# Patient Record
Sex: MALE | Race: WHITE | NOT HISPANIC OR LATINO | Employment: FULL TIME | URBAN - METROPOLITAN AREA
[De-identification: names, ages, dates, MRNs, and addresses within clinical notes are randomized per-mention and may not be internally consistent; named-entity substitution may affect disease eponyms.]

---

## 2019-08-26 ENCOUNTER — CONVERSION ENCOUNTER (OUTPATIENT)
Dept: INTERNAL MEDICINE | Facility: CLINIC | Age: 59
End: 2019-08-26

## 2019-08-26 ENCOUNTER — OFFICE VISIT CONVERTED (OUTPATIENT)
Dept: INTERNAL MEDICINE | Facility: CLINIC | Age: 59
End: 2019-08-26
Attending: INTERNAL MEDICINE

## 2019-12-10 ENCOUNTER — HOSPITAL ENCOUNTER (OUTPATIENT)
Dept: OTHER | Facility: HOSPITAL | Age: 59
Setting detail: RECURRING SERIES
Discharge: HOME | End: 2020-01-13
Attending: NURSE PRACTITIONER

## 2020-02-26 ENCOUNTER — OFFICE VISIT CONVERTED (OUTPATIENT)
Dept: INTERNAL MEDICINE | Facility: CLINIC | Age: 60
End: 2020-02-26
Attending: INTERNAL MEDICINE

## 2020-02-26 ENCOUNTER — HOSPITAL ENCOUNTER (OUTPATIENT)
Dept: OTHER | Facility: HOSPITAL | Age: 60
Discharge: HOME | End: 2020-02-26
Attending: INTERNAL MEDICINE

## 2020-02-26 LAB
ALBUMIN SERPL-MCNC: 4.4 G/DL (ref 3.5–5)
ALBUMIN/GLOB SERPL: 1.5 {RATIO} (ref 1.4–2.6)
ALP SERPL-CCNC: 56 U/L (ref 56–119)
ALT SERPL-CCNC: 27 U/L (ref 10–40)
ANION GAP SERPL CALC-SCNC: 19 MMOL/L (ref 8–19)
AST SERPL-CCNC: 27 U/L (ref 15–50)
BASOPHILS # BLD AUTO: 0.02 10*3/UL (ref 0–0.2)
BASOPHILS NFR BLD AUTO: 0.4 % (ref 0–3)
BILIRUB SERPL-MCNC: 0.37 MG/DL (ref 0.2–1.3)
BUN SERPL-MCNC: 19 MG/DL (ref 5–25)
BUN/CREAT SERPL: 20 {RATIO} (ref 6–20)
CALCIUM SERPL-MCNC: 9 MG/DL (ref 8.7–10.4)
CHLORIDE SERPL-SCNC: 97 MMOL/L (ref 99–111)
CHOLEST SERPL-MCNC: 206 MG/DL (ref 107–200)
CHOLEST/HDLC SERPL: 5 {RATIO} (ref 3–6)
CONV ABS IMM GRAN: 0.01 10*3/UL (ref 0–0.2)
CONV CO2: 25 MMOL/L (ref 22–32)
CONV IMMATURE GRAN: 0.2 % (ref 0–1.8)
CONV TOTAL PROTEIN: 7.4 G/DL (ref 6.3–8.2)
CREAT UR-MCNC: 0.97 MG/DL (ref 0.7–1.2)
DEPRECATED RDW RBC AUTO: 48.3 FL (ref 35.1–43.9)
EOSINOPHIL # BLD AUTO: 0.15 10*3/UL (ref 0–0.7)
EOSINOPHIL # BLD AUTO: 2.9 % (ref 0–7)
ERYTHROCYTE [DISTWIDTH] IN BLOOD BY AUTOMATED COUNT: 13.1 % (ref 11.6–14.4)
GFR SERPLBLD BASED ON 1.73 SQ M-ARVRAT: >60 ML/MIN/{1.73_M2}
GLOBULIN UR ELPH-MCNC: 3 G/DL (ref 2–3.5)
GLUCOSE SERPL-MCNC: 91 MG/DL (ref 70–99)
HCT VFR BLD AUTO: 51.3 % (ref 42–52)
HDLC SERPL-MCNC: 41 MG/DL (ref 40–60)
HGB BLD-MCNC: 16.2 G/DL (ref 14–18)
LDLC SERPL CALC-MCNC: 127 MG/DL (ref 70–100)
LYMPHOCYTES # BLD AUTO: 2.16 10*3/UL (ref 1–5)
LYMPHOCYTES NFR BLD AUTO: 41.9 % (ref 20–45)
MCH RBC QN AUTO: 31.5 PG (ref 27–31)
MCHC RBC AUTO-ENTMCNC: 31.6 G/DL (ref 33–37)
MCV RBC AUTO: 99.6 FL (ref 80–96)
MONOCYTES # BLD AUTO: 0.54 10*3/UL (ref 0.2–1.2)
MONOCYTES NFR BLD AUTO: 10.5 % (ref 3–10)
NEUTROPHILS # BLD AUTO: 2.27 10*3/UL (ref 2–8)
NEUTROPHILS NFR BLD AUTO: 44.1 % (ref 30–85)
NRBC CBCN: 0 % (ref 0–0.7)
OSMOLALITY SERPL CALC.SUM OF ELEC: 286 MOSM/KG (ref 273–304)
PLATELET # BLD AUTO: 176 10*3/UL (ref 130–400)
PMV BLD AUTO: 10.4 FL (ref 9.4–12.4)
POTASSIUM SERPL-SCNC: 4.3 MMOL/L (ref 3.5–5.3)
RBC # BLD AUTO: 5.15 10*6/UL (ref 4.7–6.1)
SODIUM SERPL-SCNC: 137 MMOL/L (ref 135–147)
TRIGL SERPL-MCNC: 189 MG/DL (ref 40–150)
TSH SERPL-ACNC: 2.09 M[IU]/L (ref 0.27–4.2)
VLDLC SERPL-MCNC: 38 MG/DL (ref 5–37)
WBC # BLD AUTO: 5.15 10*3/UL (ref 4.8–10.8)

## 2020-03-10 ENCOUNTER — OFFICE VISIT CONVERTED (OUTPATIENT)
Dept: ORTHOPEDIC SURGERY | Facility: CLINIC | Age: 60
End: 2020-03-10
Attending: ORTHOPAEDIC SURGERY

## 2020-08-27 ENCOUNTER — OFFICE VISIT CONVERTED (OUTPATIENT)
Dept: INTERNAL MEDICINE | Facility: CLINIC | Age: 60
End: 2020-08-27
Attending: NURSE PRACTITIONER

## 2020-09-08 ENCOUNTER — OFFICE VISIT CONVERTED (OUTPATIENT)
Dept: ORTHOPEDIC SURGERY | Facility: CLINIC | Age: 60
End: 2020-09-08
Attending: ORTHOPAEDIC SURGERY

## 2020-09-25 ENCOUNTER — HOSPITAL ENCOUNTER (OUTPATIENT)
Dept: MRI IMAGING | Facility: HOSPITAL | Age: 60
Discharge: HOME | End: 2020-09-25
Attending: ORTHOPAEDIC SURGERY

## 2020-10-01 ENCOUNTER — OFFICE VISIT CONVERTED (OUTPATIENT)
Dept: ORTHOPEDIC SURGERY | Facility: CLINIC | Age: 60
End: 2020-10-01
Attending: ORTHOPAEDIC SURGERY

## 2021-02-24 ENCOUNTER — OFFICE VISIT CONVERTED (OUTPATIENT)
Dept: INTERNAL MEDICINE | Facility: CLINIC | Age: 61
End: 2021-02-24
Attending: PHYSICIAN ASSISTANT

## 2021-02-24 ENCOUNTER — HOSPITAL ENCOUNTER (OUTPATIENT)
Dept: OTHER | Facility: HOSPITAL | Age: 61
Discharge: HOME | End: 2021-02-24
Attending: PHYSICIAN ASSISTANT

## 2021-02-24 ENCOUNTER — CONVERSION ENCOUNTER (OUTPATIENT)
Dept: INTERNAL MEDICINE | Facility: CLINIC | Age: 61
End: 2021-02-24

## 2021-03-15 ENCOUNTER — CONVERSION ENCOUNTER (OUTPATIENT)
Dept: INTERNAL MEDICINE | Facility: CLINIC | Age: 61
End: 2021-03-15

## 2021-03-15 ENCOUNTER — OFFICE VISIT CONVERTED (OUTPATIENT)
Dept: INTERNAL MEDICINE | Facility: CLINIC | Age: 61
End: 2021-03-15
Attending: INTERNAL MEDICINE

## 2021-03-15 ENCOUNTER — HOSPITAL ENCOUNTER (OUTPATIENT)
Dept: OTHER | Facility: HOSPITAL | Age: 61
Discharge: HOME | End: 2021-03-15
Attending: INTERNAL MEDICINE

## 2021-03-15 LAB
APPEARANCE UR: CLEAR
BILIRUB UR QL STRIP: NEGATIVE
BILIRUB UR QL: NEGATIVE
COLOR UR: YELLOW
COLOR UR: YELLOW
CONV CLARITY OF URINE: CLEAR
CONV COLLECTION SOURCE (UA): NORMAL
CONV PROTEIN IN URINE BY AUTOMATED TEST STRIP: NEGATIVE
CONV UROBILINOGEN IN URINE BY AUTOMATED TEST STRIP: 1 {EHRLICHU}/DL (ref 0.1–1)
CONV UROBILINOGEN IN URINE BY AUTOMATED TEST STRIP: NORMAL
GLUCOSE UR QL: NEGATIVE
GLUCOSE UR QL: NEGATIVE MG/DL
HGB UR QL STRIP: NEGATIVE
HGB UR QL STRIP: NORMAL
KETONES UR QL STRIP: NEGATIVE
KETONES UR QL STRIP: NEGATIVE MG/DL
LEUKOCYTE ESTERASE UR QL STRIP: NEGATIVE
LEUKOCYTE ESTERASE UR QL STRIP: NEGATIVE
NITRITE UR QL STRIP: NEGATIVE
NITRITE UR QL STRIP: NEGATIVE
PH UR STRIP.AUTO: 6 [PH]
PH UR STRIP.AUTO: 6 [PH] (ref 5–8)
PROT UR QL: NEGATIVE MG/DL
SP GR UR: 1.02
SP GR UR: 1.02 (ref 1–1.03)

## 2021-03-16 ENCOUNTER — HOSPITAL ENCOUNTER (OUTPATIENT)
Dept: GENERAL RADIOLOGY | Facility: HOSPITAL | Age: 61
Discharge: HOME | End: 2021-03-16
Attending: INTERNAL MEDICINE

## 2021-03-16 LAB
CREAT BLD-MCNC: 0.9 MG/DL (ref 0.6–1.4)
GFR SERPLBLD BASED ON 1.73 SQ M-ARVRAT: >60 ML/MIN/{1.73_M2}

## 2021-03-17 LAB — BACTERIA UR CULT: NORMAL

## 2021-04-07 ENCOUNTER — OFFICE VISIT CONVERTED (OUTPATIENT)
Dept: INTERNAL MEDICINE | Facility: CLINIC | Age: 61
End: 2021-04-07
Attending: INTERNAL MEDICINE

## 2021-05-13 NOTE — PROGRESS NOTES
"   Progress Note      Patient Name: Stephon Castellano   Patient ID: [id number]   Sex: Male   YOB: 1960    Primary Care Provider: Paige Florez MD    Visit Date: August 27, 2020    Provider: NARENDRA Hayden   Location: [facility]   Location Address: [address] [place] [address], [facility]  [place], [place]  [id number]   Location Phone: [phone]          Chief Complaint  · \"dizzy, congestion, runny nose      History Of Present Illness  Stephon Castellano is a 59 year old /White male who presents for evaluation and treatment of:      Acute visit     Patient reports congestion, runny nose, fever, sinus pressure, headaches in the frontal   Sudafed when he is having congestion.   Taking flonase, needs refill.   Tmax 99.4  Denies cough, shortness of breath    Denies sick exposure.     Eating and drinking well.    Patient also concerned that he may be a diabetic.  He was told that he had a rash that was similar to a diabetic rash.  Patient had labs back in February that were reviewed with the patient.    Patient would also like to discuss something for attention deficit.                Past Medical History  Disease Name Date Onset Notes   Allergic rhinitis --  --    Arthritis --  --    Cervical neck pain with evidence of disc disease --  --    Depression, unspecified depression type --  --    Head injury 1977 --    Hemorrhoid --  --    Hypogonadism in male --  --          Past Surgical History  Procedure Name Date Notes   ACL repair 2004 --    Cervical disc surgery 2008 --    Heel Spur 2005 --    Metal implants --  --    Pain Pump 2010 --          Medication List  Name Date Started Instructions   Depo-Testosterone 100 mg/mL intramuscular oil  inject 0.75 milliliter by intramuscular route every 2 weeks   Dilaudid oral  in pain pump   Flonase Allergy Relief 50 mcg/actuation nasal spray,suspension 08/27/2020 spray 2 sprays (100 mcg) in each nostril by intranasal route once daily   ibuprofen " 200 mg oral tablet 08/27/2020 take 2 tablets (400 mg) by oral route 3 times per day with food   metaxalone 800 mg oral tablet  take 1 tablet by oral route 3 times a day as needed   tramadol 50 mg oral tablet  take 1 tablet (50 mg) by oral route every 6 hours as needed   trazodone 50 mg oral tablet 08/26/2019 take 1 tablet (50 mg) by oral route once daily at bedtime for 90 days   Tylenol 325 mg oral tablet 08/27/2020 take 1 tablet (325 mg) by oral route every 4 hours as needed   Zoloft 50 mg oral tablet  take 1 tablet (50 mg) by oral route once daily         Allergy List  Allergen Name Date Reaction Notes   NO KNOWN DRUG ALLERGIES --  --  --    NO KNOWN DRUG ALLERGIES --  --  --          Family Medical History  Disease Name Relative/Age Notes   Stroke  aunt/uncle grandparents   Heart Disease Mother/   Mother   Cancer, Unspecified Father/   Father   Diabetes, unspecified type Sister/   Sister   Prostate cancer Father/   --    Diabetes  brother/sister grandparents   Osteoporosis Mother/   Mother   Family history of Arthritis Sister/   Sister         Social History  Finding Status Start/Stop Quantity Notes   Alcohol Use Current some day --/-- --  rarely drinks, has been drinking for 31 or more years   lives with spouse --  --/-- --  --    . --  --/-- --  --    Recreational Drug Use Never --/-- --  no   Tobacco Former --/-- --  former smoker  2 packs per day, smoked for 8 years quit 1990   Working --  --/-- --  --          Immunizations  NameDate Admin Mfg Trade Name Lot Number Route Inj VIS Given VIS Publication   Ixdaxhczo76/01/2019 SKB Fluarix, quadrivalent, preservative free  NE NE 10/01/2019    Comments: given at prev. PCP per pt         Review of Systems  · Constitutional  o Admits  o : fever  o Denies  o : fatigue, weight loss, weight gain  · HENT  o Admits  o : headaches, vertigo, lightheadedness, sinus pain, ear fullness  o Denies  o : nasal congestion, nasal discharge, sore throat, ear  "pain  · Cardiovascular  o Denies  o : lower extremity edema, claudication, chest pressure, palpitations  · Respiratory  o Denies  o : shortness of breath, wheezing, cough, hemoptysis, dyspnea on exertion  · Gastrointestinal  o Denies  o : nausea, vomiting, diarrhea, constipation, abdominal pain  · Musculoskeletal  o Admits  o : joint pain, wrist pain      Vitals  Date Time BP Position Site L\R Cuff Size HR RR TEMP (F) WT  HT  BMI kg/m2 BSA m2 O2 Sat HC       02/26/2020 03:29 /78 Sitting    68 - R  98.8 232lbs 4oz 5'  11\" 32.39 2.3 93 %    03/10/2020 03:29 PM       16  231lbs 16oz 5'  11\" 32.36 2.3     08/27/2020 03:19 PM      95 - R  99.5 238lbs 2oz 5'  11\" 33.21 2.33 81 %          Physical Examination  · Constitutional  o Appearance  o : no acute distress, well-nourished  · Head and Face  o Head  o :   § Inspection  § : atraumatic, normocephalic  · Eyes  o Eyes  o : extraocular movements intact, no scleral icterus, no conjunctival injection  · Ears, Nose, Mouth and Throat  o Ears  o :   § External Ears  § : normal  § Otoscopic Examination  § : fluid bubbles present behind tympanic membrane, Left TM injected, Bilateral TM effusion  o Nose  o :   § Intranasal Exam  § : nares patent, frontal sinus pain with palpation  o Oral Cavity  o :   § Oral Mucosa  § : moist mucous membranes  o Throat  o :   § Oropharynx  § : oropharynx inflammation present, tonsils within normal limits  · Respiratory  o Respiratory Effort  o : breathing comfortably, symmetric chest rise  o Auscultation of Lungs  o : clear to asculatation bilaterally, no wheezes, rales, or rhonchii  · Cardiovascular  o Heart  o :   § Auscultation of Heart  § : regular rate and rhythm, no murmurs, rubs, or gallops  o Peripheral Vascular System  o :   § Extremities  § : no edema  · Gastrointestinal  o Abdominal Examination  o :   § Abdomen  § : bowel sounds present, non-distended, non-tender  · Lymphatic  o Neck  o : no lymphadenopathy " present  o Supraclavicular Nodes  o : no supraclavicular nodes  · Skin and Subcutaneous Tissue  o General Inspection  o : no lesions present, no areas of discoloration, skin turgor normal  · Neurologic  o Mental Status Examination  o :   § Orientation  § : grossly oriented to person, place and time  o Gait and Station  o :   § Gait Screening  § : normal gait  · Psychiatric  o General  o : normal mood and affect  · Right Wrist  o Inspection  o : no deformity, no scarring, no redness, no swelling  o Palpation  o : radial styloid non tender, scaphoid non tender, CMC joint non tender, Listeis tubercle non tender, ulnar styloid non tender, pisiform non tender, hook of hammate non tender, non-tender to palpation  o Range of Motion  o : flexion normal 70-90 degrees, extension normal 75-90 degrees, pronation normal 80 degrees, supination normal 80 degrees, radial deviation normal 10-30 degrees, ulnar deviation normal 10-30 degrees  o Neurovascular  o : radial pulse present, ulnar pulse present, neurovascularly intact  o Special Tests  o : positive Finkelsteins, Phalen's positive          Assessment  · Allergic rhinitis     477.9/J30.9  Refill placed for Flonase and added Zyrtec for allergy relief.  · Arthritis     V13.4/V13.4  · Screening for lipid disorders     V77.91/Z13.220  · Sinusitis, acute     461.9/J01.90  Patient prescribed Augmentin and Mucinex at this time for sinus infection. Educated on supportive care to include rest, fluids, Tylenol and Motrin as needed. Educated patient to call or return to clinic with any new or worsening symptoms.  · Wrist pain     719.43/M25.539  Will refer to orthopedics per patient request.  · Screening for diabetes mellitus     V77.1/Z13.1       Discussed the patient's interest in ADHD medicine will consult with Dr. Florez and discussed with patient.  Patient will be set up for a phone call or telehealth visit to discuss.     Problems Reconciled  Plan  · Orders  o CBC with Auto Diff  [facility] ([id number]) - V13.4/V13.4, 461.9/J01.90, V77.91/Z13.220, V77.1/Z13.1 - 08/27/2020  o CMP [facility] ([id number]) - V13.4/V13.4, 477.9/J30.9, V77.91/Z13.220, V77.1/Z13.1 - 08/27/2020  o Hgb A1c [facility] ([id number]) - V13.4/V13.4, 477.9/J30.9, V77.91/Z13.220, V77.1/Z13.1 - 08/27/2020  o Lipid Panel [facility] ([id number]) - V77.91/Z13.220 - 08/27/2020  o TSH [facility] ([id number]) - 719.43/M25.539, 477.9/J30.9, 461.9/J01.90, V77.91/Z13.220 - 08/27/2020  o ACO-39: Current medications updated and reviewed () - - 08/27/2020  o ORTHOPEDIC CONSULTATION (ORTHO) - 719.43/M25.539 - 08/27/2020  · Medications  o Zyrtec 10 mg oral tablet   SIG: take 1 tablet (10 mg) by oral route once daily for 30 days   DISP: (30) tablets with 3 refills  Prescribed on 08/27/2020     o Augmentin 875-125 mg oral tablet   SIG: take 1 tablet by oral route every 12 hours for 10 days   DISP: (20) tablets with 0 refills  Prescribed on 08/27/2020     o Flonase Allergy Relief 50 mcg/actuation nasal spray,suspension   SIG: spray 2 sprays (100 mcg) in each nostril by intranasal route once daily   DISP: (1) 9.9 ml aer w/adap with 5 refills  Refilled on 08/27/2020     o ibuprofen 200 mg oral tablet   SIG: take 2 tablets (400 mg) by oral route 3 times per day with food   DISP: (300) tablets with 5 refills  Refilled on 08/27/2020     o Tylenol 325 mg oral tablet   SIG: take 1 tablet (325 mg) by oral route every 4 hours as needed   DISP: (180) tablets with 5 refills  Refilled on 08/27/2020     o Medications have been Reconciled  o Transition of Care or Provider Policy  · Instructions  o Patient was educated/instructed on their diagnosis, treatment and medications prior to discharge from the clinic today.  o Call the office with any concerns or questions.  · Disposition  o Call or Return if symptoms worsen or persist.  o Meds sent to pharmacy  o As Needed for Follow Up  o Care Transition  o LUCIANO Sent            Electronically Signed by: Nadya Mandel APRN -Author on August 27, 2020 04:02:25 PM

## 2021-05-13 NOTE — PROGRESS NOTES
Progress Note      Patient Name: Stephon Castellano   Patient ID: [id number]   Sex: Male   YOB: 1960    Primary Care Provider: Paige Florez MD    Visit Date: September 8, 2020    Provider: Jose Alejandro Carson MD   Location: [facility]   Location Address: [address] [place] [address]  [id number]   Location Phone: [phone]          Chief Complaint  · Right wrist pain      History Of Present Illness  Stephon Castellano is a 59 year old /White male who presents today to [facility].      The patient presents here today for follow up for right wrist pain.  We previously seen the patient for right wrist pain back in March 2020. We recommended the patient have a MRI of the right wrist. The patient is here today because he couldn't have his MRI due to his pain pump.  Patient has previously had MRIs in the past without any issues.       Past Medical History  Allergic rhinitis; Arthritis; Cervical neck pain with evidence of disc disease; Depression, unspecified depression type; Head injury; Hemorrhoid; Hypogonadism in male         Past Surgical History  ACL repair; Cervical disc surgery; Heel Spur; Metal implants; Pain Pump         Medication List  Augmentin 875-125 mg oral tablet; Depo-Testosterone 100 mg/mL intramuscular oil; Dilaudid oral; Flonase Allergy Relief 50 mcg/actuation nasal spray,suspension; ibuprofen 200 mg oral tablet; metaxalone 800 mg oral tablet; tramadol 50 mg oral tablet; trazodone 50 mg oral tablet; Tylenol 325 mg oral tablet; Zoloft 50 mg oral tablet; Zyrtec 10 mg oral tablet         Allergy List  NO KNOWN DRUG ALLERGIES; NO KNOWN DRUG ALLERGIES       Allergies Reconciled  Family Medical History  Stroke; Heart Disease; Cancer, Unspecified; Diabetes, unspecified type; Prostate cancer; Diabetes; Osteoporosis; Family history of Arthritis         Social History  Alcohol Use (Current some day); lives with spouse; .; Recreational Drug Use (Never);  "Tobacco (Former); Working         Review of Systems  · Constitutional  o Denies  o : fever, chills, weight loss  · Cardiovascular  o Denies  o : chest pain, shortness of breath  · Gastrointestinal  o Denies  o : liver disease, heartburn, nausea, blood in stools  · Genitourinary  o Denies  o : painful urination, blood in urine  · Integument  o Admits  o : itching  o Denies  o : rash  · Neurologic  o Denies  o : headache, weakness, loss of consciousness  · Musculoskeletal  o Admits  o : painful, swollen joints  · Psychiatric  o Admits  o : depression  o Denies  o : drug/alcohol addiction, anxiety      Vitals  Date Time BP Position Site L\R Cuff Size HR RR TEMP (F) WT  HT  BMI kg/m2 BSA m2 O2 Sat HC       09/08/2020 03:37 PM         240lbs 0oz 5'  11\" 33.47 2.34           Physical Examination  · Constitutional  o Appearance  o : well developed, well-nourished, no obvious deformities present  · Head and Face  o Head  o :   § Inspection  § : normocephalic  o Face  o :   § Inspection  § : no facial lesions  · Eyes  o Conjunctivae  o : conjunctivae normal  o Sclerae  o : sclerae white  · Ears, Nose, Mouth and Throat  o Ears  o :   § External Ears  § : appearance within normal limits  § Hearing  § : intact  o Nose  o :   § External Nose  § : appearance normal  · Neck  o Inspection/Palpation  o : normal appearance  o Range of Motion  o : full range of motion  · Respiratory  o Respiratory Effort  o : breathing unlabored  o Inspection of Chest  o : normal appearance  o Auscultation of Lungs  o : no audible wheezing or rales  · Cardiovascular  o Heart  o : regular rate  · Gastrointestinal  o Abdominal Examination  o : soft and non-tender  · Skin and Subcutaneous Tissue  o General Inspection  o : intact, no rashes  · Psychiatric  o General  o : Alert and oriented x3  o Judgement and Insight  o : judgment and insight intact  o Mood and Affect  o : mood normal, affect appropriate  · Right Wrist  o Inspection  o : Skin intact, no " swelling, no redness, no ecchymosis or deformity. Patient has tenderness to palpation of distal ulna. No pain with range of motion and patient has full range of motion with flexion, extension, radial and ulnar deviation, pronation and supination. Normal neurovascular exam   · Injection Note/Aspiration Note  o Site  o : Right wrist   o Procedure  o : Procedure: After educating the patient, patient gave consent for procedure. After using Chloraprep, the joint space was injected. The patient tolerated the procedure well.  o Medication  o : 80 mg of DepoMedrol with 1cc of 1% Lidocaine              Assessment  · Right ulna sided wrist pain     719.43/M25.531      Plan  · Orders  o Depo-Medrol injection 80mg () - - 09/08/2020   Lot [id number] Exp 07 2021 [employer] Administered by CHARLY HARDWICK MD  o Arthrocentesis of wrist ([id number]) - - 09/08/2020   Lot [id number] Exp 08 01 2021 [employer] Administered by CHARLY HARDWICK MD  · Medications  o Medications have been Reconciled  o Transition of Care or Provider Policy  · Instructions  o Reviewed the patient's Past Medical, Social, and Family history as well as the ROS at today's visit, no changes.  o Call or return if worsening symptoms.  o Follow up after MRI.  o The above service was scribed by Demi Castillo on my behalf and I attest to the accuracy of the note. jsb  o Dr. Hardwick called and spoke to Dr. Vick at [facility] about the patients pain pump. Dr Vick looked did some research and okd the patient to get scheduled for an MRI. Plan for MRI and repeat injection today. The patient will follow up after the MRI for results.   o Electronically Identified Patient Education Materials Provided Electronically  · Referrals  o ID: [id number] Date: 03/10/2020 Type: Inbound  Specialty: Orthopedic Surgery            Electronically Signed by: Demi Castillo MA -Author on September 9, 2020 03:53:48 PM  Electronically Co-signed by: Charly Hardwick MD  -Reviewer on September 9, 2020 10:11:39 PM

## 2021-05-13 NOTE — PROGRESS NOTES
Progress Note      Patient Name: Stephon Castellano   Patient ID: [id number]   Sex: Male   YOB: 1960    Primary Care Provider: Paige Florez MD    Visit Date: October 1, 2020    Provider: Jose Alejandro Carson MD   Location: [facility]   Location Address: [address] [place] [address]  [id number]   Location Phone: [phone]          Chief Complaint  · right wrist pain      History Of Present Illness  Stephon Castellano is a 60 year old /White male who presents today to [facility].      The patient presents here today for follow up evaluation of right wrist pain.  The patient states the injection did not give him any relief. He has had pain for about 10 years. He has been doing conservative treatment for this length of time. 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 We previously ordered an MRI on his right wrist and he is here today to follow up for those results.             Past Medical History  Allergic rhinitis; Arthritis; Cervical neck pain with evidence of disc disease; Depression, unspecified depression type; Head injury; Hemorrhoid; Hypogonadism in male         Past Surgical History  ACL repair; Cervical disc surgery; Heel Spur; Metal implants; Pain Pump         Medication List  Augmentin 875-125 mg oral tablet; Depo-Testosterone 100 mg/mL intramuscular oil; Dilaudid oral; Flonase Allergy Relief 50 mcg/actuation nasal spray,suspension; ibuprofen 200 mg oral tablet; metaxalone 800 mg oral tablet; tramadol 50 mg oral tablet; trazodone 50 mg oral tablet; Tylenol 325 mg oral tablet; Zoloft 50 mg oral tablet; Zyrtec 10 mg oral tablet         Allergy List  NO KNOWN DRUG ALLERGIES; NO KNOWN DRUG ALLERGIES       Allergies Reconciled  Family Medical History  Stroke; Heart Disease; Cancer, Unspecified; Diabetes, unspecified type; Prostate cancer; Diabetes; Osteoporosis; Family history of Arthritis         Social History  Alcohol Use (Current some day); lives with  "spouse; .; Recreational Drug Use (Never); Tobacco (Former); Working         Review of Systems  · Constitutional  o Denies  o : fever, chills, weight loss  · Cardiovascular  o Denies  o : chest pain, shortness of breath  · Gastrointestinal  o Denies  o : liver disease, heartburn, nausea, blood in stools  · Genitourinary  o Denies  o : painful urination, blood in urine  · Integument  o Denies  o : rash, itching  · Neurologic  o Denies  o : headache, weakness, loss of consciousness  · Musculoskeletal  o Denies  o : painful, swollen joints  · Psychiatric  o Denies  o : drug/alcohol addiction, anxiety, depression      Vitals  Date Time BP Position Site L\R Cuff Size HR RR TEMP (F) WT  HT  BMI kg/m2 BSA m2 O2 Sat FR L/min FiO2        10/01/2020 03:17 PM      71 - R   239lbs 0oz 5'  11\" 33.33 2.33 95 %            Physical Examination  · Constitutional  o Appearance  o : well developed, well-nourished, no obvious deformities present  · Head and Face  o Head  o :   § Inspection  § : normocephalic  o Face  o :   § Inspection  § : no facial lesions  · Eyes  o Conjunctivae  o : conjunctivae normal  o Sclerae  o : sclerae white  · Ears, Nose, Mouth and Throat  o Ears  o :   § External Ears  § : appearance within normal limits  § Hearing  § : intact  o Nose  o :   § External Nose  § : appearance normal  · Neck  o Inspection/Palpation  o : normal appearance  o Range of Motion  o : full range of motion  · Respiratory  o Respiratory Effort  o : breathing unlabored  o Inspection of Chest  o : normal appearance  o Auscultation of Lungs  o : no audible wheezing or rales  · Cardiovascular  o Heart  o : regular rate  · Gastrointestinal  o Abdominal Examination  o : soft and non-tender  · Skin and Subcutaneous Tissue  o General Inspection  o : intact, no rashes  · Psychiatric  o General  o : Alert and oriented x3  o Judgement and Insight  o : judgment and insight intact  o Mood and Affect  o : mood normal, affect " appropriate  · Right Wrist  o Inspection  o : Skin intact, no swelling, no redness, no ecchymosis or deformity. Patient has tenderness to palpation of distal ulna. No pain with range of motion and patient has full range of motion with flexion, extension, radial and ulnar deviation, pronation and supination. Normal neurovascular exam  · Imaging  o Imaging  o : [facility] MRI 09/2020: 1. Tendinosis and tenosynovitis of the extensor carpi ulnaris tendon. No evidence of a focal tear. 2. Mild osteoarthritis. 3. 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           Assessment  · Right wrist pain     719.43/M25.531  · TFCC (triangular fibrocartilage complex) injury     718.03/S69.80XA  · Right wrist tendonitis     727.05/M77.8      Plan  · Medications  o Medications have been Reconciled  o Transition of Care or Provider Policy  · Instructions  o MRI reviewed by Dr. Carson.  o Reviewed the patient's Past Medical, Social, and Family history as well as the ROS at today's visit, no changes.  o Call or return if worsening symptoms.  o Follow Up PRN.  o The above service was scribed by Demi Castillo on my behalf and I attest to the accuracy of the note. jsb  o Discussed treatment options with the patient. Due to failed conservative treatment for so many years, plan for a referral to Dr. Hoover in [place]   o Electronically Identified Patient Education Materials Provided Electronically  · Disposition  o Care Transition  o LUCIANO Sent            Electronically Signed by: Demi Castillo MA -Author on October 2, 2020 08:30:23 AM  Electronically Co-signed by: Jose Alejandro aCrson MD -Reviewer on October 6, 2020 10:27:14 PM

## 2021-05-14 VITALS — OXYGEN SATURATION: 95 % | HEIGHT: 71 IN | BODY MASS INDEX: 33.46 KG/M2 | HEART RATE: 71 BPM | WEIGHT: 239 LBS

## 2021-05-14 VITALS
WEIGHT: 226 LBS | BODY MASS INDEX: 31.64 KG/M2 | TEMPERATURE: 99.1 F | SYSTOLIC BLOOD PRESSURE: 126 MMHG | HEIGHT: 71 IN | OXYGEN SATURATION: 98 % | HEART RATE: 76 BPM | DIASTOLIC BLOOD PRESSURE: 82 MMHG

## 2021-05-14 VITALS
DIASTOLIC BLOOD PRESSURE: 82 MMHG | BODY MASS INDEX: 31.27 KG/M2 | SYSTOLIC BLOOD PRESSURE: 138 MMHG | WEIGHT: 223.37 LBS | HEART RATE: 87 BPM | HEIGHT: 71 IN | RESPIRATION RATE: 16 BRPM | OXYGEN SATURATION: 97 % | TEMPERATURE: 99.1 F

## 2021-05-14 VITALS
OXYGEN SATURATION: 95 % | HEIGHT: 71 IN | SYSTOLIC BLOOD PRESSURE: 144 MMHG | HEART RATE: 92 BPM | TEMPERATURE: 97.9 F | BODY MASS INDEX: 32.76 KG/M2 | WEIGHT: 234 LBS | DIASTOLIC BLOOD PRESSURE: 82 MMHG

## 2021-05-14 VITALS
BODY MASS INDEX: 33.34 KG/M2 | TEMPERATURE: 99.5 F | HEART RATE: 95 BPM | OXYGEN SATURATION: 81 % | WEIGHT: 238.12 LBS | HEIGHT: 71 IN

## 2021-05-14 VITALS — BODY MASS INDEX: 33.6 KG/M2 | WEIGHT: 240 LBS | HEIGHT: 71 IN

## 2021-05-14 NOTE — PROGRESS NOTES
Progress Note      Patient Name: Stephon Castellano   Patient ID: [id number]   Sex: Male   YOB: 1960    Primary Care Provider: Paige Florez MD    Visit Date: April 7, 2021    Provider: Paige Florez MD   Location: [facility]   Location Address: [address] [place] [address], [address]  [place], [place]  [id number]   Location Phone: [phone]          Chief Complaint  · Low back, right side flank pain x 1month      History Of Present Illness  Stephon Castellano is a 60 year old /White male who presents for evaluation and treatment of:      Last seen 3/15 for flank pain, hematuria. Had CT on 3/16 which showed 1mm non-obstructing kidney stone.   He has been taking Flomax since last visit.     States that he is still experiencing right flank pain, this has improved but is still present.   He denies any fever, painful urination, blood in urine.       He also complains of a cyst in his right earlobe. He states that he has had this before and his doctor at that time removed it in clinic. Cyst is tender, but ear lobe is not red or swollen.       Past Medical History  Disease Name Date Onset Notes   Allergic rhinitis --  --    Arthritis --  --    Cervical neck pain with evidence of disc disease --  --    Depression, unspecified depression type --  --    Head injury 1977 --    Hemorrhoid --  --    Hypogonadism in male --  --          Past Surgical History  Procedure Name Date Notes   ACL repair 2004 --    Cervical disc surgery 2008 --    Heel Spur 2005 --    Metal implants --  --    Pain Pump 2010 --          Medication List  Name Date Started Instructions   Cinnamon 500 mg oral capsule  take 1 capsule by oral route daily   Depo-Testosterone 100 mg/mL intramuscular oil  inject 0.75 milliliter by intramuscular route every 2 weeks   Dilaudid oral  in pain pump   ferrous sulfate 325 mg (65 mg iron) oral tablet 03/01/2021 take 1 tablet (325 mg) by oral route once daily   Flomax 0.4 mg oral  capsule 03/15/2021 take 1 capsule (0.4 mg) by oral route once daily 1/2 hour following the same meal each day   Flonase Allergy Relief 50 mcg/actuation nasal spray,suspension 08/27/2020 spray 2 sprays (100 mcg) in each nostril by intranasal route once daily   ibuprofen 200 mg oral tablet 08/27/2020 take 2 tablets (400 mg) by oral route 3 times per day with food   melatonin 10 mg oral tablet  take 1 tablet by oral route daily   Multivitamin 50 Plus oral tablet  take 1 tablet by oral route daily   trazodone 50 mg oral tablet 08/26/2019 take 1 tablet (50 mg) by oral route once daily at bedtime for 90 days   Tylenol 325 mg oral tablet 08/27/2020 take 1 tablet (325 mg) by oral route every 4 hours as needed   Vitamin C 500 mg oral tablet  take 1 tablet by oral route daily   Zoloft 50 mg oral tablet  take 1 tablet (50 mg) by oral route once daily   Zyrtec 10 mg oral tablet 08/27/2020 take 1 tablet (10 mg) by oral route once daily for 30 days         Allergy List  Allergen Name Date Reaction Notes   NO KNOWN DRUG ALLERGIES --  --  --        Allergies Reconciled  Family Medical History  Disease Name Relative/Age Notes   Stroke  aunt/uncle grandparents   Heart Disease Mother/   Mother   Cancer, Unspecified Father/   Father   Diabetes, unspecified type Sister/   Sister   Prostate cancer Father/   --    Diabetes  brother/sister grandparents   Osteoporosis Mother/   Mother   Family history of Arthritis Sister/   Sister         Social History  Finding Status Start/Stop Quantity Notes   Alcohol Use Current some day --/-- --  rarely drinks, has been drinking for 31 or more years   lives with spouse --  --/-- --  --    . --  --/-- --  --    Recreational Drug Use Never --/-- --  no   Tobacco Former --/-- --  former smoker  2 packs per day, smoked for 8 years quit 1990   Working --  --/-- --  --          Immunizations  NameDate Admin Mfg Trade Name Lot Number Route Inj VIS Given VIS Publication   [id number]/01/2019 CHAYITO  "Fluarix, quadrivalent, preservative free  NE [place] 10/01/2019    Comments: given at prev. PCP per pt         Vitals  Date Time BP Position Site L\R Cuff Size HR RR TEMP (F) WT  HT  BMI kg/m2 BSA m2 O2 Sat FR L/min FiO2 HC       02/24/2021 04:19 /82 Sitting    92 - R  97.9 233lbs 16oz 5'  11\" 32.64 2.31 95 %  21%    03/15/2021 04:06 /82 Sitting    87 - R 16 99.1 223lbs 6oz 5'  11\" 31.15 2.25 97 %  21%    04/07/2021 03:25 /82 Sitting    76 - R  99.1 226lbs 0oz 5'  11\" 31.52 2.27 98 %  21%          Physical Examination  · Constitutional  o Appearance  o : no acute distress, well-nourished  · Head and Face  o Head  o :   § Inspection  § : atraumatic, normocephalic  · Eyes  o Eyes  o : extraocular movements intact, no scleral icterus, no conjunctival injection  · Ears, Nose, Mouth and Throat  o Ears  o :   § External Ears  § : normal  o Nose  o :   § Intranasal Exam  § : nares patent  o Oral Cavity  o :   § Oral Mucosa  § : moist mucous membranes  · Respiratory  o Respiratory Effort  o : breathing comfortably, symmetric chest rise  o Auscultation of Lungs  o : clear to asculatation bilaterally, no wheezes, rales, or rhonchii  · Cardiovascular  o Heart  o :   § Auscultation of Heart  § : regular rate and rhythm, no murmurs, rubs, or gallops  o Peripheral Vascular System  o :   § Extremities  § : no edema  · Gastrointestinal  o Abdominal Examination  o :   § Abdomen  § : bowel sounds present, non-distended, non-tender  · Skin and Subcutaneous Tissue  o General Inspection  o : 3mm nodule in right earlobe that feels as though it may be fluid filled. no over lying redness or swelling.   · Neurologic  o Mental Status Examination  o :   § Orientation  § : grossly oriented to person, place and time  o Gait and Station  o :   § Gait Screening  § : normal gait  · Psychiatric  o General  o : normal mood and affect              Assessment  · Renal calculus     592.0/N20.0  3/16/21 CT result reviewed, 1mm " non-obstructing renal stone.   Continue Flomax  will refer to urology given continued pain and patient concern  · Cyst of skin     706.2/L72.9  will refer to derm for removal given cosmetically sensitive location    Problems Reconciled  Plan  · Orders  o ACO-39: Current medications updated and reviewed (, 1159F) - - 04/07/2021  o UROLOGY CONSULTATION (UROLO) - 592.0/N20.0 - 04/07/2021   1mm non-obstructing stone on CT 3/16/21, still having pain, on flomax daily.  o DERMATOLOGY CONSULTATION (DERMA) - 706.2/L72.9 - 04/07/2021   cystic lesion in right earlobe  · Medications  o Medications have been Reconciled  o Transition of Care or Provider Policy  · Instructions  o Patient was educated/instructed on their diagnosis, treatment and medications prior to discharge from the clinic today.  o Call the office with any concerns or questions.  · Disposition  o follow up as needed  o Referals Placed  o Dr. Mendez Patient  o Care Transition  o LUCIANO Sent            Electronically Signed by: Paige Florez MD -Author on April 8, 2021 03:36:10 PM

## 2021-05-14 NOTE — PROGRESS NOTES
"   Progress Note      Patient Name: Stephon Castellano   Patient ID: [id number]   Sex: Male   YOB: 1960    Primary Care Provider: Paige Florez MD    Visit Date: March 15, 2021    Provider: Rossy Mendez MD   Location: [facility]   Location Address: [address] [place] [address], [facility]  [place], [place]  [id number]   Location Phone: [phone]          Chief Complaint  · low back pain   · right eye floater  · \"I wake up once a night every night for the past week, I have insomnia\"      History Of Present Illness  Stephon Castellano is a 60 year old /White male who presents for evaluation and treatment of:      floaters just in the right eye  less than one week, nothing is making it better or worse  does sit a a computer all day  went to the eye doctor Sat, but they didn't accept his insurance  [facility]    Continues to have flank pain  States it is there all the time but sometimes better and sometimes worse  Increased urinary frequency  No gross blood  No pain with urination    Recent PSA normal per patient report will try to find this lab value       Past Medical History  Disease Name Date Onset Notes   Allergic rhinitis --  --    Arthritis --  --    Cervical neck pain with evidence of disc disease --  --    Depression, unspecified depression type --  --    Head injury 1977 --    Hemorrhoid --  --    Hypogonadism in male --  --          Past Surgical History  Procedure Name Date Notes   ACL repair 2004 --    Cervical disc surgery 2008 --    Heel Spur 2005 --    Metal implants --  --    Pain Pump 2010 --          Medication List  Name Date Started Instructions   Cinnamon 500 mg oral capsule  take 1 capsule by oral route daily   Depo-Testosterone 100 mg/mL intramuscular oil  inject 0.75 milliliter by intramuscular route every 2 weeks   Dilaudid oral  in pain pump   ferrous sulfate 325 mg (65 mg iron) oral tablet 03/01/2021 take 1 tablet (325 mg) by oral route once daily "   Flomax 0.4 mg oral capsule 03/15/2021 take 1 capsule (0.4 mg) by oral route once daily 1/2 hour following the same meal each day   Flonase Allergy Relief 50 mcg/actuation nasal spray,suspension 08/27/2020 spray 2 sprays (100 mcg) in each nostril by intranasal route once daily   ibuprofen 200 mg oral tablet 08/27/2020 take 2 tablets (400 mg) by oral route 3 times per day with food   melatonin 10 mg oral tablet  take 1 tablet by oral route daily   Multivitamin 50 Plus oral tablet  take 1 tablet by oral route daily   trazodone 50 mg oral tablet 08/26/2019 take 1 tablet (50 mg) by oral route once daily at bedtime for 90 days   Tylenol 325 mg oral tablet 08/27/2020 take 1 tablet (325 mg) by oral route every 4 hours as needed   Vitamin C 500 mg oral tablet  take 1 tablet by oral route daily   Zoloft 50 mg oral tablet  take 1 tablet (50 mg) by oral route once daily   Zyrtec 10 mg oral tablet 08/27/2020 take 1 tablet (10 mg) by oral route once daily for 30 days         Allergy List  Allergen Name Date Reaction Notes   NO KNOWN DRUG ALLERGIES --  --  --        Allergies Reconciled  Family Medical History  Disease Name Relative/Age Notes   Stroke  aunt/uncle grandparents   Heart Disease Mother/   Mother   Cancer, Unspecified Father/   Father   Diabetes, unspecified type Sister/   Sister   Prostate cancer Father/   --    Diabetes  brother/sister grandparents   Osteoporosis Mother/   Mother   Family history of Arthritis Sister/   Sister         Social History  Finding Status Start/Stop Quantity Notes   Alcohol Use Current some day --/-- --  rarely drinks, has been drinking for 31 or more years   lives with spouse --  --/-- --  --    . --  --/-- --  --    Recreational Drug Use Never --/-- --  no   Tobacco Former --/-- --  former smoker  2 packs per day, smoked for 8 years quit 1990   Working --  --/-- --  --          Immunizations  NameDate Admin Mfg Trade Name Lot Number Route Inj VIS Given VIS Publication  "  [id number]/01/2019 SKB Fluarix, quadrivalent, preservative free  NE NE 10/01/2019    Comments: given at prev. PCP per pt         Vitals  Date Time BP Position Site L\R Cuff Size HR RR TEMP (F) WT  HT  BMI kg/m2 BSA m2 O2 Sat FR L/min FiO2 HC       10/01/2020 03:17 PM      71 - R   239lbs 0oz 5'  11\" 33.33 2.33 95 %      02/24/2021 04:19 /82 Sitting    92 - R  97.9 233lbs 16oz 5'  11\" 32.64 2.31 95 %  21%    03/15/2021 04:06 /82 Sitting    87 - R 16 99.1 223lbs 6oz 5'  11\" 31.15 2.25 97 %  21%          Physical Examination  · Constitutional  o Appearance  o : no acute distress, well-nourished  · Head and Face  o Head  o :   § Inspection  § : atraumatic, normocephalic  · Eyes  o Eyes  o : extraocular movements intact, no scleral icterus, no conjunctival injection  · Respiratory  o Respiratory Effort  o : breathing comfortably, symmetric chest rise  o Auscultation of Lungs  o : clear to asculatation bilaterally, no wheezes, rales, or rhonchii  · Cardiovascular  o Heart  o :   § Auscultation of Heart  § : regular rate and rhythm, no murmurs, rubs, or gallops  o Peripheral Vascular System  o :   § Extremities  § : no edema  · Neurologic  o Mental Status Examination  o :   § Orientation  § : grossly oriented to person, place and time  o Gait and Station  o :   § Gait Screening  § : normal gait  · Psychiatric  o General  o : normal mood and affect     CVA tenderness right worse than left           Results  · In-Office Procedures  o Lab procedure  § IOP - Urinalysis without Microscopy (Clinitek) [facility] ([id number])   § Color Ur: Yellow   § Clarity Ur: Clear   § Glucose Ur Ql Strip: Negative   § Bilirub Ur Ql Strip: Negative   § Ketones Ur Ql Strip: Negative   § Sp Gr Ur Qn: 1.025   § Hgb Ur Ql Strip: Trace-Intact   § pH Ur-LsCnc: 6.0   § Prot Ur Ql Strip: Negative   § Urobilinogen Ur Strip-mCnc: 1.0 E.U./dL   § Nitrite Ur Ql Strip: Negative   § WBC Est Ur Ql Strip: Negative "       Assessment  · Lumbago     724.2/M54.5  · Frequent urination     788.41/R35.0  · Nocturia     788.43/R35.1  Will try Flomax for BPH-like symptoms as well  · Flank pain     789.09/R10.9  · Hematuria     599.70/R31.9  Will get further imaging to look into kidney stone  Start Flomax  Warned if symptoms worsen at all to call us or go to the ER  · Vision abnormalities     368.9/H53.9  · Floater, vitreous     379.24/H43.399  Will refer to ophthalmology for further work-up    Problems Reconciled  Plan  · Orders  o Urinalysis with Reflex Microscopy ([facility]) (59151) - 724.2/M54.5, 788.41/R35.0 - 03/15/2021  o Urine culture (48244, 33639) - 724.2/M54.5, 788.41/R35.0 - 03/15/2021  o ACO-14: Influenza immunization administered or previously received [facility] () - - 03/15/2021   already administered  o ACO-39: Current medications updated and reviewed (, 1159F) - - 03/15/2021  o CT Abdomen and Pelvis (with and without Contrast) with Bosniak Class and delayed images ([id number]-TA) - 788.43/R35.1, 789.09/R10.9, 599.70/R31.9 - 03/15/2021  o OPHTHALMOLOGY CONSULTATION (OPHTH) - 368.9/H53.9, 379.24/H43.399 - 03/16/2021  · Medications  o Flomax 0.4 mg oral capsule   SIG: take 1 capsule (0.4 mg) by oral route once daily 1/2 hour following the same meal each day   DISP: (90) Capsule with 0 refills  Prescribed on 03/15/2021     o Medications have been Reconciled  o Transition of Care or Provider Policy  · Instructions  o Patient was educated/instructed on their diagnosis, treatment and medications prior to discharge from the clinic today.            Electronically Signed by: Rossy Mendez MD -Author on March 19, 2021 08:11:25 AM

## 2021-05-14 NOTE — PROGRESS NOTES
Progress Note      Patient Name: Stephon Castellano   Patient ID: [id number]   Sex: Male   YOB: 1960    Primary Care Provider: Paige Florez MD    Visit Date: February 24, 2021    Provider: Anny Jo PA-C   Location: [facility]   Location Address: [address] [place] [address], [facility] [address]  [place], [place]  [id number]   Location Phone: [phone]          Chief Complaint  · Acute visit       History Of Present Illness  Stephon Castellano is a 60 year old /White male who presents for evaluation and treatment of:      f/u post covid.  Symptoms typically include diarrhea, body aches, fatigue, SOB, chills, insomnia.  He has also had some lightheadedness.  Pt had + covid dx a month ago.  At that time his symptoms were mild and he recovered within a week.  However, since then he has been having the same symptoms which are happening weekly 1-2 days then resolve.  He has been taking some Dayquil without improvement. No fevers or headaches.  No chest pain.          Past Medical History  Disease Name Date Onset Notes   Allergic rhinitis --  --    Arthritis --  --    Cervical neck pain with evidence of disc disease --  --    Depression, unspecified depression type --  --    Head injury 1977 --    Hemorrhoid --  --    Hypogonadism in male --  --          Past Surgical History  Procedure Name Date Notes   ACL repair 2004 --    Cervical disc surgery 2008 --    Heel Spur 2005 --    Metal implants --  --    Pain Pump 2010 --          Medication List  Name Date Started Instructions   Depo-Testosterone 100 mg/mL intramuscular oil  inject 0.75 milliliter by intramuscular route every 2 weeks   Dilaudid oral  in pain pump   Flonase Allergy Relief 50 mcg/actuation nasal spray,suspension 08/27/2020 spray 2 sprays (100 mcg) in each nostril by intranasal route once daily   ibuprofen 200 mg oral tablet 08/27/2020 take 2 tablets (400 mg) by oral route 3 times per day with food   trazodone 50 mg oral tablet  08/26/2019 take 1 tablet (50 mg) by oral route once daily at bedtime for 90 days   Tylenol 325 mg oral tablet 08/27/2020 take 1 tablet (325 mg) by oral route every 4 hours as needed   Zoloft 50 mg oral tablet  take 1 tablet (50 mg) by oral route once daily   Zyrtec 10 mg oral tablet 08/27/2020 take 1 tablet (10 mg) by oral route once daily for 30 days         Allergy List  Allergen Name Date Reaction Notes   NO KNOWN DRUG ALLERGIES --  --  --    NO KNOWN DRUG ALLERGIES --  --  --        Allergies Reconciled  Family Medical History  Disease Name Relative/Age Notes   Stroke  aunt/uncle grandparents   Heart Disease Mother/   Mother   Cancer, Unspecified Father/   Father   Diabetes, unspecified type Sister/   Sister   Prostate cancer Father/   --    Diabetes  brother/sister grandparents   Osteoporosis Mother/   Mother   Family history of Arthritis Sister/   Sister         Social History  Finding Status Start/Stop Quantity Notes   Alcohol Use Current some day --/-- --  rarely drinks, has been drinking for 31 or more years   lives with spouse --  --/-- --  --    . --  --/-- --  --    Recreational Drug Use Never --/-- --  no   Tobacco Former --/-- --  former smoker  2 packs per day, smoked for 8 years quit 1990   Working --  --/-- --  --          Immunizations  NameDate Admin Mfg Trade Name Lot Number Route Inj VIS Given VIS Publication   [id number]/01/2019 SKB Fluarix, quadrivalent, preservative free  NE [place] 10/01/2019    Comments: given at prev. PCP per pt         Review of Systems  · Constitutional  o Admits  o : fatigue  o Denies  o : fever, weight loss, weight gain  · Cardiovascular  o Denies  o : lower extremity edema, claudication, chest pressure, palpitations  · Respiratory  o Admits  o : shortness of breath  o Denies  o : wheezing, cough, hemoptysis, dyspnea on exertion  · Gastrointestinal  o Admits  o : diarrhea  o Denies  o : nausea, vomiting, constipation, abdominal pain      Vitals  Date Time BP  "Position Site L\R Cuff Size HR RR TEMP (F) WT  HT  BMI kg/m2 BSA m2 O2 Sat FR L/min FiO2 HC       10/01/2020 03:17 PM      71 - R   239lbs 0oz 5'  11\" 33.33 2.33 95 %      02/24/2021 04:19 /82 Sitting    92 - R  97.9 233lbs 16oz 5'  11\" 32.64 2.31 95 %  21%          Physical Examination  · Constitutional  o Appearance  o : no acute distress, well-nourished  · Head and Face  o Head  o :   § Inspection  § : atraumatic, normocephalic  · Eyes  o Eyes  o : extraocular movements intact, no scleral icterus, no conjunctival injection  · Ears, Nose, Mouth and Throat  o Ears  o :   § External Ears  § : normal  § Otoscopic Examination  § : tympanic membrane appearance within normal limits bilaterally  o Nose  o :   § Intranasal Exam  § : nares patent  o Oral Cavity  o :   § Oral Mucosa  § : moist mucous membranes  · Respiratory  o Respiratory Effort  o : breathing comfortably, symmetric chest rise  o Auscultation of Lungs  o : clear to asculatation bilaterally, no wheezes, rales, or rhonchii  · Cardiovascular  o Heart  o :   § Auscultation of Heart  § : regular rate and rhythm, no murmurs, rubs, or gallops  o Peripheral Vascular System  o :   § Extremities  § : no edema  · Lymphatic  o Neck  o : no lymphadenopathy present  · Skin and Subcutaneous Tissue  o General Inspection  o : no lesions present, no areas of discoloration, skin turgor normal  · Neurologic  o Mental Status Examination  o :   § Orientation  § : grossly oriented to person, place and time  o Gait and Station  o :   § Gait Screening  § : normal gait  · Psychiatric  o General  o : normal mood and affect              Assessment  · Diarrhea     787.91/R19.7  · Fatigue     780.79/R53.83  · History of COVID-19     V12.09/Z86.16  Recurrent symptoms most likely post covid. Will check some basic blood work to make sure patient is not anemic, electrolytes are normal or other discrepancies. If symptoms persist could consider sending patient to Post-Covid specality " clinic.   · Dyspnea on exertion     786.09/R06.00  CXR done in office WNL. Will continue to monitor at this time. Most likely due to post viral reaction as well. If symptoms persist, worse or he develops chest pain RTC or go to ER.   · Dizziness     780.4/R42    Problems Reconciled  Plan  · Orders  o CBC with Auto Diff [facility] ([id number]) - V12.09/Z86.16, 786.09/R06.00, 780.79/R53.83 - 02/24/2021  o CMP [facility] ([id number]) - V12.09/Z86.16, 786.09/R06.00, 780.79/R53.83 - 02/24/2021  o ACO-39: Current medications updated and reviewed (1159F, ) - - 02/24/2021  o Chest (AP PA and Lateral) 2 views X-Ray [facility] Preferred View ([id number]) - V12.09/Z86.16, 786.09/R06.00, 780.4/R42 - 02/24/2021   Done in office  o Iron Profile (Iron 65596 TIBC 77712 and Transferrin 64415) (IRONP) - V12.09/Z86.16, 786.09/R06.00, 780.79/R53.83 - 02/24/2021  · Medications  o Medications have been Reconciled  o Transition of Care or Provider Policy  · Instructions  o Patient was educated/instructed on their diagnosis, treatment and medications prior to discharge from the clinic today.  o Patient instructed to seek medical attention urgently for new or worsening symptoms.  o Call the office with any concerns or questions.  · Disposition  o Call or Return if symptoms worsen or persist.  o follow up as needed  o Discussed with Dr. Florez            Electronically Signed by: GAVIN LoganC -Author on February 25, 2021 11:43:28 AM

## 2021-05-15 VITALS
SYSTOLIC BLOOD PRESSURE: 128 MMHG | WEIGHT: 232.25 LBS | HEART RATE: 68 BPM | BODY MASS INDEX: 32.52 KG/M2 | TEMPERATURE: 98.8 F | HEIGHT: 71 IN | DIASTOLIC BLOOD PRESSURE: 78 MMHG | OXYGEN SATURATION: 93 %

## 2021-05-15 VITALS
RESPIRATION RATE: 16 BRPM | BODY MASS INDEX: 30.56 KG/M2 | HEART RATE: 70 BPM | WEIGHT: 218.25 LBS | TEMPERATURE: 98.2 F | SYSTOLIC BLOOD PRESSURE: 102 MMHG | HEIGHT: 71 IN | DIASTOLIC BLOOD PRESSURE: 62 MMHG | OXYGEN SATURATION: 96 %

## 2021-05-15 VITALS — HEIGHT: 71 IN | BODY MASS INDEX: 32.48 KG/M2 | WEIGHT: 232 LBS | RESPIRATION RATE: 16 BRPM

## 2021-07-20 ENCOUNTER — OFFICE VISIT (OUTPATIENT)
Dept: INTERNAL MEDICINE | Facility: CLINIC | Age: 61
End: 2021-07-20

## 2021-07-20 VITALS
HEIGHT: 71 IN | HEART RATE: 90 BPM | OXYGEN SATURATION: 98 % | WEIGHT: 233.2 LBS | DIASTOLIC BLOOD PRESSURE: 86 MMHG | SYSTOLIC BLOOD PRESSURE: 118 MMHG | TEMPERATURE: 98.5 F | BODY MASS INDEX: 32.65 KG/M2

## 2021-07-20 DIAGNOSIS — M54.6 CHRONIC RIGHT-SIDED THORACIC BACK PAIN: Primary | ICD-10-CM

## 2021-07-20 DIAGNOSIS — G89.29 CHRONIC RIGHT-SIDED THORACIC BACK PAIN: Primary | ICD-10-CM

## 2021-07-20 LAB
ALBUMIN SERPL-MCNC: 4.7 G/DL (ref 3.5–5.2)
ALBUMIN/GLOB SERPL: 1.8 G/DL
ALP SERPL-CCNC: 59 U/L (ref 39–117)
ALT SERPL W P-5'-P-CCNC: 25 U/L (ref 1–41)
ANION GAP SERPL CALCULATED.3IONS-SCNC: 10.8 MMOL/L (ref 5–15)
AST SERPL-CCNC: 31 U/L (ref 1–40)
BASOPHILS # BLD AUTO: 0.03 10*3/MM3 (ref 0–0.2)
BASOPHILS NFR BLD AUTO: 0.5 % (ref 0–1.5)
BILIRUB SERPL-MCNC: 0.3 MG/DL (ref 0–1.2)
BUN SERPL-MCNC: 7 MG/DL (ref 8–23)
BUN/CREAT SERPL: 7.4 (ref 7–25)
CALCIUM SPEC-SCNC: 9.1 MG/DL (ref 8.6–10.5)
CHLORIDE SERPL-SCNC: 100 MMOL/L (ref 98–107)
CHOLEST SERPL-MCNC: 223 MG/DL (ref 0–200)
CO2 SERPL-SCNC: 28.2 MMOL/L (ref 22–29)
CREAT SERPL-MCNC: 0.95 MG/DL (ref 0.76–1.27)
DEPRECATED RDW RBC AUTO: 41.9 FL (ref 37–54)
EOSINOPHIL # BLD AUTO: 0.12 10*3/MM3 (ref 0–0.4)
EOSINOPHIL NFR BLD AUTO: 1.9 % (ref 0.3–6.2)
ERYTHROCYTE [DISTWIDTH] IN BLOOD BY AUTOMATED COUNT: 11.5 % (ref 12.3–15.4)
GFR SERPL CREATININE-BSD FRML MDRD: 81 ML/MIN/1.73
GLOBULIN UR ELPH-MCNC: 2.6 GM/DL
GLUCOSE SERPL-MCNC: 91 MG/DL (ref 65–99)
HBA1C MFR BLD: 5.4 % (ref 4.8–5.6)
HCT VFR BLD AUTO: 46.7 % (ref 37.5–51)
HDLC SERPL-MCNC: 48 MG/DL (ref 40–60)
HGB BLD-MCNC: 16.1 G/DL (ref 13–17.7)
IMM GRANULOCYTES # BLD AUTO: 0.01 10*3/MM3 (ref 0–0.05)
IMM GRANULOCYTES NFR BLD AUTO: 0.2 % (ref 0–0.5)
LDLC SERPL CALC-MCNC: 143 MG/DL (ref 0–100)
LDLC/HDLC SERPL: 2.9 {RATIO}
LYMPHOCYTES # BLD AUTO: 2.52 10*3/MM3 (ref 0.7–3.1)
LYMPHOCYTES NFR BLD AUTO: 40.5 % (ref 19.6–45.3)
MCH RBC QN AUTO: 33.6 PG (ref 26.6–33)
MCHC RBC AUTO-ENTMCNC: 34.5 G/DL (ref 31.5–35.7)
MCV RBC AUTO: 97.5 FL (ref 79–97)
MONOCYTES # BLD AUTO: 0.66 10*3/MM3 (ref 0.1–0.9)
MONOCYTES NFR BLD AUTO: 10.6 % (ref 5–12)
NEUTROPHILS NFR BLD AUTO: 2.88 10*3/MM3 (ref 1.7–7)
NEUTROPHILS NFR BLD AUTO: 46.3 % (ref 42.7–76)
NRBC BLD AUTO-RTO: 0 /100 WBC (ref 0–0.2)
PLATELET # BLD AUTO: 175 10*3/MM3 (ref 140–450)
PMV BLD AUTO: 10.3 FL (ref 6–12)
POTASSIUM SERPL-SCNC: 4 MMOL/L (ref 3.5–5.2)
PROT SERPL-MCNC: 7.3 G/DL (ref 6–8.5)
RBC # BLD AUTO: 4.79 10*6/MM3 (ref 4.14–5.8)
SODIUM SERPL-SCNC: 139 MMOL/L (ref 136–145)
TRIGL SERPL-MCNC: 179 MG/DL (ref 0–150)
VLDLC SERPL-MCNC: 32 MG/DL (ref 5–40)
WBC # BLD AUTO: 6.22 10*3/MM3 (ref 3.4–10.8)

## 2021-07-20 PROCEDURE — 36415 COLL VENOUS BLD VENIPUNCTURE: CPT | Performed by: PHYSICIAN ASSISTANT

## 2021-07-20 PROCEDURE — 99213 OFFICE O/P EST LOW 20 MIN: CPT | Performed by: PHYSICIAN ASSISTANT

## 2021-07-20 PROCEDURE — 85025 COMPLETE CBC W/AUTO DIFF WBC: CPT | Performed by: PHYSICIAN ASSISTANT

## 2021-07-20 PROCEDURE — 80061 LIPID PANEL: CPT | Performed by: PHYSICIAN ASSISTANT

## 2021-07-20 PROCEDURE — 83036 HEMOGLOBIN GLYCOSYLATED A1C: CPT | Performed by: PHYSICIAN ASSISTANT

## 2021-07-20 PROCEDURE — 80053 COMPREHEN METABOLIC PANEL: CPT | Performed by: PHYSICIAN ASSISTANT

## 2021-07-20 RX ORDER — AMPICILLIN TRIHYDRATE 250 MG
CAPSULE ORAL
COMMUNITY

## 2021-07-20 RX ORDER — ASCORBIC ACID 500 MG
TABLET ORAL
COMMUNITY

## 2021-07-20 RX ORDER — FERROUS SULFATE 325(65) MG
TABLET ORAL
COMMUNITY
Start: 2021-03-01

## 2021-07-20 RX ORDER — TESTOSTERONE CYPIONATE 200 MG/ML
INJECTION, SOLUTION INTRAMUSCULAR
COMMUNITY
Start: 2021-06-25

## 2021-07-20 RX ORDER — MULTIPLE VITAMINS W/ MINERALS TAB 9MG-400MCG
TAB ORAL
COMMUNITY

## 2021-07-20 NOTE — PROGRESS NOTES
"Chief Complaint  right kidney pain (going on since Feb)    Subjective          Stephon Castellano presents to [facility]  R flank pain- \"burning sensation\", feels it when leaning back or sitting at computer.  Not worse to the touch.  Patient had CT abd/pelvis in March which showed 1mm puntate nonobstructing kidney stone on L side.  He has a pain pump for neck fusion.  Some increased lower back pain over the past year due to covid.      Would like labs to check blood sugars because of frequent urination.  He has a family history of diabetes and prostate cancer.  Has had PSA checked about 6 months to a year ago.        Objective   Vital Signs:   /86   Pulse 90   Temp 98.5 °F (36.9 °C)   Ht 180.3 cm (71\")   Wt 106 kg (233 lb 3.2 oz)   SpO2 98%   BMI 32.52 kg/m²     Physical Exam  Vitals reviewed.   Constitutional:       Appearance: Normal appearance. He is well-developed.   HENT:      Head: Normocephalic and atraumatic.      Mouth/Throat:      Pharynx: No oropharyngeal exudate.   Eyes:      Conjunctiva/sclera: Conjunctivae normal.      Pupils: Pupils are equal, round, and reactive to light.   Cardiovascular:      Rate and Rhythm: Normal rate and regular rhythm.      Heart sounds: No murmur heard.   No friction rub. No gallop.    Pulmonary:      Effort: Pulmonary effort is normal.      Breath sounds: Normal breath sounds. No wheezing or rhonchi.   Abdominal:      General: Bowel sounds are normal. There is no distension.      Palpations: Abdomen is soft.      Tenderness: There is no abdominal tenderness.   Musculoskeletal:      Comments: R flank TTP, no spinal tenderness, no SI joint tenderness, full ROM of spine, no rashes   Skin:     General: Skin is warm and dry.   Neurological:      Mental Status: He is alert and oriented to person, place, and time.      Cranial Nerves: No cranial nerve deficit.   Psychiatric:         Mood and Affect: Mood and affect normal.    "      Behavior: Behavior normal.         Thought Content: Thought content normal.         Judgment: Judgment normal.        Result Review :          Procedures      Assessment and Plan    Diagnoses and all orders for this visit:    1. Chronic right-sided thoracic back pain (Primary)  Assessment & Plan:  Some suspicion for musculoskeletal origin especially due to duration of symptoms.  Will get thoracic x-ray.  Could consider further imaging with MRI if needed.  Discussed results of CT of abdomen from earlier this year with patient.  Okay to send patient to urology for review of small nonobstructing punctate kidney stone and family history of prostate cancer.    Orders:  -     Ambulatory Referral to Urology  -     XR Spine Thoracic 3 View (In Office); Future  -     CBC & Differential; Future  -     Comprehensive Metabolic Panel; Future  -     Lipid Panel; Future  -     Hemoglobin A1c; Future  -     CBC & Differential  -     Comprehensive Metabolic Panel  -     Lipid Panel  -     Hemoglobin A1c      Follow Up   Return in about 4 weeks (around 8/17/2021).  Patient was given instructions and counseling regarding his condition or for health maintenance advice. Please see specific information pulled into the AVS if appropriate.

## 2021-07-21 NOTE — ASSESSMENT & PLAN NOTE
Some suspicion for musculoskeletal origin especially due to duration of symptoms.  Will get thoracic x-ray.  Could consider further imaging with MRI if needed.  Discussed results of CT of abdomen from earlier this year with patient.  Okay to send patient to urology for review of small nonobstructing punctate kidney stone and family history of prostate cancer.

## 2021-08-30 ENCOUNTER — OFFICE VISIT (OUTPATIENT)
Dept: UROLOGY | Facility: CLINIC | Age: 61
End: 2021-08-30

## 2021-08-30 VITALS
BODY MASS INDEX: 31.75 KG/M2 | DIASTOLIC BLOOD PRESSURE: 76 MMHG | SYSTOLIC BLOOD PRESSURE: 153 MMHG | WEIGHT: 226.8 LBS | HEIGHT: 71 IN

## 2021-08-30 DIAGNOSIS — M54.6 CHRONIC RIGHT-SIDED THORACIC BACK PAIN: Primary | ICD-10-CM

## 2021-08-30 DIAGNOSIS — N20.0 KIDNEY STONE: ICD-10-CM

## 2021-08-30 DIAGNOSIS — G89.29 CHRONIC RIGHT-SIDED THORACIC BACK PAIN: Primary | ICD-10-CM

## 2021-08-30 LAB
BILIRUB BLD-MCNC: NEGATIVE MG/DL
CLARITY, POC: CLEAR
COLOR UR: YELLOW
GLUCOSE UR STRIP-MCNC: NEGATIVE MG/DL
KETONES UR QL: ABNORMAL
LEUKOCYTE EST, POC: NEGATIVE
NITRITE UR-MCNC: NEGATIVE MG/ML
PH UR: 5.5 [PH] (ref 5–8)
PROT UR STRIP-MCNC: NEGATIVE MG/DL
RBC # UR STRIP: NEGATIVE /UL
SP GR UR: 1.01 (ref 1–1.03)
UROBILINOGEN UR QL: NORMAL

## 2021-08-30 PROCEDURE — 99202 OFFICE O/P NEW SF 15 MIN: CPT | Performed by: UROLOGY

## 2021-08-30 PROCEDURE — 81003 URINALYSIS AUTO W/O SCOPE: CPT | Performed by: UROLOGY

## 2021-08-30 RX ORDER — TRAZODONE HYDROCHLORIDE 100 MG/1
100 TABLET ORAL NIGHTLY
COMMUNITY

## 2021-08-30 RX ORDER — TAMSULOSIN HYDROCHLORIDE 0.4 MG/1
1 CAPSULE ORAL DAILY
COMMUNITY

## 2021-08-30 RX ORDER — DULOXETIN HYDROCHLORIDE 60 MG/1
60 CAPSULE, DELAYED RELEASE ORAL DAILY
COMMUNITY

## 2021-08-30 NOTE — PROGRESS NOTES
"Chief Complaint  Flank Pain    Subjective          Stephon Castellano presents to [facility]OLOG[facility]  Patient has chronic back pain for the past 3 to 4 months.  He had Covid in January and after he recovered from that started having some midthoracic right chronic back pain.  He denies any dysuria frequency urgency.  Denies hematuria.  He had a CT scan that revealed a punctate 1 mm left renal stone and no right renal stones.  He did not have any hydronephrosis.  Had no other  abnormalities on the scan.    Objective   Vital Signs:   /76   Ht 180.3 cm (71\")   Wt 103 kg (226 lb 12.8 oz)   BMI 31.63 kg/m²     Physical Exam  Vitals and nursing note reviewed.   Constitutional:       Appearance: Normal appearance. He is well-developed.   Pulmonary:      Effort: Pulmonary effort is normal.      Breath sounds: Normal air entry.   Neurological:      Mental Status: He is alert and oriented to person, place, and time.      Motor: Motor function is intact.   Psychiatric:         Mood and Affect: Mood normal.         Behavior: Behavior normal.        Result Review :                  Results for orders placed or performed in visit on 08/30/21   POC Urinalysis Dipstick, Automated    Specimen: Urine   Result Value Ref Range    Color Yellow Yellow, Straw, Dark Yellow, Mavis    Clarity, UA Clear Clear    Specific Gravity  1.015 (A) 1.005 - 1.030    pH, Urine 5.5 5.0 - 8.0    Leukocytes Negative Negative    Nitrite, UA Negative Negative    Protein, POC Negative Negative mg/dL    Glucose, UA Negative Negative, 1000 mg/dL (3+) mg/dL    Ketones, UA 15 mg/dL (A) Negative    Urobilinogen, UA Normal Normal    Bilirubin Negative Negative    Blood, UA Negative Negative       Assessment and Plan    Diagnoses and all orders for this visit:    1. Chronic right-sided thoracic back pain (Primary)  Assessment & Plan:  I have encouraged him to follow back up with his PCP as I suspect his pain is probably more musculoskeletal in " nature.  He does have a lot of spine issues and so imaging of his thoracic spine may be the next best course of action.      2. Kidney stone  Assessment & Plan:  He has a punctate 1 mm left new stone which have assured him is not the cause of his pain.  It is small enough he should pass it without any issues.      Orders:  -     POC Urinalysis Dipstick, Automated      Follow Up   Return if symptoms worsen or fail to improve.  Patient was given instructions and counseling regarding his condition or for health maintenance advice. Please see specific information pulled into the AVS if appropriate.

## 2021-08-30 NOTE — ASSESSMENT & PLAN NOTE
He has a punctate 1 mm left new stone which have assured him is not the cause of his pain.  It is small enough he should pass it without any issues.

## 2021-08-30 NOTE — ASSESSMENT & PLAN NOTE
I have encouraged him to follow back up with his PCP as I suspect his pain is probably more musculoskeletal in nature.  He does have a lot of spine issues and so imaging of his thoracic spine may be the next best course of action.

## 2022-02-18 ENCOUNTER — TELEPHONE (OUTPATIENT)
Dept: INTERNAL MEDICINE | Facility: CLINIC | Age: 62
End: 2022-02-18

## 2022-02-18 NOTE — TELEPHONE ENCOUNTER
Caller: Stephon Castellano    Relationship: Self    Best call back number: [phone]    What is the medical concern/diagnosis: PAIN CLINIC     What specialty or service is being requested: PAIN SPECIALISTS     What is the provider, practice or medical service name: [facility]. DR. CORWIN SALAMANCA      What is the office location: [address] [place] [address]     What is the office phone number: [phone]    Any additional details: NEEDS TO BE RETRO ACTIVE FROM April OF 2021 THROUGH April OF 2022 AND THEN WE WILL NEED A NEW ONE FROM April OF 2022 THROUGH April OF 2023.

## 2022-02-21 NOTE — TELEPHONE ENCOUNTER
Caller: Stephon Castellano    Relationship: Self    Best call back number: [phone]    What specialty or service is being requested: ENDOCRINOLOGY AS WELL AS PAIN MANAGEMENT MENTIONED IN THIS MESSAGE     What is the provider, practice or medical service name: [facility]     What is the office phone number: [phone]    Any additional details: PATIENT STATED THAT THE REFERRAL IS NEEDING TO BE UPDATED PER PATIENTS INSURANCE, RESUBMISSION FOR DR SALAMANCA IN PAIN MANAGEMENT FROM 4/2021 TO 4/2022, HE IS WANTING TO VERIFY THAT ENDOCRINOLOGY REFERRAL IS UP TO DATE OR RENEWED

## 2022-06-03 ENCOUNTER — TELEPHONE (OUTPATIENT)
Dept: INTERNAL MEDICINE | Facility: CLINIC | Age: 62
End: 2022-06-03

## 2022-06-03 NOTE — TELEPHONE ENCOUNTER
Red rule verified and correct.    Urology calling stating Dr Rosado requests updated imaging before they see him

## 2022-06-06 NOTE — TELEPHONE ENCOUNTER
Why is he being seen by urology again?   He was seen in July 2021 and had small stone, but they did not recommend follow up.   I haven't seen him since then.   Did urology say they wanted to see him?    Patient fell 2x at home w/unclear reasons, h/o CVA w/no residual deficits  - CTH negative for acute pathology  - could be d/t dehydration 2/2 flu  - c/w IVF maintenance  - no acute pathology a/w spinal stenosis  - Outpatient PT

## 2022-06-06 NOTE — TELEPHONE ENCOUNTER
Caller: Stephon Castellano    Relationship: Self    Best call back number: [phone]    What is the best time to reach you: ANTYIME    Who are you requesting to speak with (clinical staff, provider,  specific staff member): CLINICAL    What was the call regarding: PATIENT IS RETURNING CALL. HE IS REQUESTING CALL BACK.    Do you require a callback: YES

## 2022-06-07 NOTE — TELEPHONE ENCOUNTER
Red rule verified and correct.    Pt said to disregard.  He had some questions regarding billing, but not another appt.

## 2022-06-20 ENCOUNTER — CLINICAL SUPPORT (OUTPATIENT)
Dept: INTERNAL MEDICINE | Facility: CLINIC | Age: 62
End: 2022-06-20

## 2022-06-20 DIAGNOSIS — Z78.9: Primary | ICD-10-CM

## 2022-06-20 LAB
EXPIRATION DATE: NORMAL
FLUAV AG UPPER RESP QL IA.RAPID: NOT DETECTED
FLUBV AG UPPER RESP QL IA.RAPID: NOT DETECTED
INTERNAL CONTROL: NORMAL
Lab: NORMAL
SARS-COV-2 AG UPPER RESP QL IA.RAPID: NOT DETECTED

## 2022-06-20 PROCEDURE — 87428 SARSCOV & INF VIR A&B AG IA: CPT | Performed by: PHYSICIAN ASSISTANT

## 2023-03-22 ENCOUNTER — TELEPHONE (OUTPATIENT)
Dept: SURGERY | Facility: CLINIC | Age: 63
End: 2023-03-22

## 2023-03-22 ENCOUNTER — PREP FOR SURGERY (OUTPATIENT)
Dept: OTHER | Facility: HOSPITAL | Age: 63
End: 2023-03-22
Payer: OTHER GOVERNMENT

## 2023-03-22 ENCOUNTER — OFFICE VISIT (OUTPATIENT)
Dept: SURGERY | Facility: CLINIC | Age: 63
End: 2023-03-22
Payer: OTHER GOVERNMENT

## 2023-03-22 ENCOUNTER — TRANSCRIBE ORDERS (OUTPATIENT)
Dept: ADMINISTRATIVE | Facility: HOSPITAL | Age: 63
End: 2023-03-22
Payer: OTHER GOVERNMENT

## 2023-03-22 VITALS — HEIGHT: 71 IN | WEIGHT: 202.4 LBS | RESPIRATION RATE: 16 BRPM | BODY MASS INDEX: 28.34 KG/M2

## 2023-03-22 DIAGNOSIS — E22.1 HYPERPROLACTINEMIA: Primary | ICD-10-CM

## 2023-03-22 DIAGNOSIS — Z13.9 SCREENING DUE: Primary | ICD-10-CM

## 2023-03-22 PROCEDURE — S0260 H&P FOR SURGERY: HCPCS | Performed by: SURGERY

## 2023-03-22 RX ORDER — SODIUM CHLORIDE 0.9 % (FLUSH) 0.9 %
10 SYRINGE (ML) INJECTION EVERY 12 HOURS SCHEDULED
OUTPATIENT
Start: 2023-03-22

## 2023-03-22 RX ORDER — SODIUM CHLORIDE 9 MG/ML
40 INJECTION, SOLUTION INTRAVENOUS AS NEEDED
OUTPATIENT
Start: 2023-03-22

## 2023-03-22 RX ORDER — SODIUM CHLORIDE 0.9 % (FLUSH) 0.9 %
10 SYRINGE (ML) INJECTION AS NEEDED
OUTPATIENT
Start: 2023-03-22

## 2023-03-22 NOTE — PROGRESS NOTES
Chief Complaint: Colonoscopy    Subjective         History of Present Illness  Stephon Castellano is a 62 y.o. male presents to [facility].   The patient is to be seen for  a colonoscopy consult.    History of polyps     The patient had a colonoscopy in 2019 that showed several polyps. One polyp revealed a slightly more advanced tubulovillous adenoma. Additionally, there were hemorrhoids present at that time.      Constipation   The patient reports occasional constipation. He feels his poor diet choices contribute to his irregular bowel movements. He notes he is trying to improve his diet. The patient denies hematochezia and night sweats. He states he initially lost 45 pounds, bringing his weight to approximately 180 pounds. He reports his weight loss was intentional at first, but he continued to lose weight without effort. The patient has been able to gain some weight back and is now up to 203 pounds.  He denies any family history of colon cancer.     Objective     Past Medical History:   Diagnosis Date   • Allergic rhinitis    • Arthritis    • Cervical neck pain with evidence of disc disease    • Depression    • Head injury 1977   • Hemorrhoid    • Hypogonadism in male        Past Surgical History:   Procedure Laterality Date   • CERVICAL DISC SURGERY  2008   • HEEL SPUR SURGERY  2005   • KNEE ACL RECONSTRUCTION  2004   • OTHER SURGICAL HISTORY      METAL IMPLANT   • PAIN PUMP INSERTION/REVISION  2010         Current Outpatient Medications:   •  DULoxetine (CYMBALTA) 60 MG capsule, Take 1 capsule by mouth Daily., Disp: , Rfl:   •  multivitamin with minerals tablet tablet, Multivitamin 50 Plus oral tablet take 1 tablet by oral route daily   Active, Disp: , Rfl:   •  tamsulosin (FLOMAX) 0.4 MG capsule 24 hr capsule, Take 1 capsule by mouth Daily., Disp: , Rfl:   •  Testosterone Cypionate (DEPOTESTOTERONE CYPIONATE) 200 MG/ML injection, , Disp: , Rfl:   •  traZODone (DESYREL) 100 MG  "tablet, Take 1 tablet by mouth Every Night., Disp: , Rfl:   •  ascorbic acid (VITAMIN C) 500 MG tablet, Vitamin C 500 mg oral tablet take 1 tablet by oral route daily   Active (Patient not taking: Reported on 3/22/2023), Disp: , Rfl:   •  Cinnamon 500 MG capsule, Cinnamon 500 mg oral capsule take 1 capsule by oral route daily   Active (Patient not taking: Reported on 3/22/2023), Disp: , Rfl:   •  ferrous sulfate 325 (65 FE) MG tablet, ferrous sulfate 325 mg (65 mg iron) oral tablet take 1 tablet (325 mg) by oral route once daily 3/1/2021  Active (Patient not taking: Reported on 3/22/2023), Disp: , Rfl:   •  HYDROmorphone HCl (DILAUDID 10 MG/ML CONCENTRATED ORAL SOLUTION), Dilaudid oral in pain pump   Active (Patient not taking: Reported on 3/22/2023), Disp: , Rfl:   •  sertraline (ZOLOFT) 50 MG tablet, Zoloft 50 mg oral tablet take 1 tablet (50 mg) by oral route once daily   Active (Patient not taking: Reported on 3/22/2023), Disp: , Rfl:     No Known Allergies     Family History   Problem Relation Age of Onset   • Heart disease Mother    • Osteoporosis Mother    • Cancer Father    • Prostate cancer Father    • Diabetes Sister    • Arthritis Sister    • Diabetes Brother    • Stroke Other         AUNT/UNCLE GRANDPARENTS   • Diabetes Other         GRANDPARENTS        Social History     Socioeconomic History   • Marital status:    Tobacco Use   • Smoking status: Former     Packs/day: 2.00     Years: 8.00     Pack years: 16.00     Types: Cigarettes     Quit date:      Years since quittin.2   • Smokeless tobacco: Never   Substance and Sexual Activity   • Alcohol use: Yes     Comment: RARELY DRINKS; HAS BEEN DRINKING FOR 31 OR MORE YEARS    • Drug use: Never       Vital Signs:   Resp 16   Ht 180.3 cm (71\")   Wt 91.8 kg (202 lb 6.4 oz)   BMI 28.23 kg/m²      Physical Exam  Constitutional:       General: He is not in acute distress.     Appearance: Normal appearance. He is well-developed and normal " weight.   Cardiovascular:      Heart sounds: No murmur heard.  Pulmonary:      Effort: Pulmonary effort is normal.      Breath sounds: Normal air entry.   Abdominal:      Palpations: Abdomen is soft.   Neurological:      Mental Status: He is alert and oriented to person, place, and time.      Motor: Motor function is intact.          Result Review :            Procedures        Assessment and Plan    There are no diagnoses linked to this encounter.     1. Patient in need of colonoscopy with a history of polyps.  - We will plan for a colonoscopy in the near future. Risks, benefits, alternatives of the procedure were discussed extensively. All questions were answered. Patient voiced understanding and agreed to proceed with the above plan.      Follow Up   No follow-ups on file.  Patient was given instructions and counseling regarding his condition or for health maintenance advice. Please see specific information pulled into the AVS if appropriate.       Transcribed from ambient dictation for Jose Alejandro Fox MD by Mavis Brooks.  03/22/23   09:36 EDT    Patient or patient representative verbalized consent to the visit recording.  I have personally performed the services described in this document as transcribed by the above individual, and it is both accurate and complete.

## 2023-04-19 ENCOUNTER — OFFICE VISIT (OUTPATIENT)
Dept: INTERNAL MEDICINE | Facility: CLINIC | Age: 63
End: 2023-04-19
Payer: OTHER GOVERNMENT

## 2023-04-19 VITALS
DIASTOLIC BLOOD PRESSURE: 83 MMHG | WEIGHT: 204.2 LBS | HEART RATE: 84 BPM | BODY MASS INDEX: 28.48 KG/M2 | TEMPERATURE: 98 F | OXYGEN SATURATION: 99 % | SYSTOLIC BLOOD PRESSURE: 138 MMHG

## 2023-04-19 DIAGNOSIS — J02.9 PHARYNGITIS, UNSPECIFIED ETIOLOGY: ICD-10-CM

## 2023-04-19 DIAGNOSIS — J02.9 SORE THROAT: ICD-10-CM

## 2023-04-19 DIAGNOSIS — B36.0 TINEA VERSICOLOR: Primary | ICD-10-CM

## 2023-04-19 DIAGNOSIS — L98.9 SKIN LESIONS: ICD-10-CM

## 2023-04-19 PROBLEM — K64.9 HEMORRHOID: Status: ACTIVE | Noted: 2023-04-19

## 2023-04-19 PROBLEM — M19.90 ARTHRITIS: Status: ACTIVE | Noted: 2023-04-19

## 2023-04-19 PROBLEM — J30.9 ALLERGIC RHINITIS: Status: ACTIVE | Noted: 2023-04-19

## 2023-04-19 PROBLEM — M50.90 CERVICAL NECK PAIN WITH EVIDENCE OF DISC DISEASE: Status: ACTIVE | Noted: 2023-04-19

## 2023-04-19 PROBLEM — S09.90XA HEAD INJURY: Status: ACTIVE | Noted: 2023-04-19

## 2023-04-19 PROBLEM — E29.1 HYPOGONADISM IN MALE: Status: ACTIVE | Noted: 2023-04-19

## 2023-04-19 PROBLEM — M25.531 RIGHT WRIST PAIN: Status: ACTIVE | Noted: 2017-05-31

## 2023-04-19 PROBLEM — F32.A DEPRESSION: Status: ACTIVE | Noted: 2023-04-19

## 2023-04-19 LAB
EXPIRATION DATE: NORMAL
INTERNAL CONTROL: NORMAL
Lab: NORMAL
S PYO AG THROAT QL: NEGATIVE

## 2023-04-19 PROCEDURE — 87081 CULTURE SCREEN ONLY: CPT | Performed by: NURSE PRACTITIONER

## 2023-04-19 RX ORDER — FLUCONAZOLE 150 MG/1
300 TABLET ORAL WEEKLY
Qty: 4 TABLET | Refills: 0 | Status: SHIPPED | OUTPATIENT
Start: 2023-04-19 | End: 2023-05-03

## 2023-04-19 RX ORDER — ANASTROZOLE 1 MG/1
TABLET ORAL
COMMUNITY
Start: 2023-04-04

## 2023-04-19 NOTE — PROGRESS NOTES
Chief Complaint  Blepharitis (In throat/Burning when swallowing /Pain is in jaw and throat )    Subjective        Stephon Castellano presents to [facility] for concerns for sore throat.  Patient reports his throat has been bothering him for about a week, reports burning, burning with swallowing, feels like its red and swollen.  He is also concerned about skin lesions, primarily on his back, wanted to have them looked at.  As tinea versicolor, feels like he is having an acute flare, has used Diflucan in the past, requesting prescription if able.    Objective   Vital Signs:   /83   Pulse 84   Temp 98 °F (36.7 °C) (Temporal)   Wt 92.6 kg (204 lb 3.2 oz)   SpO2 99%   BMI 28.48 kg/m²     Physical Exam  Vitals and nursing note reviewed.   Constitutional:       Appearance: Normal appearance. He is normal weight.   HENT:      Head: Normocephalic and atraumatic.      Right Ear: External ear normal.      Left Ear: External ear normal.      Nose: Nose normal.      Mouth/Throat:      Mouth: Mucous membranes are moist.      Pharynx: Posterior oropharyngeal erythema present. No oropharyngeal exudate.   Eyes:      Conjunctiva/sclera: Conjunctivae normal.      Pupils: Pupils are equal, round, and reactive to light.   Cardiovascular:      Rate and Rhythm: Normal rate.   Pulmonary:      Effort: Pulmonary effort is normal.   Skin:     General: Skin is warm.      Comments: Patient with blanchable red rash on his back, consistent with tenia versicolor, also with multiple nevi, different shapes and sizes   Neurological:      Mental Status: He is alert.   Psychiatric:         Mood and Affect: Mood normal.         Thought Content: Thought content normal.        Result Review :  {The following data was reviewed by NARENDRA Mirza on 04/19/23                Diagnoses and all orders for this visit:    1. Tinea versicolor (Primary)  -     Ambulatory Referral to Dermatology    2. Skin lesions  -      Ambulatory Referral to Dermatology    3. Pharyngitis, unspecified etiology    Other orders  -     fluconazole (DIFLUCAN) 150 MG tablet; Take 2 tablets by mouth 1 (One) Time Per Week for 14 days.  Dispense: 4 tablet; Refill: 0    Referral to dermatology for management of skin lesions and tenia versicolor, will send in prescription for Diflucan, instructed on appropriate use.  Patient needs a full body skin check, does have multiple lesions, very fair complexion.    Pharyngitis, negative for strep via rapid test today.  We will culture and follow-up with patient based on those results.  Discussed warm salt water gargles, Tylenol Motrin as needed for discomfort at this time.  We will follow-up when results are available and adjust plan of care based on those results.      Follow Up   No follow-ups on file.  Patient was given instructions and counseling regarding his condition or for health maintenance advice. Please see specific information pulled into the AVS if appropriate.     NARENDRA Mirza  4/19/2023  This note was electronically signed.

## 2023-04-20 ENCOUNTER — HOSPITAL ENCOUNTER (OUTPATIENT)
Dept: MRI IMAGING | Facility: HOSPITAL | Age: 63
Discharge: HOME | End: 2023-04-20
Admitting: INTERNAL MEDICINE
Payer: OTHER GOVERNMENT

## 2023-04-20 DIAGNOSIS — E22.1 HYPERPROLACTINEMIA: ICD-10-CM

## 2023-04-20 LAB
CREAT BLDA-MCNC: 1.1 MG/DL
EGFRCR SERPLBLD CKD-EPI 2021: 75.4 ML/MIN/1.73

## 2023-04-20 PROCEDURE — 0 GADOBENATE DIMEGLUMINE 529 MG/ML SOLUTION: Performed by: INTERNAL MEDICINE

## 2023-04-20 PROCEDURE — A9577 INJ MULTIHANCE: HCPCS | Performed by: INTERNAL MEDICINE

## 2023-04-20 PROCEDURE — 82565 ASSAY OF CREATININE: CPT

## 2023-04-20 PROCEDURE — 70553 MRI BRAIN STEM W/O & W/DYE: CPT

## 2023-04-20 RX ADMIN — GADOBENATE DIMEGLUMINE 20 ML: 529 INJECTION, SOLUTION INTRAVENOUS at 12:57

## 2023-04-21 ENCOUNTER — TELEPHONE (OUTPATIENT)
Dept: INTERNAL MEDICINE | Facility: CLINIC | Age: 63
End: 2023-04-21
Payer: OTHER GOVERNMENT

## 2023-04-21 LAB — BACTERIA SPEC AEROBE CULT: NORMAL

## 2023-04-21 NOTE — TELEPHONE ENCOUNTER
Patient thought you were going to send in a medication for tenia versicolor. Please let Patient know one way or the other.

## 2023-04-25 ENCOUNTER — TELEPHONE (OUTPATIENT)
Dept: INTERNAL MEDICINE | Facility: CLINIC | Age: 63
End: 2023-04-25
Payer: OTHER GOVERNMENT

## 2023-04-25 NOTE — TELEPHONE ENCOUNTER
----- Message from NARENDRA Mirza sent at 4/23/2023  7:38 AM EDT -----  Please contact patient and let him know that strep was negative, would like to know if his symptoms have resolved.  If not, please let me know.

## 2023-05-11 ENCOUNTER — OFFICE VISIT (OUTPATIENT)
Dept: INTERNAL MEDICINE | Facility: CLINIC | Age: 63
End: 2023-05-11
Payer: OTHER GOVERNMENT

## 2023-05-11 VITALS
DIASTOLIC BLOOD PRESSURE: 74 MMHG | OXYGEN SATURATION: 98 % | TEMPERATURE: 97.8 F | WEIGHT: 203.25 LBS | HEIGHT: 71 IN | HEART RATE: 95 BPM | BODY MASS INDEX: 28.45 KG/M2 | RESPIRATION RATE: 18 BRPM | SYSTOLIC BLOOD PRESSURE: 138 MMHG

## 2023-05-11 DIAGNOSIS — J31.2 CHRONIC PHARYNGITIS: Primary | ICD-10-CM

## 2023-05-11 DIAGNOSIS — J02.9 SORE THROAT: ICD-10-CM

## 2023-05-11 LAB
EXPIRATION DATE: NORMAL
INTERNAL CONTROL: NORMAL
Lab: NORMAL
S PYO AG THROAT QL: NEGATIVE

## 2023-05-11 PROCEDURE — 99213 OFFICE O/P EST LOW 20 MIN: CPT | Performed by: INTERNAL MEDICINE

## 2023-05-11 PROCEDURE — 87880 STREP A ASSAY W/OPTIC: CPT | Performed by: INTERNAL MEDICINE

## 2023-05-11 RX ORDER — FLUCONAZOLE 200 MG/1
TABLET ORAL
COMMUNITY
Start: 2023-05-05

## 2023-05-11 RX ORDER — KETOCONAZOLE 20 MG/ML
SHAMPOO TOPICAL
COMMUNITY
Start: 2023-05-05

## 2023-06-05 NOTE — PROGRESS NOTES
"Chief Complaint  Sore Throat (Sore throat and redness, patch on left hand side at the back of throat that burns, was seen on 4/19/23 and strep, culture was both normal. )    Subjective       Stephon Castellano presents to [facility]    HPI   Presenting for evaluation of sore throat. Has been on going since 4/19/23. Strep has been negative. Denies fever.     Objective     Vitals:    05/11/23 1135   BP: 138/74   BP Location: Left arm   Patient Position: Sitting   Cuff Size: Adult   Pulse: 95   Resp: 18   Temp: 97.8 °F (36.6 °C)   TempSrc: Temporal   SpO2: 98%   Weight: 92.2 kg (203 lb 4 oz)   Height: 180.3 cm (71\")      Wt Readings from Last 3 Encounters:   05/11/23 92.2 kg (203 lb 4 oz)   04/19/23 92.6 kg (204 lb 3.2 oz)   03/22/23 91.8 kg (202 lb 6.4 oz)      BP Readings from Last 3 Encounters:   05/11/23 138/74   04/19/23 138/83   08/30/21 153/76        Body mass index is 28.35 kg/m².           Physical Exam  Vitals reviewed.   Constitutional:       Appearance: Normal appearance. He is well-developed.   HENT:      Head: Normocephalic and atraumatic.      Mouth/Throat:      Pharynx: Posterior oropharyngeal erythema present. No oropharyngeal exudate.   Eyes:      Conjunctiva/sclera: Conjunctivae normal.      Pupils: Pupils are equal, round, and reactive to light.   Cardiovascular:      Rate and Rhythm: Normal rate and regular rhythm.      Heart sounds: No murmur heard.    No friction rub. No gallop.   Pulmonary:      Effort: Pulmonary effort is normal.      Breath sounds: Normal breath sounds. No wheezing or rhonchi.   Skin:     General: Skin is warm and dry.   Neurological:      Mental Status: He is alert and oriented to person, place, and time.   Psychiatric:         Mood and Affect: Affect normal.        Result Review :   The following data was reviewed by: Paige Florez MD on 05/11/2023:  Lab Results   Component Value Date    RAPSCRN Negative 05/11/2023 "             Procedures    Assessment and Plan   Diagnoses and all orders for this visit:    1. Chronic pharyngitis (Primary)  Comments:  Will refer to ENT per patient request  Orders:  -     Ambulatory Referral to ENT (Otolaryngology)    2. Sore throat  -     POCT rapid strep A          Follow Up   Return if symptoms worsen or fail to improve.  Patient was given instructions and counseling regarding his condition or for health maintenance advice. Please see specific information pulled into the AVS if appropriate.

## 2023-08-10 ENCOUNTER — TELEPHONE (OUTPATIENT)
Dept: SURGERY | Facility: CLINIC | Age: 63
End: 2023-08-10
Payer: OTHER GOVERNMENT

## 2023-08-10 NOTE — TELEPHONE ENCOUNTER
Patient was given an arrival time of 7:30am on 08/18/23. He was given this arrival time and date when he was seen in office and scheduled for this procedure. Patient was also given his bowel prep at this time as well. Patient did not express any questions regarding the bowel prep at that time. Patient was advised that he could call our office if he has any questions regarding pre-op instructions. Patient was advised that he will need to be on a clear liquid diet the day before his procedure,he will need to be NPO 8 hours prior to his arrival time and that he will need a  home. Patient is not on any blood thinners and no additional clearances are required.

## 2023-08-16 NOTE — TELEPHONE ENCOUNTER
SPOKE WITH THE PATIENT AGAIN AND GAVE HIM HIS ARRIVAL TIME OF 30 AM. HE [facility]P THAT [facility] FOR HIS [facility]ONOSCOPY.

## 2023-08-17 ENCOUNTER — OFFICE VISIT (OUTPATIENT)
Dept: OTOLARYNGOLOGY | Facility: CLINIC | Age: 63
End: 2023-08-17
Payer: OTHER GOVERNMENT

## 2023-08-17 VITALS — WEIGHT: 208.8 LBS | TEMPERATURE: 97.6 F | BODY MASS INDEX: 29.23 KG/M2 | HEIGHT: 71 IN

## 2023-08-17 DIAGNOSIS — R07.0 THROAT PAIN IN ADULT: ICD-10-CM

## 2023-08-17 DIAGNOSIS — R68.2 DRY MOUTH: Primary | ICD-10-CM

## 2023-08-17 RX ORDER — DEXAMETHASONE 0.5 MG/1
TABLET ORAL
Status: ON HOLD | COMMUNITY
Start: 2023-05-24 | End: 2023-08-18

## 2023-08-17 NOTE — TELEPHONE ENCOUNTER
Patient called back for clarification on when he is to start his prep today. Patient was advised to go ahead and take the 4 laxative tablets now and then to start drinking his miralax/gatorade mixture at 4pm today. Patient is aware that his arrival time on 08/18/23 is 7:30 am

## 2023-08-17 NOTE — PROGRESS NOTES
Patient Name: Stephon Castellano   Visit Date: 08/17/2023   Patient ID: [id number]  Provider: Maykel Collazo MD    Sex: male  Location: [facility]   YOB: 1960  Location Address: [address] [place] [address], [address],?[place]?[id number]    Primary Care Provider Paige Florez MD  Location Phone: [phone]    Referring Provider: Paige Vargas*        Chief Complaint  Chronic Pharyngitis (New patient )    Subjective    History of Present Illness  Stephon Castellano is a 62 y.o. male who presents to [facility] today as a consult from Paige Vargas*.    He presents to the clinic today for evaluation of dry mouth and throat discomfort.  He informs me that he had significant redness and streaking in his pharynx about 5 or 6 months ago.  He had some discomfort at this time.  Denies any voice changes.  He has not had any weight loss.  The symptoms have for the most part improved, but he continues to have redness.    He informs me that he does have some symptoms of dry eyes and dry mouth.  He denies any autoimmune conditions.  He does drink alcohol on a daily basis.    Past Medical History:   Diagnosis Date    Allergic rhinitis     Arthritis     Cervical neck pain with evidence of disc disease     Depression     Head injury 1977    Hemorrhoid     Hypogonadism in male        Past Surgical History:   Procedure Laterality Date    CERVICAL DISC SURGERY  2008    HEEL SPUR SURGERY  2005    KNEE ACL RECONSTRUCTION  2004    OTHER SURGICAL HISTORY      METAL IMPLANT    PAIN PUMP INSERTION/REVISION  2010         Current Outpatient Medications:     DULoxetine (CYMBALTA) 60 MG capsule, Take 1 capsule by mouth Daily., Disp: , Rfl:     multivitamin with minerals tablet tablet, Multivitamin 50 Plus oral tablet take 1 tablet by oral route daily   Active, Disp: , Rfl:     pain patient supplied pump, by Intrathecal route Continuous., Disp: , Rfl:      "Phenytoin (DILANTIN PO), Take  by mouth. Uses in pain pump., Disp: , Rfl:     tamsulosin (FLOMAX) 0.4 MG capsule 24 hr capsule, Take 1 capsule by mouth Daily., Disp: , Rfl:     Testosterone Cypionate (DEPOTESTOTERONE CYPIONATE) 200 MG/ML injection, , Disp: , Rfl:     traZODone (DESYREL) 100 MG tablet, Take 1 tablet by mouth Every Night., Disp: , Rfl:     anastrozole (ARIMIDEX) 1 MG tablet, , Disp: , Rfl:     dexAMETHasone (DECADRON) 0.5 MG tablet, , Disp: , Rfl:     fluconazole (DIFLUCAN) 200 MG tablet, , Disp: , Rfl:     ketoconazole (NIZORAL) 2 % shampoo, , Disp: , Rfl:     triamcinolone (KENALOG) 0.1 % ointment, , Disp: , Rfl:      No Known Allergies    Family History   Problem Relation Age of Onset    Heart disease Mother     Osteoporosis Mother     Cancer Father     Prostate cancer Father     Diabetes Sister     Arthritis Sister     Diabetes Brother     Stroke Other         AUNT/UNCLE GRANDPARENTS    Diabetes Other         GRANDPARENTS         Social History     Social History Narrative    Not on file       Objective     Vital Signs:   Temp 97.6 øF (36.4 øC) (Tympanic)   Ht 180.3 cm (71\")   Wt 94.7 kg (208 lb 12.8 oz)   BMI 29.12 kg/mý       Physical Exam         Constitutional   Appearance  : well developed, well-nourished, alert and in no acute distress, voice clear and strong    Head  Inspection  : no deformities or lesions  Face  Inspection  : No facial lesions; House-Brackmann I/VI bilaterally  Palpation  : No TMJ crepitus nor  muscle tenderness bilaterally    Eyes  Vision  Visual Fields  : Extraocular movements are intact. No spontaneous or gaze-induced nystagmus.  Conjunctivae  : clear  Sclerae  : clear  Pupils and Irises  : pupils equal, round, and reactive to light.     Ears, Nose, Mouth and Throat    Ears    External Ears  : appearance within normal limits, no lesions present  Otoscopic Examination  : Tympanic membrane appearance within normal limits bilaterally without perforations, " well-aerated middle ears  Hearing  : intact to conversational voice both ears  Tunning fork testing:     :    Nose    External Nose  : appearance normal  Intranasal Exam  : mucosa within normal limits, vestibules normal, no intranasal lesions present, septum midline, sinuses non tender to percussion  Oral Cavity    Oral Mucosa  : oral mucosa dry, no distinct lesions, slight erythema throughout  Lips  : lip appearance normal  Teeth  : normal dentition for age  Gums  : gums pink, non-swollen, no bleeding present  Tongue  : tongue appearance normal; normal mobility  Palate  : hard palate normal, soft palate appearance normal with symmetric mobility    Throat    Oropharynx  : no inflammation or lesions present, tonsils within normal limits  Hypopharynx  : appearance within normal limits, superior epiglottis within normal limits  Larynx  : appearance within normal limits, vocal cords within normal limits, no lesions present    Neck  Inspection/Palpation  : normal appearance, no masses or tenderness, trachea midline; thyroid size normal, nontender, no nodules or masses present on palpation    Respiratory  Respiratory Effort  : breathing unlabored  Inspection of Chest  : normal appearance, no retractions    Cardiovascular  Heart  : regular rate and rhythm    Lymphatic  Neck  : no lymphadenopathy present  Supraclavicular Nodes  : no lymphadenopathy present  Preauricular Nodes  : no lymphadenopathy present    Skin and Subcutaneous Tissue  General Inspection  : Regarding face and neck - there are no rashes present, no lesions present, and no areas of discoloration    Neurologic  Cranial Nerves  : cranial nerves II-XII are grossly intact bilaterally  Gait and Station  : normal gait, able to stand without diffculty    Psychiatric  Judgement and Insight  : judgment and insight intact  Mood and Affect  : mood normal, affect appropriate          Assessment and Plan    Diagnoses and all orders for this visit:    1. Dry mouth  (Primary)    2. Throat pain in adult    Examination today revealed dry mucous membranes and slight erythema throughout.  I discussed Sjogren's syndrome due to his dry eye and dry mouth symptoms, and since [facility] I recommended SSA and SSB labs to be drawn.  I have asked that he contact me afterwards to discuss the results.  I did recommend avoiding high salt high spice foods and cutting down on alcohol consumption to see if this improves his symptoms.    Follow Up   No follow-ups on file.  Patient was given instructions and counseling regarding his condition or for health maintenance advice. Please see specific information pulled into the AVS if appropriate.

## 2023-08-18 ENCOUNTER — ANESTHESIA (OUTPATIENT)
Dept: GASTROENTEROLOGY | Facility: HOSPITAL | Age: 63
End: 2023-08-18
Payer: OTHER GOVERNMENT

## 2023-08-18 ENCOUNTER — HOSPITAL ENCOUNTER (OUTPATIENT)
Facility: HOSPITAL | Age: 63
Setting detail: HOSPITAL OUTPATIENT SURGERY
Discharge: HOME | End: 2023-08-18
Attending: SURGERY | Admitting: SURGERY
Payer: OTHER GOVERNMENT

## 2023-08-18 ENCOUNTER — ANESTHESIA EVENT (OUTPATIENT)
Dept: GASTROENTEROLOGY | Facility: HOSPITAL | Age: 63
End: 2023-08-18
Payer: OTHER GOVERNMENT

## 2023-08-18 VITALS
HEART RATE: 79 BPM | OXYGEN SATURATION: 97 % | SYSTOLIC BLOOD PRESSURE: 124 MMHG | WEIGHT: 199.3 LBS | TEMPERATURE: 98.7 F | BODY MASS INDEX: 27.8 KG/M2 | DIASTOLIC BLOOD PRESSURE: 75 MMHG | RESPIRATION RATE: 16 BRPM

## 2023-08-18 DIAGNOSIS — Z13.9 SCREENING DUE: ICD-10-CM

## 2023-08-18 PROCEDURE — 88305 TISSUE EXAM BY PATHOLOGIST: CPT | Performed by: SURGERY

## 2023-08-18 PROCEDURE — 25010000002 PROPOFOL 10 MG/ML EMULSION: Performed by: NURSE ANESTHETIST, CERTIFIED REGISTERED

## 2023-08-18 RX ORDER — SODIUM CHLORIDE 0.9 % (FLUSH) 0.9 %
10 SYRINGE (ML) INJECTION AS NEEDED
Status: DISCONTINUED | OUTPATIENT
Start: 2023-08-18 | End: 2023-08-18 | Stop reason: HOSPADM

## 2023-08-18 RX ORDER — SODIUM CHLORIDE 0.9 % (FLUSH) 0.9 %
10 SYRINGE (ML) INJECTION EVERY 12 HOURS SCHEDULED
Status: DISCONTINUED | OUTPATIENT
Start: 2023-08-18 | End: 2023-08-18 | Stop reason: HOSPADM

## 2023-08-18 RX ORDER — SODIUM CHLORIDE, SODIUM LACTATE, POTASSIUM CHLORIDE, CALCIUM CHLORIDE 600; 310; 30; 20 MG/100ML; MG/100ML; MG/100ML; MG/100ML
30 INJECTION, SOLUTION INTRAVENOUS CONTINUOUS
Status: DISCONTINUED | OUTPATIENT
Start: 2023-08-18 | End: 2023-08-18 | Stop reason: HOSPADM

## 2023-08-18 RX ORDER — PROPOFOL 10 MG/ML
VIAL (ML) INTRAVENOUS AS NEEDED
Status: DISCONTINUED | OUTPATIENT
Start: 2023-08-18 | End: 2023-08-18 | Stop reason: SURG

## 2023-08-18 RX ORDER — LIDOCAINE HYDROCHLORIDE 20 MG/ML
INJECTION, SOLUTION EPIDURAL; INFILTRATION; INTRACAUDAL; PERINEURAL AS NEEDED
Status: DISCONTINUED | OUTPATIENT
Start: 2023-08-18 | End: 2023-08-18 | Stop reason: SURG

## 2023-08-18 RX ORDER — SODIUM CHLORIDE 9 MG/ML
40 INJECTION, SOLUTION INTRAVENOUS AS NEEDED
Status: DISCONTINUED | OUTPATIENT
Start: 2023-08-18 | End: 2023-08-18 | Stop reason: HOSPADM

## 2023-08-18 RX ADMIN — PROPOFOL 150 MCG/KG/MIN: 10 INJECTION, EMULSION INTRAVENOUS at 09:12

## 2023-08-18 RX ADMIN — LIDOCAINE HYDROCHLORIDE 30 MG: 20 INJECTION, SOLUTION EPIDURAL; INFILTRATION; INTRACAUDAL; PERINEURAL at 09:12

## 2023-08-18 RX ADMIN — SODIUM CHLORIDE, POTASSIUM CHLORIDE, SODIUM LACTATE AND CALCIUM CHLORIDE 30 ML/HR: 600; 310; 30; 20 INJECTION, SOLUTION INTRAVENOUS at 08:11

## 2023-08-18 RX ADMIN — PROPOFOL 80 MG: 10 INJECTION, EMULSION INTRAVENOUS at 09:12

## 2023-08-18 NOTE — ANESTHESIA PREPROCEDURE EVALUATION
Anesthesia Evaluation     Patient summary reviewed and Nursing notes reviewed   no history of anesthetic complications:   NPO Solid Status: > 8 hours  NPO Liquid Status: > 2 hours           Airway   Mallampati: II  TM distance: >3 FB  Neck ROM: full  No difficulty expected  Dental      Pulmonary - negative pulmonary ROS and normal exam    breath sounds clear to auscultation  Cardiovascular - negative cardio ROS and normal exam  Exercise tolerance: good (4-7 METS)    Rhythm: regular  Rate: normal        Neuro/Psych- negative ROS  GI/Hepatic/Renal/Endo - negative ROS     Musculoskeletal     (+) neck pain      ROS comment: Dilaudid pain pump, on  Abdominal    Substance History - negative use     OB/GYN negative ob/gyn ROS         Other - negative ROS       ROS/Med Hx Other: PAT Nursing Notes unavailable.                 Anesthesia Plan    ASA 3     general       Anesthetic plan, risks, benefits, and alternatives have been provided, discussed and informed consent has been obtained with: patient and spouse/significant other.    CODE STATUS:

## 2023-08-18 NOTE — ANESTHESIA POSTPROCEDURE EVALUATION
Patient: Stephon Castellano    Procedure Summary       Date: 08/18/23 Room / Location: [facility] ENDOSCOPY 5 / [facility] ENDOSCOPY    Anesthesia Start: 0909 Anesthesia Stop: 1023    Procedure: COLONOSCOPY WITH POLYPECTOMY/SNARE Diagnosis:       Screening due      (Screening due [Z13.9])    Surgeons: Jose Alejandro Fox MD Provider: Agusto Villavicencio MD    Anesthesia Type: general ASA Status: 3            Anesthesia Type: general    Vitals  Vitals Value Taken Time   /75 08/18/23 1040   Temp 37.1 øC (98.7 øF) 08/18/23 1040   Pulse 76 08/18/23 1041   Resp 16 08/18/23 1040   SpO2 97 % 08/18/23 1041   Vitals shown include unvalidated device data.        Post Anesthesia Care and Evaluation    Patient location during evaluation: bedside  Patient participation: complete - patient participated  Level of consciousness: awake  Pain management: adequate    Airway patency: patent  PONV Status: none  Cardiovascular status: acceptable and stable  Respiratory status: acceptable  Hydration status: acceptable    Comments: An Anesthesiologist personally participated in the most demanding procedures (including induction and emergence if applicable) in the anesthesia plan, monitored the course of anesthesia administration at frequent intervals and remained physically present and available for immediate diagnosis and treatment of emergencies.

## 2023-08-18 NOTE — H&P
History of Present Illness  Stephon Castellano is a 62 y.o. male presents to [facility].   The patient is to be seen for  a colonoscopy consult.     History of polyps      The patient had a colonoscopy in 2019 that showed several polyps. One polyp revealed a slightly more advanced tubulovillous adenoma. Additionally, there were hemorrhoids present at that time.       Constipation   The patient reports occasional constipation. He feels his poor diet choices contribute to his irregular bowel movements. He notes he is trying to improve his diet. The patient denies hematochezia and night sweats. He states he initially lost 45 pounds, bringing his weight to approximately 180 pounds. He reports his weight loss was intentional at first, but he continued to lose weight without effort. The patient has been able to gain some weight back and is now up to 203 pounds.  He denies any family history of colon cancer.            Objective         Medical History        Past Medical History:   Diagnosis Date    Allergic rhinitis      Arthritis      Cervical neck pain with evidence of disc disease      Depression      Head injury 1977    Hemorrhoid      Hypogonadism in male              Surgical History         Past Surgical History:   Procedure Laterality Date    CERVICAL DISC SURGERY   2008    HEEL SPUR SURGERY   2005    KNEE ACL RECONSTRUCTION   2004    OTHER SURGICAL HISTORY         METAL IMPLANT    PAIN PUMP INSERTION/REVISION   2010               Current Outpatient Medications:     DULoxetine (CYMBALTA) 60 MG capsule, Take 1 capsule by mouth Daily., Disp: , Rfl:     multivitamin with minerals tablet tablet, Multivitamin 50 Plus oral tablet take 1 tablet by oral route daily   Active, Disp: , Rfl:     tamsulosin (FLOMAX) 0.4 MG capsule 24 hr capsule, Take 1 capsule by mouth Daily., Disp: , Rfl:     Testosterone Cypionate (DEPOTESTOTERONE CYPIONATE) 200 MG/ML injection, , Disp: , Rfl:     traZODone  "(DESYREL) 100 MG tablet, Take 1 tablet by mouth Every Night., Disp: , Rfl:     ascorbic acid (VITAMIN C) 500 MG tablet, Vitamin C 500 mg oral tablet take 1 tablet by oral route daily   Active (Patient not taking: Reported on 3/22/2023), Disp: , Rfl:     Cinnamon 500 MG capsule, Cinnamon 500 mg oral capsule take 1 capsule by oral route daily   Active (Patient not taking: Reported on 3/22/2023), Disp: , Rfl:     ferrous sulfate 325 (65 FE) MG tablet, ferrous sulfate 325 mg (65 mg iron) oral tablet take 1 tablet (325 mg) by oral route once daily 3/1/2021  Active (Patient not taking: Reported on 3/22/2023), Disp: , Rfl:     HYDROmorphone HCl (DILAUDID 10 MG/ML CONCENTRATED ORAL SOLUTION), Dilaudid oral in pain pump   Active (Patient not taking: Reported on 3/22/2023), Disp: , Rfl:     sertraline (ZOLOFT) 50 MG tablet, Zoloft 50 mg oral tablet take 1 tablet (50 mg) by oral route once daily   Active (Patient not taking: Reported on 3/22/2023), Disp: , Rfl:      No Known Allergies            Family History   Problem Relation Age of Onset    Heart disease Mother      Osteoporosis Mother      Cancer Father      Prostate cancer Father      Diabetes Sister      Arthritis Sister      Diabetes Brother      Stroke Other           AUNT/UNCLE GRANDPARENTS    Diabetes Other           GRANDPARENTS          Social History   Social History            Socioeconomic History    Marital status:    Tobacco Use    Smoking status: Former       Packs/day: 2.00       Years: 8.00       Pack years: 16.00       Types: Cigarettes       Quit date:        Years since quittin.2    Smokeless tobacco: Never   Substance and Sexual Activity    Alcohol use: Yes       Comment: RARELY DRINKS; HAS BEEN DRINKING FOR 31 OR MORE YEARS     Drug use: Never            Vital Signs:   Resp 16   Ht 180.3 cm (71\")   Wt 91.8 kg (202 lb 6.4 oz)   BMI 28.23 kg/mý      Physical Exam  Constitutional:       General: He is not in acute distress.     " Appearance: Normal appearance. He is well-developed and normal weight.   Cardiovascular:      Heart sounds: No murmur heard.  Pulmonary:      Effort: Pulmonary effort is normal.      Breath sounds: Normal air entry.   Abdominal:      Palpations: Abdomen is soft.   Neurological:      Mental Status: He is alert and oriented to person, place, and time.      Motor: Motor function is intact.                Result Review    :               Procedures            Assessment      Assessment and Plan    There are no diagnoses linked to this encounter.      1. Patient in need of colonoscopy with a history of polyps.  - We will plan for a colonoscopy in the near future. Risks, benefits, alternatives of the procedure were discussed extensively. All questions were answered. Patient voiced understanding and agreed to proceed with the above plan.        Follow Up   No follow-ups on file.  Patient was given instructions and counseling regarding his condition or for health maintenance advice. Please see specific information pulled into the AVS if appropriate.         Transcribed from ambient dictation for Jose Alejandro Fox MD by Mavis Brooks.  03/22/23   09:36 EDT     Patient or patient representative verbalized consent to the visit recording.  I have personally performed the services described in this document as transcribed by the above individual, and it is both accurate and complete.

## 2023-08-21 LAB
CYTO UR: NORMAL
LAB AP CASE REPORT: NORMAL
LAB AP CLINICAL INFORMATION: NORMAL
PATH REPORT.FINAL DX SPEC: NORMAL
PATH REPORT.GROSS SPEC: NORMAL

## 2023-08-31 ENCOUNTER — OFFICE VISIT (OUTPATIENT)
Dept: SURGERY | Facility: CLINIC | Age: 63
End: 2023-08-31
Payer: OTHER GOVERNMENT

## 2023-08-31 VITALS — HEART RATE: 90 BPM | WEIGHT: 199 LBS | HEIGHT: 71 IN | BODY MASS INDEX: 27.86 KG/M2

## 2023-08-31 DIAGNOSIS — N40.0 ENLARGED PROSTATE: ICD-10-CM

## 2023-08-31 DIAGNOSIS — Z98.890 S/P COLONOSCOPY WITH POLYPECTOMY: Primary | ICD-10-CM

## 2023-08-31 DIAGNOSIS — D36.9 TUBULAR ADENOMA: ICD-10-CM

## 2023-08-31 PROCEDURE — 99212 OFFICE O/P EST SF 10 MIN: CPT | Performed by: NURSE PRACTITIONER

## 2023-08-31 NOTE — PROGRESS NOTES
Chief Complaint: Post-op Follow-up    Subjective      Follow-up visit       History of Present Illness  Stephon Castellano is a 62 y.o. male presents to [facility] for follow-up visit.    Patient presents today for follow-up visit after undergoing an a colonoscopy on 8/18/2023 performed by Dr. Jose Alejandro Fox.  Patient was with 5 sessile tubular adenomas of the sigmoid colon, transverse colon and ascending colon per colonoscopy/pathology.  Resection and retrieval of all polyps was complete.  It was also noted patient has an enlarged prostate.     Results for orders placed or performed during the hospital encounter of 08/18/23   Tissue Pathology Exam    Specimen: A: Large Intestine, Transverse Colon; Polyp    B: Large Intestine, Right / Ascending Colon; Polyp    C: Large Intestine, Sigmoid Colon; Polyp   Result Value Ref Range    Case Report       Surgical Pathology Report                         Case: [id number]                                  Authorizing Provider:  Jose Alejandro Fox MD      Collected:           08/18/2023 09:35 AM          Ordering Location:     [facility] Received:            08/18/2023 11:02 AM                                 SUITES                                                                       Pathologist:           Amarilis Jamison DO                                                       Specimens:   1) - Large Intestine, Transverse Colon, TRANSVERSE COLON POLYP                                      2) - Large Intestine, Right / Ascending Colon, ASCENDING POLYP                                      3) - Large Intestine, Sigmoid Colon, SIGMOID POLYP                                         Clinical Information       Screening due  Colon polyps      Final Diagnosis       1. Transverse colon polyp, biopsy:   - Fragments of tubular adenoma      2. Ascending colon polyp, biopsy:   - Fragments of tubular adenoma      3. Sigmoid colon polyp,  "biopsy:   - Fragments of tubular adenoma          Gross Description       1. Large Intestine, Transverse Colon.  Received in formalin and labeled \" transverse colon polyp\" are two fragments of tan soft tissue measuring 0.4-0.5 cm in greatest dimension. The specimen is entirely submitted in one cassette.    2. Large Intestine, Right / Ascending Colon.  Received in formalin and labeled \" ascending polyp\" is a 0.7 cm aggregate of tan soft tissue fragments. The specimen is entirely submitted in one cassette.    3. Large Intestine, Sigmoid Colon.  Received in formalin and labeled \" sigmoid polyp\" is a 0.5 cm fragment of tan soft tissue. The specimen is entirely submitted in one cassette.   JULIETTE      Microscopic Description       Microscopic examination performed.       The colonoscopy was technical difficult and complex due to significant looping.  Successful completion of the procedure was aided by increasing the dose of sedation, change in [place] patient to supine position and using manual pressure withdrawing and reinserting the scope and applying abdominal pressure.    Patient tolerated the procedure well.  Denies any postoperative complications.    Objective     Past Medical History:   Diagnosis Date    Allergic rhinitis     Arthritis     Cervical neck pain with evidence of disc disease     Depression     Head injury 1977    Hemorrhoid     Hypogonadism in male        Past Surgical History:   Procedure Laterality Date    CERVICAL DISC SURGERY  2008    COLONOSCOPY N/A 8/18/2023    Procedure: COLONOSCOPY WITH POLYPECTOMY/SNARE;  Surgeon: Jose Alejandro Fox MD;  Location: [facility] ENDOSCOPY;  Service: General;  Laterality: N/A;  COLON POLYPS    HEEL SPUR SURGERY  2005    KNEE ACL RECONSTRUCTION  2004    OTHER SURGICAL HISTORY      METAL IMPLANT    PAIN PUMP INSERTION/REVISION  2010       Outpatient Medications Marked as Taking for the 8/31/23 encounter (Office Visit) with Libby Williamson APRN   Medication Sig Dispense " "Refill    DULoxetine (CYMBALTA) 60 MG capsule Take 1 capsule by mouth Daily.      multivitamin with minerals tablet tablet Multivitamin 50 Plus oral tablet take 1 tablet by oral route daily   Active      pain patient supplied pump by Intrathecal route Continuous.      tamsulosin (FLOMAX) 0.4 MG capsule 24 hr capsule Take 1 capsule by mouth Daily.      Testosterone Cypionate (DEPOTESTOTERONE CYPIONATE) 200 MG/ML injection       traZODone (DESYREL) 100 MG tablet Take 1 tablet by mouth Every Night.         No Known Allergies     Family History   Problem Relation Age of Onset    Heart disease Mother     Osteoporosis Mother     Cancer Father     Prostate cancer Father     Diabetes Sister     Arthritis Sister     Diabetes Brother     Stroke Other         AUNT/UNCLE GRANDPARENTS    Diabetes Other         GRANDPARENTS        Social History     Socioeconomic History    Marital status:    Tobacco Use    Smoking status: Former     Packs/day: 2.00     Years: 8.00     Pack years: 16.00     Types: Cigarettes     Quit date:      Years since quittin.6    Smokeless tobacco: Never   Vaping Use    Vaping Use: Never used   Substance and Sexual Activity    Alcohol use: Yes     Comment: 1-2 drinks a night    Drug use: Never    Sexual activity: Defer       Review of Systems   Constitutional:  Negative for chills and fever.   Gastrointestinal:  Negative for abdominal distention, abdominal pain, anal bleeding, blood in stool, constipation, diarrhea and rectal pain.      Vital Signs:   Pulse 90   Ht 180.3 cm (71\")   Wt 90.3 kg (199 lb)   BMI 27.75 kg/mý      Physical Exam  Vitals and nursing note reviewed.   Constitutional:       General: He is not in acute distress.  HENT:      Head: Normocephalic.   Cardiovascular:      Rate and Rhythm: Normal rate.   Pulmonary:      Effort: Pulmonary effort is normal.      Breath sounds: No stridor.   Abdominal:      Palpations: Abdomen is soft.      Tenderness: There is no guarding. "   Musculoskeletal:         General: No deformity. Normal range of motion.   Skin:     General: Skin is warm and dry.      Coloration: Skin is not jaundiced.   Neurological:      General: No focal deficit present.      Mental Status: He is alert and oriented to person, place, and time.   Psychiatric:         Mood and Affect: Mood normal.         Thought Content: Thought content normal.        Result Review :          []  Laboratory  []  Radiology  []  Pathology  []  Microbiology  []  EKG/Telemetry   []  Cardiology/Vascular   []  Old records  I spent 15 minutes caring for Stephon on this date of service. This time includes time spent by me in the following activities: reviewing tests, obtaining and/or reviewing a separately obtained history, performing a medically appropriate examination and/or evaluation, ordering medications, tests, or procedures, and documenting information in the medical record.     Assessment and Plan    Diagnoses and all orders for this visit:    1. S/P colonoscopy with polypectomy (Primary)    2. Tubular adenoma    3. Enlarged prostate        Follow Up   Return for Rescreen colon In 1 year follow-up in the interim as needed.    I have recommended the patient discuss his enlarged prostate with his PCP and/or even make a referral to urology.  Patient reports that he has a family history of prostate cancer with his father.    Avoid high fat diets  Increase fiber intake    Per AGA guidelines patient will follow-up for colonoscopy surveillance as directed.    Patient was given instructions and counseling regarding his condition or for health maintenance advice. Please see specific information pulled into the AVS if appropriate.

## 2023-09-20 ENCOUNTER — LAB (OUTPATIENT)
Dept: LAB | Facility: HOSPITAL | Age: 63
End: 2023-09-20
Payer: OTHER GOVERNMENT

## 2023-09-20 ENCOUNTER — OFFICE VISIT (OUTPATIENT)
Dept: INTERNAL MEDICINE | Facility: CLINIC | Age: 63
End: 2023-09-20
Payer: OTHER GOVERNMENT

## 2023-09-20 VITALS
TEMPERATURE: 98.6 F | SYSTOLIC BLOOD PRESSURE: 150 MMHG | HEART RATE: 85 BPM | HEIGHT: 71 IN | BODY MASS INDEX: 27.66 KG/M2 | DIASTOLIC BLOOD PRESSURE: 80 MMHG | WEIGHT: 197.6 LBS | OXYGEN SATURATION: 98 %

## 2023-09-20 DIAGNOSIS — R41.89 BRAIN FOG: ICD-10-CM

## 2023-09-20 DIAGNOSIS — R53.83 FATIGUE, UNSPECIFIED TYPE: ICD-10-CM

## 2023-09-20 DIAGNOSIS — M25.50 MULTIPLE JOINT PAIN: ICD-10-CM

## 2023-09-20 DIAGNOSIS — H04.123 DRY EYES: Primary | ICD-10-CM

## 2023-09-20 DIAGNOSIS — K02.9 DENTAL CARIES: ICD-10-CM

## 2023-09-20 DIAGNOSIS — Z13.220 LIPID SCREENING: ICD-10-CM

## 2023-09-20 DIAGNOSIS — R68.2 DRY MOUTH: ICD-10-CM

## 2023-09-20 LAB
ALBUMIN SERPL-MCNC: 4.8 G/DL (ref 3.5–5.2)
ALBUMIN/GLOB SERPL: 1.9 G/DL
ALP SERPL-CCNC: 80 U/L (ref 39–117)
ALT SERPL W P-5'-P-CCNC: 34 U/L (ref 1–41)
ANION GAP SERPL CALCULATED.3IONS-SCNC: 13 MMOL/L (ref 5–15)
AST SERPL-CCNC: 31 U/L (ref 1–40)
BACTERIA UR QL AUTO: NORMAL /HPF
BASOPHILS # BLD AUTO: 0.04 10*3/MM3 (ref 0–0.2)
BASOPHILS NFR BLD AUTO: 0.8 % (ref 0–1.5)
BILIRUB SERPL-MCNC: 0.4 MG/DL (ref 0–1.2)
BILIRUB UR QL STRIP: NEGATIVE
BUN SERPL-MCNC: 20 MG/DL (ref 8–23)
BUN/CREAT SERPL: 11.2 (ref 7–25)
CALCIUM SPEC-SCNC: 9.7 MG/DL (ref 8.6–10.5)
CHLORIDE SERPL-SCNC: 97 MMOL/L (ref 98–107)
CHOLEST SERPL-MCNC: 188 MG/DL (ref 0–200)
CHROMATIN AB SERPL-ACNC: <10 IU/ML (ref 0–14)
CLARITY UR: CLEAR
CO2 SERPL-SCNC: 29 MMOL/L (ref 22–29)
COLOR UR: YELLOW
CREAT SERPL-MCNC: 1.78 MG/DL (ref 0.76–1.27)
CRP SERPL-MCNC: 0.31 MG/DL (ref 0–0.5)
DEPRECATED RDW RBC AUTO: 40.1 FL (ref 37–54)
EGFRCR SERPLBLD CKD-EPI 2021: 42.1 ML/MIN/1.73
EOSINOPHIL # BLD AUTO: 0.15 10*3/MM3 (ref 0–0.4)
EOSINOPHIL NFR BLD AUTO: 3.2 % (ref 0.3–6.2)
ERYTHROCYTE [DISTWIDTH] IN BLOOD BY AUTOMATED COUNT: 11.8 % (ref 12.3–15.4)
ERYTHROCYTE [SEDIMENTATION RATE] IN BLOOD: 7 MM/HR (ref 0–20)
GLOBULIN UR ELPH-MCNC: 2.5 GM/DL
GLUCOSE SERPL-MCNC: 104 MG/DL (ref 65–99)
GLUCOSE UR STRIP-MCNC: NEGATIVE MG/DL
HCT VFR BLD AUTO: 39.3 % (ref 37.5–51)
HDLC SERPL-MCNC: 43 MG/DL (ref 40–60)
HGB BLD-MCNC: 13.4 G/DL (ref 13–17.7)
HGB UR QL STRIP.AUTO: NEGATIVE
HYALINE CASTS UR QL AUTO: NORMAL /LPF
IMM GRANULOCYTES # BLD AUTO: 0.01 10*3/MM3 (ref 0–0.05)
IMM GRANULOCYTES NFR BLD AUTO: 0.2 % (ref 0–0.5)
KETONES UR QL STRIP: ABNORMAL
LDLC SERPL CALC-MCNC: 125 MG/DL (ref 0–100)
LDLC/HDLC SERPL: 2.85 {RATIO}
LEUKOCYTE ESTERASE UR QL STRIP.AUTO: NEGATIVE
LYMPHOCYTES # BLD AUTO: 1.69 10*3/MM3 (ref 0.7–3.1)
LYMPHOCYTES NFR BLD AUTO: 35.8 % (ref 19.6–45.3)
MCH RBC QN AUTO: 32.4 PG (ref 26.6–33)
MCHC RBC AUTO-ENTMCNC: 34.1 G/DL (ref 31.5–35.7)
MCV RBC AUTO: 94.9 FL (ref 79–97)
MONOCYTES # BLD AUTO: 0.47 10*3/MM3 (ref 0.1–0.9)
MONOCYTES NFR BLD AUTO: 10 % (ref 5–12)
NEUTROPHILS NFR BLD AUTO: 2.36 10*3/MM3 (ref 1.7–7)
NEUTROPHILS NFR BLD AUTO: 50 % (ref 42.7–76)
NITRITE UR QL STRIP: NEGATIVE
NRBC BLD AUTO-RTO: 0 /100 WBC (ref 0–0.2)
PH UR STRIP.AUTO: 6 [PH] (ref 5–8)
PLATELET # BLD AUTO: 147 10*3/MM3 (ref 140–450)
PMV BLD AUTO: 10.4 FL (ref 6–12)
POTASSIUM SERPL-SCNC: 3.9 MMOL/L (ref 3.5–5.2)
PROT SERPL-MCNC: 7.3 G/DL (ref 6–8.5)
PROT UR QL STRIP: NEGATIVE
RBC # BLD AUTO: 4.14 10*6/MM3 (ref 4.14–5.8)
RBC # UR STRIP: NORMAL /HPF
REF LAB TEST METHOD: NORMAL
SODIUM SERPL-SCNC: 139 MMOL/L (ref 136–145)
SP GR UR STRIP: 1.01 (ref 1–1.03)
SQUAMOUS #/AREA URNS HPF: NORMAL /HPF
T3FREE SERPL-MCNC: 3.05 PG/ML (ref 2–4.4)
T4 FREE SERPL-MCNC: 1.11 NG/DL (ref 0.93–1.7)
TRIGL SERPL-MCNC: 113 MG/DL (ref 0–150)
TSH SERPL DL<=0.05 MIU/L-ACNC: 0.58 UIU/ML (ref 0.27–4.2)
UROBILINOGEN UR QL STRIP: ABNORMAL
VLDLC SERPL-MCNC: 20 MG/DL (ref 5–40)
WBC # UR STRIP: NORMAL /HPF
WBC NRBC COR # BLD: 4.72 10*3/MM3 (ref 3.4–10.8)

## 2023-09-20 PROCEDURE — 85732 THROMBOPLASTIN TIME PARTIAL: CPT | Performed by: NURSE PRACTITIONER

## 2023-09-20 PROCEDURE — 80053 COMPREHEN METABOLIC PANEL: CPT | Performed by: NURSE PRACTITIONER

## 2023-09-20 PROCEDURE — 85025 COMPLETE CBC W/AUTO DIFF WBC: CPT | Performed by: NURSE PRACTITIONER

## 2023-09-20 PROCEDURE — 80061 LIPID PANEL: CPT | Performed by: NURSE PRACTITIONER

## 2023-09-20 PROCEDURE — 85730 THROMBOPLASTIN TIME PARTIAL: CPT | Performed by: NURSE PRACTITIONER

## 2023-09-20 PROCEDURE — 85613 RUSSELL VIPER VENOM DILUTED: CPT | Performed by: NURSE PRACTITIONER

## 2023-09-20 PROCEDURE — 85610 PROTHROMBIN TIME: CPT | Performed by: NURSE PRACTITIONER

## 2023-09-20 PROCEDURE — 84439 ASSAY OF FREE THYROXINE: CPT | Performed by: NURSE PRACTITIONER

## 2023-09-20 PROCEDURE — 85597 PHOSPHOLIPID PLTLT NEUTRALIZ: CPT | Performed by: NURSE PRACTITIONER

## 2023-09-20 PROCEDURE — 81001 URINALYSIS AUTO W/SCOPE: CPT | Performed by: NURSE PRACTITIONER

## 2023-09-20 PROCEDURE — 85598 HEXAGNAL PHOSPH PLTLT NEUTRL: CPT | Performed by: NURSE PRACTITIONER

## 2023-09-20 PROCEDURE — 86146 BETA-2 GLYCOPROTEIN ANTIBODY: CPT | Performed by: NURSE PRACTITIONER

## 2023-09-20 PROCEDURE — 86431 RHEUMATOID FACTOR QUANT: CPT | Performed by: NURSE PRACTITIONER

## 2023-09-20 PROCEDURE — 36415 COLL VENOUS BLD VENIPUNCTURE: CPT | Performed by: NURSE PRACTITIONER

## 2023-09-20 PROCEDURE — 86235 NUCLEAR ANTIGEN ANTIBODY: CPT | Performed by: NURSE PRACTITIONER

## 2023-09-20 PROCEDURE — 86038 ANTINUCLEAR ANTIBODIES: CPT | Performed by: NURSE PRACTITIONER

## 2023-09-20 PROCEDURE — 86147 CARDIOLIPIN ANTIBODY EA IG: CPT | Performed by: NURSE PRACTITIONER

## 2023-09-20 PROCEDURE — 86140 C-REACTIVE PROTEIN: CPT | Performed by: NURSE PRACTITIONER

## 2023-09-20 PROCEDURE — 85670 THROMBIN TIME PLASMA: CPT | Performed by: NURSE PRACTITIONER

## 2023-09-20 PROCEDURE — 84443 ASSAY THYROID STIM HORMONE: CPT | Performed by: NURSE PRACTITIONER

## 2023-09-20 PROCEDURE — 85652 RBC SED RATE AUTOMATED: CPT | Performed by: NURSE PRACTITIONER

## 2023-09-20 PROCEDURE — 84481 FREE ASSAY (FT-3): CPT | Performed by: NURSE PRACTITIONER

## 2023-09-20 RX ORDER — ANASTROZOLE 1 MG/1
TABLET ORAL DAILY
COMMUNITY

## 2023-09-20 NOTE — PROGRESS NOTES
"Chief Complaint  Dizziness (Dizziness and loss of balance. Started 3 weeks ago. ) and Dry Eye (Saw ENT for throat and told to see PCP for labs to check for Sjogrens.)    Subjective        Stephon Castellano [place] [facility] for concerns for Sjogren's syndrome.    Patient is concerned regarding Sjogren's syndrome, patient recently had an appointment with ENT provider for chronic pharyngitis, dry throat.  ENT recommended that he follow-up with his PCP to discuss this disorder.  Patient admits that he has had multiple complaints over the last few years.  He retired just in the summer, and this is part of why he retired.    Dry eyes, dry throat, brain fog, forgetfulness, skin rashes, joint pains, headaches, dizziness, balance issues, recurrent pharyngitis, constipation.    Objective   Vital Signs:   /80   Pulse 85   Temp 98.6 °F (37 °C)   Ht 180.3 cm (71\")   Wt 89.6 kg (197 lb 9.6 oz)   SpO2 98%   BMI 27.56 kg/m²     Physical Exam  Vitals and nursing note reviewed.   Constitutional:       Appearance: Normal appearance.   HENT:      Head: Normocephalic and atraumatic.      Right Ear: External ear normal.      Left Ear: External ear normal.      Nose: Nose normal.      Mouth/Throat:      Mouth: Mucous membranes are dry.   Eyes:      Pupils: Pupils are equal, round, and reactive to light.   Cardiovascular:      Rate and Rhythm: Normal rate.   Pulmonary:      Effort: Pulmonary effort is normal.   Skin:     General: Skin is warm and dry.   Neurological:      Mental Status: He is alert.      Result Review :  {The following data was reviewed by NARENDRA Mirza on 09/20/23                Diagnoses and all orders for this visit:    1. Dry eyes (Primary)  -     Comprehensive Metabolic Panel  -     CBC & Differential  -     TSH  -     T4, Free  -     T3, Free  -     Sedimentation Rate  -     Rheumatoid Factor  -     C-reactive Protein  -     Sjogren's Antibody, Anti-SS-A / -SS-B  -     " DARWIN  -     Lupus Anticoagulant Panel  -     Urinalysis With Microscopic - Urine, Clean Catch  -     Ambulatory Referral to Ophthalmology    2. Brain fog  -     Comprehensive Metabolic Panel  -     CBC & Differential  -     TSH  -     T4, Free  -     T3, Free  -     Sedimentation Rate  -     Rheumatoid Factor  -     C-reactive Protein  -     Sjogren's Antibody, Anti-SS-A / -SS-B  -     DARWIN  -     Lupus Anticoagulant Panel  -     Urinalysis With Microscopic - Urine, Clean Catch  -     Ambulatory Referral to Ophthalmology    3. Fatigue, unspecified type  -     Comprehensive Metabolic Panel  -     CBC & Differential  -     TSH  -     T4, Free  -     T3, Free  -     Sedimentation Rate  -     Rheumatoid Factor  -     C-reactive Protein  -     Sjogren's Antibody, Anti-SS-A / -SS-B  -     DARWIN  -     Lupus Anticoagulant Panel  -     Urinalysis With Microscopic - Urine, Clean Catch  -     Ambulatory Referral to Ophthalmology    4. Dental caries  -     Comprehensive Metabolic Panel  -     CBC & Differential  -     TSH  -     T4, Free  -     T3, Free  -     Sedimentation Rate  -     Rheumatoid Factor  -     C-reactive Protein  -     Sjogren's Antibody, Anti-SS-A / -SS-B  -     DARWIN  -     Lupus Anticoagulant Panel  -     Urinalysis With Microscopic - Urine, Clean Catch  -     Ambulatory Referral to Ophthalmology    5. Dry mouth  -     Comprehensive Metabolic Panel  -     CBC & Differential  -     TSH  -     T4, Free  -     T3, Free  -     Sedimentation Rate  -     Rheumatoid Factor  -     C-reactive Protein  -     Sjogren's Antibody, Anti-SS-A / -SS-B  -     DARWIN  -     Lupus Anticoagulant Panel  -     Urinalysis With Microscopic - Urine, Clean Catch  -     Ambulatory Referral to Ophthalmology    6. Multiple joint pain  -     Comprehensive Metabolic Panel  -     CBC & Differential  -     TSH  -     T4, Free  -     T3, Free  -     Sedimentation Rate  -     Rheumatoid Factor  -     C-reactive Protein  -     Sjogren's Antibody,  Anti-SS-A / -SS-B  -     DARWIN  -     Lupus Anticoagulant Panel  -     Urinalysis With Microscopic - Urine, Clean Catch  -     Ambulatory Referral to Ophthalmology    7. Lipid screening  -     Lipid Panel    Discussed with patient initial steps of diagnosis for Sjogren's, we will get lab work completed today, will get patient scheduled for a follow-up with his PCP in the next 1 to 2 weeks.  Will review labs with him at that time.  If any significant abnormal results come back, I will discuss with his PCP and address those as they come in.      Follow Up   No follow-ups on file.  Patient was given instructions and counseling regarding his condition or for health maintenance advice. Please see specific information pulled into the AVS if appropriate.     Kenneth Villagomez, NARENDRA  9/20/2023  This note was electronically signed.

## 2023-09-21 LAB
ANA SER QL: NEGATIVE
ENA SS-A AB SER-ACNC: 0.2 AI (ref 0–0.9)
ENA SS-B AB SER-ACNC: <0.2 AI (ref 0–0.9)

## 2023-09-27 LAB
APTT HEX PL PPP: 8 SEC
APTT IMM NP PPP: NORMAL SEC
APTT PPP 1:1 SALINE: NORMAL SEC
APTT PPP: 26.6 SEC
B2 GLYCOPROT1 IGA SER-ACNC: <10 SAU
B2 GLYCOPROT1 IGG SER-ACNC: <10 SGU
B2 GLYCOPROT1 IGM SER-ACNC: <10 SMU
CARDIOLIPIN IGG SER IA-ACNC: <10 GPL
CARDIOLIPIN IGM SER IA-ACNC: <10 MPL
CONFIRM APTT: 0 SEC
CONFIRM DRVVT: NORMAL SEC
DRVVT SCREEN TO CONFIRM RATIO: NORMAL RATIO
INR PPP: 0.9 RATIO
LABORATORY COMMENT REPORT: NORMAL
PROTHROMBIN TIME: 9.7 SEC
SCREEN DRVVT: 33.4 SEC
THROMBIN TIME: 16.6 SEC

## 2023-11-13 ENCOUNTER — NURSE TRIAGE (OUTPATIENT)
Dept: CALL CENTER | Facility: HOSPITAL | Age: 63
End: 2023-11-13
Payer: OTHER GOVERNMENT

## 2023-11-13 NOTE — TELEPHONE ENCOUNTER
"Reason for Disposition   [1] Dizziness (vertigo) present now AND [2] age > 59   (Exception: Prior doctor or NP/PA evaluation for this AND no different/worse than usual.)    Additional Information   Negative: [1] Weakness (i.e., paralysis, loss of muscle strength) of the face, arm or leg on one side of the body AND [2] sudden onset AND [3] present now   Negative: [1] Numbness (i.e., loss of sensation) of the face, arm or leg on one side of the body AND [2] sudden onset AND [3] present now   Negative: [1] Loss of speech or garbled speech AND [2] sudden onset AND [3] present now   Negative: Difficult to awaken or acting confused (e.g., disoriented, slurred speech)   Negative: Sounds like a life-threatening emergency to the triager   Negative: Followed a head injury   Negative: Followed an ear injury   Negative: Localized weakness or numbness is main symptom   Negative: Dizziness relates to riding in a car, going to an amusement park, etc.   Negative: [1] Dizziness is main symptom AND [2] NO spinning sensation (i.e., vertigo)   Negative: SEVERE dizziness (vertigo) (e.g., unable to walk without assistance)   Negative: Severe headache (e.g., excruciating)   (Exception: Similar to previous migraines.)   Negative: [1] Dizziness (vertigo) present now AND [2] one or more STROKE RISK FACTORS (i.e., hypertension, diabetes, prior stroke/TIA, heart attack)  (Exception: Prior doctor or NP/PA evaluation for this AND no different/worse than usual.)    Answer Assessment - Initial Assessment Questions  1. DESCRIPTION: \"Describe your dizziness.\"      When he stands up or turns his head he is dizzy  2. VERTIGO: \"Do you feel like either you or the room is spinning or tilting?\"       Denies that he now states dizzy not vertigo as orginally stated  3. LIGHTHEADED: \"Do you feel lightheaded?\" (e.g., somewhat faint, woozy, weak upon standing)      yes  4. SEVERITY: \"How bad is it?\"  \"Can you walk?\"    - MILD: Feels slightly dizzy and " "unsteady, but is walking normally.    - MODERATE: Feels unsteady when walking, but not falling; interferes with normal activities (e.g., school, work).    - SEVERE: Unable to walk without falling, or requires assistance to walk without falling.      moderate  5. ONSET:  \"When did the dizziness begin?\"      Today  6. AGGRAVATING FACTORS: \"Does anything make it worse?\" (e.g., standing, change in head position)      Position changes  7. CAUSE: \"What do you think is causing the dizziness?\"      He is not sure  8. RECURRENT SYMPTOM: \"Have you had dizziness before?\" If Yes, ask: \"When was the last time?\" \"What happened that time?\"      Once before 3 weeks ago  9. OTHER SYMPTOMS: \"Do you have any other symptoms?\" (e.g., headache, weakness, numbness, vomiting, earache)      Dizziness with position change and head tilt, ringing in ears suffers tinnitis chronically for years ( hx, low noises)  10. PREGNANCY: \"Is there any chance you are pregnant?\" \"When was your last menstrual period?\"        na    Protocols used: Dizziness - Vertigo-ADULT-AH    "

## 2023-11-14 ENCOUNTER — APPOINTMENT (OUTPATIENT)
Dept: GENERAL RADIOLOGY | Facility: HOSPITAL | Age: 63
DRG: 309 | End: 2023-11-14
Payer: OTHER GOVERNMENT

## 2023-11-14 ENCOUNTER — APPOINTMENT (OUTPATIENT)
Dept: CT IMAGING | Facility: HOSPITAL | Age: 63
DRG: 309 | End: 2023-11-14
Payer: OTHER GOVERNMENT

## 2023-11-14 ENCOUNTER — OFFICE VISIT (OUTPATIENT)
Dept: INTERNAL MEDICINE | Facility: CLINIC | Age: 63
End: 2023-11-14
Payer: OTHER GOVERNMENT

## 2023-11-14 ENCOUNTER — HOSPITAL ENCOUNTER (INPATIENT)
Facility: HOSPITAL | Age: 63
LOS: 2 days | Discharge: HOME | DRG: 309 | End: 2023-11-16
Attending: EMERGENCY MEDICINE | Admitting: FAMILY MEDICINE
Payer: OTHER GOVERNMENT

## 2023-11-14 VITALS
HEIGHT: 71 IN | TEMPERATURE: 97.6 F | BODY MASS INDEX: 26.21 KG/M2 | HEART RATE: 62 BPM | WEIGHT: 187.2 LBS | OXYGEN SATURATION: 96 % | DIASTOLIC BLOOD PRESSURE: 60 MMHG | SYSTOLIC BLOOD PRESSURE: 122 MMHG

## 2023-11-14 DIAGNOSIS — R94.31 ABNORMAL EKG: Primary | ICD-10-CM

## 2023-11-14 DIAGNOSIS — R94.31 ABNORMAL EKG: ICD-10-CM

## 2023-11-14 DIAGNOSIS — N28.9 RENAL INSUFFICIENCY: ICD-10-CM

## 2023-11-14 DIAGNOSIS — R63.4 WEIGHT LOSS: ICD-10-CM

## 2023-11-14 DIAGNOSIS — R10.9 ABDOMINAL PAIN, UNSPECIFIED ABDOMINAL LOCATION: ICD-10-CM

## 2023-11-14 DIAGNOSIS — R56.9 SEIZURE-LIKE ACTIVITY: Primary | ICD-10-CM

## 2023-11-14 DIAGNOSIS — R00.2 PALPITATIONS: ICD-10-CM

## 2023-11-14 PROBLEM — R00.1 BRADYCARDIA: Status: ACTIVE | Noted: 2023-11-14

## 2023-11-14 LAB
25(OH)D3 SERPL-MCNC: 64.2 NG/ML (ref 30–100)
ALBUMIN SERPL-MCNC: 4.4 G/DL (ref 3.5–5.2)
ALBUMIN SERPL-MCNC: 4.7 G/DL (ref 3.5–5.2)
ALBUMIN/GLOB SERPL: 1.5 G/DL
ALBUMIN/GLOB SERPL: 1.7 G/DL
ALP SERPL-CCNC: 73 U/L (ref 39–117)
ALP SERPL-CCNC: 76 U/L (ref 39–117)
ALT SERPL W P-5'-P-CCNC: 33 U/L (ref 1–41)
ALT SERPL W P-5'-P-CCNC: 34 U/L (ref 1–41)
ANION GAP SERPL CALCULATED.3IONS-SCNC: 11.2 MMOL/L (ref 5–15)
ANION GAP SERPL CALCULATED.3IONS-SCNC: 9.5 MMOL/L (ref 5–15)
AST SERPL-CCNC: 19 U/L (ref 1–40)
AST SERPL-CCNC: 19 U/L (ref 1–40)
BASOPHILS # BLD AUTO: 0.03 10*3/MM3 (ref 0–0.2)
BASOPHILS # BLD AUTO: 0.04 10*3/MM3 (ref 0–0.2)
BASOPHILS NFR BLD AUTO: 0.7 % (ref 0–1.5)
BASOPHILS NFR BLD AUTO: 0.9 % (ref 0–1.5)
BILIRUB SERPL-MCNC: 0.3 MG/DL (ref 0–1.2)
BILIRUB SERPL-MCNC: <0.2 MG/DL (ref 0–1.2)
BILIRUB UR QL STRIP: NEGATIVE
BUN SERPL-MCNC: 37 MG/DL (ref 8–23)
BUN SERPL-MCNC: 41 MG/DL (ref 8–23)
BUN/CREAT SERPL: 14.9 (ref 7–25)
BUN/CREAT SERPL: 17.6 (ref 7–25)
CALCIUM SPEC-SCNC: 9.7 MG/DL (ref 8.6–10.5)
CALCIUM SPEC-SCNC: 9.7 MG/DL (ref 8.6–10.5)
CHLORIDE SERPL-SCNC: 96 MMOL/L (ref 98–107)
CHLORIDE SERPL-SCNC: 98 MMOL/L (ref 98–107)
CK SERPL-CCNC: 127 U/L (ref 20–200)
CLARITY UR: CLEAR
CO2 SERPL-SCNC: 29.8 MMOL/L (ref 22–29)
CO2 SERPL-SCNC: 31.5 MMOL/L (ref 22–29)
COLOR UR: YELLOW
CREAT SERPL-MCNC: 2.33 MG/DL (ref 0.76–1.27)
CREAT SERPL-MCNC: 2.49 MG/DL (ref 0.76–1.27)
DEPRECATED RDW RBC AUTO: 38.8 FL (ref 37–54)
DEPRECATED RDW RBC AUTO: 42.5 FL (ref 37–54)
EGFRCR SERPLBLD CKD-EPI 2021: 28.1 ML/MIN/1.73
EGFRCR SERPLBLD CKD-EPI 2021: 30.5 ML/MIN/1.73
EOSINOPHIL # BLD AUTO: 0.12 10*3/MM3 (ref 0–0.4)
EOSINOPHIL # BLD AUTO: 0.13 10*3/MM3 (ref 0–0.4)
EOSINOPHIL NFR BLD AUTO: 2.9 % (ref 0.3–6.2)
EOSINOPHIL NFR BLD AUTO: 3 % (ref 0.3–6.2)
ERYTHROCYTE [DISTWIDTH] IN BLOOD BY AUTOMATED COUNT: 11.5 % (ref 12.3–15.4)
ERYTHROCYTE [DISTWIDTH] IN BLOOD BY AUTOMATED COUNT: 12.2 % (ref 12.3–15.4)
GLOBULIN UR ELPH-MCNC: 2.8 GM/DL
GLOBULIN UR ELPH-MCNC: 2.9 GM/DL
GLUCOSE SERPL-MCNC: 100 MG/DL (ref 65–99)
GLUCOSE SERPL-MCNC: 96 MG/DL (ref 65–99)
GLUCOSE UR STRIP-MCNC: NEGATIVE MG/DL
HCT VFR BLD AUTO: 34.7 % (ref 37.5–51)
HCT VFR BLD AUTO: 36.8 % (ref 37.5–51)
HGB BLD-MCNC: 11.7 G/DL (ref 13–17.7)
HGB BLD-MCNC: 11.9 G/DL (ref 13–17.7)
HGB UR QL STRIP.AUTO: NEGATIVE
HOLD SPECIMEN: NORMAL
HOLD SPECIMEN: NORMAL
IMM GRANULOCYTES # BLD AUTO: 0.01 10*3/MM3 (ref 0–0.05)
IMM GRANULOCYTES # BLD AUTO: 0.01 10*3/MM3 (ref 0–0.05)
IMM GRANULOCYTES NFR BLD AUTO: 0.2 % (ref 0–0.5)
IMM GRANULOCYTES NFR BLD AUTO: 0.2 % (ref 0–0.5)
KETONES UR QL STRIP: NEGATIVE
LEUKOCYTE ESTERASE UR QL STRIP.AUTO: NEGATIVE
LYMPHOCYTES # BLD AUTO: 1.5 10*3/MM3 (ref 0.7–3.1)
LYMPHOCYTES # BLD AUTO: 1.86 10*3/MM3 (ref 0.7–3.1)
LYMPHOCYTES NFR BLD AUTO: 37.4 % (ref 19.6–45.3)
LYMPHOCYTES NFR BLD AUTO: 41.9 % (ref 19.6–45.3)
MAGNESIUM SERPL-MCNC: 2.2 MG/DL (ref 1.6–2.4)
MAGNESIUM SERPL-MCNC: 2.3 MG/DL (ref 1.6–2.4)
MCH RBC QN AUTO: 30.7 PG (ref 26.6–33)
MCH RBC QN AUTO: 31.5 PG (ref 26.6–33)
MCHC RBC AUTO-ENTMCNC: 32.3 G/DL (ref 31.5–35.7)
MCHC RBC AUTO-ENTMCNC: 33.7 G/DL (ref 31.5–35.7)
MCV RBC AUTO: 93.5 FL (ref 79–97)
MCV RBC AUTO: 95.1 FL (ref 79–97)
MONOCYTES # BLD AUTO: 0.35 10*3/MM3 (ref 0.1–0.9)
MONOCYTES # BLD AUTO: 0.38 10*3/MM3 (ref 0.1–0.9)
MONOCYTES NFR BLD AUTO: 8.6 % (ref 5–12)
MONOCYTES NFR BLD AUTO: 8.7 % (ref 5–12)
NEUTROPHILS NFR BLD AUTO: 2 10*3/MM3 (ref 1.7–7)
NEUTROPHILS NFR BLD AUTO: 2.02 10*3/MM3 (ref 1.7–7)
NEUTROPHILS NFR BLD AUTO: 45.5 % (ref 42.7–76)
NEUTROPHILS NFR BLD AUTO: 50 % (ref 42.7–76)
NITRITE UR QL STRIP: NEGATIVE
NRBC BLD AUTO-RTO: 0 /100 WBC (ref 0–0.2)
NRBC BLD AUTO-RTO: 0 /100 WBC (ref 0–0.2)
PH UR STRIP.AUTO: 5.5 [PH] (ref 5–8)
PLATELET # BLD AUTO: 144 10*3/MM3 (ref 140–450)
PLATELET # BLD AUTO: 145 10*3/MM3 (ref 140–450)
PMV BLD AUTO: 10.2 FL (ref 6–12)
PMV BLD AUTO: 10.4 FL (ref 6–12)
POTASSIUM SERPL-SCNC: 4.3 MMOL/L (ref 3.5–5.2)
POTASSIUM SERPL-SCNC: 4.4 MMOL/L (ref 3.5–5.2)
PROLACTIN SERPL-MCNC: 7.63 NG/ML (ref 4.04–15.2)
PROT SERPL-MCNC: 7.3 G/DL (ref 6–8.5)
PROT SERPL-MCNC: 7.5 G/DL (ref 6–8.5)
PROT UR QL STRIP: NEGATIVE
RBC # BLD AUTO: 3.71 10*6/MM3 (ref 4.14–5.8)
RBC # BLD AUTO: 3.87 10*6/MM3 (ref 4.14–5.8)
SODIUM SERPL-SCNC: 137 MMOL/L (ref 136–145)
SODIUM SERPL-SCNC: 139 MMOL/L (ref 136–145)
SP GR UR STRIP: 1.01 (ref 1–1.03)
TROPONIN T SERPL HS-MCNC: 18 NG/L
TSH SERPL DL<=0.05 MIU/L-ACNC: 1.13 UIU/ML (ref 0.27–4.2)
UROBILINOGEN UR QL STRIP: NORMAL
WBC NRBC COR # BLD: 4.01 10*3/MM3 (ref 3.4–10.8)
WBC NRBC COR # BLD: 4.44 10*3/MM3 (ref 3.4–10.8)
WHOLE BLOOD HOLD COAG: NORMAL
WHOLE BLOOD HOLD SPECIMEN: NORMAL

## 2023-11-14 PROCEDURE — 80053 COMPREHEN METABOLIC PANEL: CPT | Performed by: PHYSICIAN ASSISTANT

## 2023-11-14 PROCEDURE — 71045 X-RAY EXAM CHEST 1 VIEW: CPT

## 2023-11-14 PROCEDURE — 93005 ELECTROCARDIOGRAM TRACING: CPT | Performed by: EMERGENCY MEDICINE

## 2023-11-14 PROCEDURE — 93005 ELECTROCARDIOGRAM TRACING: CPT

## 2023-11-14 PROCEDURE — 84484 ASSAY OF TROPONIN QUANT: CPT

## 2023-11-14 PROCEDURE — 84146 ASSAY OF PROLACTIN: CPT | Performed by: PHYSICIAN ASSISTANT

## 2023-11-14 PROCEDURE — 81003 URINALYSIS AUTO W/O SCOPE: CPT | Performed by: EMERGENCY MEDICINE

## 2023-11-14 PROCEDURE — 85025 COMPLETE CBC W/AUTO DIFF WBC: CPT | Performed by: PHYSICIAN ASSISTANT

## 2023-11-14 PROCEDURE — 99285 EMERGENCY DEPT VISIT HI MDM: CPT

## 2023-11-14 PROCEDURE — 83735 ASSAY OF MAGNESIUM: CPT | Performed by: PHYSICIAN ASSISTANT

## 2023-11-14 PROCEDURE — 82550 ASSAY OF CK (CPK): CPT | Performed by: PHYSICIAN ASSISTANT

## 2023-11-14 PROCEDURE — 84443 ASSAY THYROID STIM HORMONE: CPT

## 2023-11-14 PROCEDURE — 36415 COLL VENOUS BLD VENIPUNCTURE: CPT

## 2023-11-14 PROCEDURE — 85025 COMPLETE CBC W/AUTO DIFF WBC: CPT

## 2023-11-14 PROCEDURE — 93010 ELECTROCARDIOGRAM REPORT: CPT | Performed by: SPECIALIST

## 2023-11-14 PROCEDURE — 99222 1ST HOSP IP/OBS MODERATE 55: CPT | Performed by: FAMILY MEDICINE

## 2023-11-14 PROCEDURE — 86592 SYPHILIS TEST NON-TREP QUAL: CPT | Performed by: PHYSICIAN ASSISTANT

## 2023-11-14 PROCEDURE — 84154 ASSAY OF PSA FREE: CPT | Performed by: INTERNAL MEDICINE

## 2023-11-14 PROCEDURE — 82306 VITAMIN D 25 HYDROXY: CPT | Performed by: PHYSICIAN ASSISTANT

## 2023-11-14 PROCEDURE — 25810000003 SODIUM CHLORIDE 0.9 % SOLUTION: Performed by: EMERGENCY MEDICINE

## 2023-11-14 PROCEDURE — 84153 ASSAY OF PSA TOTAL: CPT | Performed by: INTERNAL MEDICINE

## 2023-11-14 PROCEDURE — 70450 CT HEAD/BRAIN W/O DYE: CPT

## 2023-11-14 PROCEDURE — 80053 COMPREHEN METABOLIC PANEL: CPT

## 2023-11-14 PROCEDURE — 83735 ASSAY OF MAGNESIUM: CPT

## 2023-11-14 RX ORDER — SODIUM CHLORIDE 0.9 % (FLUSH) 0.9 %
10 SYRINGE (ML) INJECTION AS NEEDED
Status: DISCONTINUED | OUTPATIENT
Start: 2023-11-14 | End: 2023-11-16 | Stop reason: HOSPADM

## 2023-11-14 RX ADMIN — SODIUM CHLORIDE 1000 ML: 9 INJECTION, SOLUTION INTRAVENOUS at 20:36

## 2023-11-14 NOTE — ASSESSMENT & PLAN NOTE
EKG in office showing T wave elevation, since we have nothing to compare it to as well as other vague symptoms, would recommend going to the ER for further evaluation.  Patient agreeable and declined EMS transport at this time.

## 2023-11-14 NOTE — ASSESSMENT & PLAN NOTE
Due to significant weight loss and abdominal pain, will order CT of abd/pelvis to rule out causes for unintentional weight loss and pain.

## 2023-11-14 NOTE — ED PROVIDER NOTES
Time: 5:43 PM EST  Date of encounter:  11/14/2023  Independent Historian/Clinical History and Information was obtained by:   Patient    History is limited by: N/A    Chief Complaint   Patient presents with    Irregular Heart Beat         History of Present Illness:  Patient is a 63 y.o. year old male who presents to the emergency department for evaluation of abnormal EKG.  Patient states that he was sent here by his PCP because he had an abnormal EKG at the office today.  He states they did an EKG because he had been having fluttering in his chest and also had seizure-like activity yesterday when he was taking some of his medications.  Patient is denying any chest pain or shortness of breath.  Patient denies any palpitations at this time.  Patient also has had 40 pound weight loss in the last 2 months.  Patient denies fever and chills.  Patient has no cough or hemoptysis.  Patient denies dysuria and urinary frequency.    Patient Care Team  Primary Care Provider: Paige Florez MD    Past Medical History:     No Known Allergies  Past Medical History:   Diagnosis Date    Allergic rhinitis     Anxiety 2006    Starting taking mood depression drugs    Arthritis     Asthma About 6 months    Sore Throat and was sent to ENT    Benign prostatic hyperplasia 2015    Dad had Prostate Cancer and just did Colonoscopy and they said i have a major large Prostate    Cervical neck pain with evidence of disc disease     Colon polyp 2020    Had a lot of polyps on the last 2 Colonoscopies    Depression     Erectile dysfunction 2017    I have been on Testosterone replacements since then    Head injury 1977    Headache May 2023    Started abou 6 months or more    Hemorrhoid     HL (hearing loss) 2004    Have a major tinitis/ringing in my ears especially the left one    Hyperlipidemia 2017    My blood test have stated they were high or elevated    Hypertension 2017    Given Flomax said it will probably go down    Hypogonadism  in male     Kidney stone 2020    Went to doctor and got medication for them and still have issues with right side    Low back pain 2010    Stinosis of back & neck    Substance abuse [id number]    On Disulfiram for alcohol abuse-voluntary    Visual impairment 2023    Just recently have been given eye drops     Past Surgical History:   Procedure Laterality Date    CERVICAL DISC SURGERY  2008    COLONOSCOPY N/A 08/18/2023    Procedure: COLONOSCOPY WITH POLYPECTOMY/SNARE;  Surgeon: Jose Alejandro Fox MD;  Location: [facility] ENDOSCOPY;  Service: General;  Laterality: N/A;  COLON POLYPS    HEEL SPUR SURGERY  2005    KNEE ACL RECONSTRUCTION  2004    OTHER SURGICAL HISTORY      METAL IMPLANT    PAIN PUMP INSERTION/REVISION  2010    SPINE SURGERY  2008    Antieror c5-7 discotomy    TONSILLECTOMY  1965     Family History   Problem Relation Age of Onset    Heart disease Mother     Osteoporosis Mother     Alcohol abuse Mother         Mother committed suicide    Depression Mother         She had major depression    Early death Mother         She committed suicide    Thyroid disease Mother         She had Thyroid issues and had surgery    Cancer Father         Prostate    Prostate cancer Father     Hearing loss Father         He has worn a hearing aids since his late 50’s    Diabetes Sister         Diabetes    Arthritis Sister     Diabetes Brother     Stroke Other         AUNT/UNCLE GRANDPARENTS    Diabetes Other         GRANDPARENTS        Home Medications:  Prior to Admission medications    Medication Sig Start Date End Date Taking? Authorizing Provider   anastrozole (ARIMIDEX) 1 MG tablet Take  by mouth Daily.    Lety Roberts MD   DULoxetine (CYMBALTA) 60 MG capsule Take 1 capsule by mouth Daily.    Lety Roberts MD   multivitamin with minerals tablet tablet Multivitamin 50 Plus oral tablet take 1 tablet by oral route daily   Active    ProviderLety MD   pain patient supplied pump by Intrathecal route  "Continuous.    Lety Roberts MD   tamsulosin (FLOMAX) 0.4 MG capsule 24 hr capsule Take 1 capsule by mouth Daily.    Lety Roberts MD   Testosterone Cypionate (DEPOTESTOTERONE CYPIONATE) 200 MG/ML injection  21   Lety Roberts MD   traZODone (DESYREL) 100 MG tablet Take 1 tablet by mouth Every Night.    Lety Roberts MD        Social History:   Social History     Tobacco Use    Smoking status: Former     Packs/day: 2.00     Years: 7.00     Additional pack years: 0.00     Total pack years: 14.00     Types: Cigarettes     Start date: 1975     Quit date: 1983     Years since quittin.8    Smokeless tobacco: Never    Tobacco comments:     Also dipped for several years   Vaping Use    Vaping Use: Never used   Substance Use Topics    Alcohol use: Not Currently     Comment: Stopped due to Disulfiram 250 mg    Drug use: Not Currently         Review of Systems:  Review of Systems   Constitutional:  Negative for chills and fever.   HENT:  Negative for congestion, rhinorrhea and sore throat.    Eyes:  Negative for pain and visual disturbance.   Respiratory:  Negative for apnea, cough, chest tightness and shortness of breath.    Cardiovascular:  Positive for palpitations. Negative for chest pain.   Gastrointestinal:  Negative for abdominal pain, diarrhea, nausea and vomiting.   Genitourinary:  Negative for difficulty urinating and dysuria.   Musculoskeletal:  Negative for joint swelling and myalgias.   Skin:  Negative for color change.   Neurological:  Negative for seizures and headaches.   Psychiatric/Behavioral: Negative.     All other systems reviewed and are negative.       Physical Exam:  /83 (BP Location: Left arm, Patient Position: Lying)   Pulse 51   Temp 97.6 °F (36.4 °C) (Oral)   Resp 18   Ht 180.3 cm (71\")   Wt 83.7 kg (184 lb 8.4 oz)   SpO2 97%   BMI 25.74 kg/m²         Physical Exam  Vitals and nursing note reviewed.   Constitutional:       General: He " is not in acute distress.     Appearance: Normal appearance. He is not toxic-appearing.   HENT:      Head: Normocephalic and atraumatic.      Jaw: There is normal jaw occlusion.   Eyes:      General: Lids are normal.      Extraocular Movements: Extraocular movements intact.      Conjunctiva/sclera: Conjunctivae normal.      Pupils: Pupils are equal, round, and reactive to light.   Cardiovascular:      Rate and Rhythm: Normal rate and regular rhythm.      Pulses: Normal pulses.      Heart sounds: Normal heart sounds.   Pulmonary:      Effort: Pulmonary effort is normal. No respiratory distress.      Breath sounds: Normal breath sounds. No wheezing or rhonchi.   Abdominal:      General: Abdomen is flat. There is no distension.      Palpations: Abdomen is soft.      Tenderness: There is no abdominal tenderness. There is no guarding or rebound.   Musculoskeletal:         General: Normal range of motion.      Cervical back: Normal range of motion and neck supple.      Right lower leg: No edema.      Left lower leg: No edema.   Skin:     General: Skin is warm and dry.      Coloration: Skin is not cyanotic.   Neurological:      Mental Status: He is alert and oriented to person, place, and time. Mental status is at baseline.   Psychiatric:         Attention and Perception: Attention and perception normal.         Mood and Affect: Mood normal.                    Procedures:  Procedures      Medical Decision Making:      Comorbidities that affect care:    Asthma    External Notes reviewed:    Previous Clinic Note: Patient was seen in clinic for dizziness in September.      The following orders were placed and all results were independently analyzed by me:  Orders Placed This Encounter   Procedures    XR Chest 1 View    CT Head Without Contrast    [place] Draw    Comprehensive Metabolic Panel    Magnesium    Single High Sensitivity Troponin T    TSH    CBC Auto Differential    Urinalysis With Microscopic If Indicated (No  Culture) - Urine, Clean Catch    NPO Diet NPO Type: Strict NPO    Undress & Gown    Continuous Pulse Oximetry    Inpatient Hospitalist Consult    Oxygen Therapy- Nasal Cannula; Titrate 1-6 LPM Per SpO2; 90 - 95%    ECG 12 Lead ED Triage Standing Order; Dysrhythmia    Insert Peripheral IV    CBC & Differential    Green Top (Gel)    Lavender Top    Gold Top - SST    Light Blue Top       Medications Given in the Emergency Department:  Medications   sodium chloride 0.9 % flush 10 mL (has no administration in time range)   sodium chloride 0.9 % bolus 1,000 mL (1,000 mL Intravenous New Bag 11/14/23 2036)        ED Course:    The patient was initially evaluated in the triage area where orders were placed. The patient was later dispositioned by Corrie Murillo MD.      The patient was advised to stay for completion of workup which includes but is not limited to communication of labs and radiological results, reassessment and plan. The patient was advised that leaving prior to disposition by a provider could result in critical findings that are not communicated to the patient.     ED Course as of 11/14/23 2200 Tue Nov 14, 2023   1747 PROVIDER IN TRIAGE  Patient was evaluated by me in triage, Juan Haile PA-C.  Orders were placed and patient is currently awaiting final results and disposition.  [MD]      ED Course User Index  [MD] Juan Haile PA-C       Labs:    Lab Results (last 24 hours)       Procedure Component Value Units Date/Time    CBC w AUTO Differential [[id number]] Collected: 11/14/23 1716    Specimen: Blood from Arm, Left Updated: 11/14/23 1716    Narrative:      The following orders were created for panel order CBC w AUTO Differential.  Procedure                               Abnormality         Status                     ---------                               -----------         ------                     CBC Auto Differential[[id number]]                            In process                    Please view results for these tests on the individual orders.    Comprehensive metabolic panel [[id number]] Collected: 11/14/23 1716    Specimen: Blood from Arm, Left Updated: 11/14/23 1716    CK [[id number]] Collected: 11/14/23 1716    Specimen: Blood from Arm, Left Updated: 11/14/23 1716    RPR [[id number]] Collected: 11/14/23 1716    Specimen: Blood from Arm, Left Updated: 11/14/23 1716    Vitamin D 25 hydroxy [[id number]] Collected: 11/14/23 1716    Specimen: Blood from Arm, Left Updated: 11/14/23 1716    Prolactin [[id number]] Collected: 11/14/23 1716    Specimen: Blood from Arm, Left Updated: 11/14/23 1716    Magnesium [[id number]] Collected: 11/14/23 1716    Specimen: Blood from Arm, Left Updated: 11/14/23 1716    CBC Auto Differential [[id number]] Collected: 11/14/23 1716    Specimen: Blood from Arm, Left Updated: 11/14/23 1716    CBC & Differential [[id number]]  (Abnormal) Collected: 11/14/23 1756    Specimen: Blood from Arm, Right Updated: 11/14/23 1832    Narrative:      The following orders were created for panel order CBC & Differential.  Procedure                               Abnormality         Status                     ---------                               -----------         ------                     CBC Auto Differential[[id number]]        Abnormal            Final result                 Please view results for these tests on the individual orders.    Comprehensive Metabolic Panel [[id number]]  (Abnormal) Collected: 11/14/23 1756    Specimen: Blood from Arm, Right Updated: 11/14/23 1854     Glucose 100 mg/dL      BUN 41 mg/dL      Creatinine 2.33 mg/dL      Sodium 139 mmol/L      Potassium 4.3 mmol/L      Chloride 98 mmol/L      CO2 31.5 mmol/L      Calcium 9.7 mg/dL      Total Protein 7.5 g/dL      Albumin 4.7 g/dL      ALT (SGPT) 33 U/L      AST (SGOT) 19 U/L      Alkaline Phosphatase 76 U/L      Total Bilirubin 0.3 mg/dL      Globulin 2.8 gm/dL      A/G Ratio 1.7 g/dL      BUN/Creatinine Ratio  17.6     Anion Gap 9.5 mmol/L      eGFR 30.5 mL/min/1.73     Narrative:      GFR Normal >60  Chronic Kidney Disease <60  Kidney Failure <15      Magnesium [[id number]]  (Normal) Collected: 11/14/23 1756    Specimen: Blood from Arm, Right Updated: 11/14/23 1854     Magnesium 2.2 mg/dL     Single High Sensitivity Troponin T [[id number]]  (Normal) Collected: 11/14/23 1756    Specimen: Blood from Arm, Right Updated: 11/14/23 1857     HS Troponin T 18 ng/L     Narrative:      High Sensitive Troponin T Reference Range:  <14.0 ng/L- Negative Female for AMI  <22.0 ng/L- Negative Male for AMI  >=14 - Abnormal Female indicating possible myocardial injury.  >=22 - Abnormal Male indicating possible myocardial injury.   Clinicians would have to utilize clinical acumen, EKG, Troponin, and serial changes to determine if it is an Acute Myocardial Infarction or myocardial injury due to an underlying chronic condition.         TSH [[id number]]  (Normal) Collected: 11/14/23 1756    Specimen: Blood from Arm, Right Updated: 11/14/23 1857     TSH 1.130 uIU/mL     CBC Auto Differential [[id number]]  (Abnormal) Collected: 11/14/23 1756    Specimen: Blood from Arm, Right Updated: 11/14/23 1832     WBC 4.44 10*3/mm3      RBC 3.87 10*6/mm3      Hemoglobin 11.9 g/dL      Hematocrit 36.8 %      MCV 95.1 fL      MCH 30.7 pg      MCHC 32.3 g/dL      RDW 12.2 %      RDW-SD 42.5 fl      MPV 10.2 fL      Platelets 145 10*3/mm3      Neutrophil % 45.5 %      Lymphocyte % 41.9 %      Monocyte % 8.6 %      Eosinophil % 2.9 %      Basophil % 0.9 %      Immature Grans % 0.2 %      Neutrophils, Absolute 2.02 10*3/mm3      Lymphocytes, Absolute 1.86 10*3/mm3      Monocytes, Absolute 0.38 10*3/mm3      Eosinophils, Absolute 0.13 10*3/mm3      Basophils, Absolute 0.04 10*3/mm3      Immature Grans, Absolute 0.01 10*3/mm3      nRBC 0.0 /100 WBC     Urinalysis With Microscopic If Indicated (No Culture) - Urine, Clean Catch [[id number]]  (Normal) Collected: 11/14/23  2100    Specimen: Urine, Clean Catch Updated: 11/14/23 2113     Color, UA Yellow     Appearance, UA Clear     pH, UA 5.5     Specific Gravity, UA 1.008     Glucose, UA Negative     Ketones, UA Negative     Bilirubin, UA Negative     Blood, UA Negative     Protein, UA Negative     Leuk Esterase, UA Negative     Nitrite, UA Negative     Urobilinogen, UA 0.2 E.U./dL    Narrative:      Urine microscopic not indicated.             Imaging:    XR Chest 1 View    Result Date: 11/14/2023  PROCEDURE: XR CHEST 1 VW  COMPARISON: [facility], CR, CHEST PA/AP & LAT 2V, 2/24/2021, 15:47.  INDICATIONS: IRREGULAR HEART BEAT TODAY  FINDINGS:  The heart is not enlarged.  The lungs seem clear.  There are emphysematous changes noted.        1. Element of COPD suggested.       JACI FOLEY MD       Electronically Signed and Approved By: JACI FOLEY MD on 11/14/2023 at 18:59                Differential Diagnosis and Discussion:      Palpitations: Differential diagnosis includes but is not limited to anxiety, atrioventricular blocks, mitral valve disease, hypoxia, coronary artery disease, hypokalemia, anemia, fever, COPD, congestive heart failure, pericarditis, Carey-Parkinson-White syndrome, pulmonary embolism, SVT, atrial fibrillation, atrial flutter, sinus tachycardia, thyrotoxicosis, and pheochromocytoma.    All labs were reviewed and interpreted by me.  All X-rays impressions were independently interpreted by me.  EKG was interpreted by me.  CT scan radiology impression was interpreted by me.    MDM     Amount and/or Complexity of Data Reviewed  Clinical lab tests: reviewed  Tests in the radiology section of CPT®: reviewed  Tests in the medicine section of CPT®: reviewed  Decide to obtain previous medical records or to obtain history from someone other than the patient: yes     The patient´s CBC that was reviewed and interpreted by me shows no abnormalities of critical concern. Of note, there is no anemia  requiring a blood transfusion and the platelet count is acceptable.  CMP does show a worsening renal insufficiency.  Patient was given 1 L normal saline bolus emergency department.            Patient Care Considerations:    SEPSIS was considered but is NOT present in the emergency department as SIRS criteria is not present. CT ABDOMEN AND PELVIS: I considered ordering a CT scan of the abdomen and pelvis however the patient's creatinine does preclude him from getting IV contrast at this moment.      Consultants/Shared Management Plan:    Case was discussed with Dr. Eldridge who agrees with admission.    Social Determinants of Health:    Patient is independent, reliable, and has access to care.       Disposition and Care Coordination:    Admit:   Through independent evaluation of the patient's history, physical, and imperical data, the patient meets criteria for observation/admission to the hospital.        Final diagnoses:   Renal insufficiency        ED Disposition       ED Disposition   Decision to Admit    Condition   --    Comment   --               This medical record created using voice recognition software.             Corrie Murillo MD  11/14/23 5193

## 2023-11-14 NOTE — PROGRESS NOTES
"Chief Complaint  Dizziness, Shaking (Episode yesterday, and previously about a month ago.  Has had labs previously), and Numbness (Circulation is bad in feet and hands, turn white.)    Subjective          Stephon Castellano presents to [facility]    Episode of shaking- patient states that yesterday he was taking his medication when his whole body started shaking.  The pills fell out of his hand.  This episode lasted a second or two.  Patient states that he had a similar symptoms about a month ago when he was walking towards the tv.  Patient has been evaluated at our office and ENT with several labs, normal other than increase in creatinine.      Brain fog, vertigo, dry eyes, headaches.  He started noticing this in July of this year.  He has been having palpitations/fluttering in his chest off and on as well.       Objective   Vital Signs:   /60   Pulse 62   Temp 97.6 °F (36.4 °C) (Temporal)   Ht 180.3 cm (70.98\")   Wt 84.9 kg (187 lb 3.2 oz)   SpO2 96%   BMI 26.12 kg/m²     Physical Exam  Vitals reviewed.   Constitutional:       Appearance: Normal appearance. He is well-developed.   HENT:      Head: Normocephalic and atraumatic.   Eyes:      Conjunctiva/sclera: Conjunctivae normal.      Pupils: Pupils are equal, round, and reactive to light.   Cardiovascular:      Rate and Rhythm: Normal rate and regular rhythm.      Heart sounds: No murmur heard.     No friction rub. No gallop.   Pulmonary:      Effort: Pulmonary effort is normal.      Breath sounds: Normal breath sounds. No wheezing or rhonchi.   Abdominal:      General: Bowel sounds are normal.      Palpations: Abdomen is soft.      Tenderness: There is abdominal tenderness (generalized).   Musculoskeletal:      Right lower leg: No edema.      Left lower leg: No edema.   Skin:     General: Skin is warm and dry.   Neurological:      Mental Status: He is alert and oriented to person, place, and time.      " Cranial Nerves: No cranial nerve deficit.   Psychiatric:         Mood and Affect: Mood and affect normal.         Behavior: Behavior normal.         Thought Content: Thought content normal.         Judgment: Judgment normal.        Result Review :            ECG 12 Lead    Date/Time: 11/14/2023 5:49 PM  Performed by: Anny Jo PA-C    Authorized by: Anny Jo PA-C  Comparison: not compared with previous ECG   Rhythm: sinus rhythm  Rate: normal  Conduction: conduction normal  ST Segments: ST segments normal  T elevation: V3  QRS axis: normal  Other findings: T wave abnormality    Clinical impression: non-specific ECG            Assessment and Plan    Diagnoses and all orders for this visit:    1. Seizure-like activity (Primary)  Assessment & Plan:  Uncertain etiology, concern for neurologic origin.  Therefore will order stat MRI of brain and get labs that could be contributing.  Discussed with patient that if his symptoms return with the shaking/seizure like activity he needs to go to the ER by EMS.  Follow up in 1 week for re-evaluation and discussion over labs.     Orders:  -     CBC w AUTO Differential  -     Comprehensive metabolic panel  -     CK  -     RPR  -     Vitamin D 25 hydroxy  -     Prolactin  -     Magnesium  -     MRI Brain With & Without Contrast  -     CT Abdomen Pelvis With & Without Contrast    2. Weight loss  -     CT Abdomen Pelvis With & Without Contrast    3. Abdominal pain, unspecified abdominal location  Assessment & Plan:  Due to significant weight loss and abdominal pain, will order CT of abd/pelvis to rule out causes for unintentional weight loss and pain.      Orders:  -     CT Abdomen Pelvis With & Without Contrast    4. Palpitations  -     ECG 12 Lead  -     Holter monitor - 24 hour; Future    5. Abnormal EKG  Assessment & Plan:  EKG in office showing T wave elevation, since we have nothing to compare it to as well as other vague symptoms, would recommend going to the ER  for further evaluation.  Patient agreeable and declined EMS transport at this time.                  Follow Up   No follow-ups on file.  Patient was given instructions and counseling regarding his condition or for health maintenance advice. Please see specific information pulled into the AVS if appropriate.

## 2023-11-14 NOTE — ASSESSMENT & PLAN NOTE
Uncertain etiology, concern for neurologic origin.  Therefore will order stat MRI of brain and get labs that could be contributing.  Discussed with patient that if his symptoms return with the shaking/seizure like activity he needs to go to the ER by EMS.  Follow up in 1 week for re-evaluation and discussion over labs.

## 2023-11-14 NOTE — ED TRIAGE NOTES
Pt has dilaudid pain pump to RLQ; reports was refilled with med approx 3-4 weeks ago (goes every 7 weeks); pump in place for chronic neck pain- cervical srg with ongoing chroic pain

## 2023-11-15 ENCOUNTER — APPOINTMENT (OUTPATIENT)
Facility: HOSPITAL | Age: 63
DRG: 309 | End: 2023-11-15
Payer: OTHER GOVERNMENT

## 2023-11-15 ENCOUNTER — APPOINTMENT (OUTPATIENT)
Dept: CARDIOLOGY | Facility: HOSPITAL | Age: 63
DRG: 309 | End: 2023-11-15
Payer: OTHER GOVERNMENT

## 2023-11-15 ENCOUNTER — APPOINTMENT (OUTPATIENT)
Dept: ULTRASOUND IMAGING | Facility: HOSPITAL | Age: 63
DRG: 309 | End: 2023-11-15
Payer: OTHER GOVERNMENT

## 2023-11-15 ENCOUNTER — APPOINTMENT (OUTPATIENT)
Dept: CT IMAGING | Facility: HOSPITAL | Age: 63
DRG: 309 | End: 2023-11-15
Payer: OTHER GOVERNMENT

## 2023-11-15 LAB
ALBUMIN SERPL-MCNC: 4 G/DL (ref 3.5–5.2)
ALBUMIN/GLOB SERPL: 1.5 G/DL
ALP SERPL-CCNC: 67 U/L (ref 39–117)
ALT SERPL W P-5'-P-CCNC: 29 U/L (ref 1–41)
ANION GAP SERPL CALCULATED.3IONS-SCNC: 7.5 MMOL/L (ref 5–15)
AST SERPL-CCNC: 19 U/L (ref 1–40)
BASOPHILS # BLD AUTO: 0.03 10*3/MM3 (ref 0–0.2)
BASOPHILS NFR BLD AUTO: 0.7 % (ref 0–1.5)
BH CV ECHO MEAS - ACS: 2.3 CM
BH CV ECHO MEAS - AO MAX PG: 5 MMHG
BH CV ECHO MEAS - AO MEAN PG: 3 MMHG
BH CV ECHO MEAS - AO ROOT DIAM: 3.5 CM
BH CV ECHO MEAS - AO V2 MAX: 116 CM/SEC
BH CV ECHO MEAS - AO V2 VTI: 28.2 CM
BH CV ECHO MEAS - AVA(I,D): 2.32 CM2
BH CV ECHO MEAS - EDV(CUBED): 125 ML
BH CV ECHO MEAS - EDV(MOD-SP2): 147 ML
BH CV ECHO MEAS - EDV(MOD-SP4): 117 ML
BH CV ECHO MEAS - EF(MOD-BP): 50.5 %
BH CV ECHO MEAS - EF(MOD-SP2): 47.2 %
BH CV ECHO MEAS - EF(MOD-SP4): 48.8 %
BH CV ECHO MEAS - ESV(CUBED): 54.9 ML
BH CV ECHO MEAS - ESV(MOD-SP2): 77.6 ML
BH CV ECHO MEAS - ESV(MOD-SP4): 59.9 ML
BH CV ECHO MEAS - FS: 24 %
BH CV ECHO MEAS - IVS/LVPW: 0.9 CM
BH CV ECHO MEAS - IVSD: 0.9 CM
BH CV ECHO MEAS - LA DIMENSION: 3.7 CM
BH CV ECHO MEAS - LAT PEAK E' VEL: 10.6 CM/SEC
BH CV ECHO MEAS - LV DIASTOLIC VOL/BSA (35-75): 57.5 CM2
BH CV ECHO MEAS - LV MASS(C)D: 169.9 GRAMS
BH CV ECHO MEAS - LV MAX PG: 3.2 MMHG
BH CV ECHO MEAS - LV MEAN PG: 2 MMHG
BH CV ECHO MEAS - LV SYSTOLIC VOL/BSA (12-30): 29.5 CM2
BH CV ECHO MEAS - LV V1 MAX: 90.1 CM/SEC
BH CV ECHO MEAS - LV V1 VTI: 20.8 CM
BH CV ECHO MEAS - LVIDD: 5 CM
BH CV ECHO MEAS - LVIDS: 3.8 CM
BH CV ECHO MEAS - LVOT AREA: 3.1 CM2
BH CV ECHO MEAS - LVOT DIAM: 2 CM
BH CV ECHO MEAS - LVPWD: 1 CM
BH CV ECHO MEAS - MED PEAK E' VEL: 8.8 CM/SEC
BH CV ECHO MEAS - MV A MAX VEL: 82.3 CM/SEC
BH CV ECHO MEAS - MV DEC TIME: 0.24 SEC
BH CV ECHO MEAS - MV E MAX VEL: 68.7 CM/SEC
BH CV ECHO MEAS - MV E/A: 0.83
BH CV ECHO MEAS - RAP SYSTOLE: 3 MMHG
BH CV ECHO MEAS - RVSP: 21.8 MMHG
BH CV ECHO MEAS - SI(MOD-SP2): 34.1 ML/M2
BH CV ECHO MEAS - SI(MOD-SP4): 28.1 ML/M2
BH CV ECHO MEAS - SV(LVOT): 65.3 ML
BH CV ECHO MEAS - SV(MOD-SP2): 69.4 ML
BH CV ECHO MEAS - SV(MOD-SP4): 57.1 ML
BH CV ECHO MEAS - TAPSE (>1.6): 2.33 CM
BH CV ECHO MEAS - TR MAX PG: 18.8 MMHG
BH CV ECHO MEAS - TR MAX VEL: 217 CM/SEC
BH CV ECHO MEASUREMENTS AVERAGE E/E' RATIO: 7.08
BILIRUB SERPL-MCNC: 0.3 MG/DL (ref 0–1.2)
BUN SERPL-MCNC: 39 MG/DL (ref 8–23)
BUN/CREAT SERPL: 16 (ref 7–25)
CALCIUM SPEC-SCNC: 9.4 MG/DL (ref 8.6–10.5)
CHLORIDE SERPL-SCNC: 103 MMOL/L (ref 98–107)
CO2 SERPL-SCNC: 31.5 MMOL/L (ref 22–29)
CREAT SERPL-MCNC: 2.44 MG/DL (ref 0.76–1.27)
DEPRECATED RDW RBC AUTO: 42.3 FL (ref 37–54)
EGFRCR SERPLBLD CKD-EPI 2021: 28.8 ML/MIN/1.73
EOSINOPHIL # BLD AUTO: 0.14 10*3/MM3 (ref 0–0.4)
EOSINOPHIL NFR BLD AUTO: 3.2 % (ref 0.3–6.2)
EOSINOPHIL SPEC QL MICRO: 0 % EOS/100 CELLS (ref 0–0)
ERYTHROCYTE [DISTWIDTH] IN BLOOD BY AUTOMATED COUNT: 12.2 % (ref 12.3–15.4)
GLOBULIN UR ELPH-MCNC: 2.6 GM/DL
GLUCOSE SERPL-MCNC: 74 MG/DL (ref 65–99)
HCT VFR BLD AUTO: 34.8 % (ref 37.5–51)
HGB BLD-MCNC: 11.1 G/DL (ref 13–17.7)
IMM GRANULOCYTES # BLD AUTO: 0 10*3/MM3 (ref 0–0.05)
IMM GRANULOCYTES NFR BLD AUTO: 0 % (ref 0–0.5)
LEFT ATRIUM VOLUME INDEX: 28.1 ML/M2
LYMPHOCYTES # BLD AUTO: 1.78 10*3/MM3 (ref 0.7–3.1)
LYMPHOCYTES NFR BLD AUTO: 40.2 % (ref 19.6–45.3)
MCH RBC QN AUTO: 30.2 PG (ref 26.6–33)
MCHC RBC AUTO-ENTMCNC: 31.9 G/DL (ref 31.5–35.7)
MCV RBC AUTO: 94.8 FL (ref 79–97)
MONOCYTES # BLD AUTO: 0.5 10*3/MM3 (ref 0.1–0.9)
MONOCYTES NFR BLD AUTO: 11.3 % (ref 5–12)
NEUTROPHILS NFR BLD AUTO: 1.98 10*3/MM3 (ref 1.7–7)
NEUTROPHILS NFR BLD AUTO: 44.6 % (ref 42.7–76)
NRBC BLD AUTO-RTO: 0 /100 WBC (ref 0–0.2)
PLATELET # BLD AUTO: 133 10*3/MM3 (ref 140–450)
PMV BLD AUTO: 10.4 FL (ref 6–12)
POTASSIUM SERPL-SCNC: 4.6 MMOL/L (ref 3.5–5.2)
PROT SERPL-MCNC: 6.6 G/DL (ref 6–8.5)
QT INTERVAL: 433 MS
QTC INTERVAL: 411 MS
RBC # BLD AUTO: 3.67 10*6/MM3 (ref 4.14–5.8)
RPR SER QL: NORMAL
SODIUM SERPL-SCNC: 142 MMOL/L (ref 136–145)
T4 FREE SERPL-MCNC: 0.9 NG/DL (ref 0.93–1.7)
TSH SERPL DL<=0.05 MIU/L-ACNC: 2.01 UIU/ML (ref 0.27–4.2)
WBC NRBC COR # BLD: 4.43 10*3/MM3 (ref 3.4–10.8)

## 2023-11-15 PROCEDURE — 87205 SMEAR GRAM STAIN: CPT | Performed by: INTERNAL MEDICINE

## 2023-11-15 PROCEDURE — 25010000002 HYDROMORPHONE 1 MG/ML SOLUTION: Performed by: FAMILY MEDICINE

## 2023-11-15 PROCEDURE — 80053 COMPREHEN METABOLIC PANEL: CPT | Performed by: FAMILY MEDICINE

## 2023-11-15 PROCEDURE — 84439 ASSAY OF FREE THYROXINE: CPT | Performed by: FAMILY MEDICINE

## 2023-11-15 PROCEDURE — 85025 COMPLETE CBC W/AUTO DIFF WBC: CPT | Performed by: FAMILY MEDICINE

## 2023-11-15 PROCEDURE — 72125 CT NECK SPINE W/O DYE: CPT

## 2023-11-15 PROCEDURE — 93880 EXTRACRANIAL BILAT STUDY: CPT | Performed by: SURGERY

## 2023-11-15 PROCEDURE — 93880 EXTRACRANIAL BILAT STUDY: CPT

## 2023-11-15 PROCEDURE — 25810000003 SODIUM CHLORIDE 0.9 % SOLUTION: Performed by: FAMILY MEDICINE

## 2023-11-15 PROCEDURE — 76775 US EXAM ABDO BACK WALL LIM: CPT

## 2023-11-15 PROCEDURE — 93306 TTE W/DOPPLER COMPLETE: CPT | Performed by: SPECIALIST

## 2023-11-15 PROCEDURE — 93306 TTE W/DOPPLER COMPLETE: CPT

## 2023-11-15 PROCEDURE — 84443 ASSAY THYROID STIM HORMONE: CPT | Performed by: FAMILY MEDICINE

## 2023-11-15 PROCEDURE — 25010000002 HEPARIN (PORCINE) PER 1000 UNITS: Performed by: FAMILY MEDICINE

## 2023-11-15 RX ORDER — SODIUM CHLORIDE 9 MG/ML
40 INJECTION, SOLUTION INTRAVENOUS AS NEEDED
Status: DISCONTINUED | OUTPATIENT
Start: 2023-11-15 | End: 2023-11-16 | Stop reason: HOSPADM

## 2023-11-15 RX ORDER — POLYETHYLENE GLYCOL 3350 17 G/17G
17 POWDER, FOR SOLUTION ORAL DAILY PRN
Status: DISCONTINUED | OUTPATIENT
Start: 2023-11-15 | End: 2023-11-16 | Stop reason: HOSPADM

## 2023-11-15 RX ORDER — TAMSULOSIN HYDROCHLORIDE 0.4 MG/1
0.4 CAPSULE ORAL DAILY
Status: DISCONTINUED | OUTPATIENT
Start: 2023-11-15 | End: 2023-11-15

## 2023-11-15 RX ORDER — SODIUM CHLORIDE 0.9 % (FLUSH) 0.9 %
10 SYRINGE (ML) INJECTION EVERY 12 HOURS SCHEDULED
Status: DISCONTINUED | OUTPATIENT
Start: 2023-11-15 | End: 2023-11-16 | Stop reason: HOSPADM

## 2023-11-15 RX ORDER — BISACODYL 5 MG/1
5 TABLET, DELAYED RELEASE ORAL DAILY PRN
Status: DISCONTINUED | OUTPATIENT
Start: 2023-11-15 | End: 2023-11-16 | Stop reason: HOSPADM

## 2023-11-15 RX ORDER — DISULFIRAM 250 MG/1
250 TABLET ORAL DAILY
COMMUNITY

## 2023-11-15 RX ORDER — AMOXICILLIN 250 MG
2 CAPSULE ORAL 2 TIMES DAILY
Status: DISCONTINUED | OUTPATIENT
Start: 2023-11-15 | End: 2023-11-16 | Stop reason: HOSPADM

## 2023-11-15 RX ORDER — DULOXETIN HYDROCHLORIDE 30 MG/1
60 CAPSULE, DELAYED RELEASE ORAL DAILY
Status: DISCONTINUED | OUTPATIENT
Start: 2023-11-15 | End: 2023-11-16 | Stop reason: HOSPADM

## 2023-11-15 RX ORDER — NITROGLYCERIN 0.4 MG/1
0.4 TABLET SUBLINGUAL
Status: DISCONTINUED | OUTPATIENT
Start: 2023-11-15 | End: 2023-11-16 | Stop reason: HOSPADM

## 2023-11-15 RX ORDER — SODIUM CHLORIDE 9 MG/ML
100 INJECTION, SOLUTION INTRAVENOUS CONTINUOUS
Status: DISCONTINUED | OUTPATIENT
Start: 2023-11-15 | End: 2023-11-16 | Stop reason: HOSPADM

## 2023-11-15 RX ORDER — BISACODYL 10 MG
10 SUPPOSITORY, RECTAL RECTAL DAILY PRN
Status: DISCONTINUED | OUTPATIENT
Start: 2023-11-15 | End: 2023-11-16 | Stop reason: HOSPADM

## 2023-11-15 RX ORDER — CARBOXYMETHYLCELLULOSE SODIUM 5 MG/ML
1 SOLUTION/ DROPS OPHTHALMIC
COMMUNITY

## 2023-11-15 RX ORDER — SODIUM CHLORIDE 0.9 % (FLUSH) 0.9 %
10 SYRINGE (ML) INJECTION AS NEEDED
Status: DISCONTINUED | OUTPATIENT
Start: 2023-11-15 | End: 2023-11-16 | Stop reason: HOSPADM

## 2023-11-15 RX ORDER — TAMSULOSIN HYDROCHLORIDE 0.4 MG/1
0.8 CAPSULE ORAL DAILY
Status: DISCONTINUED | OUTPATIENT
Start: 2023-11-16 | End: 2023-11-16 | Stop reason: HOSPADM

## 2023-11-15 RX ORDER — FLUTICASONE PROPIONATE 50 MCG
2 SPRAY, SUSPENSION (ML) NASAL DAILY
COMMUNITY

## 2023-11-15 RX ORDER — TRAZODONE HYDROCHLORIDE 100 MG/1
100 TABLET ORAL NIGHTLY
Status: DISCONTINUED | OUTPATIENT
Start: 2023-11-15 | End: 2023-11-16 | Stop reason: HOSPADM

## 2023-11-15 RX ORDER — HEPARIN SODIUM 5000 [USP'U]/ML
5000 INJECTION, SOLUTION INTRAVENOUS; SUBCUTANEOUS EVERY 12 HOURS SCHEDULED
Status: DISCONTINUED | OUTPATIENT
Start: 2023-11-15 | End: 2023-11-16 | Stop reason: HOSPADM

## 2023-11-15 RX ADMIN — Medication 10 ML: at 09:05

## 2023-11-15 RX ADMIN — TAMSULOSIN HYDROCHLORIDE 0.4 MG: 0.4 CAPSULE ORAL at 09:04

## 2023-11-15 RX ADMIN — Medication 10 ML: at 20:48

## 2023-11-15 RX ADMIN — DOCUSATE SODIUM 50MG AND SENNOSIDES 8.6MG 2 TABLET: 8.6; 5 TABLET, FILM COATED ORAL at 09:04

## 2023-11-15 RX ADMIN — HEPARIN SODIUM 5000 UNITS: 5000 INJECTION INTRAVENOUS; SUBCUTANEOUS at 01:33

## 2023-11-15 RX ADMIN — HEPARIN SODIUM 5000 UNITS: 5000 INJECTION INTRAVENOUS; SUBCUTANEOUS at 09:05

## 2023-11-15 RX ADMIN — Medication 10 ML: at 01:34

## 2023-11-15 RX ADMIN — DULOXETINE HYDROCHLORIDE 60 MG: 30 CAPSULE, DELAYED RELEASE ORAL at 09:04

## 2023-11-15 RX ADMIN — HEPARIN SODIUM 5000 UNITS: 5000 INJECTION INTRAVENOUS; SUBCUTANEOUS at 20:47

## 2023-11-15 RX ADMIN — SODIUM CHLORIDE 100 ML/HR: 9 INJECTION, SOLUTION INTRAVENOUS at 11:23

## 2023-11-15 RX ADMIN — SODIUM CHLORIDE 100 ML/HR: 9 INJECTION, SOLUTION INTRAVENOUS at 01:33

## 2023-11-15 RX ADMIN — HYDROMORPHONE HYDROCHLORIDE 0.5 MG: 1 INJECTION, SOLUTION INTRAMUSCULAR; INTRAVENOUS; SUBCUTANEOUS at 02:30

## 2023-11-15 RX ADMIN — TRAZODONE HYDROCHLORIDE 100 MG: 100 TABLET ORAL at 20:47

## 2023-11-15 NOTE — CONSULTS
[facility] Consult Note      Patient Name: Stephon Castellano  : 1960  MRN: [id number]  Primary Care Physician:  Paige Florez MD  Referring Physician: No ref. provider found  Date of admission: 2023    Subjective     Reason for Consult: Abnormal renal function    HPI:   Stephon Castellano is a 63 y.o. male retired from the [employer], remote history of tobacco and alcohol  abuse, with past medical history of allergic rhinitis, anxiety disorder, arthritis, asthma, prostatic hypertrophy tamsulosin, hypertension, hypogonadism on testosterone replacement therapy, history of nephrolithiasis, cervical disc degenerative disease mainly affecting C5 to C7 status post surgical intervention, status post hydromorphone infusion pump placement 10 years ago .    Patient states an episode of syncope a couple weeks ago , recently he developed sensation of shock with marked in to perform that activities he presented to his primary care provider where a EKG was obtained and he was instructed to present to the emergency department.  Patient symptomatic bradycardia acute kidney injury, and admitted for further management    Patient is states that he has been losing a fair amount of weight for the last couple of months not associated with GI complain, dysphagia or abdominal pain.  He states that it her mother passed away recently, but he does not believe that is the reason for  his weight loss.     Patient is fairly active dependent and walks frequently with his dogs.      Patient denies any recent changes in his medications, initiation of supplements, antibiotics or NSAIDs use.  His  pain pump has not been recently adjusted    Patient was seen and examined in the radiology department while performing echocardiogram.    Patient is currently receiving gentle IV hydration    Review of Systems:   14 point review of systems is otherwise negative except for mentioned above on HPI    Personal History      Past Medical History:   Diagnosis Date    Allergic rhinitis     Anxiety 2006    Starting taking mood depression drugs    Arthritis     Asthma About 6 months    Sore Throat and was sent to ENT    Benign prostatic hyperplasia 2015    Dad had Prostate Cancer and just did Colonoscopy and they said i have a major large Prostate    Cervical neck pain with evidence of disc disease     Colon polyp 2020    Had a lot of polyps on the last 2 Colonoscopies    Depression     Erectile dysfunction 2017    I have been on Testosterone replacements since then    Head injury 1977    Headache May 2023    Started abou 6 months or more    Hemorrhoid     HL (hearing loss) 2004    Have a major tinitis/ringing in my ears especially the left one    Hyperlipidemia 2017    My blood test have stated they were high or elevated    Hypertension 2017    Given Flomax said it will probably go down    Hypogonadism in male     Kidney stone 2020    Went to doctor and got medication for them and still have issues with right side    Low back pain 2010    Stinosis of back & neck    Substance abuse [id number]    On Disulfiram for alcohol abuse-voluntary    Visual impairment 2023    Just recently have been given eye drops       Past Surgical History:   Procedure Laterality Date    CERVICAL DISC SURGERY  2008    COLONOSCOPY N/A 08/18/2023    Procedure: COLONOSCOPY WITH POLYPECTOMY/SNARE;  Surgeon: Jose Alejandro Fox MD;  Location: [facility] ENDOSCOPY;  Service: General;  Laterality: N/A;  COLON POLYPS    HEEL SPUR SURGERY  2005    KNEE ACL RECONSTRUCTION  2004    OTHER SURGICAL HISTORY      METAL IMPLANT    PAIN PUMP INSERTION/REVISION  2010    SPINE SURGERY  2008    Antieror c5-7 discotomy    TONSILLECTOMY  1965       Family History: family history includes Alcohol abuse in his mother; Arthritis in his sister; Cancer in his father; Depression in his mother; Diabetes in his brother, sister, and another family member; Early death in his mother; Hearing loss in  his father; Heart disease in his mother; Osteoporosis in his mother; Prostate cancer in his father; Stroke in an other family member; Thyroid disease in his mother.    Social History:  reports that he quit smoking about 40 years ago. His smoking use included cigarettes. He started smoking about 48 years ago. He has a 14.00 pack-year smoking history. He has never used smokeless tobacco. He reports that he does not currently use alcohol. He reports that he does not currently use drugs.    Home Medications:  Prior to Admission medications    Medication Sig Start Date End Date Taking? Authorizing Provider   anastrozole (ARIMIDEX) 1 MG tablet Take  by mouth Daily.   Yes Lety Roberts MD   DULoxetine (CYMBALTA) 60 MG capsule Take 1 capsule by mouth Daily.   Yes Lety Roberts MD   multivitamin with minerals tablet tablet Multivitamin 50 Plus oral tablet take 1 tablet by oral route daily   Active   Yes Lety Roberts MD   pain patient supplied pump by Intrathecal route Continuous.   Yes Lety Roberts MD   tamsulosin (FLOMAX) 0.4 MG capsule 24 hr capsule Take 1 capsule by mouth Daily.   Yes Lety Roberts MD   Testosterone Cypionate (DEPOTESTOTERONE CYPIONATE) 200 MG/ML injection  6/25/21  Yes Lety Roberts MD   traZODone (DESYREL) 100 MG tablet Take 1 tablet by mouth Every Night.   Yes Lety Roberts MD       Allergies:  No Known Allergies    Objective     Vitals:   Temp:  [97.5 °F (36.4 °C)-98 °F (36.7 °C)] 97.5 °F (36.4 °C)  Heart Rate:  [51-67] 59  Resp:  [16-18] 18  BP: (109-158)/(54-83) 113/77    Intake/Output Summary (Last 24 hours) at 11/15/2023 0854  Last data filed at 11/14/2023 2207  Gross per 24 hour   Intake 1000 ml   Output --   Net 1000 ml       Physical Exam:   Constitutional: Awake, alert, no acute distress.  HEENT: Sclera anicteric, no conjunctival injection  Neck: Supple, no thyromegaly, no lymphadenopathy, trachea at midline, no JVD  Respiratory: Clear  to auscultation bilaterally, nonlabored respiration  Cardiovascular: Bradycardic systolic ejection murmur  Gastrointestinal: Positive bowel sounds, abdomen is soft, nontender and nondistended  : No palpable bladder  Musculoskeletal: No edema, no clubbing or cyanosis  Psychiatric: Appropriate affect, cooperative  Neurologic: Oriented x3, moving all extremities, normal speech and mental status  Skin: Warm and dry       Scheduled Meds:     DULoxetine, 60 mg, Oral, Daily  heparin (porcine), 5,000 Units, Subcutaneous, Q12H  senna-docusate sodium, 2 tablet, Oral, BID  sodium chloride, 10 mL, Intravenous, Q12H  tamsulosin, 0.4 mg, Oral, Daily  traZODone, 100 mg, Oral, Nightly      IV Meds:   sodium chloride, 100 mL/hr, Last Rate: 100 mL/hr (11/15/23 0133)        Results Reviewed:   I have personally reviewed the results from the time of this admission to 11/15/2023 08:54 EST     Lab Results   Component Value Date    GLUCOSE 74 11/15/2023    CALCIUM 9.4 11/15/2023     11/15/2023    K 4.6 11/15/2023    CO2 31.5 (H) 11/15/2023     11/15/2023    BUN 39 (H) 11/15/2023    CREATININE 2.44 (H) 11/15/2023    EGFRIFNONA 81 07/20/2021    BCR 16.0 11/15/2023    ANIONGAP 7.5 11/15/2023      Lab Results   Component Value Date    MG 2.2 11/14/2023    ALBUMIN 4.0 11/15/2023         Lab Results   Lab Value Date/Time    CREATININE 2.44 (H) 11/15/2023 0157    CREATININE 2.33 (H) 11/14/2023 1756    CREATININE 2.49 (H) 11/14/2023 1716    CREATININE 1.78 (H) 09/20/2023 1654    CREATININE 1.10 04/20/2023 1206    CREATININE 0.95 07/20/2021 1700    CREATININE 0.97 02/26/2020 2036       Assessment / Plan       Bradycardia      ASSESSMENT:    -Acute kidney injury on  Chronic kidney disease stage III his baseline renal function 0.9-1.1.  From 2020 to 2023.  Upon admission his creatinine was at 2.4.  Possible related to prerenal state and cardiorenal syndrome from symptomatic bradycardia. ?   Currently on gentle hydration.  Urinalysis is  bland without active sediment, CT scan abdominal pelvis 2021 1 mm nonobstructive left renal stone with a few subcentimeters left renal cyst  -Incidental nephrolithiasis left-sided in 2021 by CT abdomen.  Will obtain renal ultrasound  -Chronic cervical disc degenerative disease status post cervical surgery C5-C7 on chronic hydromorphone pump  -Benign prostatic hypertrophy currently on tamsulosin  -Symptomatic bradycardia patient undergoing work-up including echocardiogram  -Weight loss patient denies any GI symptoms.  Recently his mother passed away but he does not believe is related to her passing ?  Has a history of alcohol and tobacco abuse but none recently ?.  Depression exacerbation  ?? Vs effect of hydromorphone pump ?? as per primary team work-up  -Depression disorder on duloxetine and trazodone     PLAN:  -Agree with trial of gentle hydration while undergoing work-up for bradycardia,   -We will obtain bladder scan to rule out urinary retention from hydromorphone pump   -Unclear if he is bradycardia could be related to hydromorphone pump   -Will obtain  renal ultrasound for history of nephrolithiasis  -Continue surveillance labs      Thank you for involving us in the care of Stephon Castellano.  Please feel free to call with any questions.    Caleb Sandoval MD  11/15/23  08:54 [facility]    [facility]  [phone]      Please note that portions of this note were completed with a voice recognition program.

## 2023-11-15 NOTE — H&P
"[facility]   HISTORY AND PHYSICAL    Patient Name: Stephon Castellano  : 1960  MRN: [id number]  Primary Care Physician:  Paige Florez MD  Date of admission: 2023    Subjective   Subjective     Chief Complaint: Dizziness, weakness    History of Present Illness  Patient is a 63-year-old male with past medical history of hypertension, hyperlipidemia, hypogonadism, depression who presents to the emergency department with worsening weakness and dizziness.  He went into see his PCP and an EKG was ordered.  Patient says that he had some fluttering in his chest but also felt like his arms were going numb with \"electricity shooting through them when he put his head back to take his medications.  He has a history of cervical disc problems.  Patient says he has not been eating or drinking very well and has actually lost a tremendous amount of weight.  States that somewhere in the neighborhood of 60 pounds in the last several months.  Today he came into the ED because the dizziness was worsening sometimes.    Review of Systems   Cardiovascular:  Positive for palpitations.   Neurological:  Positive for dizziness and weakness.   All other systems reviewed and are negative.       Personal History     Past Medical History:   Diagnosis Date    Allergic rhinitis     Anxiety     Starting taking mood depression drugs    Arthritis     Asthma About 6 months    Sore Throat and was sent to ENT    Benign prostatic hyperplasia     Dad had Prostate Cancer and just did Colonoscopy and they said i have a major large Prostate    Cervical neck pain with evidence of disc disease     Colon polyp     Had a lot of polyps on the last 2 Colonoscopies    Depression     Erectile dysfunction     I have been on Testosterone replacements since then    Head injury     Headache May 2023    Started abou 6 months or more    Hemorrhoid     HL (hearing loss)     Have a major tinitis/ringing in my ears especially " the left one    Hyperlipidemia 2017    My blood test have stated they were high or elevated    Hypertension 2017    Given Flomax said it will probably go down    Hypogonadism in male     Kidney stone 2020    Went to doctor and got medication for them and still have issues with right side    Low back pain 2010    Stinosis of back & neck    Substance abuse [id number]    On Disulfiram for alcohol abuse-voluntary    Visual impairment 2023    Just recently have been given eye drops       Past Surgical History:   Procedure Laterality Date    CERVICAL DISC SURGERY  2008    COLONOSCOPY N/A 08/18/2023    Procedure: COLONOSCOPY WITH POLYPECTOMY/SNARE;  Surgeon: Jose Alejandro Fox MD;  Location: [facility] ENDOSCOPY;  Service: General;  Laterality: N/A;  COLON POLYPS    HEEL SPUR SURGERY  2005    KNEE ACL RECONSTRUCTION  2004    OTHER SURGICAL HISTORY      METAL IMPLANT    PAIN PUMP INSERTION/REVISION  2010    SPINE SURGERY  2008    Antieror c5-7 discotomy    TONSILLECTOMY  1965       Family History: family history includes Alcohol abuse in his mother; Arthritis in his sister; Cancer in his father; Depression in his mother; Diabetes in his brother, sister, and another family member; Early death in his mother; Hearing loss in his father; Heart disease in his mother; Osteoporosis in his mother; Prostate cancer in his father; Stroke in an other family member; Thyroid disease in his mother. Otherwise pertinent FHx was reviewed and not pertinent to current issue.    Social History:  reports that he quit smoking about 40 years ago. His smoking use included cigarettes. He started smoking about 48 years ago. He has a 14.00 pack-year smoking history. He has never used smokeless tobacco. He reports that he does not currently use alcohol. He reports that he does not currently use drugs.    Home Medications:  DULoxetine, Testosterone Cypionate, anastrozole, multivitamin with minerals, pain, tamsulosin, and traZODone    Allergies:  No Known  Allergies    Objective    Objective     Vitals:   Temp:  [97.6 °F (36.4 °C)] 97.6 °F (36.4 °C)  Heart Rate:  [51-62] 51  Resp:  [17-18] 18  BP: (122-158)/(60-83) 131/83    Physical Exam  Constitutional:  Well-developed and well-nourished.  No acute distress.      HENT:  Head:  Normocephalic and atraumatic.  Mouth:  Moist mucous membranes.    Eyes:  Conjunctivae and EOM are normal. No scleral icterus.    Neck:  Neck supple.  No JVD present.    Cardiovascular:  Normal rate, regular rhythm and normal heart sounds with no murmur.  Pulmonary/Chest:  No respiratory distress, no wheezes, no crackles, with normal breath sounds and good air movement.  Abdominal:  Soft. No distension and no tenderness.   Musculoskeletal:  No tenderness, and no deformity.  No red or swollen joints anywhere.    Neurological:  Alert and oriented to person, place, and time.  No cranial nerve deficit.    Skin:  Skin is warm and dry. No rash noted. No pallor.   Peripheral vascular:  No clubbing, no cyanosis, no edema.  Psychiatric: Appropriate mood and affect  : Deferred  Result Review    Result Review:  I have personally reviewed the results from the time of this admission to 11/14/2023 22:06 EST and agree with these findings:  [x]  Laboratory list / accordion  []  Microbiology  [x]  Radiology  []  EKG/Telemetry   []  Cardiology/Vascular   []  Pathology  []  Old records  []  Other:  Most notable findings include: Pertinent positives noted      Assessment & Plan   Assessment / Plan     Brief Patient Summary:  Stephon Castellano is a 63 y.o. male who presents to the ED    Active Hospital Problems:  1.  Symptomatic bradycardia  2.  Acute renal failure  3.  Dehydration  4.  Dizziness  5.  Mild anemia  Plan:   Patient will be admitted to the hospital service  We will hydrate the patient overnight with normal saline  We will repeat his lab work in the a.m. to evaluate for renal improvement  We will obtain a 2D echo in the a.m. and based on those findings  cardiology consultation may be of clinical benefit  We will consult nephrology for their evaluation for his worsening renal function  Obtain carotid Dopplers to better evaluate the dizziness although I believe this is most likely secondary to dehydration and hypotension  Also obtain CT scan of the patient's cervical spine to evaluate for nerve impingement  DVT prophylaxis:  No DVT prophylaxis order currently exists.    CODE STATUS:    Code Status (Patient has no pulse and is not breathing): CPR (Attempt to Resuscitate)  Medical Interventions (Patient has pulse or is breathing): Full Support    Admission Status:  I believe this patient meets inpatient status.    Jose Alejandro Eldridge, DO

## 2023-11-15 NOTE — PROGRESS NOTES
"DAILY PROGRESS NOTE  [facility]      PATIENT IDENTIFICATION    Name: Stephon Castellano  :  1960  MRN: [id number]    CHIEF COMPLAINT/PRINCIPAL DIAGNOSIS: Bradycardia       SUBJECTIVE    No issues     ROS:   Gen: No fever or chills  CV: no chest pain or palpitation  Resp: no shortness of breath or cough  GI: no nausea, vomiting, diarrhea  Neuro: no headache or dizziness     OBJECTIVE     Exam:  /77 (BP Location: Left arm, Patient Position: Lying)   Pulse 59   Temp 97.5 °F (36.4 °C) (Oral)   Resp 18   Ht 180.3 cm (70.98\")   Wt 85.3 kg (188 lb 0.8 oz)   SpO2 100%   BMI 26.24 kg/m²   Intake/Output last 24 hours:    Intake/Output Summary (Last 24 hours) at 11/15/2023 1138  Last data filed at 11/15/2023 0900  Gross per 24 hour   Intake 1240 ml   Output --   Net 1240 ml        Gen: NAD, up in bed  Resp: CTAB, no increased work of breathing  CV: RRR, no m/r/g. No peripheral edema  GI: Soft, nontender, (+) BS. nondistended  Psych: Alert and Oriented x 3, Mood and affect appropriate to situation  Skin: warm and dry on palpation. No rash on inspection.  Neuro: moves all 4 extremities, follows simple commands    DATA REVIEW:  Lab Results (last 24 hours)       Procedure Component Value Units Date/Time    Eosinophil Smear - Urine, Urine, Clean Catch [[id number]] Collected: 11/15/23 1117    Specimen: Urine, Clean Catch Updated: 11/15/23 112    T4, Free [[id number]]  (Abnormal) Collected: 11/15/23 0157    Specimen: Blood Updated: 11/15/23 0246     Free T4 0.90 ng/dL     Narrative:      Results may be falsely increased if patient taking Biotin.      TSH Rfx On Abnormal To Free T4 [[id number]]  (Normal) Collected: 11/15/23 0157    Specimen: Blood Updated: 11/15/23 0246     TSH 2.010 uIU/mL     Comprehensive Metabolic Panel [[id number]]  (Abnormal) Collected: 11/15/23 0157    Specimen: Blood Updated: 11/15/23 0243     Glucose 74 mg/dL      BUN 39 mg/dL      Creatinine 2.44 mg/dL      Sodium 142 mmol/L      " Potassium 4.6 mmol/L      Chloride 103 mmol/L      CO2 31.5 mmol/L      Calcium 9.4 mg/dL      Total Protein 6.6 g/dL      Albumin 4.0 g/dL      ALT (SGPT) 29 U/L      AST (SGOT) 19 U/L      Alkaline Phosphatase 67 U/L      Total Bilirubin 0.3 mg/dL      Globulin 2.6 gm/dL      A/G Ratio 1.5 g/dL      BUN/Creatinine Ratio 16.0     Anion Gap 7.5 mmol/L      eGFR 28.8 mL/min/1.73     Narrative:      GFR Normal >60  Chronic Kidney Disease <60  Kidney Failure <15      CBC Auto Differential [[id number]]  (Abnormal) Collected: 11/15/23 0157    Specimen: Blood Updated: 11/15/23 0222     WBC 4.43 10*3/mm3      RBC 3.67 10*6/mm3      Hemoglobin 11.1 g/dL      Hematocrit 34.8 %      MCV 94.8 fL      MCH 30.2 pg      MCHC 31.9 g/dL      RDW 12.2 %      RDW-SD 42.3 fl      MPV 10.4 fL      Platelets 133 10*3/mm3      Neutrophil % 44.6 %      Lymphocyte % 40.2 %      Monocyte % 11.3 %      Eosinophil % 3.2 %      Basophil % 0.7 %      Immature Grans % 0.0 %      Neutrophils, Absolute 1.98 10*3/mm3      Lymphocytes, Absolute 1.78 10*3/mm3      Monocytes, Absolute 0.50 10*3/mm3      Eosinophils, Absolute 0.14 10*3/mm3      Basophils, Absolute 0.03 10*3/mm3      Immature Grans, Absolute 0.00 10*3/mm3      nRBC 0.0 /100 WBC     Urinalysis With Microscopic If Indicated (No Culture) - Urine, Clean Catch [[id number]]  (Normal) Collected: 11/14/23 2100    Specimen: Urine, Clean Catch Updated: 11/14/23 2113     Color, UA Yellow     Appearance, UA Clear     pH, UA 5.5     Specific Gravity, UA 1.008     Glucose, UA Negative     Ketones, UA Negative     Bilirubin, UA Negative     Blood, UA Negative     Protein, UA Negative     Leuk Esterase, UA Negative     Nitrite, UA Negative     Urobilinogen, UA 0.2 E.U./dL    Narrative:      Urine microscopic not indicated.    Single High Sensitivity Troponin T [[id number]]  (Normal) Collected: 11/14/23 1756    Specimen: Blood from Arm, Right Updated: 11/14/23 2707     HS Troponin T 18 ng/L     Narrative:       High Sensitive Troponin T Reference Range:  <14.0 ng/L- Negative Female for AMI  <22.0 ng/L- Negative Male for AMI  >=14 - Abnormal Female indicating possible myocardial injury.  >=22 - Abnormal Male indicating possible myocardial injury.   Clinicians would have to utilize clinical acumen, EKG, Troponin, and serial changes to determine if it is an Acute Myocardial Infarction or myocardial injury due to an underlying chronic condition.         TSH [[id number]]  (Normal) Collected: 11/14/23 1756    Specimen: Blood from Arm, Right Updated: 11/14/23 1857     TSH 1.130 uIU/mL     Comprehensive Metabolic Panel [[id number]]  (Abnormal) Collected: 11/14/23 1756    Specimen: Blood from Arm, Right Updated: 11/14/23 1854     Glucose 100 mg/dL      BUN 41 mg/dL      Creatinine 2.33 mg/dL      Sodium 139 mmol/L      Potassium 4.3 mmol/L      Chloride 98 mmol/L      CO2 31.5 mmol/L      Calcium 9.7 mg/dL      Total Protein 7.5 g/dL      Albumin 4.7 g/dL      ALT (SGPT) 33 U/L      AST (SGOT) 19 U/L      Alkaline Phosphatase 76 U/L      Total Bilirubin 0.3 mg/dL      Globulin 2.8 gm/dL      A/G Ratio 1.7 g/dL      BUN/Creatinine Ratio 17.6     Anion Gap 9.5 mmol/L      eGFR 30.5 mL/min/1.73     Narrative:      GFR Normal >60  Chronic Kidney Disease <60  Kidney Failure <15      Magnesium [[id number]]  (Normal) Collected: 11/14/23 1756    Specimen: Blood from Arm, Right Updated: 11/14/23 1854     Magnesium 2.2 mg/dL     CBC & Differential [[id number]]  (Abnormal) Collected: 11/14/23 1756    Specimen: Blood from Arm, Right Updated: 11/14/23 1832    Narrative:      The following orders were created for panel order CBC & Differential.  Procedure                               Abnormality         Status                     ---------                               -----------         ------                     CBC Auto Differential[[id number]]        Abnormal            Final result                 Please view results for these tests on the  individual orders.    CBC Auto Differential [[id number]]  (Abnormal) Collected: 11/14/23 1756    Specimen: Blood from Arm, Right Updated: 11/14/23 1832     WBC 4.44 10*3/mm3      RBC 3.87 10*6/mm3      Hemoglobin 11.9 g/dL      Hematocrit 36.8 %      MCV 95.1 fL      MCH 30.7 pg      MCHC 32.3 g/dL      RDW 12.2 %      RDW-SD 42.5 fl      MPV 10.2 fL      Platelets 145 10*3/mm3      Neutrophil % 45.5 %      Lymphocyte % 41.9 %      Monocyte % 8.6 %      Eosinophil % 2.9 %      Basophil % 0.9 %      Immature Grans % 0.2 %      Neutrophils, Absolute 2.02 10*3/mm3      Lymphocytes, Absolute 1.86 10*3/mm3      Monocytes, Absolute 0.38 10*3/mm3      Eosinophils, Absolute 0.13 10*3/mm3      Basophils, Absolute 0.04 10*3/mm3      Immature Grans, Absolute 0.01 10*3/mm3      nRBC 0.0 /100 WBC     [place] Draw [[id number]] Collected: 11/14/23 1756    Specimen: Blood from Arm, Right Updated: 11/14/23 1829    Narrative:      The following orders were created for panel order [place] Draw.  Procedure                               Abnormality         Status                     ---------                               -----------         ------                     Green Top (Gel)[[id number]]                                  Final result               Lavender Top[[id number]]                                     Final result               Gold Top - SST[[id number]]                                   Final result               Light Blue Top[[id number]]                                   Final result                 Please view results for these tests on the individual orders.    Gold Top - SST [[id number]] Collected: 11/14/23 1756    Specimen: Blood from Arm, Right Updated: 11/14/23 1829     Extra Tube Hold for add-ons.     Comment: Auto resulted.       Green Top (Gel) [[id number]] Collected: 11/14/23 1756    Specimen: Blood from Arm, Right Updated: 11/14/23 1828     Extra Tube Hold for add-ons.     Comment: Auto resulted.       Light Blue Top  [[id number]] Collected: 11/14/23 1756    Specimen: Blood from Arm, Right Updated: 11/14/23 1828     Extra Tube Hold for add-ons.     Comment: Auto resulted       Lavender Top [[id number]] Collected: 11/14/23 1756    Specimen: Blood from Arm, Right Updated: 11/14/23 1828     Extra Tube hold for add-on     Comment: Auto resulted               Imaging Results (Last 24 Hours)       Procedure Component Value Units Date/Time    US Renal Bilateral [[id number]] Resulted: 11/15/23 1008     Updated: 11/15/23 1035    CT Cervical Spine Without Contrast [[id number]] Resulted: 11/15/23 1003     Updated: 11/15/23 1022    CT Head Without Contrast [[id number]] Collected: 11/14/23 2210     Updated: 11/14/23 2213    Narrative:      PROCEDURE: CT HEAD WO CONTRAST     COMPARISON: 4/20/2023.     INDICATIONS: Headache; dizziness.     PROTOCOL:   Standard CT imaging protocol performed.      RADIATION:   Total DLP: 1,018.2 mGy*cm.    MA and/or KV were/was adjusted to minimize radiation dose.       TECHNIQUE: After obtaining the patient's consent, 130 CT images were obtained without non-ionic   intravenous contrast material.      DISCUSSION:   A routine nonenhanced head CT was performed. No acute brain abnormality is identified. No acute   intracranial hemorrhage. No acute infarction. No acute skull fracture. No midline shift or acute   intracranial mass effect is seen.  Minimal chronic small vessel ischemia/infarction is suspected.   There are arterial calcifications. The extra-axial spaces and the ventricular system are mildly   prominent, suggesting central atrophy.  Minimal age-indeterminate mucosal thickening involves the   imaged paranasal sinuses.  An air-fluid interface within the left sphenoid locule is possible.  The   right maxillary paranasal sinus disease has improved considerably since the prior brain MRI exam   from 4/20/2023.  Age-indeterminate congestive and/or inflammatory changes involve the right,   greater than left, mastoid  air cell complexes.  Similar findings were seen previously.  The   findings are probably chronic.  No acute fractures are seen in these regions (that is, the   bilateral mastoid temporal bones).  No acute orbital findings are suggested.       Impression:         No acute brain abnormality is seen.  Please see above comments for further detail.           Please note that portions of this note were completed with a voice recognition program.     BYRON CHAPMAN JR, MD         Electronically Signed and Approved By: BYRON CHAPMAN JR, MD on 11/14/2023 at 22:10                        XR Chest 1 View [[id number]] Collected: 11/14/23 1859     Updated: 11/14/23 1903    Narrative:      PROCEDURE: XR CHEST 1 VW     COMPARISON: [facility], CR, CHEST PA/AP & LAT 2V, 2/24/2021, 15:47.     INDICATIONS: IRREGULAR HEART BEAT TODAY     FINDINGS:   The heart is not enlarged.  The lungs seem clear.  There are emphysematous changes noted.       Impression:         1. Element of COPD suggested.                  JACI FOLEY MD         Electronically Signed and Approved By: JACI FOLEY MD on 11/14/2023 at 18:59                             Labs and imaging noted above have been personally reviewed by me.    Scheduled Meds:DULoxetine, 60 mg, Oral, Daily  heparin (porcine), 5,000 Units, Subcutaneous, Q12H  senna-docusate sodium, 2 tablet, Oral, BID  sodium chloride, 10 mL, Intravenous, Q12H  tamsulosin, 0.4 mg, Oral, Daily  traZODone, 100 mg, Oral, Nightly      Continuous Infusions:sodium chloride, 100 mL/hr, Last Rate: 100 mL/hr (11/15/23 1123)      PRN Meds:  senna-docusate sodium **AND** polyethylene glycol **AND** bisacodyl **AND** bisacodyl    HYDROmorphone    nitroglycerin    sodium chloride    sodium chloride    sodium chloride    ASSESSMENT/PLAN      Bradycardia    Dizziness  Pain in UEs  - unclear if this is related to spinal disease vs arrhythmia vs vertigo  - no e/o bradycardia  - has h/o ACDF of cervical  spine (unsure level)  - echo with EF 50%, no major valvular abnormalities  - CT c-spine pending, may need MRI  - ctm on telemetry, will plan on 30 day event monitor at d/c  - check carotid u/s    EDIL on CKD3  Urinary retention  - baseline Cr 1.3, 2.4 on admission  - gentle IVF ongoing  - renal u/s pending  - place hernandez if continues to retain  - cont flomax and inc to 0.8mg  - check PSA  - renal following          Nutrition - Diet: Cardiac Diets; Healthy Heart (2-3 Na+); Texture: Regular Texture (IDDSI 7); Fluid Consistency: Thin (IDDSI 0)   DVT Prophylaxis - heparin sub-q  Code Status - full   GI ppx - none  Disposition - home in am if stable and workup neg       Leo Garza MD  [facility]ospitalist [facility]  11/15/2023  11:38 EST

## 2023-11-15 NOTE — PLAN OF CARE
Patient had an uneventful evening. Pain management implemented. See Mar. Patient slept well. IV fluids initiated. Vitals stable. No further needs voiced or assessed. Call light within reach. Continuing to monitor.   Problem: Adult Inpatient Plan of Care  Goal: Plan of Care Review  Outcome: Ongoing, Progressing  Goal: Patient-Specific Goal (Individualized)  Outcome: Ongoing, Progressing  Goal: Absence of Hospital-Acquired Illness or Injury  Outcome: Ongoing, Progressing  Goal: Optimal Comfort and Wellbeing  Outcome: Ongoing, Progressing  Goal: Readiness for Transition of Care  Outcome: Ongoing, Progressing  Intervention: Mutually Develop Transition Plan  Recent Flowsheet Documentation  Taken 11/15/2023 0028 by Steven Patel, RN  Equipment Currently Used at Home: none  Transportation Anticipated: family or friend will provide  Patient/Family Anticipated Services at Transition: none  Patient/Family Anticipates Transition to: home   Goal Outcome Evaluation:

## 2023-11-16 ENCOUNTER — READMISSION MANAGEMENT (OUTPATIENT)
Dept: CALL CENTER | Facility: HOSPITAL | Age: 63
End: 2023-11-16
Payer: OTHER GOVERNMENT

## 2023-11-16 VITALS
BODY MASS INDEX: 26.33 KG/M2 | DIASTOLIC BLOOD PRESSURE: 70 MMHG | TEMPERATURE: 97.8 F | OXYGEN SATURATION: 100 % | HEART RATE: 61 BPM | RESPIRATION RATE: 18 BRPM | WEIGHT: 188.05 LBS | HEIGHT: 71 IN | SYSTOLIC BLOOD PRESSURE: 123 MMHG

## 2023-11-16 LAB
ANION GAP SERPL CALCULATED.3IONS-SCNC: 7.9 MMOL/L (ref 5–15)
BH CV XLRA MEAS LEFT CAROTID BULB EDV: 12 CM/SEC
BH CV XLRA MEAS LEFT CAROTID BULB PSV: 46 CM/SEC
BH CV XLRA MEAS LEFT DIST CCA EDV: 26.7 CM/SEC
BH CV XLRA MEAS LEFT DIST CCA PSV: 85.7 CM/SEC
BH CV XLRA MEAS LEFT DIST ICA EDV: -25.9 CM/SEC
BH CV XLRA MEAS LEFT DIST ICA PSV: -64.5 CM/SEC
BH CV XLRA MEAS LEFT ICA/CCA RATIO: -0.88
BH CV XLRA MEAS LEFT MID ICA EDV: -24.5 CM/SEC
BH CV XLRA MEAS LEFT MID ICA PSV: -64.5 CM/SEC
BH CV XLRA MEAS LEFT PROX CCA EDV: -24.2 CM/SEC
BH CV XLRA MEAS LEFT PROX CCA PSV: -87 CM/SEC
BH CV XLRA MEAS LEFT PROX ECA EDV: -19.6 CM/SEC
BH CV XLRA MEAS LEFT PROX ECA PSV: -99.6 CM/SEC
BH CV XLRA MEAS LEFT PROX ICA EDV: -28.7 CM/SEC
BH CV XLRA MEAS LEFT PROX ICA PSV: -75.7 CM/SEC
BH CV XLRA MEAS LEFT VERTEBRAL A EDV: 11.3 CM/SEC
BH CV XLRA MEAS LEFT VERTEBRAL A PSV: 36.3 CM/SEC
BH CV XLRA MEAS RIGHT CAROTID BULB EDV: 24 CM/SEC
BH CV XLRA MEAS RIGHT CAROTID BULB PSV: 86 CM/SEC
BH CV XLRA MEAS RIGHT DIST CCA EDV: 26.1 CM/SEC
BH CV XLRA MEAS RIGHT DIST CCA PSV: 79.5 CM/SEC
BH CV XLRA MEAS RIGHT DIST ICA EDV: -39.1 CM/SEC
BH CV XLRA MEAS RIGHT DIST ICA PSV: -82 CM/SEC
BH CV XLRA MEAS RIGHT ICA/CCA RATIO: -0.87
BH CV XLRA MEAS RIGHT MID ICA EDV: -28.6 CM/SEC
BH CV XLRA MEAS RIGHT MID ICA PSV: -64.6 CM/SEC
BH CV XLRA MEAS RIGHT PROX CCA EDV: -19.9 CM/SEC
BH CV XLRA MEAS RIGHT PROX CCA PSV: -91.3 CM/SEC
BH CV XLRA MEAS RIGHT PROX ECA EDV: -26.6 CM/SEC
BH CV XLRA MEAS RIGHT PROX ECA PSV: -97.4 CM/SEC
BH CV XLRA MEAS RIGHT PROX ICA EDV: -23.6 CM/SEC
BH CV XLRA MEAS RIGHT PROX ICA PSV: -69 CM/SEC
BH CV XLRA MEAS RIGHT VERTEBRAL A EDV: 13 CM/SEC
BH CV XLRA MEAS RIGHT VERTEBRAL A PSV: 38.2 CM/SEC
BUN SERPL-MCNC: 37 MG/DL (ref 8–23)
BUN/CREAT SERPL: 15 (ref 7–25)
CALCIUM SPEC-SCNC: 9.4 MG/DL (ref 8.6–10.5)
CHLORIDE SERPL-SCNC: 104 MMOL/L (ref 98–107)
CO2 SERPL-SCNC: 28.1 MMOL/L (ref 22–29)
CREAT SERPL-MCNC: 2.47 MG/DL (ref 0.76–1.27)
EGFRCR SERPLBLD CKD-EPI 2021: 28.4 ML/MIN/1.73
GLUCOSE SERPL-MCNC: 87 MG/DL (ref 65–99)
LEFT ARM BP: NORMAL MMHG
POTASSIUM SERPL-SCNC: 4.6 MMOL/L (ref 3.5–5.2)
RIGHT ARM BP: NORMAL MMHG
SODIUM SERPL-SCNC: 140 MMOL/L (ref 136–145)
WHOLE BLOOD HOLD SPECIMEN: NORMAL

## 2023-11-16 PROCEDURE — 25010000002 HEPARIN (PORCINE) PER 1000 UNITS: Performed by: FAMILY MEDICINE

## 2023-11-16 PROCEDURE — 25810000003 SODIUM CHLORIDE 0.9 % SOLUTION: Performed by: FAMILY MEDICINE

## 2023-11-16 PROCEDURE — 80048 BASIC METABOLIC PNL TOTAL CA: CPT | Performed by: INTERNAL MEDICINE

## 2023-11-16 RX ORDER — TAMSULOSIN HYDROCHLORIDE 0.4 MG/1
2 CAPSULE ORAL DAILY
Qty: 60 CAPSULE | Refills: 0 | Status: SHIPPED | OUTPATIENT
Start: 2023-11-16

## 2023-11-16 RX ADMIN — Medication 10 ML: at 09:51

## 2023-11-16 RX ADMIN — SODIUM CHLORIDE 100 ML/HR: 9 INJECTION, SOLUTION INTRAVENOUS at 04:36

## 2023-11-16 RX ADMIN — DULOXETINE HYDROCHLORIDE 60 MG: 30 CAPSULE, DELAYED RELEASE ORAL at 10:35

## 2023-11-16 RX ADMIN — DOCUSATE SODIUM 50MG AND SENNOSIDES 8.6MG 2 TABLET: 8.6; 5 TABLET, FILM COATED ORAL at 09:48

## 2023-11-16 RX ADMIN — TAMSULOSIN HYDROCHLORIDE 0.8 MG: 0.4 CAPSULE ORAL at 09:48

## 2023-11-16 RX ADMIN — HEPARIN SODIUM 5000 UNITS: 5000 INJECTION INTRAVENOUS; SUBCUTANEOUS at 09:48

## 2023-11-16 NOTE — PROGRESS NOTES
[facility]     Nephrology Progress Note      Patient Name: Stephon Castellano  : 1960  MRN: [id number]  Primary Care Physician:  Paige Florez MD  Date of admission: 2023    Subjective   Subjective     Interval History:  Patient Reports feeling okay this morning.  No new complaint.  Nausea or dizziness.  Voiding well and frequently.    Review of Systems   All systems were reviewed and negative except for: What is mentioned above.    Objective   Objective     Vitals:   Temp:  [97.3 °F (36.3 °C)-98.8 °F (37.1 °C)] 97.8 °F (36.6 °C)  Heart Rate:  [53-61] 61  Resp:  [16-18] 18  BP: (111-143)/(55-76) 123/70  Physical Exam:   Constitutional: Awake, alert   Eyes: sclerae anicteric, no conjunctival injection   HENT: mucous membranes moist   Neck: Supple, no thyromegaly, no lymphadenopathy, trachea midline, No JVD   Respiratory: Clear to auscultation bilaterally, nonlabored respirations    Cardiovascular: RRR, no murmurs, rubs, or gallops.   Gastrointestinal: Positive bowel sounds, soft, nontender, nondistended, pain pump in place.   Musculoskeletal: No edema, no clubbing or cyanosis   Psychiatric: Appropriate affect, cooperative   Neurologic: Oriented x 3, moving all extremities, Cranial Nerves grossly intact, speech clear   Skin: warm and dry, no rashes     Result Review    Result Reviewed:  I have personally reviewed the results from the time of this admission to 2023 15:19 EST and agree with these findings:  [x]  Laboratory  []  Microbiology  [x]  Radiology  []  EKG/Telemetry   []  Cardiology/Vascular   []  Pathology  []  Old records  []  Other:  Lab Results   Component Value Date    GLUCOSE 87 2023    CALCIUM 9.4 2023     2023    K 4.6 2023    CO2 28.1 2023     2023    BUN 37 (H) 2023    CREATININE 2.47 (H) 2023    EGFRIFNONA 81 2021    BCR 15.0 2023    ANIONGAP 7.9 2023     Lab Results   Component Value Date     CALCIUM 9.4 11/16/2023      Results from last 7 days   Lab Units 11/14/23  1756   MAGNESIUM mg/dL 2.2      US Renal Bilateral    Result Date: 11/15/2023  PROCEDURE: US RENAL BILATERAL  COMPARISON: [facility], CT, ABDOMEN PELVIS W/WO CONTRAST, 3/16/2021, 9:51.  INDICATIONS: Acute kidney injury history of left-sided nephrolithiasis  FINDINGS:  The right kidney measures 11.6 centimeters.  Left kidney measures 11.6 centimeters.  No significant right-sided hydronephrosis is seen.  There could be slightly increased echogenicity of the renal cortex compared to the liver.  Cortical thickness not unusual.  No hydronephrosis.  The bladder appears within normal limits.  Ureteral jets are not visualized.  Left kidney demonstrates no acute findings.  Small left renal cyst.  Previously seen punctate 1 millimeter left-sided nephrolith on prior CT not well seen.      Impression:   1. No significant hydronephrosis. 2. Previously seen punctate 1 millimeter left-sided nephrolith well seen on current ultrasound. 3. Small left renal cyst. 4. Question mild increased echogenicity of the renal cortex.  This could reflect chronic renal disease.  Correlate with history.      PADMINI MICHAEL MD       Electronically Signed and Approved By: PADMINI MICHAEL MD on 11/15/2023 at 12:51             Most notable findings include: Creatinine 2.47.  UA completely bland.  Urine eosinophils 0.  TSH okay.  Electrolytes are stable.  Echocardiogram was okay.  Ultrasound no hydronephrosis.    Assessment & Plan   Assessment / Plan       Active Hospital Problems:  Active Hospital Problems    Diagnosis     **Bradycardia        Assessment:    - Acute kidney injury on CKD stage III, baseline 0.9-1.1.  Higher this time.  Possible retention and possible hypotension from bradycardia.  Creatinine 2.47.  Clinically stable.  [place] UA.  Work-up negative so far.  - Mild anemia.  - Chronic back pain, intrathecal pain pump in place.  - BPH  -  Symptomatic bradycardia.  - Depression.    Plan:  - Normal saline at 100 cc an hour.  - Check bladder scan pre and postvoid.  If no retention, patient can be discharged home with repeat BMP on Monday and follow-up in our office next week.  Patient would benefit from urology follow-up.  - Discussed with primary.  - Please call with any questions.      Electronically signed by Mell Farrell MD, 11/16/23, 3:19 PM EST.

## 2023-11-16 NOTE — DISCHARGE SUMMARY
Physician Discharge Summary    Patient Identification  PATIENT IDENTIFICATION    Name: Stephon Castellano  :  1960  MRN: [id number]    Admit date: 2023    Discharge date: 2023     Admitting Physician: Jose Alejandro Eldridge DO     Discharge Physician: Ronen Garza MD     Admission Diagnoses: Bradycardia [R00.1]  Renal insufficiency [N28.9]    Hospital Problems:   Principal Problem:  Bradycardia   Active Problems:  Problems Addressed this Visit          Cardiac and Vasculature    Abnormal EKG - Primary    Relevant Orders    Ambulatory Referral to Nephrology    Ambulatory Referral to Urology     Other Visit Diagnoses       Renal insufficiency              Diagnoses         Codes Comments    Abnormal EKG    -  Primary ICD-10-CM: R94.31  ICD-9-CM: 794.31     Renal insufficiency     ICD-10-CM: N28.9  ICD-9-CM: 593.9              Discharged Condition: stable    Consults: IP CONSULT TO HOSPITALIST  IP CONSULT TO ADVANCE CARE PLANNING  IP CONSULT TO NEPHROLOGY    Imaging:   Imaging Results (Last 24 Hours)       Procedure Component Value Units Date/Time    CT Cervical Spine Without Contrast [[id number]] Collected: 11/15/23 1327     Updated: 11/15/23 1330    Narrative:      PROCEDURE: CT CERVICAL SPINE WO CONTRAST     COMPARISON: None     INDICATIONS: Cervical radiculopathy, no red flags, episode of loss of control of both arms, shaking   uncontrollably     PROTOCOL:   Standard imaging protocol performed      RADIATION:   DLP: 315 mGy*cm    MA and/or KV was adjusted to minimize radiation dose.          TECHNIQUE: After obtaining the patient's consent, multi-planar CT images were created without   contrast material.       FINDINGS:   The patient has had anterior discectomy interbody fusion at C5-6 and C6-7 with screw plate device   extending from C5 to C7.  There is prominent anterior osseous bridging along the C4-5 disc space.    There is also prominent anterior ossification along the disc space at C3-4.  The  relationship of   the lateral pillars of C1 with respect to C2 does not appear unusual.     It looks like there is a catheter within the central canal left of midline with the top aspect   around the C7-T1 level.  This looks like it is probably intradural.     C2-3:  No definite disc herniation or intervertebral foraminal stenosis.     C3-4:  There is bulging of the annulus.  The intervertebral foramina appear patent.  The AP   diameter of the central canal is 0.98 cm.     C4-5:  There is some bulging of the annulus.  The intervertebral foramina appear patent.     C5-6:  Interbody fusion.  Intervertebral foramina seem patent.     C6-7:  Postsurgical changes from interbody fusion.  The intervertebral foramina appear patent.     C7-T1:  Broad-based osseous bar superimposed on uncovertebral arthropathy with mild to moderate   narrowing of the left intervertebral foramina.  There is mild narrowing of the right intervertebral   foramina.         Impression:         1. Postsurgical changes C5-6 C6-7  2. Multilevel degenerative changes noted.  3. Suggested intradural catheter with top at the level of C7-T1.  Correlation with patient history   with respect to this suggested.            JACI FOLEY MD         Electronically Signed and Approved By: JACI FOLEY MD on 11/15/2023 at 13:27                             Labs:   Lab Results (last 24 hours)       Procedure Component Value Units Date/Time    Basic Metabolic Panel [[id number]]  (Abnormal) Collected: 11/16/23 0428    Specimen: Blood from Arm, Left Updated: 11/16/23 0525     Glucose 87 mg/dL      BUN 37 mg/dL      Creatinine 2.47 mg/dL      Sodium 140 mmol/L      Potassium 4.6 mmol/L      Chloride 104 mmol/L      CO2 28.1 mmol/L      Calcium 9.4 mg/dL      BUN/Creatinine Ratio 15.0     Anion Gap 7.9 mmol/L      eGFR 28.4 mL/min/1.73     Narrative:      GFR Normal >60  Chronic Kidney Disease <60  Kidney Failure <15      Extra Tubes [[id number]] Collected: 11/16/23 0428     "Specimen: Blood from Arm, Left Updated: 11/16/23 0503    Narrative:      The following orders were created for panel order Extra Tubes.  Procedure                               Abnormality         Status                     ---------                               -----------         ------                     Lavender Top[[id number]]                                     Final result                 Please view results for these tests on the individual orders.    Lavender Top [[id number]] Collected: 11/16/23 0428    Specimen: Blood from Arm, Left Updated: 11/16/23 0503     Extra Tube hold for add-on     Comment: Auto resulted       Eosinophil Smear - Urine, Urine, Clean Catch [[id number]]  (Normal) Collected: 11/15/23 1117    Specimen: Urine, Clean Catch Updated: 11/15/23 1823     Eosinophil Smear 0 % EOS/100 Cells               Hospital Course:   63-year-old male with past medical history of hypertension, hyperlipidemia, hypogonadism, depression who presents to the emergency department with worsening weakness and dizziness.  He went into see his PCP and an EKG was ordered.  Patient says that he had some fluttering in his chest but also felt like his arms were going numb with \"electricity shooting through them when he put his head back to take his medications.  He has a history of cervical disc problems.  Patient says he has not been eating or drinking very well and has actually lost a tremendous amount of weight.  States that somewhere in the neighborhood of 60 pounds in the last several months.  Today he came into the ED because the dizziness was worsening sometimes.     Dizziness  Pain in UEs  Implanted spinal pain pump  - unclear etiology  - no e/o bradycardia on telemetry since being here  - has h/o ACDF C5-7 10 years ago -- CT c-spine only shows mild-mod disease likely not the cause of his symptoms  - echo with EF 50%, no major valvular abnormalities  - carotid u/s unremarkable  - no issues on telemetry, ordered 30 " day event monitor at d/c     EDIL on CKD3  Urinary retention  - baseline Cr 1.3, 2.4 on admission and stable  - did not improve with IVF  - renal u/s shows some possible medical renal disease  - post-void residuals showed about 250cc retention  - increased flomax to 0.8mg  - PSA pending  - renal followed -- d/w Dr Farrell and will get repeat BMP within 1 week and follow up with him in clinic. Have also placed referral to urology     Discharge Exam:  Gen: up in bed, in NAD  CV:  RRR, no m/r/g, no peripheral edema  Resp: CTAB, no increase work of breathing  Abd: soft, NT, ND, bs present  Neuro: moves all 4 ext, following commands  Ext: no clubbing, cyanosis or edema  Psych: AAOx3, pleasant affect    Disposition: Home    Patient Discharge Medications:      Discharge Medications        Changes to Medications        Instructions Start Date   tamsulosin 0.4 MG capsule 24 hr capsule  Commonly known as: FLOMAX  What changed: how much to take   0.8 mg, Oral, Daily             Continue These Medications        Instructions Start Date   anastrozole 1 MG tablet  Commonly known as: ARIMIDEX   1 mg, Oral, Daily      carboxymethylcellulose 0.5 % solution  Commonly known as: REFRESH PLUS   1 drop, Both Eyes, 5 Times Daily PRN      disulfiram 250 MG tablet  Commonly known as: ANTABUSE   250 mg, Oral, Daily      DULoxetine 60 MG capsule  Commonly known as: CYMBALTA   60 mg, Oral, Daily      fluticasone 50 MCG/ACT nasal spray  Commonly known as: FLONASE   2 sprays, Nasal, Daily      multivitamin with minerals tablet tablet   Take 1 tablet by mouth Daily.      pain patient supplied pump   Intrathecal, Continuous, Instructions are in drop boxes       Testosterone Cypionate 200 MG/ML injection  Commonly known as: DEPOTESTOTERONE CYPIONATE   No dose, route, or frequency recorded.      traZODone 100 MG tablet  Commonly known as: DESYREL   200 mg, Oral, Nightly              Follow-up Information       Mell Farrell MD .    Specialty:  Nephrology  Contact information:  [address]  [place] [address]  [phone]               Charles Phan MD .    Specialty: Urology  Contact information:  [address]  [place] [address]  [phone]               Paige Florez MD .    Specialties: Pediatrics, Internal Medicine, Internal Medicine & Pediatrics  Contact information:  [address]  [facility] [address]  [phone]                                 Signed:  Leo Garza MD  Hospitalist Group  11/16/2023  13:02 EST      I spent 31 minutes arranging and coordinating discharge and reviewing medications with majority of time spent counseling patient

## 2023-11-16 NOTE — PLAN OF CARE
Problem: Adult Inpatient Plan of Care  Goal: Plan of Care Review  Outcome: Met  Goal: Patient-Specific Goal (Individualized)  Outcome: Met  Goal: Absence of Hospital-Acquired Illness or Injury  Outcome: Met  Intervention: Identify and Manage Fall Risk  Recent Flowsheet Documentation  Taken 11/16/2023 1227 by Javi Krishnamurthy RN  Safety Promotion/Fall Prevention: safety round/check completed  Taken 11/16/2023 1000 by Javi Krishnamurthy RN  Safety Promotion/Fall Prevention: safety round/check completed  Taken 11/16/2023 0800 by Javi Krishnamurthy RN  Safety Promotion/Fall Prevention: safety round/check completed  Taken 11/16/2023 0710 by Javi Krishnamurthy RN  Safety Promotion/Fall Prevention: safety round/check completed  Intervention: Prevent Skin Injury  Recent Flowsheet Documentation  Taken 11/16/2023 0710 by Javi Krishnamurthy RN  Body Position: position changed independently  Intervention: Prevent and Manage VTE (Venous Thromboembolism) Risk  Recent Flowsheet Documentation  Taken 11/16/2023 0710 by Javi Krishnamurthy RN  Activity Management: up ad courtney  Range of Motion: active ROM (range of motion) encouraged  Goal: Optimal Comfort and Wellbeing  Outcome: Met  Intervention: Provide Person-Centered Care  Recent Flowsheet Documentation  Taken 11/16/2023 0710 by Javi Krishnamurthy RN  Trust Relationship/Rapport:   care explained   choices provided   emotional support provided   empathic listening provided   questions answered   questions encouraged  Goal: Readiness for Transition of Care  Outcome: Met     Problem: Fall Injury Risk  Goal: Absence of Fall and Fall-Related Injury  Outcome: Met  Intervention: Promote Injury-Free Environment  Recent Flowsheet Documentation  Taken 11/16/2023 1227 by Javi Krishnamurthy RN  Safety Promotion/Fall Prevention: safety round/check completed  Taken 11/16/2023 1000 by Javi Krishnamurthy RN  Safety Promotion/Fall Prevention: safety round/check completed  Taken 11/16/2023 0800 by Javi Krishnamurthy RN  Safety Promotion/Fall Prevention:  safety round/check completed  Taken 11/16/2023 0710 by Javi Krishnamurthy, RN  Safety Promotion/Fall Prevention: safety round/check completed   Goal Outcome Evaluation:

## 2023-11-16 NOTE — PROGRESS NOTES
"DAILY PROGRESS NOTE  HOSPITALIST GROUP      PATIENT IDENTIFICATION    Name: Stephon Castellano  :  1960  MRN: [id number]    CHIEF COMPLAINT/PRINCIPAL DIAGNOSIS: Bradycardia       SUBJECTIVE    No issues. Feels good. Has not required cath anymore.     ROS:   Gen: No fever or chills  CV: no chest pain or palpitation  Resp: no shortness of breath or cough  GI: no nausea, vomiting, diarrhea  Neuro: no headache or dizziness     OBJECTIVE     Exam:  /55 (BP Location: Left arm, Patient Position: Lying)   Pulse 60   Temp 97.4 °F (36.3 °C) (Oral)   Resp 16   Ht 180.3 cm (70.98\")   Wt 85.3 kg (188 lb 0.8 oz)   SpO2 98%   BMI 26.24 kg/m²   Intake/Output last 24 hours:    Intake/Output Summary (Last 24 hours) at 2023 0954  Last data filed at 2023 0910  Gross per 24 hour   Intake 2954 ml   Output 4575 ml   Net -1621 ml        Gen: NAD, up in bed  Resp: CTAB, no increased work of breathing  CV: RRR, no m/r/g. No peripheral edema  GI: Soft, nontender, (+) BS. nondistended  Psych: Alert and Oriented x 3, Mood and affect appropriate to situation  Skin: warm and dry on palpation. No rash on inspection.  Neuro: moves all 4 extremities, follows simple commands    DATA REVIEW:  Lab Results (last 24 hours)       Procedure Component Value Units Date/Time    Basic Metabolic Panel [[id number]]  (Abnormal) Collected: 23    Specimen: Blood from Arm, Left Updated: 23 0562     Glucose 87 mg/dL      BUN 37 mg/dL      Creatinine 2.47 mg/dL      Sodium 140 mmol/L      Potassium 4.6 mmol/L      Chloride 104 mmol/L      CO2 28.1 mmol/L      Calcium 9.4 mg/dL      BUN/Creatinine Ratio 15.0     Anion Gap 7.9 mmol/L      eGFR 28.4 mL/min/1.73     Narrative:      GFR Normal >60  Chronic Kidney Disease <60  Kidney Failure <15      Extra Tubes [[id number]] Collected: 23    Specimen: Blood from Arm, Left Updated: 23 0503    Narrative:      The following orders were created for panel order Extra " Tubes.  Procedure                               Abnormality         Status                     ---------                               -----------         ------                     Lavender Top[[id number]]                                     Final result                 Please view results for these tests on the individual orders.    Lavender Top [[id number]] Collected: 11/16/23 0428    Specimen: Blood from Arm, Left Updated: 11/16/23 0503     Extra Tube hold for add-on     Comment: Auto resulted       Eosinophil Smear - Urine, Urine, Clean Catch [[id number]]  (Normal) Collected: 11/15/23 1117    Specimen: Urine, Clean Catch Updated: 11/15/23 1823     Eosinophil Smear 0 % EOS/100 Cells     PSA, Total & Free [[id number]] Collected: 11/14/23 1756    Specimen: Blood from Arm, Right Updated: 11/15/23 1206            Imaging Results (Last 24 Hours)       Procedure Component Value Units Date/Time    CT Cervical Spine Without Contrast [[id number]] Collected: 11/15/23 1327     Updated: 11/15/23 1330    Narrative:      PROCEDURE: CT CERVICAL SPINE WO CONTRAST     COMPARISON: None     INDICATIONS: Cervical radiculopathy, no red flags, episode of loss of control of both arms, shaking   uncontrollably     PROTOCOL:   Standard imaging protocol performed      RADIATION:   DLP: 315 mGy*cm    MA and/or KV was adjusted to minimize radiation dose.          TECHNIQUE: After obtaining the patient's consent, multi-planar CT images were created without   contrast material.       FINDINGS:   The patient has had anterior discectomy interbody fusion at C5-6 and C6-7 with screw plate device   extending from C5 to C7.  There is prominent anterior osseous bridging along the C4-5 disc space.    There is also prominent anterior ossification along the disc space at C3-4.  The relationship of   the lateral pillars of C1 with respect to C2 does not appear unusual.     It looks like there is a catheter within the central canal left of midline with the  top aspect   around the C7-T1 level.  This looks like it is probably intradural.     C2-3:  No definite disc herniation or intervertebral foraminal stenosis.     C3-4:  There is bulging of the annulus.  The intervertebral foramina appear patent.  The AP   diameter of the central canal is 0.98 cm.     C4-5:  There is some bulging of the annulus.  The intervertebral foramina appear patent.     C5-6:  Interbody fusion.  Intervertebral foramina seem patent.     C6-7:  Postsurgical changes from interbody fusion.  The intervertebral foramina appear patent.     C7-T1:  Broad-based osseous bar superimposed on uncovertebral arthropathy with mild to moderate   narrowing of the left intervertebral foramina.  There is mild narrowing of the right intervertebral   foramina.         Impression:         1. Postsurgical changes C5-6 C6-7  2. Multilevel degenerative changes noted.  3. Suggested intradural catheter with top at the level of C7-T1.  Correlation with patient history   with respect to this suggested.            JACI FOLEY MD         Electronically Signed and Approved By: JACI FOLEY MD on 11/15/2023 at 13:27                     US Renal Bilateral [[id number]] Collected: 11/15/23 1251     Updated: 11/15/23 1254    Narrative:      PROCEDURE: US RENAL BILATERAL     COMPARISON: [facility], CT, ABDOMEN PELVIS W/WO CONTRAST, 3/16/2021, 9:51.     INDICATIONS: Acute kidney injury history of left-sided nephrolithiasis     FINDINGS:   The right kidney measures 11.6 centimeters.  Left kidney measures 11.6 centimeters.  No significant   right-sided hydronephrosis is seen.  There could be slightly increased echogenicity of the renal   cortex compared to the liver.  Cortical thickness not unusual.  No hydronephrosis.  The bladder   appears within normal limits.  Ureteral jets are not visualized.  Left kidney demonstrates no acute   findings.  Small left renal cyst.  Previously seen punctate 1 millimeter  left-sided nephrolith on   prior CT not well seen.       Impression:         1. No significant hydronephrosis.  2. Previously seen punctate 1 millimeter left-sided nephrolith well seen on current ultrasound.  3. Small left renal cyst.  4. Question mild increased echogenicity of the renal cortex.  This could reflect chronic renal   disease.  Correlate with history.               PADMINI MICHAEL MD         Electronically Signed and Approved By: PADMINI MICHAEL MD on 11/15/2023 at 12:51                             Labs and imaging noted above have been personally reviewed by me.    Scheduled Meds:DULoxetine, 60 mg, Oral, Daily  heparin (porcine), 5,000 Units, Subcutaneous, Q12H  senna-docusate sodium, 2 tablet, Oral, BID  sodium chloride, 10 mL, Intravenous, Q12H  tamsulosin, 0.8 mg, Oral, Daily  traZODone, 100 mg, Oral, Nightly      Continuous Infusions:sodium chloride, 100 mL/hr, Last Rate: 100 mL/hr (11/16/23 0436)      PRN Meds:  senna-docusate sodium **AND** polyethylene glycol **AND** bisacodyl **AND** bisacodyl    HYDROmorphone    nitroglycerin    sodium chloride    sodium chloride    sodium chloride    ASSESSMENT/PLAN      Bradycardia    Dizziness  Pain in UEs  Implanted spinal pain pump  - unclear etiology  - no e/o bradycardia on telemetry since being here  - has h/o ACDF C5-7 10 years ago -- CT c-spine only shows mild-mod disease likely not the cause of his symptoms  - echo with EF 50%, no major valvular abnormalities  - carotid u/s unremarkable  - ctm on telemetry, can consider 30 day event monitor at d/c    EIDL on CKD3  Urinary retention  - baseline Cr 1.3, 2.4 on admission and stable  - gentle IVF ongoing  - renal u/s shows some possible medical renal disease  - obtain pre- and post-void residuals today  - increased flomax to 0.8mg  - PSA pending  - renal following          Nutrition - Diet: Cardiac Diets; Healthy Heart (2-3 Na+); Texture: Regular Texture (IDDSI 7); Fluid Consistency: Thin (IDDSI 0)    DVT Prophylaxis - heparin sub-q  Code Status - full   GI ppx - none  Disposition - home today if ok with renal       Leo MD Greg  Hospitalist Group  11/16/2023  09:54 EST

## 2023-11-16 NOTE — OUTREACH NOTE
Prep Survey      Flowsheet Row Responses   [facility] patient discharged from? [facility]   Is LACE score < 7 ? Yes   Eligibility [facility]   Hospital [facility]   Date of Admission 11/14/23   Date of Discharge 11/16/23   Discharge Disposition Home or Self Care   Discharge diagnosis bradycardia, EDIL on CKD   Does the patient have one of the following disease processes/diagnoses(primary or secondary)? Other   Does the patient have Home health ordered? No   Is there a DME ordered? No   Prep survey completed? Yes            Sonia WHITLEY - Registered Nurse

## 2023-11-16 NOTE — PLAN OF CARE
Goal Outcome Evaluation:  Plan of Care Reviewed With: patient        Progress: no change  Outcome Evaluation: VSS. A&Ox4. No acute change to patient condition. No signs or symptoms of distress. Resting with eyes closed and visible respirations.

## 2023-11-17 ENCOUNTER — TRANSITIONAL CARE MANAGEMENT TELEPHONE ENCOUNTER (OUTPATIENT)
Dept: CALL CENTER | Facility: HOSPITAL | Age: 63
End: 2023-11-17
Payer: OTHER GOVERNMENT

## 2023-11-17 NOTE — OUTREACH NOTE
Call Center TCM Note      Flowsheet Row Responses   [facility] patient discharged from? [facility]   Does the patient have one of the following disease processes/diagnoses(primary or secondary)? Other   TCM attempt successful? Yes   Call start time 1407   Call end time 1414   Discharge diagnosis bradycardia, EDIL on CKD  (Dizziness)   Person spoke with today (if not patient) and relationship Patient   Meds reviewed with patient/caregiver? Yes  [dose change flomax]   Does the patient have all medications ordered at discharge? Yes   Is the patient taking all medications as directed (includes completed medication regime)? Yes   Comments PCP Dr Florez. Patient has a primary care office visit in place with Anny ZEE on 11/21/23  345pm that patient intends to keep. Message routed to office.   Does the patient have an appointment with their PCP within 7-14 days of discharge? Other   Nursing Interventions Confirmed date/time of appointment, Routed TCM call to PCP office   Has home health visited the patient within 72 hours of discharge? N/A   Psychosocial issues? No   Comments Patient will record readings of any blood pressure / HR readings he takes at home and bring to follow up appt.   Did the patient receive a copy of their discharge instructions? Yes   Nursing interventions Reviewed instructions with patient   What is the patient's perception of their health status since discharge? Same   Is the patient/caregiver able to teach back signs and symptoms related to disease process for when to call PCP? Yes   Is the patient/caregiver able to teach back signs and symptoms related to disease process for when to call 911? Yes   Is the patient/caregiver able to teach back the hierarchy of who to call/visit for symptoms/problems? PCP, Specialist, Home health nurse, Urgent Care, ED, 911 Yes   If the patient is a current smoker, are they able to teach back resources for cessation? Not a smoker   TCM call completed? Yes   Call  end time 1414   Would this patient benefit from a Referral to [facility] Social Work? No   Is the patient interested in additional calls from an ambulatory ? No            Sarahi Malik RN    11/17/2023, 14:21 EST

## 2023-11-18 LAB
PSA FREE MFR SERPL: 36.7 %
PSA FREE SERPL-MCNC: 0.22 NG/ML
PSA SERPL-MCNC: 0.6 NG/ML (ref 0–4)

## 2023-11-21 ENCOUNTER — HOSPITAL ENCOUNTER (OUTPATIENT)
Dept: MRI IMAGING | Facility: HOSPITAL | Age: 63
Discharge: HOME | End: 2023-11-21
Admitting: PHYSICIAN ASSISTANT
Payer: OTHER GOVERNMENT

## 2023-11-21 ENCOUNTER — OFFICE VISIT (OUTPATIENT)
Dept: INTERNAL MEDICINE | Facility: CLINIC | Age: 63
End: 2023-11-21
Payer: OTHER GOVERNMENT

## 2023-11-21 VITALS
BODY MASS INDEX: 26.21 KG/M2 | DIASTOLIC BLOOD PRESSURE: 80 MMHG | HEART RATE: 80 BPM | OXYGEN SATURATION: 95 % | SYSTOLIC BLOOD PRESSURE: 122 MMHG | HEIGHT: 71 IN | WEIGHT: 187.2 LBS | TEMPERATURE: 98.5 F

## 2023-11-21 DIAGNOSIS — R53.83 FATIGUE, UNSPECIFIED TYPE: ICD-10-CM

## 2023-11-21 DIAGNOSIS — R56.9 SEIZURE-LIKE ACTIVITY: Primary | ICD-10-CM

## 2023-11-21 DIAGNOSIS — N17.9 ACUTE KIDNEY INJURY: ICD-10-CM

## 2023-11-21 DIAGNOSIS — R41.89 BRAIN FOG: ICD-10-CM

## 2023-11-21 DIAGNOSIS — R63.4 WEIGHT LOSS: ICD-10-CM

## 2023-11-21 LAB
ANION GAP SERPL CALCULATED.3IONS-SCNC: 9.1 MMOL/L (ref 5–15)
BASOPHILS # BLD AUTO: 0.02 10*3/MM3 (ref 0–0.2)
BASOPHILS NFR BLD AUTO: 0.5 % (ref 0–1.5)
BUN SERPL-MCNC: 23 MG/DL (ref 8–23)
BUN/CREAT SERPL: 9.1 (ref 7–25)
CALCIUM SPEC-SCNC: 9.7 MG/DL (ref 8.6–10.5)
CHLORIDE SERPL-SCNC: 97 MMOL/L (ref 98–107)
CO2 SERPL-SCNC: 30.9 MMOL/L (ref 22–29)
CREAT SERPL-MCNC: 2.54 MG/DL (ref 0.76–1.27)
DEPRECATED RDW RBC AUTO: 38.5 FL (ref 37–54)
EGFRCR SERPLBLD CKD-EPI 2021: 27.5 ML/MIN/1.73
EOSINOPHIL # BLD AUTO: 0.15 10*3/MM3 (ref 0–0.4)
EOSINOPHIL NFR BLD AUTO: 3.7 % (ref 0.3–6.2)
ERYTHROCYTE [DISTWIDTH] IN BLOOD BY AUTOMATED COUNT: 11.5 % (ref 12.3–15.4)
GLUCOSE SERPL-MCNC: 112 MG/DL (ref 65–99)
HCT VFR BLD AUTO: 34 % (ref 37.5–51)
HGB BLD-MCNC: 11.4 G/DL (ref 13–17.7)
IMM GRANULOCYTES # BLD AUTO: 0 10*3/MM3 (ref 0–0.05)
IMM GRANULOCYTES NFR BLD AUTO: 0 % (ref 0–0.5)
LYMPHOCYTES # BLD AUTO: 1.93 10*3/MM3 (ref 0.7–3.1)
LYMPHOCYTES NFR BLD AUTO: 47.5 % (ref 19.6–45.3)
MCH RBC QN AUTO: 31.1 PG (ref 26.6–33)
MCHC RBC AUTO-ENTMCNC: 33.5 G/DL (ref 31.5–35.7)
MCV RBC AUTO: 92.9 FL (ref 79–97)
MONOCYTES # BLD AUTO: 0.42 10*3/MM3 (ref 0.1–0.9)
MONOCYTES NFR BLD AUTO: 10.3 % (ref 5–12)
NEUTROPHILS NFR BLD AUTO: 1.54 10*3/MM3 (ref 1.7–7)
NEUTROPHILS NFR BLD AUTO: 38 % (ref 42.7–76)
NRBC BLD AUTO-RTO: 0 /100 WBC (ref 0–0.2)
PLATELET # BLD AUTO: 121 10*3/MM3 (ref 140–450)
PMV BLD AUTO: 10.6 FL (ref 6–12)
POTASSIUM SERPL-SCNC: 4.9 MMOL/L (ref 3.5–5.2)
RBC # BLD AUTO: 3.66 10*6/MM3 (ref 4.14–5.8)
SODIUM SERPL-SCNC: 137 MMOL/L (ref 136–145)
WBC NRBC COR # BLD AUTO: 4.06 10*3/MM3 (ref 3.4–10.8)

## 2023-11-21 PROCEDURE — 80048 BASIC METABOLIC PNL TOTAL CA: CPT | Performed by: PHYSICIAN ASSISTANT

## 2023-11-21 PROCEDURE — 85025 COMPLETE CBC W/AUTO DIFF WBC: CPT | Performed by: PHYSICIAN ASSISTANT

## 2023-11-21 PROCEDURE — 70551 MRI BRAIN STEM W/O DYE: CPT

## 2023-11-21 RX ORDER — DEXAMETHASONE 0.5 MG/1
0.5 TABLET ORAL
COMMUNITY
Start: 2023-05-22 | End: 2024-05-21

## 2023-11-21 RX ORDER — KETOCONAZOLE 20 MG/ML
2 SHAMPOO TOPICAL
COMMUNITY
Start: 2017-05-01 | End: 2024-05-03

## 2023-11-21 RX ORDER — FLUCONAZOLE 150 MG/1
150 TABLET ORAL
COMMUNITY
Start: 2017-05-01 | End: 2024-04-19

## 2023-11-21 NOTE — ASSESSMENT & PLAN NOTE
Will reschedule CT abdomen and pelvis to look for any causes of abdominal pain and unintentional weight loss.  If patient's symptoms persist or worsen he is to go to the ER.  Follow-up with PCP in 1-2 weeks.

## 2023-11-21 NOTE — PROGRESS NOTES
"Transitional Care Follow Up Visit  Subjective     Stephon Castellano is a 63 y.o. male who presents for a transitional care management visit.    Within 48 business hours after discharge our office contacted him via telephone to coordinate his care and needs.      I reviewed and discussed the details of that call along with the discharge summary, hospital problems, inpatient lab results, inpatient diagnostic studies, and consultation reports with Stephon.     Current outpatient and discharge medications have been reconciled for the patient.  Reviewed by: Anny Jo PA-C          11/16/2023     4:19 PM   Date of TCM Phone Call   [facility]   Date of Admission 11/14/2023   Date of Discharge 11/16/2023   Discharge Disposition Home or Self Care     Risk for Readmission (LACE) Score: 5 (11/16/2023  6:00 AM)      History of Present Illness   Course During Hospital Stay:      63-year-old male with past medical history of hypertension, hyperlipidemia, hypogonadism, depression who presents to the emergency department with worsening weakness and dizziness.  He went into see his PCP and an EKG was ordered.  Patient says that he had some fluttering in his chest but also felt like his arms were going numb with \"electricity shooting through them when he put his head back to take his medications.  He has a history of cervical disc problems.  Patient says he has not been eating or drinking very well and has actually lost a tremendous amount of weight.  States that somewhere in the neighborhood of 60 pounds in the last several months.  Today he came into the ED because the dizziness was worsening sometimes.      Dizziness  Pain in UEs  Implanted spinal pain pump  - unclear etiology  - no e/o bradycardia on telemetry since being here  - has h/o ACDF C5-7 10 years ago -- CT c-spine only shows mild-mod disease likely not the cause of his symptoms  - echo with EF 50%, no major valvular abnormalities  - carotid u/s unremarkable  - no " issues on telemetry, ordered 30 day event monitor at d/c     EDIL on CKD3  Urinary retention  - baseline Cr 1.3, 2.4 on admission and stable  - did not improve with IVF  - renal u/s shows some possible medical renal disease  - post-void residuals showed about 250cc retention  - increased flomax to 0.8mg  - PSA pending  - renal followed -- d/w Dr Farrell and will get repeat BMP within 1 week and follow up with him in clinic. Have also placed referral to urology     Symptoms have persisted especially with weakness in his legs and shaking in his arms.  He does still have some associated dizziness which feels somewhat like vertigo when he turns his head.  Patient is scheduled to see nephrology tomorrow.     The following portions of the patient's history were reviewed and updated as appropriate: allergies, current medications, past family history, past medical history, past social history, past surgical history, and problem list.    Review of Systems    Objective   Physical Exam  Vitals reviewed.   Constitutional:       Appearance: Normal appearance. He is well-developed.   HENT:      Head: Normocephalic and atraumatic.   Eyes:      Conjunctiva/sclera: Conjunctivae normal.      Pupils: Pupils are equal, round, and reactive to light.   Cardiovascular:      Rate and Rhythm: Normal rate and regular rhythm.      Heart sounds: No murmur heard.     No friction rub. No gallop.   Pulmonary:      Effort: Pulmonary effort is normal.      Breath sounds: Normal breath sounds. No wheezing or rhonchi.   Musculoskeletal:      Right lower leg: No edema.      Left lower leg: No edema.   Skin:     General: Skin is warm and dry.   Neurological:      Mental Status: He is alert and oriented to person, place, and time.      Cranial Nerves: No cranial nerve deficit.   Psychiatric:         Mood and Affect: Mood and affect normal.         Behavior: Behavior normal.         Thought Content: Thought content normal.         Judgment: Judgment normal.          Assessment & Plan   Problems Addressed this Visit       Weight loss     Will reschedule CT abdomen and pelvis to look for any causes of abdominal pain and unintentional weight loss.  If patient's symptoms persist or worsen he is to go to the ER.  Follow-up with PCP in 1-2 weeks.         Relevant Orders    Ambulatory Referral to Neurology    Seizure-like activity - Primary     Uncertain etiology.  Will get stat MRI of brain as ordered previously to check for any neurological deficits and send patient to neurology for further evaluation.  If symptoms persist or worsen patient needs to go to the ER.         Relevant Orders    Ambulatory Referral to Neurology    Brain fog    Relevant Orders    Ambulatory Referral to Neurology    Fatigue    Relevant Orders    Ambulatory Referral to Neurology    Acute kidney injury    Relevant Orders    CBC w AUTO Differential    Basic metabolic panel     Diagnoses         Codes Comments    Seizure-like activity    -  Primary ICD-10-CM: R56.9  ICD-9-CM: 780.39     Weight loss     ICD-10-CM: R63.4  ICD-9-CM: 783.21     Brain fog     ICD-10-CM: R41.89  ICD-9-CM: 799.59     Fatigue, unspecified type     ICD-10-CM: R53.83  ICD-9-CM: 780.79     Acute kidney injury     ICD-10-CM: N17.9  ICD-9-CM: 584.9

## 2023-11-21 NOTE — ASSESSMENT & PLAN NOTE
Uncertain etiology.  Will get stat MRI of brain as ordered previously to check for any neurological deficits and send patient to neurology for further evaluation.  If symptoms persist or worsen patient needs to go to the ER.

## 2023-11-22 ENCOUNTER — HOSPITAL ENCOUNTER (OUTPATIENT)
Dept: CT IMAGING | Facility: HOSPITAL | Age: 63
Discharge: HOME | End: 2023-11-22
Admitting: PHYSICIAN ASSISTANT
Payer: OTHER GOVERNMENT

## 2023-11-22 PROCEDURE — 74176 CT ABD & PELVIS W/O CONTRAST: CPT

## 2023-12-21 ENCOUNTER — OFFICE VISIT (OUTPATIENT)
Dept: INTERNAL MEDICINE | Facility: CLINIC | Age: 63
End: 2023-12-21
Payer: OTHER GOVERNMENT

## 2023-12-21 VITALS
TEMPERATURE: 97.8 F | SYSTOLIC BLOOD PRESSURE: 138 MMHG | HEIGHT: 71 IN | DIASTOLIC BLOOD PRESSURE: 86 MMHG | OXYGEN SATURATION: 100 % | BODY MASS INDEX: 25.65 KG/M2 | WEIGHT: 183.2 LBS | HEART RATE: 62 BPM

## 2023-12-21 DIAGNOSIS — R25.1 TREMOR: ICD-10-CM

## 2023-12-21 DIAGNOSIS — R53.83 OTHER FATIGUE: ICD-10-CM

## 2023-12-21 DIAGNOSIS — N17.9 ACUTE KIDNEY INJURY: Primary | ICD-10-CM

## 2023-12-21 DIAGNOSIS — R63.4 WEIGHT LOSS: ICD-10-CM

## 2023-12-21 DIAGNOSIS — K59.01 SLOW TRANSIT CONSTIPATION: ICD-10-CM

## 2023-12-21 RX ORDER — SODIUM CHLORIDE 1 G/1
1 TABLET ORAL 3 TIMES DAILY
COMMUNITY
Start: 2023-11-22 | End: 2023-12-22

## 2023-12-21 RX ORDER — DIPHENOXYLATE HYDROCHLORIDE AND ATROPINE SULFATE 2.5; .025 MG/1; MG/1
1 TABLET ORAL DAILY
COMMUNITY

## 2023-12-21 NOTE — PROGRESS NOTES
"Chief Complaint  Weight Loss, Back Pain, Dizziness, and Knee Pain (Pt is having trouble walking and has been falling. )    Subjective       Stephon Castellano presents to [facility]    Weight Loss    Back Pain  Associated symptoms include weight loss.   Dizziness    Knee Pain        Presenting for follow up    Has been recently evaluated for balance issues, EDIL, weight loss. Lab work up and imaging have been normal.     Has been followed by nephrology. Has appointments scheduled with neurology, urology and endocrine.     Has been dealing with constipation. Has tried over the counter treatments without much improvement. Has been eating smoothies and plums.     Objective     Vitals:    12/21/23 1349   BP: 138/86   Pulse: 62   Temp: 97.8 °F (36.6 °C)   SpO2: 100%   Weight: 83.1 kg (183 lb 3.2 oz)   Height: 180.3 cm (70.98\")      Wt Readings from Last 3 Encounters:   12/21/23 83.1 kg (183 lb 3.2 oz)   11/21/23 84.9 kg (187 lb 3.2 oz)   11/15/23 85.3 kg (188 lb 0.8 oz)      BP Readings from Last 3 Encounters:   12/21/23 138/86   11/21/23 122/80   11/16/23 123/70        Body mass index is 25.57 kg/m².        Physical Exam  Vitals reviewed.   Constitutional:       Appearance: Normal appearance. He is well-developed.   HENT:      Head: Normocephalic and atraumatic.      Mouth/Throat:      Pharynx: No oropharyngeal exudate.   Eyes:      Conjunctiva/sclera: Conjunctivae normal.      Pupils: Pupils are equal, round, and reactive to light.   Neck:      Thyroid: No thyromegaly or thyroid tenderness.   Cardiovascular:      Rate and Rhythm: Normal rate and regular rhythm.      Heart sounds: No murmur heard.     No friction rub. No gallop.   Pulmonary:      Effort: Pulmonary effort is normal.      Breath sounds: Normal breath sounds. No wheezing or rhonchi.   Lymphadenopathy:      Cervical: No cervical adenopathy.   Skin:     General: Skin is warm and dry.   Neurological:      Mental " Status: He is alert and oriented to person, place, and time.   Psychiatric:         Mood and Affect: Affect normal.          Result Review :   The following data was reviewed by: Paige Florez MD on 12/21/2023:  CMP          11/15/2023    01:57 11/16/2023    04:28 11/21/2023    16:21   CMP   Glucose 74  87  112    BUN 39  37  23    Creatinine 2.44  2.47  2.54    EGFR 28.8  28.4  27.5    Sodium 142  140  137    Potassium 4.6  4.6  4.9    Chloride 103  104  97    Calcium 9.4  9.4  9.7    Total Protein 6.6      Albumin 4.0      Globulin 2.6      Total Bilirubin 0.3      Alkaline Phosphatase 67      AST (SGOT) 19      ALT (SGPT) 29      Albumin/Globulin Ratio 1.5      BUN/Creatinine Ratio 16.0  15.0  9.1    Anion Gap 7.5  7.9  9.1      CBC          11/14/2023    17:16 11/14/2023    17:56 11/15/2023    01:57 11/21/2023    16:21   CBC   WBC 4.01  4.44  4.43  4.06    RBC 3.71  3.87  3.67  3.66    Hemoglobin 11.7  11.9  11.1  11.4    Hematocrit 34.7  36.8  34.8  34.0    MCV 93.5  95.1  94.8  92.9    MCH 31.5  30.7  30.2  31.1    MCHC 33.7  32.3  31.9  33.5    RDW 11.5  12.2  12.2  11.5    Platelets 144  145  133  121      Lipid Panel          9/20/2023    16:54   Lipid Panel   Total Cholesterol 188    Triglycerides 113    HDL Cholesterol 43    VLDL Cholesterol 20    LDL Cholesterol  125    LDL/HDL Ratio 2.85      TSH          9/20/2023    16:54 11/14/2023    17:56 11/15/2023    01:57   TSH   TSH 0.580  1.130  2.010          Lab Results   Component Value Date    JYIH22NY 64.2 11/14/2023     Lab Results   Component Value Date    FREET4 0.90 (L) 11/15/2023        PSA          11/14/2023    17:56   PSA   PSA 0.6          Procedures    Assessment and Plan   Diagnoses and all orders for this visit:    1. Acute kidney injury (Primary)  Assessment & Plan:  Followed by nephrology      2. Tremor  Assessment & Plan:  Imaging and labs have been normal. Has appointment scheduled for evaluation by neurology.      3. Other  fatigue    4. Weight loss  Assessment & Plan:  Lab workup normal      5. Slow transit constipation  Assessment & Plan:  Discussed bowel cleanout regimen followed by maintenance dosing.             Follow Up   Return in about 3 months (around 3/21/2024) for Next scheduled follow up.  Patient was given instructions and counseling regarding his condition or for health maintenance advice. Please see specific information pulled into the AVS if appropriate.

## 2023-12-22 ENCOUNTER — TELEPHONE (OUTPATIENT)
Dept: INTERNAL MEDICINE | Facility: CLINIC | Age: 63
End: 2023-12-22
Payer: OTHER GOVERNMENT

## 2023-12-22 NOTE — TELEPHONE ENCOUNTER
Caller: Gray Stephon    Relationship: Self    Best call back number: [phone]    What medication are you requesting: MIRALAX/ LAXATIVE     What are your current symptoms: CONSTIPATION     How long have you been experiencing symptoms: N/A    Have you had these symptoms before:    [x] Yes  [] No    Have you been treated for these symptoms before:   [x] Yes  [] No    If a prescription is needed, what is your preferred pharmacy and phone number: [id number] - [place], [place] - [address] AT [facility] OF [address] & [place] - [phone]  - [phone] FX     Additional notes: PATIENT HAS BEEN BUYING THIS OVER THE COUNTER BUT IS WANTING TO KNOW IF MD HERNANDEZ COULD WRITE HIM A PRESCRIPTION TO THE PHARMACY ABOVE.

## 2023-12-28 NOTE — PROGRESS NOTES
"Chief Complaint: Urinary Retention    Subjective         History of Present Illness  Stephon Castellano is a 63 y.o. male presents to [facility] to be seen for urinary retention.      Patient was admitted to the hospital 11/14/2023 for bradycardia.  He was found to be in urinary retention at that visit with PVRs of 250 cc.  His tamsulosin was increased to 0.8 mg.  He is here for follow-up.    Patient reports he has seen improvement in symptoms since increasing tamsulosin but is still going urinary frquency with going every hour. He does not feel he is emptying his bladder.     He takes testosterone so was also advised to take arimidex for an estrogen blocker. He has been on this combo for 3-4 months      Frequency-admits up to 30 times per day    Urgency-admits for the past year    Incontinence-denies    Nocturia-2-3    Perineal pain-denies    Dysuria-denies    Stream-\"not strong\"    GH-denies    History of stones-admits x 2, passed     surgeries-denies    Family history of  malignancy-dad prostate CA at age 60    Cardiopulmonary-bradycardia    Anticoagulants-denies    Smoker-denies    PSA  11/14/2023 0.6    Objective     Past Medical History:   Diagnosis Date    Allergic rhinitis     Anxiety 2006    Starting taking mood depression drugs    Arthritis     Asthma About 6 months    Sore Throat and was sent to ENT    Benign prostatic hyperplasia 2015    Dad had Prostate Cancer and just did Colonoscopy and they said i have a major large Prostate    Cervical neck pain with evidence of disc disease     Colon polyp 2020    Had a lot of polyps on the last 2 Colonoscopies    Depression     Erectile dysfunction 2017    I have been on Testosterone replacements since then    Head injury 1977    Headache May 2023    Started abou 6 months or more    Hemorrhoid     HL (hearing loss) 2004    Have a major tinitis/ringing in my ears especially the left one    Hyperlipidemia 2017    My blood test have stated " they were high or elevated    Hypertension 2017    Given Flomax said it will probably go down    Hypogonadism in male     Kidney stone 2020    Went to doctor and got medication for them and still have issues with right side    Low back pain 2010    Stinosis of back & neck    Substance abuse [id number]    On Disulfiram for alcohol abuse-voluntary    Visual impairment 2023    Just recently have been given eye drops       Past Surgical History:   Procedure Laterality Date    CERVICAL DISC SURGERY  2008    COLONOSCOPY N/A 08/18/2023    Procedure: COLONOSCOPY WITH POLYPECTOMY/SNARE;  Surgeon: Jose Alejandro Fox MD;  Location: [facility] END[facility];  Service: General;  Laterality: N/A;  COLON POLYPS    HEEL SPUR SURGERY  2005    KNEE ACL RECONSTRUCTION  2004    OTHER SURGICAL HISTORY      METAL IMPLANT    PAIN PUMP INSERTION/REVISION  2010    SPINE SURGERY  2008    Antieror c5-7 discotomy    TONSILLECTOMY  1965         Current Outpatient Medications:     anastrozole (ARIMIDEX) 1 MG tablet, Take 1 tablet by mouth Daily., Disp: , Rfl:     carboxymethylcellulose (REFRESH PLUS) 0.5 % solution, Administer 1 drop to both eyes 5 (Five) Times a Day As Needed for Dry Eyes., Disp: , Rfl:     dexAMETHasone (DECADRON) 0.5 MG tablet, Take 1 tablet by mouth., Disp: , Rfl:     disulfiram (ANTABUSE) 250 MG tablet, Take 1 tablet by mouth Daily., Disp: , Rfl:     DULoxetine (CYMBALTA) 60 MG capsule, Take 1 capsule by mouth Daily., Disp: , Rfl:     fluconazole (DIFLUCAN) 150 MG tablet, Take 1 tablet by mouth., Disp: , Rfl:     fluticasone (FLONASE) 50 MCG/ACT nasal spray, 2 sprays into the nostril(s) as directed by provider Daily., Disp: , Rfl:     ketoconazole (NIZORAL) 2 % shampoo, Apply 2 % topically to the appropriate area as directed., Disp: , Rfl:     multivitamin tablet tablet, Take 1 tablet by mouth Daily., Disp: , Rfl:     multivitamin with minerals tablet tablet, Take 1 tablet by mouth Daily., Disp: , Rfl:     pain patient supplied  pump, by Intrathecal route Continuous. Instructions are in drop boxes, Disp: , Rfl:     tamsulosin (FLOMAX) 0.4 MG capsule 24 hr capsule, Take 2 capsules by mouth Daily for 360 days., Disp: 180 capsule, Rfl: 3    Testosterone Cypionate (DEPOTESTOTERONE CYPIONATE) 200 MG/ML injection, , Disp: , Rfl:     traZODone (DESYREL) 100 MG tablet, Take 2 tablets by mouth Every Night., Disp: , Rfl:     finasteride (PROSCAR) 5 MG tablet, Take 1 tablet by mouth Daily for 360 days., Disp: 90 tablet, Rfl: 3    No Known Allergies     Family History   Problem Relation Age of Onset    Heart disease Mother     Osteoporosis Mother     Alcohol abuse Mother         Mother committed suicide    Depression Mother         She had major depression    Early death Mother         She committed suicide    Thyroid disease Mother         She had Thyroid issues and had surgery    Cancer Father         Prostate    Prostate cancer Father     Hearing loss Father         He has worn a hearing aids since his late 50’s    Diabetes Sister         Diabetes    Arthritis Sister     Diabetes Brother     Stroke Other         AUNT/UNCLE GRANDPARENTS    Diabetes Other         GRANDPARENTS        Social History     Socioeconomic History    Marital status:    Tobacco Use    Smoking status: Former     Packs/day: 2.00     Years: 7.00     Additional pack years: 0.00     Total pack years: 14.00     Types: Cigarettes     Start date: 1975     Quit date: 1983     Years since quittin.0     Passive exposure: Past    Smokeless tobacco: Never    Tobacco comments:     Also dipped for several years   Vaping Use    Vaping Use: Never used   Substance and Sexual Activity    Alcohol use: Not Currently     Comment: Stopped due to Disulfiram 250 mg    Drug use: Not Currently    Sexual activity: Not Currently     Partners: Female     Birth control/protection: None, Vasectomy       Vital Signs:   /77 (BP Location: Left arm, Patient Position: Sitting, Cuff Size:  "Large Adult)   Pulse 78   Ht 180.3 cm (70.98\")   Wt 85 kg (187 lb 6.4 oz)   BMI 26.15 kg/m²      Physical Exam  Vitals reviewed.   Constitutional:       Appearance: Normal appearance.   Neurological:      General: No focal deficit present.      Mental Status: He is alert and oriented to person, place, and time.   Psychiatric:         Mood and Affect: Mood normal.         Behavior: Behavior normal.          Result Review :   The following data was reviewed by: NARENDRA Villafana on 01/02/2024:     PSA          11/14/2023    17:56   PSA   PSA 0.6        Bladder Scan interpretation 01/02/2024    Estimation of residual urine via ZecterI 3000 Verathon Bladder Scan  MA/nurse performing: Becca CANNON MA  Residual Urine: 75 ml  Indication: Urinary retention    Benign prostatic hyperplasia with urinary retention   Position: Supine  Examination: Incremental scanning of the suprapubic area using 2.0 MHz transducer using copious amounts of acoustic gel.   Findings: An anechoic area was demonstrated which represented the bladder, with measurement of residual urine as noted. I inspected this myself. In that the residual urine was insignificant, refer to plan for treatment and plan necessary at this time.         Procedures        Assessment and Plan    Diagnoses and all orders for this visit:    1. Urinary retention (Primary)  -     Bladder Scan  -     POC Urinalysis Dipstick, Automated  -     tamsulosin (FLOMAX) 0.4 MG capsule 24 hr capsule; Take 2 capsules by mouth Daily for 360 days.  Dispense: 180 capsule; Refill: 3    2. Benign prostatic hyperplasia with urinary retention  -     tamsulosin (FLOMAX) 0.4 MG capsule 24 hr capsule; Take 2 capsules by mouth Daily for 360 days.  Dispense: 180 capsule; Refill: 3  -     finasteride (PROSCAR) 5 MG tablet; Take 1 tablet by mouth Daily for 360 days.  Dispense: 90 tablet; Refill: 3    BPH with Luts- behavioral modifications including fluid management, limiting fluids prior to " sleep, and voiding immediately prior to sleep.      Continue conservative management of BPH Luts via behavioral modifications and medications; monitor for adverse outcomes of BPH which would warrant discussion of further management (ie hydronephrosis, recurrent uti, bladder stones, urinary retention, or renal insufficiency)     Continue Flomax 0.8 mg  Start finasteride 5 mg tab; side effects discussed; aware that it may take 3-4 months to have complete effect so encouraged patient to continue to take to ensure adequate trial of medication    Given that he did have 75 ml PVR I will see him back in 3 months to see if his symptoms are improving.  We did discuss that it will take 3 to 6 months to see if he is going to have good results with this.      Follow Up   No follow-ups on file.  Patient was given instructions and counseling regarding his condition or for health maintenance advice. Please see specific information pulled into the AVS if appropriate.         This document has been electronically signed by NARENDRA Villafana  January 2, 2024 10:04 EST

## 2024-01-02 ENCOUNTER — TELEPHONE (OUTPATIENT)
Dept: INTERNAL MEDICINE | Facility: CLINIC | Age: 64
End: 2024-01-02
Payer: OTHER GOVERNMENT

## 2024-01-02 ENCOUNTER — OFFICE VISIT (OUTPATIENT)
Dept: UROLOGY | Facility: CLINIC | Age: 64
End: 2024-01-02
Payer: OTHER GOVERNMENT

## 2024-01-02 VITALS
SYSTOLIC BLOOD PRESSURE: 142 MMHG | WEIGHT: 187.4 LBS | DIASTOLIC BLOOD PRESSURE: 77 MMHG | HEIGHT: 71 IN | HEART RATE: 78 BPM | BODY MASS INDEX: 26.23 KG/M2

## 2024-01-02 DIAGNOSIS — R33.8 BENIGN PROSTATIC HYPERPLASIA WITH URINARY RETENTION: ICD-10-CM

## 2024-01-02 DIAGNOSIS — N40.1 BENIGN PROSTATIC HYPERPLASIA WITH URINARY RETENTION: ICD-10-CM

## 2024-01-02 DIAGNOSIS — R33.9 URINARY RETENTION: Primary | ICD-10-CM

## 2024-01-02 LAB
BILIRUB BLD-MCNC: NEGATIVE MG/DL
CLARITY, POC: CLEAR
COLOR UR: YELLOW
EXPIRATION DATE: NORMAL
GLUCOSE UR STRIP-MCNC: NEGATIVE MG/DL
KETONES UR QL: NEGATIVE
LEUKOCYTE EST, POC: NEGATIVE
Lab: NORMAL
NITRITE UR-MCNC: NEGATIVE MG/ML
PH UR: 7 [PH] (ref 5–8)
PROT UR STRIP-MCNC: NEGATIVE MG/DL
RBC # UR STRIP: NEGATIVE /UL
SP GR UR: 1.01 (ref 1–1.03)
URINE VOLUME: 74
UROBILINOGEN UR QL: NORMAL

## 2024-01-02 RX ORDER — FINASTERIDE 5 MG/1
5 TABLET, FILM COATED ORAL DAILY
Qty: 90 TABLET | Refills: 3 | Status: SHIPPED | OUTPATIENT
Start: 2024-01-02 | End: 2024-12-27

## 2024-01-02 RX ORDER — TAMSULOSIN HYDROCHLORIDE 0.4 MG/1
2 CAPSULE ORAL DAILY
Qty: 180 CAPSULE | Refills: 3 | Status: SHIPPED | OUTPATIENT
Start: 2024-01-02 | End: 2024-12-27

## 2024-01-02 NOTE — TELEPHONE ENCOUNTER
Caller: Stephon Castellano    Relationship: Self    Best call back number: [phone]    What medication are you requesting: SOMETHING FOR CONSTIPATION     What are your current symptoms: CONSTIPATION     How long have you been experiencing symptoms: N/A    Have you had these symptoms before:    [x] Yes  [] No    Have you been treated for these symptoms before:   [x] Yes  [] No    If a prescription is needed, what is your preferred pharmacy and phone number: [facility] - [place] [phone] [place] [phone]      Additional notes: PATIENT STATES MD HERNANDEZ WAS SUPPOSED TO SEND HIM A NEW PRESCRIPTION FOR CONSTIPATION TO [facility] LAST WEEK BUT THEY NEVER RECEIVED ANYTHING. PATIENT REQUESTING SHE SEND THIS OVER TO [facility] TODAY.

## 2024-01-03 RX ORDER — POLYETHYLENE GLYCOL 3350 17 G/17G
17 POWDER, FOR SOLUTION ORAL DAILY
Qty: 850 G | Refills: 5 | Status: SHIPPED | OUTPATIENT
Start: 2024-01-03 | End: 2024-01-03 | Stop reason: SDUPTHER

## 2024-01-03 RX ORDER — POLYETHYLENE GLYCOL 3350 17 G/17G
17 POWDER, FOR SOLUTION ORAL DAILY
Qty: 850 G | Refills: 5 | Status: SHIPPED | OUTPATIENT
Start: 2024-01-03

## 2024-01-18 ENCOUNTER — HOSPITAL ENCOUNTER (OUTPATIENT)
Dept: CARDIOLOGY | Facility: HOSPITAL | Age: 64
Discharge: HOME | End: 2024-01-18
Admitting: PHYSICIAN ASSISTANT
Payer: OTHER GOVERNMENT

## 2024-01-18 DIAGNOSIS — R00.2 PALPITATIONS: ICD-10-CM

## 2024-01-18 PROCEDURE — 93225 XTRNL ECG REC<48 HRS REC: CPT

## 2024-01-30 DIAGNOSIS — R00.2 PALPITATIONS: Primary | ICD-10-CM

## 2024-02-12 ENCOUNTER — OFFICE VISIT (OUTPATIENT)
Dept: INTERNAL MEDICINE | Facility: CLINIC | Age: 64
End: 2024-02-12
Payer: OTHER GOVERNMENT

## 2024-02-12 VITALS
TEMPERATURE: 98.5 F | BODY MASS INDEX: 26.18 KG/M2 | DIASTOLIC BLOOD PRESSURE: 72 MMHG | HEART RATE: 80 BPM | WEIGHT: 187 LBS | OXYGEN SATURATION: 96 % | HEIGHT: 71 IN | SYSTOLIC BLOOD PRESSURE: 134 MMHG

## 2024-02-12 DIAGNOSIS — R29.898 WEAKNESS OF BOTH LOWER EXTREMITIES: ICD-10-CM

## 2024-02-12 DIAGNOSIS — R29.898 LEFT HAND WEAKNESS: Primary | ICD-10-CM

## 2024-02-12 PROCEDURE — 99213 OFFICE O/P EST LOW 20 MIN: CPT | Performed by: PHYSICIAN ASSISTANT

## 2024-02-13 ENCOUNTER — LAB (OUTPATIENT)
Dept: LAB | Facility: HOSPITAL | Age: 64
End: 2024-02-13
Payer: OTHER GOVERNMENT

## 2024-02-13 LAB
ALBUMIN SERPL-MCNC: 4.6 G/DL (ref 3.5–5.2)
ALBUMIN/GLOB SERPL: 2.1 G/DL
ALP SERPL-CCNC: 64 U/L (ref 39–117)
ALT SERPL W P-5'-P-CCNC: 39 U/L (ref 1–41)
ANION GAP SERPL CALCULATED.3IONS-SCNC: 9 MMOL/L (ref 5–15)
AST SERPL-CCNC: 33 U/L (ref 1–40)
BASOPHILS # BLD AUTO: 0.01 10*3/MM3 (ref 0–0.2)
BASOPHILS NFR BLD AUTO: 0.3 % (ref 0–1.5)
BILIRUB SERPL-MCNC: 0.2 MG/DL (ref 0–1.2)
BUN SERPL-MCNC: 14 MG/DL (ref 8–23)
BUN/CREAT SERPL: 9.3 (ref 7–25)
CALCIUM SPEC-SCNC: 9.4 MG/DL (ref 8.6–10.5)
CHLORIDE SERPL-SCNC: 101 MMOL/L (ref 98–107)
CK SERPL-CCNC: 339 U/L (ref 20–200)
CO2 SERPL-SCNC: 30 MMOL/L (ref 22–29)
CREAT SERPL-MCNC: 1.5 MG/DL (ref 0.76–1.27)
DEPRECATED RDW RBC AUTO: 42.9 FL (ref 37–54)
EGFRCR SERPLBLD CKD-EPI 2021: 51.7 ML/MIN/1.73
EOSINOPHIL # BLD AUTO: 0.11 10*3/MM3 (ref 0–0.4)
EOSINOPHIL NFR BLD AUTO: 3.2 % (ref 0.3–6.2)
ERYTHROCYTE [DISTWIDTH] IN BLOOD BY AUTOMATED COUNT: 12.7 % (ref 12.3–15.4)
GLOBULIN UR ELPH-MCNC: 2.2 GM/DL
GLUCOSE SERPL-MCNC: 125 MG/DL (ref 65–99)
HCT VFR BLD AUTO: 31 % (ref 37.5–51)
HGB BLD-MCNC: 10.4 G/DL (ref 13–17.7)
IMM GRANULOCYTES # BLD AUTO: 0 10*3/MM3 (ref 0–0.05)
IMM GRANULOCYTES NFR BLD AUTO: 0 % (ref 0–0.5)
LYMPHOCYTES # BLD AUTO: 1.8 10*3/MM3 (ref 0.7–3.1)
LYMPHOCYTES NFR BLD AUTO: 52 % (ref 19.6–45.3)
MCH RBC QN AUTO: 31.1 PG (ref 26.6–33)
MCHC RBC AUTO-ENTMCNC: 33.5 G/DL (ref 31.5–35.7)
MCV RBC AUTO: 92.8 FL (ref 79–97)
MONOCYTES # BLD AUTO: 0.28 10*3/MM3 (ref 0.1–0.9)
MONOCYTES NFR BLD AUTO: 8.1 % (ref 5–12)
NEUTROPHILS NFR BLD AUTO: 1.26 10*3/MM3 (ref 1.7–7)
NEUTROPHILS NFR BLD AUTO: 36.4 % (ref 42.7–76)
NRBC BLD AUTO-RTO: 0 /100 WBC (ref 0–0.2)
PLATELET # BLD AUTO: 147 10*3/MM3 (ref 140–450)
PMV BLD AUTO: 9.8 FL (ref 6–12)
POTASSIUM SERPL-SCNC: 4 MMOL/L (ref 3.5–5.2)
PROT SERPL-MCNC: 6.8 G/DL (ref 6–8.5)
RBC # BLD AUTO: 3.34 10*6/MM3 (ref 4.14–5.8)
SODIUM SERPL-SCNC: 140 MMOL/L (ref 136–145)
WBC NRBC COR # BLD AUTO: 3.46 10*3/MM3 (ref 3.4–10.8)

## 2024-02-13 PROCEDURE — 83519 RIA NONANTIBODY: CPT | Performed by: PHYSICIAN ASSISTANT

## 2024-02-13 PROCEDURE — 36415 COLL VENOUS BLD VENIPUNCTURE: CPT | Performed by: PHYSICIAN ASSISTANT

## 2024-02-13 PROCEDURE — 86255 FLUORESCENT ANTIBODY SCREEN: CPT | Performed by: PHYSICIAN ASSISTANT

## 2024-02-13 PROCEDURE — 80053 COMPREHEN METABOLIC PANEL: CPT | Performed by: PHYSICIAN ASSISTANT

## 2024-02-13 PROCEDURE — 86051 AQUAPORIN-4 ANTB ELISA: CPT | Performed by: PHYSICIAN ASSISTANT

## 2024-02-13 PROCEDURE — 82550 ASSAY OF CK (CPK): CPT | Performed by: PHYSICIAN ASSISTANT

## 2024-02-13 PROCEDURE — 85025 COMPLETE CBC W/AUTO DIFF WBC: CPT | Performed by: PHYSICIAN ASSISTANT

## 2024-02-16 LAB — AQP4 H2O CHANNEL IGG SERPL IA-ACNC: <1.5 U/ML (ref 0–3)

## 2024-02-22 ENCOUNTER — OFFICE VISIT (OUTPATIENT)
Dept: NEUROLOGY | Facility: CLINIC | Age: 64
End: 2024-02-22
Payer: OTHER GOVERNMENT

## 2024-02-22 VITALS
HEIGHT: 71 IN | SYSTOLIC BLOOD PRESSURE: 134 MMHG | HEART RATE: 69 BPM | WEIGHT: 187 LBS | DIASTOLIC BLOOD PRESSURE: 73 MMHG | BODY MASS INDEX: 26.18 KG/M2

## 2024-02-22 DIAGNOSIS — T50.905A DRUG INDUCED MYOCLONUS: ICD-10-CM

## 2024-02-22 DIAGNOSIS — F99 INSOMNIA DUE TO OTHER MENTAL DISORDER: ICD-10-CM

## 2024-02-22 DIAGNOSIS — G47.33 OBSTRUCTIVE SLEEP APNEA SYNDROME: ICD-10-CM

## 2024-02-22 DIAGNOSIS — M54.12 CERVICAL RADICULOPATHY: ICD-10-CM

## 2024-02-22 DIAGNOSIS — G25.3 DRUG INDUCED MYOCLONUS: ICD-10-CM

## 2024-02-22 DIAGNOSIS — F51.05 INSOMNIA DUE TO OTHER MENTAL DISORDER: ICD-10-CM

## 2024-02-22 DIAGNOSIS — F33.9 EPISODE OF RECURRENT MAJOR DEPRESSIVE DISORDER, UNSPECIFIED DEPRESSION EPISODE SEVERITY: Primary | ICD-10-CM

## 2024-02-22 NOTE — ASSESSMENT & PLAN NOTE
I will refer him to neurosurgery for further evaluation of his neck pain and evaluated for cervical radiculopathy.

## 2024-02-22 NOTE — ASSESSMENT & PLAN NOTE
He states that he had a sleep study years ago and he was told that he had mild sleep apnea syndrome.  I will refer him back to sleep medicine to evaluate him for his fragmented sleep which most likely is a combination of depression as well as possibly a sleep disorder.

## 2024-02-22 NOTE — PROGRESS NOTES
"Chief Complaint  Neurologic Problem (Eval for seizure.)    Subjective          Stephon Castellano is a 63 y.o. male who presents to [facility]  History of Present Illness  63-year-old man evaluated for complaints of memory issues, forgetfulness, weight loss, tremors, jerking.  He states that he lost 50 pounds over the last 6 months to a year.  September 23 started having memory issues.  If he brushes his teeth and he goes upstairs or downstairs he will forget what he is supposed to do.  He is also complaining of pain going down his neck where he had C5-C6 donor bones in the past and the pain goes down his neck to his arms.  He had surgery to his neck over 10 years ago and has not seen neurosurgery.  He is seeing pain management.  His Dilaudid was recently increased for his pump.  He states that he is hands started aching and jerking and twisting for a few weeks to a month and he would drop things when he would jerk.  He states that his hands would lock up for about 4 to 5 days and then it got better.  He also has tremors in his left hand for the last 6 months to a year.    He states that his sleep is terrible.  He cannot fall asleep and he cannot stay asleep.  He states that he has falling asleep and he takes trazodone.  He cannot stay asleep because the pain bothers him and then he also has mental issues that he started having thoughts.  He is also depressed and has not seen a psychiatrist for years.  His depression started when he was in the [employer] however he decided not to tell them.  He states that his mother killed herself.  He started being treated for depression 10 years ago.  Last year he started being significant problems with his work that he quit his job.    Objective   Vital Signs:   /73 (BP Location: Right arm, Patient Position: Sitting, Cuff Size: Adult)   Pulse 69   Ht 180.3 cm (70.98\")   Wt 84.8 kg (187 lb)   BMI 26.09 kg/m²     Physical Exam   He is " alert, fluent, phasic, follows commands well.  His Mini-Mental status score is 29 out of 30.  He missed 1 out of 3 recent objects.  He is able to give me the history as noted above.  He started having uncontrolled crying when he told me that he could not function last year because he could not remember all was told of him and before he quit his job.  Optic disc are normal bilaterally is a full, EOMs full all directions gaze, facial strength is full, soft palate elevation and tongue are normal.  There is no weakness of the upper or lower extremities and vision muscle testing.  Fine finger movements are intact.  Reflexes are symmetrically decreased in the biceps, triceps patellar's and absent in the ankles.  Cerebellar testing is intact.  Station gait is able to ambulate without difficulty.        Assessment and Plan  Diagnoses and all orders for this visit:    1. Episode of recurrent major depressive disorder, unspecified depression episode severity (Primary)  Assessment & Plan:  He has not seen a psychiatrist for years.  I will refer him to psychiatry.  I discussed with him that his memory problems is most likely secondary to a combination of depression as well as sleep disorder.  I will see him again in 2 months time for follow-up.    Orders:  -     Ambulatory Referral to Psychiatry    2. Insomnia due to other mental disorder  -     Ambulatory Referral to Sleep Medicine    3. Obstructive sleep apnea syndrome  Assessment & Plan:  He states that he had a sleep study years ago and he was told that he had mild sleep apnea syndrome.  I will refer him back to sleep medicine to evaluate him for his fragmented sleep which most likely is a combination of depression as well as possibly a sleep disorder.    Orders:  -     Ambulatory Referral to Sleep Medicine    4. Drug induced myoclonus  Assessment & Plan:  I discussed with him that psychiatry will adjust his medications and that depressant medications can cause myoclonus as  well as Dilaudid.  I discussed with him that it seems to be a cause-and-effect that he started having myoclonus after his Dilaudid was increased.      5. Cervical radiculopathy  Assessment & Plan:  I will refer him to neurosurgery for further evaluation of his neck pain and evaluated for cervical radiculopathy.           Total time spent with the patient and coordinating patient care was 60 minutes.    Follow Up  No follow-ups on file.  Patient was given instructions and counseling regarding his condition or for health maintenance advice. Please see specific information pulled into the AVS if appropriate.

## 2024-02-22 NOTE — ASSESSMENT & PLAN NOTE
He has not seen a psychiatrist for years.  I will refer him to psychiatry.  I discussed with him that his memory problems is most likely secondary to a combination of depression as well as sleep disorder.  I will see him again in 2 months time for follow-up.

## 2024-02-22 NOTE — ASSESSMENT & PLAN NOTE
I discussed with him that psychiatry will adjust his medications and that depressant medications can cause myoclonus as well as Dilaudid.  I discussed with him that it seems to be a cause-and-effect that he started having myoclonus after his Dilaudid was increased.

## 2024-02-26 LAB
ACHR BIND AB SER-SCNC: <0.03 NMOL/L (ref 0–0.24)
ACHR BLOCK AB SER-ACNC: 13 % (ref 0–25)
ACHR MOD AB SER QL FC: 0 % (ref 0–45)
MUSK AB SER IA-ACNC: <1 U/ML
REFLEX INFORMATION: NORMAL
STRIA MUS AB TITR SER IF: NEGATIVE {TITER}

## 2024-02-28 ENCOUNTER — OFFICE VISIT (OUTPATIENT)
Dept: INTERNAL MEDICINE | Facility: CLINIC | Age: 64
End: 2024-02-28
Payer: OTHER GOVERNMENT

## 2024-02-28 VITALS
HEART RATE: 85 BPM | WEIGHT: 185 LBS | SYSTOLIC BLOOD PRESSURE: 114 MMHG | HEIGHT: 71 IN | OXYGEN SATURATION: 96 % | BODY MASS INDEX: 25.9 KG/M2 | TEMPERATURE: 98.4 F | DIASTOLIC BLOOD PRESSURE: 74 MMHG

## 2024-02-28 DIAGNOSIS — R63.4 UNEXPLAINED WEIGHT LOSS: Primary | ICD-10-CM

## 2024-02-28 DIAGNOSIS — R41.89 BRAIN FOG: ICD-10-CM

## 2024-02-28 DIAGNOSIS — R25.8 INVOLUNTARY JERKY MOVEMENTS: ICD-10-CM

## 2024-02-28 DIAGNOSIS — R25.1 TREMOR: ICD-10-CM

## 2024-02-28 DIAGNOSIS — D64.9 ANEMIA, UNSPECIFIED TYPE: ICD-10-CM

## 2024-02-28 DIAGNOSIS — G47.33 OBSTRUCTIVE SLEEP APNEA SYNDROME: ICD-10-CM

## 2024-02-28 DIAGNOSIS — I95.1 ORTHOSTATIC HYPOTENSION: ICD-10-CM

## 2024-02-28 DIAGNOSIS — R74.8 ELEVATED CK: ICD-10-CM

## 2024-02-28 DIAGNOSIS — Z11.59 NEED FOR HEPATITIS C SCREENING TEST: ICD-10-CM

## 2024-02-28 DIAGNOSIS — F99 INSOMNIA DUE TO OTHER MENTAL DISORDER: ICD-10-CM

## 2024-02-28 DIAGNOSIS — F51.05 INSOMNIA DUE TO OTHER MENTAL DISORDER: ICD-10-CM

## 2024-02-28 DIAGNOSIS — R29.898 WEAKNESS OF BOTH LOWER EXTREMITIES: ICD-10-CM

## 2024-02-28 LAB
BASOPHILS # BLD AUTO: 0.02 10*3/MM3 (ref 0–0.2)
BASOPHILS NFR BLD AUTO: 0.6 % (ref 0–1.5)
CK SERPL-CCNC: 239 U/L (ref 20–200)
CRP SERPL-MCNC: <0.3 MG/DL (ref 0–0.5)
DEPRECATED RDW RBC AUTO: 40.6 FL (ref 37–54)
EOSINOPHIL # BLD AUTO: 0.07 10*3/MM3 (ref 0–0.4)
EOSINOPHIL NFR BLD AUTO: 2.3 % (ref 0.3–6.2)
ERYTHROCYTE [DISTWIDTH] IN BLOOD BY AUTOMATED COUNT: 12.5 % (ref 12.3–15.4)
ERYTHROCYTE [SEDIMENTATION RATE] IN BLOOD: 3 MM/HR (ref 0–20)
HBA1C MFR BLD: 5.3 % (ref 4.8–5.6)
HCT VFR BLD AUTO: 31.4 % (ref 37.5–51)
HCV AB SER DONR QL: NORMAL
HGB BLD-MCNC: 10.4 G/DL (ref 13–17.7)
HIV 1+2 AB+HIV1 P24 AG SERPL QL IA: NORMAL
IMM GRANULOCYTES # BLD AUTO: 0 10*3/MM3 (ref 0–0.05)
IMM GRANULOCYTES NFR BLD AUTO: 0 % (ref 0–0.5)
LYMPHOCYTES # BLD AUTO: 1.01 10*3/MM3 (ref 0.7–3.1)
LYMPHOCYTES NFR BLD AUTO: 32.7 % (ref 19.6–45.3)
MCH RBC QN AUTO: 30.2 PG (ref 26.6–33)
MCHC RBC AUTO-ENTMCNC: 33.1 G/DL (ref 31.5–35.7)
MCV RBC AUTO: 91.3 FL (ref 79–97)
MONOCYTES # BLD AUTO: 0.31 10*3/MM3 (ref 0.1–0.9)
MONOCYTES NFR BLD AUTO: 10 % (ref 5–12)
NEUTROPHILS NFR BLD AUTO: 1.68 10*3/MM3 (ref 1.7–7)
NEUTROPHILS NFR BLD AUTO: 54.4 % (ref 42.7–76)
NRBC BLD AUTO-RTO: 0 /100 WBC (ref 0–0.2)
PLATELET # BLD AUTO: 147 10*3/MM3 (ref 140–450)
PMV BLD AUTO: 10.3 FL (ref 6–12)
RBC # BLD AUTO: 3.44 10*6/MM3 (ref 4.14–5.8)
WBC NRBC COR # BLD AUTO: 3.09 10*3/MM3 (ref 3.4–10.8)

## 2024-02-28 PROCEDURE — 82550 ASSAY OF CK (CPK): CPT | Performed by: INTERNAL MEDICINE

## 2024-02-28 PROCEDURE — 99214 OFFICE O/P EST MOD 30 MIN: CPT | Performed by: INTERNAL MEDICINE

## 2024-02-28 PROCEDURE — 85652 RBC SED RATE AUTOMATED: CPT | Performed by: INTERNAL MEDICINE

## 2024-02-28 PROCEDURE — 86140 C-REACTIVE PROTEIN: CPT | Performed by: INTERNAL MEDICINE

## 2024-02-28 PROCEDURE — 86803 HEPATITIS C AB TEST: CPT | Performed by: INTERNAL MEDICINE

## 2024-02-28 PROCEDURE — 36415 COLL VENOUS BLD VENIPUNCTURE: CPT | Performed by: INTERNAL MEDICINE

## 2024-02-28 PROCEDURE — 85025 COMPLETE CBC W/AUTO DIFF WBC: CPT | Performed by: INTERNAL MEDICINE

## 2024-02-28 PROCEDURE — G0432 EIA HIV-1/HIV-2 SCREEN: HCPCS | Performed by: INTERNAL MEDICINE

## 2024-02-28 PROCEDURE — 83036 HEMOGLOBIN GLYCOSYLATED A1C: CPT | Performed by: INTERNAL MEDICINE

## 2024-02-28 RX ORDER — VIT C/B6/B5/MAGNESIUM/HERB 173 50-5-6-5MG
CAPSULE ORAL
COMMUNITY

## 2024-02-28 RX ORDER — DISULFIRAM 250 MG/1
250 TABLET ORAL DAILY
COMMUNITY

## 2024-02-28 RX ORDER — MULTIVITAMIN WITH IRON
TABLET ORAL
COMMUNITY

## 2024-02-28 RX ORDER — ACETAMINOPHEN 160 MG
2000 TABLET,DISINTEGRATING ORAL DAILY
COMMUNITY

## 2024-02-28 RX ORDER — CYST/ALA/Q10/PHOS.SER/DHA/BROC 100-20-50
POWDER (GRAM) ORAL
COMMUNITY

## 2024-02-28 NOTE — PROGRESS NOTES
"Chief Complaint  Follow-up (3 month follow up weight loss and tremor, fingers and toes are white and nerve issue seen neuro for sleep study and physiatry thinks he has parkinson's disease   )    Subjective     Stephon Castellano is a 63 y.o. male who presents to [facility] for follow up of ongoing health concerns.    Over the past six months he has had multiple clinic visits for various health concerns including neurological symptoms, weight loss, insomnia, poor circulation.   He has had lab work up as well as MRI brain (no acute findings, changes of small vessel ischemic disease) and CT abd/pelvis (no acute findings).  He has started seeing nephrology for CKD.   He was referred to neurology for evaluation of neurological symptoms.   He was seen by neurologist in [facility], but no additional neuro work up was done. He was determined to be having depression symptoms and MUNIRA symptoms and referrals were placed for psych and sleep medicine.     Specifically today patient is concerned about the following symptoms.   Lost 50lbs unintentionally over the last 6 months.  MS vs Parkinsons?  Jerking in hands and arms, trouble carrying cups  Legs get weak after about 5 minutes walking. Gait changes.   Balance issues. Vertigo with standing.  Whiteness of hands and feet that comes and goes.  Sleeping issues, takes Trazodone, wakes 3-4 times per night. Gets up to urinate.  Brain fog. Daytime sleepiness. Ongoing fatigue.    Objective   Vital Signs:   Vitals:    02/28/24 0934   BP: 114/74   BP Location: Left arm   Patient Position: Sitting   Cuff Size: Adult   Pulse: 85   Temp: 98.4 °F (36.9 °C)   TempSrc: Tympanic   SpO2: 96%   Weight: 83.9 kg (185 lb)   Height: 180.3 cm (70.98\")     Body mass index is 25.82 kg/m².    Wt Readings from Last 3 Encounters:   02/28/24 83.9 kg (185 lb)   02/22/24 84.8 kg (187 lb)   02/12/24 84.8 kg (187 lb)     BP Readings from Last 3 Encounters:   02/28/24 114/74 "   02/22/24 134/73   02/12/24 134/72       Health Maintenance   Topic Date Due    RSV Vaccine - Adults (1 - 1-dose 60+ series) Never done    ANNUAL PHYSICAL  Never done    COVID-19 Vaccine (3 - 2023-24 season) 09/01/2023    INFLUENZA VACCINE  03/31/2024 (Originally 8/1/2023)    BMI FOLLOWUP  03/22/2024    COLORECTAL CANCER SCREENING  08/18/2024    LIPID PANEL  09/20/2024    TDAP/TD VACCINES (4 - Td or Tdap) 03/29/2029    HEPATITIS C SCREENING  Completed    ZOSTER VACCINE  Completed    Pneumococcal Vaccine 0-64  Aged Out       Physical Exam  Vitals reviewed.   Constitutional:       Appearance: Normal appearance. He is well-developed.   HENT:      Head: Normocephalic and atraumatic.      Mouth/Throat:      Pharynx: No oropharyngeal exudate.   Eyes:      Conjunctiva/sclera: Conjunctivae normal.      Pupils: Pupils are equal, round, and reactive to light.   Neck:      Thyroid: No thyromegaly or thyroid tenderness.   Cardiovascular:      Rate and Rhythm: Normal rate and regular rhythm.      Heart sounds: No murmur heard.     No friction rub. No gallop.   Pulmonary:      Effort: Pulmonary effort is normal.      Breath sounds: Normal breath sounds. No wheezing or rhonchi.   Lymphadenopathy:      Cervical: No cervical adenopathy.   Skin:     General: Skin is warm and dry.   Neurological:      Mental Status: He is alert and oriented to person, place, and time.   Psychiatric:         Mood and Affect: Affect normal.          Result Review :  The following data was reviewed by: Paige Florez MD on 02/28/2024:  CMP          11/16/2023    04:28 11/21/2023    16:21 2/13/2024    09:38   CMP   Glucose 87  112  125    BUN 37  23  14    Creatinine 2.47  2.54  1.50    EGFR 28.4  27.5  51.7    Sodium 140  137  140    Potassium 4.6  4.9  4.0    Chloride 104  97  101    Calcium 9.4  9.7  9.4    Total Protein   6.8    Albumin   4.6    Globulin   2.2    Total Bilirubin   0.2    Alkaline Phosphatase   64    AST (SGOT)   33    ALT  (SGPT)   39    Albumin/Globulin Ratio   2.1    BUN/Creatinine Ratio 15.0  9.1  9.3    Anion Gap 7.9  9.1  9.0      CBC          11/21/2023    16:21 2/13/2024    09:38 2/28/2024    10:40   CBC   WBC 4.06  3.46  3.09    RBC 3.66  3.34  3.44    Hemoglobin 11.4  10.4  10.4    Hematocrit 34.0  31.0  31.4    MCV 92.9  92.8  91.3    MCH 31.1  31.1  30.2    MCHC 33.5  33.5  33.1    RDW 11.5  12.7  12.5    Platelets 121  147  147      Lipid Panel          9/20/2023    16:54   Lipid Panel   Total Cholesterol 188    Triglycerides 113    HDL Cholesterol 43    VLDL Cholesterol 20    LDL Cholesterol  125    LDL/HDL Ratio 2.85      TSH          9/20/2023    16:54 11/14/2023    17:56 11/15/2023    01:57   TSH   TSH 0.580  1.130  2.010      Most Recent A1C          2/28/2024    10:40   HGBA1C Most Recent   Hemoglobin A1C 5.30        Lab Results   Component Value Date    UMEE08QS 64.2 11/14/2023           PSA          11/14/2023    17:56   PSA   PSA 0.6            Data reviewed : Radiologic studies MRI brain 11/21/23, CT abd/pel 11/22/23      Procedures          Assessment & Plan  Unexplained weight loss  Will order additional lab work up as well as CT Chest to further evaluate.  Tremor    Involuntary jerky movements  Will refer to Neurology at [facility] for second opinion and additional evaluation  Weakness of both lower extremities    Brain fog    Orthostatic hypotension  Discussed increasing water intake and taking care with position changes.  Anemia, unspecified type  Worsened on last labs  Repeat labs ordered today for follow up  Elevated CK  Repeat labs ordered  Need for hepatitis C screening test    Obstructive sleep apnea syndrome  Encouraged patient to keep appointment with sleep medicine as MUNIRA could be contributing to his fatigue and brain fog symptoms.   Insomnia due to other mental disorder  Continue Trazodone  Keep sleep medicine appointment    Orders Placed This Encounter   Procedures    CT Chest Without Contrast    CK     Hemoglobin A1c    Sedimentation rate, automated    C-reactive protein    Hepatitis C Antibody    HIV-1/O/2 Ag/Ab w Reflex    CBC Auto Differential    Ambulatory Referral to Neurology    CBC & Differential                       FOLLOW UP  Return in about 3 months (around 5/28/2024) for or sooner if needed.  Patient was given instructions and counseling regarding his condition or for health maintenance advice. Please see specific information pulled into the AVS if appropriate.       Paige Florez MD  02/29/24  10:30 EST    CURRENT & DISCONTINUED MEDICATIONS  Current Outpatient Medications   Medication Instructions    anastrozole (ARIMIDEX) 1 mg, Daily    carboxymethylcellulose (REFRESH PLUS) 0.5 % solution 1 drop, 5 Times Daily PRN    CINNAMON PO Oral    dexAMETHasone (DECADRON) 0.5 mg    disulfiram (ANTABUSE) 250 mg, Oral, Daily    DULoxetine (CYMBALTA) 60 mg, Oral, Daily    finasteride (PROSCAR) 5 mg, Oral, Daily    fluconazole (DIFLUCAN) 150 mg    fluticasone (FLONASE) 50 MCG/ACT nasal spray 2 sprays, Daily    IRON, FERROUS GLUCONATE, PO 65 mg, Oral, Daily    Lysine 1000 MG tablet Oral    Magnesium 250 MG tablet Oral    multivitamin tablet tablet 1 tablet, Oral, Daily    multivitamin with minerals tablet tablet Take 1 tablet by mouth Daily.    Omega 3-6-9 Fatty Acids (OMEGA 3-6-9 COMPLEX PO) Oral    pain patient supplied pump Continuous    polyethylene glycol (MIRALAX) 17 g, Oral, Daily    tamsulosin (FLOMAX) 0.8 mg, Oral, Daily    Testosterone Cypionate (DEPOTESTOTERONE CYPIONATE) 200 MG/ML injection No dose, route, or frequency recorded.    traZODone (DESYREL) 200 mg, Oral, Nightly    Turmeric 500 MG capsule Oral    Vitamin D-Vitamin K (K2-D3 10,000) 98234-06 UNIT-MCG capsule Oral    Vitamin D3 2,000 Units, Oral, Daily       There are no discontinued medications.

## 2024-02-29 NOTE — ASSESSMENT & PLAN NOTE
Encouraged patient to keep appointment with sleep medicine as MUNIRA could be contributing to his fatigue and brain fog symptoms.

## 2024-03-04 ENCOUNTER — OFFICE VISIT (OUTPATIENT)
Dept: SLEEP MEDICINE | Facility: HOSPITAL | Age: 64
End: 2024-03-04
Payer: OTHER GOVERNMENT

## 2024-03-04 VITALS
HEIGHT: 71 IN | HEART RATE: 76 BPM | BODY MASS INDEX: 26.6 KG/M2 | WEIGHT: 190 LBS | OXYGEN SATURATION: 100 % | DIASTOLIC BLOOD PRESSURE: 77 MMHG | SYSTOLIC BLOOD PRESSURE: 122 MMHG

## 2024-03-04 DIAGNOSIS — R06.83 SNORING: ICD-10-CM

## 2024-03-04 DIAGNOSIS — G89.29 OTHER CHRONIC PAIN: ICD-10-CM

## 2024-03-04 DIAGNOSIS — R29.818 SUSPECTED SLEEP APNEA: Primary | ICD-10-CM

## 2024-03-04 DIAGNOSIS — R63.4 WEIGHT LOSS: ICD-10-CM

## 2024-03-04 DIAGNOSIS — Z72.821 INADEQUATE SLEEP HYGIENE: ICD-10-CM

## 2024-03-04 DIAGNOSIS — G47.19 EXCESSIVE DAYTIME SLEEPINESS: ICD-10-CM

## 2024-03-04 DIAGNOSIS — F41.9 ANXIETY: ICD-10-CM

## 2024-03-04 DIAGNOSIS — E66.3 OVERWEIGHT WITH BODY MASS INDEX (BMI) OF 26 TO 26.9 IN ADULT: ICD-10-CM

## 2024-03-04 DIAGNOSIS — R06.81 WITNESSED EPISODE OF APNEA: ICD-10-CM

## 2024-03-04 DIAGNOSIS — F32.A DEPRESSION, UNSPECIFIED DEPRESSION TYPE: ICD-10-CM

## 2024-03-04 PROCEDURE — G0463 HOSPITAL OUTPT CLINIC VISIT: HCPCS

## 2024-03-04 PROCEDURE — 99244 OFF/OP CNSLTJ NEW/EST MOD 40: CPT | Performed by: FAMILY MEDICINE

## 2024-03-04 NOTE — PROGRESS NOTES
[facility] [address] [address]  [place]   [place] [address]  Phone: [phone]  Fax: [phone]      Stephon Castellano  [id number]   1960  63 y.o.  male      Referring physician/provider Neurology Dr. Dwayne Florez, Paige Travis MD    Type of service: Initial Sleep Medicine Consult.  Date of service: 3/4/2024      Chief Complaint   Patient presents with    Snoring       History of present illness;  The patient was seen today on 3/4/2024 at [facility].    Thank you for asking to see Stephon Castellano, 63 y.o. PMHx   Anxiety Depression,Substance abuse (Marijuana - last used 10 years), A. Fib, Chronic pain (on dilaudid with pain pump for the past 10 years), Hypogonadism (on TRT for the past 5 years).  The patient presents for initial evaluation of sleep sleep disordered breathing.  Patient  denies prior surgery namely tonsillectomy, nasal surgery or UPPP.         **[facility]**    Snores sometimes   Lost 50+ lbs since his last sleep study done in 2007  Not on PAP therapy   States he's concerned for Parkinson's disease restring tremor, shuffling gait, states he often feels like his feet are very heavy  Denies any parasomnias, injuries to self or bed partner   States his neurologist wanted him re-evaluated for sleep apnea    Obstructive Sleep Apnea Screening: STOP-BANG Sleep Apnea Questionnaire. Reference: Vincent F et al. Br J Anaesth, 2012.     Criterion    Yes    No  Do you SNORE loudly?   [x]   Yes  []   No   Do you often feel TIRED, fatigued, or sleepy during the day?    [x]   Yes  []   No  Has anyone OBSERVED you stop breathing during your sleep?    [x]   Yes  []   No  Do you have or are you being treated for high blood PRESSURE?    []   Yes  [x]   No  BMI >32 kg/m2     []   Yes  [x]   No  AGE > 50 years    [x]   Yes  []   No  NECK circumference >16 inches / 40 cm    []   Yes  [x]   No  GENDER: male      [x]   Yes  []   No    MUNIRA Probability:  []   1-2 - Low  []   3-4 - Intermediate  [x]   5-8 - High          Further Sleep History:    Bedtime: Weekdays 7:30 PM-11:30 PM; weekends 11:30 PM-midnight  Rise Time: Weekdays 7:30 AM-9 AM; weekends 9 AM  Sleep Latency: 30 minutes  Screens in bed: Yes  Wake after sleep onset: 3-4 times  Reasons for awakenings: Nocturia  Number of naps per day up to 2/day  Naps restorative: Denies  Caffeine use: 3 or more beverages per day    RLS Symptoms: No   Bruxism:No   Current sleep related gastroesophageal reflux symptoms:  No   Cataplexy:  No   Sleep Paralysis:  No   Hypnagogic or hypnopompic hallucinations: No   Parasomnias such as sleep walking or sleep eating No     Disclaimer Sleep History: The above sleep history is based on this sleep physician's in room encounter with the patient. Pre encounter self administered questionnaires are taken into consideration and discussed with patient for any discordance. The above documentation by this sleep physician is the most accurate clinical information determined by in room sleep physician encounter with patient.     MEDICAL CONDITIONS (PMH)   Chronic pain  Depression  Anxiety  A-fib  Hypogonadism  Arthritis    Social history:  Do you drive a commercial vehicle:  No   Shift work:  No   Tobacco use:  No former 2 PPD since age 15 to age 24    Alcohol use: 0 per week  Occupation: Not working    Family Hx (parents and siblings) (pertaining to sleep medicine)  Sleep apnea  Thyroid disorder  Obesity    Medications: reviewed    Review of systems is negative unless otherwise noted per HPI   Disclaimer History: The above history is based on this sleep physician's in room encounter with the patient. Pre encounter self administered questionnaires are taken into consideration and discussed with patient for any discordance. The above documentation by this sleep physician is the most accurate clinical information determined by in room sleep physician  "encounter with patient.     Physical exam:  Vitals:    03/04/24 1000   BP: 122/77   Pulse: 76   SpO2: 100%   Weight: 86.2 kg (190 lb)   Height: 180.3 cm (70.98\")    Body mass index is 26.51 kg/m².   CONSTITUTIONAL:  Non-toxic, In no overt distress   Head: normocephalic   ENT: Mallampati class II, + macroglossia, no septal defects   NECK:Neck Circumference: 14 inches,no nuchal rigidity  RESPIRATORY SYSTEM: Breath sounds are clear (no rales, no rhonchi, no wheezes), no accessory muscle use  CARDIOVASULAR SYSTEM: Heart sounds are regular rhythm and normal rate, no rub, no gallop, no edema  NEUROLOGICAL SYSTEM: Oriented x 3, No gross focal deficits   PSYCHIATRIC SYSTEM: Goal oriented, affect full range appropriate      Office notes from care team reviewed:      -2/28/24 Office Visit Dr. aPige Florez PCP fatigue daytime sleepiness  -2/22/2024 Office Visit neurology concern for sleep apnea depression referred to psychiatry     Labs reviewed.  TSH          9/20/2023    16:54 11/14/2023    17:56 11/15/2023    01:57   TSH   TSH 0.580  1.130  2.010       Most Recent A1C          2/28/2024    10:40   HGBA1C Most Recent   Hemoglobin A1C 5.30        Imaging/Diagnostics reviewed:   8/20/2007  PAP titration  Diagnosis MUNIRA  Recommendation by interpreting physician CPAP 8 cm H2O (this has been appeared to have been marked with pen after titration report printed originally recommended 10 cm H2O)  Interpreting physician Dr. Mireille Tamayo    Baseline sleep study is not available      **[facility] Patient  prefers to go through the [facility] if PAP therapy necessary**  Assessment and plan:  Suspected sleep apnea [R29.818] patient's symptoms and examination is consistent with sleep apnea (G47.30). I have talked to the patient about the signs and symptoms of sleep apnea. In addition, I have also discussed pathophysiology of sleep apnea.  I also discussed the complications of untreated sleep apnea including effects on hypertension, diabetes mellitus and " nonrestorative sleep with hypersomnia which can increase risk for motor vehicle accidents.  Untreated sleep apnea is also a risk factor for development of atrial fibrillation, hypertension, insulin resistance and cerebrovascular accident.  Discussed in detail of various testing methods including home-based and lab based sleep studies.  Based on history and physical examination and other comorbidities the most appropriate study is to order SPLIT AHI > 15 in laboratory polysomnography, rather than home sleep apnea testing,to rule out diagnosis of sleep apnea per AASM clinical practice guidelines (doi:10.5664/jcsm.6506) secondary to patient’s history of chronic pain on long-term opioid therapy with Dilaudid pain pump, weight loss since last sleep study, baseline sleep study not available. the order for the sleep study is placed in [employer].  The test will be scheduled after approval from insurance. Treatment and management will be discussed after the test is completed.  Patient was given opportunity to ask questions and all the questions were answered.   **[facility] Patient  prefers to go through the VA if PAP therapy necessary**  -Patient made aware I will not fill out any [employer] / [employer] forms-letters for service connection. This must be done by the patient's [employer] physicians who may refer to my diagnosis and may make the the determination of service connection based on their evaluation and final determination. This is not an appropriate request to a care in the community non-veterans association physician. Counseled the patient must follow up with a VA physician, VA compensation and pension physician, an alternative may be an occupational health physician that they must discuss with their VA physician.  Snoring (R06.83), snoring is the sound created by turbulent airflow vibrating upper airway soft tissue due to limitation of inspiratory airflow. I have also discussed factors affecting  snoring including sleep deprivation, sleeping on the back and alcohol ingestion. To minimize snoring, patient is advised to have adequate sleep, sleep on the side and avoid alcohol and sedative medications before bedtime  Excessive daytime sleepiness .  Patient endorses subjective excessive daytime sleepiness with sleep physician encounter which was consistent with patient's pre-encounter self-administered [place] Sleepiness Scale of Total score: 13.  There are many causes for daytime excessive sleepiness including sleep depression, shiftwork syndrome, depression and other medical disorders including heart, kidney and liver failure.  This is sleep disordered breathing until proven otherwise. The most common cause of excessive sleepiness is due to sleep apnea with frequent awakenings during sleep time.  I have discussed safety of driving and to remain vigilant while driving; patient verbalized understanding of counseling.  Counseled never drive or operate heavy machinery while sleepy; verbalized understanding of same.  -On dilaudid pain pump will add to central hypersomnolence follow up with prescribing physician to discuss risks/benefits.  Inadequate sleep hygiene [Z72.821]  Counseled lifestyle modifications as below to be applied especially night of any sleep study, verbalized understanding of same. Follow up with PCP to reinforce my counseling towards lifestyle modifications if sleep study negative.  Overweight, patient's BMI is Body mass index is 26.51 kg/m².. I have discussed the relationship between weight and sleep apnea.There is direct correlation between weight and severity of sleep apnea.  Weight reduction is encouraged, as it is going to reduce the severity of sleep apnea. I have also discussed with the patient diet and exercise to achieve ideal body weight.  Chronic pain,  On pain pump follow up with prescribing physician to discuss risks/benefits.   Hypogonadism, Counseled: TRT may exacerbate MUNIRA in some  patients.  MUNIRA is often associated with decreased testosterone secretion, together with obesity and aging. Although MUNIRA treatment does not reliably increase testosterone levels in most studies, MUNIRA treatment with testosterone replacement therapy (TRT) may not only improve hypogonadism, but can also alleviate erectile/sexual dysfunction. Follow up with prescribing physician to discuss risks/benefits of TRT.  Anxiety/Depression, Follow up with primary care physician for continued management. Comorbid mood disorders would make the patient eligible for a trial of PAP therapy even if sleep study reveals mild severity sleep apnea.        I have also discussed with the patient the following  Sleep hygiene: Maintaining a regular bedtime and wake time, not to watch television or work in bed, limit caffeine-containing beverages before bed time and avoid naps during the day  Adequate amount of sleep.  In lab sleep study as above. Generally most people needs about 7 to 8 hours of sleep.      Return for 31 to 90 days after PAP setup with down load.  Patient's questions were answered      I once again thank you for asking me to see this patient in consultation and I have forwarded my opinion and treatment plan.  Please do not hesitate to call me if you have any questions.       EMR Dragon/Transcription disclaimer:   Much of this encounter note is an electronic transcription/translation of spoken language to printed text. The electronic translation of spoken language may permit erroneous, or at times, nonsensical words or phrases to be inadvertently transcribed; Although I have reviewed the note for such errors, some may still exist.     NPI #: [id number]    Lars Wang,   Sleep Medicine  [facility]  03/04/24

## 2024-03-17 ENCOUNTER — HOSPITAL ENCOUNTER (OUTPATIENT)
Dept: SLEEP MEDICINE | Facility: HOSPITAL | Age: 64
Discharge: HOME | End: 2024-03-17
Admitting: FAMILY MEDICINE
Payer: OTHER GOVERNMENT

## 2024-03-17 DIAGNOSIS — G47.19 EXCESSIVE DAYTIME SLEEPINESS: ICD-10-CM

## 2024-03-17 DIAGNOSIS — R06.81 WITNESSED EPISODE OF APNEA: ICD-10-CM

## 2024-03-17 DIAGNOSIS — R29.818 SUSPECTED SLEEP APNEA: ICD-10-CM

## 2024-03-17 DIAGNOSIS — R63.4 WEIGHT LOSS: ICD-10-CM

## 2024-03-17 DIAGNOSIS — G89.29 OTHER CHRONIC PAIN: ICD-10-CM

## 2024-03-17 DIAGNOSIS — R06.83 SNORING: ICD-10-CM

## 2024-03-17 PROCEDURE — 95810 POLYSOM 6/> YRS 4/> PARAM: CPT

## 2024-03-18 PROCEDURE — 95810 POLYSOM 6/> YRS 4/> PARAM: CPT | Performed by: FAMILY MEDICINE

## 2024-03-19 ENCOUNTER — HOSPITAL ENCOUNTER (OUTPATIENT)
Dept: CT IMAGING | Facility: HOSPITAL | Age: 64
Discharge: HOME | End: 2024-03-19
Admitting: INTERNAL MEDICINE
Payer: OTHER GOVERNMENT

## 2024-03-19 DIAGNOSIS — R63.4 UNEXPLAINED WEIGHT LOSS: ICD-10-CM

## 2024-03-19 PROCEDURE — 71250 CT THORAX DX C-: CPT

## 2024-03-22 ENCOUNTER — OFFICE VISIT (OUTPATIENT)
Dept: NEUROSURGERY | Facility: CLINIC | Age: 64
End: 2024-03-22
Payer: OTHER GOVERNMENT

## 2024-03-22 VITALS
HEIGHT: 71 IN | DIASTOLIC BLOOD PRESSURE: 75 MMHG | BODY MASS INDEX: 25.48 KG/M2 | HEART RATE: 66 BPM | SYSTOLIC BLOOD PRESSURE: 130 MMHG | WEIGHT: 182 LBS

## 2024-03-22 DIAGNOSIS — M50.30 DDD (DEGENERATIVE DISC DISEASE), CERVICAL: Primary | ICD-10-CM

## 2024-03-22 DIAGNOSIS — M48.02 FORAMINAL STENOSIS OF CERVICAL REGION: ICD-10-CM

## 2024-03-22 DIAGNOSIS — R20.2 POSITIVE TINEL'S SIGN: ICD-10-CM

## 2024-03-22 DIAGNOSIS — Z98.1 HISTORY OF FUSION OF CERVICAL SPINE: ICD-10-CM

## 2024-03-22 NOTE — PROGRESS NOTES
"Chief Complaint  Neck Pain, Shoulder Pain (Left ), Numbness (Bilateral hands), and Tingling (Bilateral hands)    Subjective          Stephon Castellano who is a 63 y.o. year old male who presents to [facility] for Evaluation of the Spine.     The patient complains of pain located in the Cervical Spine.  Patients states the pain has been present for 15 years.  The pain came on gradually.  The pain scaled level is 6.  The pain  does not radiate .  The pain is constant and waxing/waning and described as dull.  The pain is worse at nighttime. Patient states  pain pump bolus makes the pain better.  Patient states  reading, driving, lifting, using a computer makes the pain worse.    Associated Symptoms Include: Tingling in the hands, middle fingers in particular. Denies weakness.  Conservative Interventions Include:  Dilaudid in his pain pump is somewhat helpful. Steroid, PT were ineffective.    Was this the result of an injury or accident? : No    History of Previous Spinal Surgery?: Yes.  Cervical, Date 15 years ago, C5-C6-C7 fusion.     reports that he quit smoking about 41 years ago. His smoking use included cigarettes. He started smoking about 49 years ago. He has a 16 pack-year smoking history. He has been exposed to tobacco smoke. He has never used smokeless tobacco.    Review of Systems   Musculoskeletal:  Positive for neck pain and neck stiffness.        Objective   Vital Signs:   /75 (BP Location: Right arm, Patient Position: Sitting, Cuff Size: Adult)   Pulse 66   Ht 180.3 cm (70.98\")   Wt 82.6 kg (182 lb)   BMI 25.40 kg/m²       Physical Exam  Constitutional:       Appearance: Normal appearance.   Neck:      Comments: Pain with ROM  Pulmonary:      Effort: Pulmonary effort is normal.   Musculoskeletal:         General: No tenderness.      Comments: Weeks's negative bilaterally,  Tinel's test positive at both wrists   Neurological:      General: No focal deficit " present.      Mental Status: He is alert and oriented to person, place, and time.      Sensory: No sensory deficit.      Motor: No weakness.      Deep Tendon Reflexes: Reflexes normal.   Psychiatric:         Mood and Affect: Mood normal.         Behavior: Behavior normal.        Neurologic Exam     Mental Status   Oriented to person, place, and time.        Result Review     I personally reviewed the CT of cervical spine without contrast from 11/15/2023 which shows previous ACDF at C5-C6 and C6-C7.  There is some disc bulging at C4-C5 above the fusion site without significant stenosis.  There is mild central canal stenosis at C3-C4.  There is no significant foraminal narrowing.  There appears to be an intradural catheter in the left central spinal canal around C7-T1.  There is moderate left foraminal narrowing at C7-T1 below the fusion site.     Assessment and Plan    Diagnoses and all orders for this visit:    1. DDD (degenerative disc disease), cervical (Primary)    2. Foraminal stenosis of cervical region  Comments:  moderate right C7-T1    3. History of fusion of cervical spine    4. Positive Tinel's sign  -     EMG & Nerve Conduction Test; Future    He has pain in the neck mostly.    He has numbness, tingling to the middle fingers of the hand.    He has no significant stenosis in the cervical spine.    He had positive tinel's testing at both wrists. I would consider NCV/EMG testing to assess for median neuropathy.    He will follow-up in 4 weeks to reassess and review nerve test result.      Follow Up   Return in about 4 weeks (around 4/19/2024).  Patient was given instructions and counseling regarding his condition or for health maintenance advice. Please see specific information pulled into the AVS if appropriate.

## 2024-03-27 ENCOUNTER — TELEPHONE (OUTPATIENT)
Dept: INTERNAL MEDICINE | Facility: CLINIC | Age: 64
End: 2024-03-27
Payer: OTHER GOVERNMENT

## 2024-03-27 NOTE — TELEPHONE ENCOUNTER
Caller: Stephon Castellano    Relationship: Self    Best call back number: [phone]     What is the best time to reach you: ANYTIME    Who are you requesting to speak with (clinical staff, provider,  specific staff member): CLINICAL    What was the call regarding: PATIENT IS INQUIRING IF HE NEEDS ANY ANTIBIOTICS OR MEDICATION SINCE HE HAS A LUNG INFECTION PER CT CHEST.     Is it okay if the provider responds through MyChart: CALL BACK PLEASE

## 2024-03-27 NOTE — TELEPHONE ENCOUNTER
Not at this point. As I said in the result note, I am referring him to pulmonology. He will need further workup prior to deciding what abx or other treatment is appropriate as this seems to be a long standing infection and so is more likely to be an unusual bacteria or fungal infection.

## 2024-03-29 ENCOUNTER — OFFICE VISIT (OUTPATIENT)
Dept: INTERNAL MEDICINE | Facility: CLINIC | Age: 64
End: 2024-03-29
Payer: OTHER GOVERNMENT

## 2024-03-29 VITALS
WEIGHT: 180.2 LBS | RESPIRATION RATE: 18 BRPM | HEIGHT: 71 IN | SYSTOLIC BLOOD PRESSURE: 120 MMHG | DIASTOLIC BLOOD PRESSURE: 72 MMHG | TEMPERATURE: 98.7 F | HEART RATE: 75 BPM | BODY MASS INDEX: 25.23 KG/M2 | OXYGEN SATURATION: 98 %

## 2024-03-29 DIAGNOSIS — M62.81 MUSCLE WEAKNESS: Primary | ICD-10-CM

## 2024-03-29 DIAGNOSIS — R63.4 WEIGHT LOSS: ICD-10-CM

## 2024-03-29 DIAGNOSIS — R41.3 MEMORY LOSS: ICD-10-CM

## 2024-03-29 DIAGNOSIS — R93.89 ABNORMAL CHEST CT: ICD-10-CM

## 2024-03-29 DIAGNOSIS — Z91.81 HISTORY OF RECENT FALL: ICD-10-CM

## 2024-03-29 LAB
ALBUMIN SERPL-MCNC: 4.7 G/DL (ref 3.5–5.2)
ALBUMIN/GLOB SERPL: 1.7 G/DL
ALP SERPL-CCNC: 61 U/L (ref 39–117)
ALT SERPL W P-5'-P-CCNC: 36 U/L (ref 1–41)
ANION GAP SERPL CALCULATED.3IONS-SCNC: 11 MMOL/L (ref 5–15)
AST SERPL-CCNC: 38 U/L (ref 1–40)
BASOPHILS # BLD AUTO: 0.02 10*3/MM3 (ref 0–0.2)
BASOPHILS NFR BLD AUTO: 0.6 % (ref 0–1.5)
BILIRUB SERPL-MCNC: 0.2 MG/DL (ref 0–1.2)
BUN SERPL-MCNC: 28 MG/DL (ref 8–23)
BUN/CREAT SERPL: 19 (ref 7–25)
CALCIUM SPEC-SCNC: 9.9 MG/DL (ref 8.6–10.5)
CHLORIDE SERPL-SCNC: 103 MMOL/L (ref 98–107)
CK SERPL-CCNC: 756 U/L (ref 20–200)
CO2 SERPL-SCNC: 25 MMOL/L (ref 22–29)
CREAT SERPL-MCNC: 1.47 MG/DL (ref 0.76–1.27)
DEPRECATED RDW RBC AUTO: 42.7 FL (ref 37–54)
EGFRCR SERPLBLD CKD-EPI 2021: 52.9 ML/MIN/1.73
EOSINOPHIL # BLD AUTO: 0.05 10*3/MM3 (ref 0–0.4)
EOSINOPHIL NFR BLD AUTO: 1.4 % (ref 0.3–6.2)
ERYTHROCYTE [DISTWIDTH] IN BLOOD BY AUTOMATED COUNT: 12.3 % (ref 12.3–15.4)
GLOBULIN UR ELPH-MCNC: 2.8 GM/DL
GLUCOSE SERPL-MCNC: 99 MG/DL (ref 65–99)
HCT VFR BLD AUTO: 34.7 % (ref 37.5–51)
HGB BLD-MCNC: 11.5 G/DL (ref 13–17.7)
IMM GRANULOCYTES # BLD AUTO: 0.01 10*3/MM3 (ref 0–0.05)
IMM GRANULOCYTES NFR BLD AUTO: 0.3 % (ref 0–0.5)
LYMPHOCYTES # BLD AUTO: 0.98 10*3/MM3 (ref 0.7–3.1)
LYMPHOCYTES NFR BLD AUTO: 27.1 % (ref 19.6–45.3)
MCH RBC QN AUTO: 31.2 PG (ref 26.6–33)
MCHC RBC AUTO-ENTMCNC: 33.1 G/DL (ref 31.5–35.7)
MCV RBC AUTO: 94 FL (ref 79–97)
MONOCYTES # BLD AUTO: 0.38 10*3/MM3 (ref 0.1–0.9)
MONOCYTES NFR BLD AUTO: 10.5 % (ref 5–12)
NEUTROPHILS NFR BLD AUTO: 2.18 10*3/MM3 (ref 1.7–7)
NEUTROPHILS NFR BLD AUTO: 60.1 % (ref 42.7–76)
NRBC BLD AUTO-RTO: 0 /100 WBC (ref 0–0.2)
PLATELET # BLD AUTO: 168 10*3/MM3 (ref 140–450)
PMV BLD AUTO: 10.6 FL (ref 6–12)
POTASSIUM SERPL-SCNC: 4.3 MMOL/L (ref 3.5–5.2)
PROT SERPL-MCNC: 7.5 G/DL (ref 6–8.5)
RBC # BLD AUTO: 3.69 10*6/MM3 (ref 4.14–5.8)
SODIUM SERPL-SCNC: 139 MMOL/L (ref 136–145)
VIT B12 BLD-MCNC: 555 PG/ML (ref 211–946)
WBC NRBC COR # BLD AUTO: 3.62 10*3/MM3 (ref 3.4–10.8)

## 2024-03-29 PROCEDURE — 36415 COLL VENOUS BLD VENIPUNCTURE: CPT | Performed by: NURSE PRACTITIONER

## 2024-03-29 PROCEDURE — 82607 VITAMIN B-12: CPT | Performed by: NURSE PRACTITIONER

## 2024-03-29 PROCEDURE — 80053 COMPREHEN METABOLIC PANEL: CPT | Performed by: NURSE PRACTITIONER

## 2024-03-29 PROCEDURE — 99214 OFFICE O/P EST MOD 30 MIN: CPT | Performed by: NURSE PRACTITIONER

## 2024-03-29 PROCEDURE — 85025 COMPLETE CBC W/AUTO DIFF WBC: CPT | Performed by: NURSE PRACTITIONER

## 2024-03-29 PROCEDURE — 82550 ASSAY OF CK (CPK): CPT | Performed by: NURSE PRACTITIONER

## 2024-03-29 NOTE — PROGRESS NOTES
"Chief Complaint  Weakness - Generalized (Patient stated his feet and legs having been giving out and causing him to have falls- 3/4 months.), Fall (Last week fall when walking his dog and stated that his legs just felt like jelly.  He has had several falls in the past couple weeks. Patient has concerns for parkinson. ), Memory Loss (Patient stated he retired Feb/March of last year and started to notice the memory loss. ), and Weight Loss (In the past 6 months he has lost 50lbs)    Subjective          Stephon Castellano presents to [facility]  History of Present Illness  He reports having 2 falls in the last week. He reports legs often giving out, progressively worsening in last 3 mths.   He denies leg pain. He is doing exercises regularly for legs but has not noticed any improvement.   He reports having significant weight loss in the last 50lbs in the last 6 mths without making any changes to diet    He reports cognitive decline that has also been progressive    He will be out of the country in [place] next week    Denies soa  Denies fever, chills  Some vertigo in the last 6 mths but improving  Objective   Vital Signs:   /72 (BP Location: Left arm, Patient Position: Sitting, Cuff Size: Adult)   Pulse 75   Temp 98.7 °F (37.1 °C)   Resp 18   Ht 180.3 cm (70.98\")   Wt 81.7 kg (180 lb 3.2 oz)   SpO2 98%   BMI 25.15 kg/m²     Physical Exam  Vitals and nursing note reviewed.   Constitutional:       General: He is not in acute distress.     Appearance: Normal appearance.   HENT:      Head: Normocephalic and atraumatic.      Right Ear: External ear normal.      Left Ear: External ear normal.      Nose: Nose normal.      Mouth/Throat:      Mouth: Mucous membranes are moist.   Eyes:      Conjunctiva/sclera: Conjunctivae normal.   Cardiovascular:      Rate and Rhythm: Normal rate and regular rhythm.      Pulses: Normal pulses.      Heart sounds: Normal heart sounds. No " murmur heard.     No friction rub. No gallop.   Pulmonary:      Effort: Pulmonary effort is normal. No respiratory distress.      Breath sounds: No wheezing, rhonchi or rales.   Musculoskeletal:      Cervical back: Neck supple.      Right lower leg: No edema.      Left lower leg: No edema.   Skin:     General: Skin is warm and dry.   Neurological:      General: No focal deficit present.      Mental Status: He is alert and oriented to person, place, and time.   Psychiatric:         Mood and Affect: Mood normal.         Behavior: Behavior normal.        Result Review :          Procedures      Assessment and Plan    Diagnoses and all orders for this visit:    1. Muscle weakness (Primary)  -     CK  -     Comprehensive Metabolic Panel  -     CBC Auto Differential  -     Vitamin B12  -     Methylmalonic Acid, Serum    2. Memory loss    3. Weight loss    4. History of recent fall  Comments:  Reports no current injury.  Reassuring physical exam    5. Abnormal chest CT    Discussed care with patient at length and with Dr. Florez.  Patient has appointment with [facility] set up on 4/16/2024 for further evaluation.  Will recheck CK today with some additional lab work.    Discussed further workup with likely bronchoscopy with pulmonology which she has scheduled in 2 weeks as well.          Follow Up   No follow-ups on file.  Patient was given instructions and counseling regarding his condition or for health maintenance advice. Please see specific information pulled into the AVS if appropriate.

## 2024-04-09 NOTE — PROGRESS NOTES
"Chief Complaint: Urinary Retention    Subjective         History of Present Illness  Stephon Castellano is a 63 y.o. male presents to [facility] UROLOGY to be seen for follow-up.    Patient was previously seen by me with last visit on 1/2/2024 for urinary retention.  At that visit he was continued on Flomax and added finasteride.  He is here for follow-up.    He reports he has not seen any improvement in symptoms.  He was up 4 times last night to urinate.    He also reports that he is only taking 1 tamsulosin a day due to drowsiness with the a.m. dosage.    Previous 1/2/2024:  Patient was admitted to the hospital 11/14/2023 for bradycardia.  He was found to be in urinary retention at that visit with PVRs of 250 cc.  His tamsulosin was increased to 0.8 mg.  He is here for follow-up.     Patient reports he has seen improvement in symptoms since increasing tamsulosin but is still going urinary frquency with going every hour. He does not feel he is emptying his bladder.      He takes testosterone so was also advised to take arimidex for an estrogen blocker. He has been on this combo for 3-4 months        Frequency-admits up to 30 times per day   Urgency-admits for the past year   Incontinence-denies   Nocturia-2-3   Perineal pain-denies   Dysuria-denies   Stream-\"not strong\"   GH-denies   History of stones-admits x 2, passed    surgeries-denies   Family history of  malignancy-dad prostate CA at age 60   Cardiopulmonary-bradycardia   Anticoagulants-denies   Smoker-denies     PSA  11/14/2023 0.6       Objective     Past Medical History:   Diagnosis Date    Allergic rhinitis     Anemia 02/06/2024    My hand started just shaking but got worse until now i have no strength in my left hand    Anxiety 2006    Starting taking mood depression drugs    Arthritis     Asthma About 6 months    Sore Throat and was sent to ENT    Atrial fibrillation 2023    Benign prostatic hyperplasia 2015    Dad had Prostate Cancer " and just did Colonoscopy and they said i have a major large Prostate    Cervical neck pain with evidence of disc disease     Colon polyp 2020    Had a lot of polyps on the last 2 Colonoscopies    CTS (carpal tunnel syndrome) 2005    Depression     Difficulty walking 2033    Erectile dysfunction 2017    I have been on Testosterone replacements since then    Head injury 1977    Headache May 2023    Started abou 6 months or more    Hemorrhoid     HL (hearing loss) 2004    Have a major tinitis/ringing in my ears especially the left one    Hyperlipidemia 2017    My blood test have stated they were high or elevated    Hypertension 2017    Given Flomax said it will probably go down    Hypogonadism in male     Insomnia     Kidney stone 2020    Went to doctor and got medication for them and still have issues with right side    Low back pain 2010    Stinosis of back & neck    Memory loss 2023    Movement disorder 2023    Sleep apnea 2007    Substance abuse [id number]    On Disulfiram for alcohol abuse-voluntary    Visual impairment 2023    Just recently have been given eye drops       Past Surgical History:   Procedure Laterality Date    CERVICAL DISC SURGERY  2008    COLONOSCOPY N/A 08/18/2023    Procedure: COLONOSCOPY WITH POLYPECTOMY/SNARE;  Surgeon: Jose Alejandro Fox MD;  Location: [facility]COPY;  Service: General;  Laterality: N/A;  COLON POLYPS    HEEL SPUR SURGERY  2005    KNEE ACL RECONSTRUCTION  2004    OTHER SURGICAL HISTORY      METAL IMPLANT    PAIN PUMP INSERTION/REVISION  2010    SPINE SURGERY  2008    Antieror c5-7 discotomy    TONSILLECTOMY  1965    VASECTOMY  2002         Current Outpatient Medications:     anastrozole (ARIMIDEX) 1 MG tablet, Take 1 tablet by mouth Daily., Disp: , Rfl:     Cholecalciferol (Vitamin D3) 50 MCG (2000 UT) capsule, Take 1 capsule by mouth Daily., Disp: , Rfl:     CINNAMON PO, Take  by mouth., Disp: , Rfl:     DULoxetine (CYMBALTA) 60 MG capsule, Take 1 capsule by mouth Daily.,  Disp: , Rfl:     finasteride (PROSCAR) 5 MG tablet, Take 1 tablet by mouth Daily for 360 days., Disp: 90 tablet, Rfl: 3    fluticasone (FLONASE) 50 MCG/ACT nasal spray, 2 sprays into the nostril(s) as directed by provider Daily., Disp: , Rfl:     IRON, FERROUS GLUCONATE, PO, Take 65 mg by mouth Daily., Disp: , Rfl:     Lysine 1000 MG tablet, Take  by mouth., Disp: , Rfl:     Magnesium 250 MG tablet, Take  by mouth., Disp: , Rfl:     multivitamin with minerals tablet tablet, Take 1 tablet by mouth Daily., Disp: , Rfl:     Omega 3-6-9 Fatty Acids (OMEGA 3-6-9 COMPLEX PO), Take  by mouth., Disp: , Rfl:     pain patient supplied pump, by Intrathecal route Continuous. Instructions are in drop boxes, Disp: , Rfl:     polyethylene glycol (MiraLax) 17 GM/SCOOP powder, Take 17 g by mouth Daily., Disp: 850 g, Rfl: 5    tamsulosin (FLOMAX) 0.4 MG capsule 24 hr capsule, Take 2 capsules by mouth Daily for 360 days., Disp: 180 capsule, Rfl: 3    Testosterone Cypionate (DEPOTESTOTERONE CYPIONATE) 200 MG/ML injection, , Disp: , Rfl:     traZODone (DESYREL) 100 MG tablet, Take 2 tablets by mouth Every Night., Disp: , Rfl:     Turmeric 500 MG capsule, Take  by mouth., Disp: , Rfl:     Vitamin D-Vitamin K (K2-D3 10,000) 99014-65 UNIT-MCG capsule, Take  by mouth., Disp: , Rfl:     No Known Allergies     Family History   Problem Relation Age of Onset    Heart disease Mother     Osteoporosis Mother     Alcohol abuse Mother         Mother committed suicide    Depression Mother         She had major depression    Early death Mother         She committed suicide    Thyroid disease Mother         She had Thyroid issues and had surgery    Cancer Father         Prostate    Prostate cancer Father     Hearing loss Father         He has worn a hearing aids since his late 50’s    Diabetes Sister         Diabetes    Arthritis Sister     Sleep apnea Sister     Obesity Sister     Thyroid disease Sister     Insomnia Sister     Narcolepsy Sister      "Diabetes Brother     Stroke Other         AUNT/UNCLE GRANDPARENTS    Diabetes Other         GRANDPARENTS        Social History     Socioeconomic History    Marital status:    Tobacco Use    Smoking status: Former     Current packs/day: 0.00     Average packs/day: 2.0 packs/day for 8.0 years (16.0 ttl pk-yrs)     Types: Cigarettes     Start date: 1975     Quit date: 1983     Years since quittin.3     Passive exposure: Past    Smokeless tobacco: Never    Tobacco comments:     Also dipped for several years   Vaping Use    Vaping status: Never Used   Substance and Sexual Activity    Alcohol use: Not Currently     Alcohol/week: 14.0 standard drinks of alcohol     Types: 14 Shots of liquor per week     Comment: Stopped due to Disulfiram 250 mg    Drug use: Not Currently     Frequency: 7.0 times per week     Types: Marijuana    Sexual activity: Not Currently     Partners: Female     Birth control/protection: None, Vasectomy     Comment: Low testosterone       Vital Signs:   /76 (BP Location: Left arm, Patient Position: Sitting, Cuff Size: Adult)   Pulse 67   Ht 180.3 cm (70.98\")   Wt 81.7 kg (180 lb 1.9 oz)   BMI 25.13 kg/m²      Physical Exam  Vitals reviewed.   Constitutional:       Appearance: Normal appearance.   Neurological:      General: No focal deficit present.      Mental Status: He is alert and oriented to person, place, and time.   Psychiatric:         Mood and Affect: Mood normal.         Behavior: Behavior normal.          Result Review :   The following data was reviewed by: NARENDRA Villafana on 04/10/2024:     PSA          2023    17:56   PSA   PSA 0.6        Bladder Scan interpretation 04/10/2024    Estimation of residual urine via BVI 3000 Verathon Bladder Scan  MA/nurse performing: Becca CANNON MA  Residual Urine: 80 ml  Indication: Urinary retention    Benign prostatic hyperplasia with urinary retention   Position: Supine  Examination: Incremental scanning " of the suprapubic area using 2.0 MHz transducer using copious amounts of acoustic gel.   Findings: An anechoic area was demonstrated which represented the bladder, with measurement of residual urine as noted. I inspected this myself. In that the residual urine was stable or insignificant, refer to plan for treatment and plan necessary at this time.         Procedures        Assessment and Plan    Diagnoses and all orders for this visit:    1. Urinary retention (Primary)  -     Bladder Scan    2. Benign prostatic hyperplasia with urinary retention    We did discuss that he could take both the tamsulosin dosages at bedtime to see if this improves symptoms as well as did not cause any residual drowsiness.  He is also going continue on finasteride.    We did discuss that if he is not seeing improvement over the course of the next 3 months that we may need to consider procedures for the prostate.  We will discuss at his next visit if he is not seeing improvement.  I will see him back in 3 months or sooner for any concerns.  Follow Up   Return in about 3 months (around 7/10/2024).  Patient was given instructions and counseling regarding his condition or for health maintenance advice. Please see specific information pulled into the AVS if appropriate.         This document has been electronically signed by NARENDRA Villafana  April 10, 2024 13:25 EDT

## 2024-04-10 ENCOUNTER — OFFICE VISIT (OUTPATIENT)
Dept: UROLOGY | Facility: CLINIC | Age: 64
End: 2024-04-10
Payer: OTHER GOVERNMENT

## 2024-04-10 VITALS
BODY MASS INDEX: 25.22 KG/M2 | HEART RATE: 67 BPM | WEIGHT: 180.12 LBS | SYSTOLIC BLOOD PRESSURE: 124 MMHG | DIASTOLIC BLOOD PRESSURE: 76 MMHG | HEIGHT: 71 IN

## 2024-04-10 DIAGNOSIS — N40.1 BENIGN PROSTATIC HYPERPLASIA WITH URINARY RETENTION: ICD-10-CM

## 2024-04-10 DIAGNOSIS — R33.9 URINARY RETENTION: Primary | ICD-10-CM

## 2024-04-10 DIAGNOSIS — R33.8 BENIGN PROSTATIC HYPERPLASIA WITH URINARY RETENTION: ICD-10-CM

## 2024-04-10 LAB — URINE VOLUME: 80

## 2024-04-11 ENCOUNTER — TELEPHONE (OUTPATIENT)
Dept: SLEEP MEDICINE | Facility: HOSPITAL | Age: 64
End: 2024-04-11
Payer: OTHER GOVERNMENT

## 2024-04-11 NOTE — TELEPHONE ENCOUNTER
Called patient in regard to sleep study, patient request follow up for discussion. Patient has cpap in box that has not been used, he has difficulty with using cpap

## 2024-04-17 ENCOUNTER — OFFICE VISIT (OUTPATIENT)
Dept: PULMONOLOGY | Facility: CLINIC | Age: 64
End: 2024-04-17
Payer: OTHER GOVERNMENT

## 2024-04-17 VITALS
HEIGHT: 71 IN | TEMPERATURE: 98.6 F | WEIGHT: 179.6 LBS | BODY MASS INDEX: 25.15 KG/M2 | OXYGEN SATURATION: 96 % | RESPIRATION RATE: 16 BRPM | HEART RATE: 75 BPM | DIASTOLIC BLOOD PRESSURE: 81 MMHG | SYSTOLIC BLOOD PRESSURE: 135 MMHG

## 2024-04-17 DIAGNOSIS — R93.89 ABNORMAL CT OF THE CHEST: Primary | ICD-10-CM

## 2024-04-17 DIAGNOSIS — G47.33 OBSTRUCTIVE SLEEP APNEA SYNDROME: ICD-10-CM

## 2024-04-17 DIAGNOSIS — R06.09 DYSPNEA ON EXERTION: ICD-10-CM

## 2024-04-17 PROCEDURE — 99203 OFFICE O/P NEW LOW 30 MIN: CPT | Performed by: INTERNAL MEDICINE

## 2024-04-17 NOTE — PROGRESS NOTES
Pulmonary Consultation    Paige Florez*,    Thank you for asking me to see Stephon Castellano for   Chief Complaint   Patient presents with    Establish Care     Abnormal Ct     Shortness of Breath   .      History of Present Illness  Stephon Castellano is a 63 y.o. male with a PMH significant for obstructive sleep apnea and cervical spine disease degenerative presents for evaluation on account of abnormal CT chest patient complains of mild shortness of breath only on heavy exertion but denies any cough wheezing fever night sweats he does complain of aching in his back and also on the right patient denies any weight loss or calf pain or swelling he has a history of pneumonia patient has quit smoking      Tobacco use history:  Former smoker      Review of Systems: History obtained from chart review and the patient.  Review of Systems   Respiratory:  Positive for shortness of breath.    All other systems reviewed and are negative.    As described in the HPI. Otherwise, remainder of ROS (14 systems) were negative.    Patient Active Problem List   Diagnosis    Chronic right-sided thoracic back pain    Kidney stone    Screening due    Allergic rhinitis    Arthritis    Cervical neck pain with evidence of disc disease    Depression    Head injury    Hemorrhoid    Hypogonadism in male    Right wrist pain    Weight loss    Abdominal pain    Seizure-like activity    Palpitations    Abnormal EKG    Bradycardia    Brain fog    Fatigue    Acute kidney injury    Tremor    Slow transit constipation    Weakness of both lower extremities    Left hand weakness    Drug induced myoclonus    Insomnia due to other mental disorder    Obstructive sleep apnea syndrome    Cervical radiculopathy         Current Outpatient Medications:     anastrozole (ARIMIDEX) 1 MG tablet, Take 1 tablet by mouth Daily., Disp: , Rfl:     Cholecalciferol (Vitamin D3) 50 MCG (2000 UT) capsule, Take 1 capsule by mouth Daily., Disp: , Rfl:     CINNAMON PO,  Take  by mouth., Disp: , Rfl:     DULoxetine (CYMBALTA) 60 MG capsule, Take 1 capsule by mouth Daily., Disp: , Rfl:     finasteride (PROSCAR) 5 MG tablet, Take 1 tablet by mouth Daily for 360 days., Disp: 90 tablet, Rfl: 3    fluticasone (FLONASE) 50 MCG/ACT nasal spray, 2 sprays into the nostril(s) as directed by provider Daily., Disp: , Rfl:     IRON, FERROUS GLUCONATE, PO, Take 65 mg by mouth Daily., Disp: , Rfl:     Lysine 1000 MG tablet, Take  by mouth., Disp: , Rfl:     Magnesium 250 MG tablet, Take  by mouth., Disp: , Rfl:     multivitamin with minerals tablet tablet, Take 1 tablet by mouth Daily., Disp: , Rfl:     Omega 3-6-9 Fatty Acids (OMEGA 3-6-9 COMPLEX PO), Take  by mouth., Disp: , Rfl:     pain patient supplied pump, by Intrathecal route Continuous. Instructions are in drop boxes, Disp: , Rfl:     polyethylene glycol (MiraLax) 17 GM/SCOOP powder, Take 17 g by mouth Daily., Disp: 850 g, Rfl: 5    tamsulosin (FLOMAX) 0.4 MG capsule 24 hr capsule, Take 2 capsules by mouth Daily for 360 days., Disp: 180 capsule, Rfl: 3    Testosterone Cypionate (DEPOTESTOTERONE CYPIONATE) 200 MG/ML injection, , Disp: , Rfl:     traZODone (DESYREL) 100 MG tablet, Take 2 tablets by mouth Every Night., Disp: , Rfl:     Turmeric 500 MG capsule, Take  by mouth., Disp: , Rfl:     Vitamin D-Vitamin K (K2-D3 10,000) 05197-36 UNIT-MCG capsule, Take  by mouth., Disp: , Rfl:     No Known Allergies    Past Medical History:   Diagnosis Date    Allergic rhinitis     Anemia 02/06/2024    My hand started just shaking but got worse until now i have no strength in my left hand    Anxiety 2006    Starting taking mood depression drugs    Arthritis     Asthma About 6 months    Sore Throat and was sent to ENT    Atrial fibrillation 2023    Benign prostatic hyperplasia 2015    Dad had Prostate Cancer and just did Colonoscopy and they said i have a major large Prostate    Cervical neck pain with evidence of disc disease     Colon polyp 2020     Had a lot of polyps on the last 2 Colonoscopies    CTS (carpal tunnel syndrome)     Depression     Difficulty walking     Erectile dysfunction     I have been on Testosterone replacements since then    Head injury     Headache May 2023    Started abou 6 months or more    Hemorrhoid     HL (hearing loss)     Have a major tinitis/ringing in my ears especially the left one    Hyperlipidemia     My blood test have stated they were high or elevated    Hypertension 2017    Given Flomax said it will probably go down    Hypogonadism in male     Insomnia     Kidney stone     Went to doctor and got medication for them and still have issues with right side    Low back pain 2010    Stinosis of back & neck    Memory loss     Movement disorder     Sleep apnea 2007    Substance abuse [id number]    On Disulfiram for alcohol abuse-voluntary    Visual impairment     Just recently have been given eye drops     Past Surgical History:   Procedure Laterality Date    CERVICAL DISC SURGERY      COLONOSCOPY N/A 2023    Procedure: COLONOSCOPY WITH POLYPECTOMY/SNARE;  Surgeon: Jose Alejandro Fox MD;  Location: [facility] ENDOSCOPY;  Service: General;  Laterality: N/A;  COLON POLYPS    HEEL SPUR SURGERY      KNEE ACL RECONSTRUCTION      OTHER SURGICAL HISTORY      METAL IMPLANT    PAIN PUMP INSERTION/REVISION  2010    SPINE SURGERY  2008    Antieror c5-7 discotomy    TONSILLECTOMY  1965    VASECTOMY  2002     Social History     Socioeconomic History    Marital status:    Tobacco Use    Smoking status: Former     Current packs/day: 0.00     Average packs/day: 2.0 packs/day for 8.0 years (16.0 ttl pk-yrs)     Types: Cigarettes     Start date: 1975     Quit date: 1983     Years since quittin.3     Passive exposure: Past    Smokeless tobacco: Never    Tobacco comments:     Also dipped for several years   Vaping Use    Vaping status: Never Used   Substance and Sexual Activity  "   Alcohol use: Not Currently     Alcohol/week: 14.0 standard drinks of alcohol     Types: 14 Shots of liquor per week     Comment: Stopped due to Disulfiram 250 mg    Drug use: Not Currently     Frequency: 7.0 times per week     Types: Marijuana    Sexual activity: Not Currently     Partners: Female     Birth control/protection: None, Vasectomy     Comment: Low testosterone     Family History   Problem Relation Age of Onset    Heart disease Mother     Osteoporosis Mother     Alcohol abuse Mother         Mother committed suicide    Depression Mother         She had major depression    Early death Mother         She committed suicide    Thyroid disease Mother         She had Thyroid issues and had surgery    Cancer Father         Prostate    Prostate cancer Father     Hearing loss Father         He has worn a hearing aids since his late 50’s    Diabetes Sister         Diabetes    Arthritis Sister     Sleep apnea Sister     Obesity Sister     Thyroid disease Sister     Insomnia Sister     Narcolepsy Sister     Diabetes Brother     Stroke Other         AUNT/UNCLE GRANDPARENTS    Diabetes Other         GRANDPARENTS        CT Chest Without Contrast Diagnostic    Result Date: 3/19/2024  Impression: CT scan of the chest without IV contrast demonstrating tree-in-bud airspace disease in the left lower lobe suggesting indolent infection. Follow-up CT scan of the chest in 3 months is recommended.    Electronically Signed By-SOILA TREVIÑO MD On:3/19/2024 3:23 PM           Objective     Blood pressure 135/81, pulse 75, temperature 98.6 °F (37 °C), temperature source Tympanic, resp. rate 16, height 180.3 cm (70.98\"), weight 81.5 kg (179 lb 9.6 oz), SpO2 96%.  Physical Exam  Vitals and nursing note reviewed.   Constitutional:       Appearance: Normal appearance.   HENT:      Head: Normocephalic and atraumatic.      Nose: Nose normal.      Mouth/Throat:      Mouth: Mucous membranes are moist.      Pharynx: Oropharynx is clear. "   Eyes:      Extraocular Movements: Extraocular movements intact.      Conjunctiva/sclera: Conjunctivae normal.      Pupils: Pupils are equal, round, and reactive to light.   Cardiovascular:      Rate and Rhythm: Normal rate and regular rhythm.      Pulses: Normal pulses.      Heart sounds: Normal heart sounds.   Pulmonary:      Effort: Pulmonary effort is normal.      Breath sounds: Normal breath sounds.   Abdominal:      General: Abdomen is flat. Bowel sounds are normal.      Palpations: Abdomen is soft.   Musculoskeletal:         General: Normal range of motion.      Cervical back: Normal range of motion and neck supple.   Skin:     General: Skin is warm.      Capillary Refill: Capillary refill takes 2 to 3 seconds.   Neurological:      General: No focal deficit present.      Mental Status: He is alert and oriented to person, place, and time.   Psychiatric:         Mood and Affect: Mood normal.         Behavior: Behavior normal.       Immunization History   Administered Date(s) Administered    COVID-19 ([employer]) 03/10/2021    Covid-19 (Pf[employer]) Gray Cap Monovalent 01/28/2022    DTaP 03/29/2019    Fluzone (or Fluarix & Flulaval for VFC) >6mos 09/25/2019, 01/25/2022, 03/15/2024    Fluzone Quad >6mos (Multi-dose) 10/01/2019    H1N1 Inj 12/16/2009    Hepatitis A 05/08/1995, 11/08/1999    Influenza Injectable Mdck Pf Quad 10/27/2020, 02/15/2023    Influenza LAIV (Nasal) 01/24/2005, 11/09/2005    Influenza Quad Vaccine (Inpatient) 10/04/2011, 10/15/2015    Influenza Whole 12/03/2003, 10/12/2011    Influenza, Unspecified 10/01/2018, 10/27/2020, 02/15/2023    MMR 04/25/2003    Measles 05/02/1984    Meningococcal Polysaccharide 04/19/2002    OPV 10/03/1986    Plague 01/27/1986, 03/10/1986, 06/13/1989    Pneumococcal Conjugate 20-Valent (PCV20) 09/01/2022    Pneumococcal, Unspecified 09/01/2022    Rubella 05/02/1984    Shingrix 01/25/2022, 08/02/2022    Td (TDVAX) 11/08/1999    Tdap 09/28/2010, 03/29/2019    Typhoid,  Unspecified 11/13/2001    Yellow Fever 05/13/1994    influenza Split 11/21/2001, 11/01/2002, 10/30/2007, 10/07/2009, 10/20/2009            Assessment & Plan     Diagnoses and all orders for this visit:    1. Abnormal CT of the chest (Primary)  -     Complete PFT - Pre & Post Bronchodilator; Future  -     CT Chest With Contrast; Future    2. Dyspnea on exertion  -     Complete PFT - Pre & Post Bronchodilator; Future  -     CT Chest With Contrast; Future    3. Obstructive sleep apnea syndrome         Result Review :       Data reviewed : Radiologic studies CT chest      Discussion/ Recommendations:   CT chest reviewed shows evidence of tree-in-bud appearance left lower lobe likely secondary to bronchitis history and pneumonia  Will repeat CT chest in 3 months time  We will order PFTs for evaluation of his dyspnea  Patient has already quit smoking and at this time his chest examination is normal and his O2 sats are 96% on room air  Advised to continue regular exercise and avoid dust and smoke  Discussed vaccination and recommended                Return in about 6 months (around 10/17/2024).      Thank you for allowing me to participate in the care of Stephon Castellano. Please do not hesitate to contact me with any questions.         This document has been electronically signed by Shaggy Shelley MD on April 17, 2024 09:23 EDT

## 2024-04-19 ENCOUNTER — HOSPITAL ENCOUNTER (OUTPATIENT)
Facility: HOSPITAL | Age: 64
Discharge: HOME | End: 2024-04-19
Payer: OTHER GOVERNMENT

## 2024-04-19 DIAGNOSIS — R20.2 POSITIVE TINEL'S SIGN: ICD-10-CM

## 2024-04-19 PROCEDURE — 95886 MUSC TEST DONE W/N TEST COMP: CPT

## 2024-04-19 PROCEDURE — 95911 NRV CNDJ TEST 9-10 STUDIES: CPT

## 2024-04-23 ENCOUNTER — OFFICE VISIT (OUTPATIENT)
Dept: NEUROSURGERY | Facility: CLINIC | Age: 64
End: 2024-04-23
Payer: OTHER GOVERNMENT

## 2024-04-23 VITALS
WEIGHT: 180.2 LBS | HEART RATE: 64 BPM | SYSTOLIC BLOOD PRESSURE: 127 MMHG | DIASTOLIC BLOOD PRESSURE: 76 MMHG | BODY MASS INDEX: 25.23 KG/M2 | HEIGHT: 71 IN

## 2024-04-23 DIAGNOSIS — M50.30 DDD (DEGENERATIVE DISC DISEASE), CERVICAL: Primary | ICD-10-CM

## 2024-04-23 DIAGNOSIS — Z98.1 HISTORY OF FUSION OF CERVICAL SPINE: ICD-10-CM

## 2024-04-23 DIAGNOSIS — G56.01 MILD CARPAL TUNNEL SYNDROME, RIGHT: ICD-10-CM

## 2024-04-23 DIAGNOSIS — M54.12 C6 RADICULOPATHY: ICD-10-CM

## 2024-04-23 DIAGNOSIS — M48.02 FORAMINAL STENOSIS OF CERVICAL REGION: ICD-10-CM

## 2024-04-23 PROCEDURE — 99213 OFFICE O/P EST LOW 20 MIN: CPT | Performed by: PHYSICIAN ASSISTANT

## 2024-04-23 NOTE — PROGRESS NOTES
"Patient being seen for today for Neck Pain  .    Subjective    Stephon Castellano is a 63 y.o. male that presents with Neck Pain  .    HPI  Previously: Last seen on 3/22/2024 for pain mostly in the neck.  He had numbness and tingling to the middle fingers of the hand bilaterally.  There was no significant stenosis in the cervical spine.  He had positive Tinel's testing bilaterally at the wrist.  There was an order for NCV/EMG test to assess for median nerve neuropathy.    Today: He reports new numbness in the bilateral feet.    Walking seems to make his ankles weak and \"wobbly\". He had a fall several times about 1 months ago. He has lower back pain which does not radiate. He denies loss of bowel or bladder control outside of normal.    For the neck/arms he denies new or changed complaints.     reports that he quit smoking about 41 years ago. His smoking use included cigarettes. He started smoking about 49 years ago. He has a 16 pack-year smoking history. He has been exposed to tobacco smoke. He has never used smokeless tobacco.    Review of Systems   Musculoskeletal:  Positive for back pain.     Objective   Vitals:    04/23/24 1310   BP: 127/76   Pulse: 64        Physical Exam  Constitutional:       Appearance: Normal appearance.   Pulmonary:      Effort: Pulmonary effort is normal.   Musculoskeletal:         General: No tenderness.      Comments: SLR negative bilaterally   Neurological:      General: No focal deficit present.      Mental Status: He is alert and oriented to person, place, and time.      Sensory: No sensory deficit.      Motor: No weakness.      Deep Tendon Reflexes: Reflexes normal.   Psychiatric:         Mood and Affect: Mood normal.         Behavior: Behavior normal.        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     Assessment and Plan {CC Problem List  Visit Diagnosis  ROS  Review " (Popup)  [facility]  BestPractice  Medications  SmartSets  SnapShot Encounters  Media :23}   Diagnoses and all orders for this visit:    1. DDD (degenerative disc disease), cervical (Primary)    2. Foraminal stenosis of cervical region    3. C6 radiculopathy    4. Mild carpal tunnel syndrome, right    5. History of fusion of cervical spine    Again, his pain is mostly in the neck. He has numbness and tingling to the middle fingers of the hands. There is no significant stenosis in the cervical spine.    He has history of ACDF at C5-C6 and C6-C7.    There was chronic C6 radiculopathy on the left on NCV/EMG test - which I expect is related to his surgical history. I do not expect any additional surgical approach to address this finding.    Theoretically, a NELLY could help manage the left arm complaints.    He has mild right carpal tunnel syndrome on NCV testing as well. This may explain numbness in the right hand, but obviously does not help explain the left hand. Wrist bracing at night time is the only conservative treatment I would recommend in this case.    With the new complaints of lower back pain, numbness in the feet, I would consider an MRI of the lumbar spine to evaluate the lumbar spine pathology. He will discuss this further with his PCP.    The patient was counseled on basic recommendations for the reduction and prevention of back, neck, or spine pain in association with spinal disorders, including: cessation/avoidance of nicotine use, maintenance of a healthy BMI and weight, focusing on building/maintaining core strength through core exercise, and avoidance of activities which worsen the pain. The patient will monitor for changes in symptoms and notify our clinic of these changes as needed.    He will follow-up here PRN.  Follow Up {Instructions Charge Capture  Follow-up Communications :23}   Return if symptoms worsen or fail to improve.

## 2024-04-29 ENCOUNTER — OFFICE VISIT (OUTPATIENT)
Dept: SLEEP MEDICINE | Facility: HOSPITAL | Age: 64
End: 2024-04-29
Payer: OTHER GOVERNMENT

## 2024-04-29 VITALS
OXYGEN SATURATION: 97 % | WEIGHT: 183.6 LBS | HEIGHT: 71 IN | DIASTOLIC BLOOD PRESSURE: 69 MMHG | BODY MASS INDEX: 25.7 KG/M2 | SYSTOLIC BLOOD PRESSURE: 136 MMHG | HEART RATE: 72 BPM

## 2024-04-29 DIAGNOSIS — E66.3 OVERWEIGHT (BMI 25.0-29.9): ICD-10-CM

## 2024-04-29 DIAGNOSIS — G47.34 SLEEP RELATED HYPOXIA: ICD-10-CM

## 2024-04-29 DIAGNOSIS — G89.29 OTHER CHRONIC PAIN: ICD-10-CM

## 2024-04-29 DIAGNOSIS — G47.33 SEVERE OBSTRUCTIVE SLEEP APNEA: Primary | ICD-10-CM

## 2024-04-29 DIAGNOSIS — Z78.9 INTOLERANCE OF CONTINUOUS POSITIVE AIRWAY PRESSURE (CPAP) VENTILATION: ICD-10-CM

## 2024-04-29 DIAGNOSIS — Z79.891 LONG-TERM CURRENT USE OF OPIATE ANALGESIC: ICD-10-CM

## 2024-04-29 DIAGNOSIS — G47.19 EXCESSIVE DAYTIME SLEEPINESS: ICD-10-CM

## 2024-04-29 PROCEDURE — 99214 OFFICE O/P EST MOD 30 MIN: CPT | Performed by: FAMILY MEDICINE

## 2024-04-29 PROCEDURE — G0463 HOSPITAL OUTPT CLINIC VISIT: HCPCS

## 2024-04-29 NOTE — PROGRESS NOTES
[address] [place] [id number]  Phone: [phone]  Fax: [phone]      SLEEP CLINIC FOLLOW UP PROGRESS NOTE.    Stephon Castellano  [id number]   1960  63 y.o.  male      PCP: Paige Florez MD      Date of visit: 4/29/2024    Chief Complaint   Patient presents with    Sleep Apnea     History of CPAP intolerance       Medications and allergies are reviewed by me and documented in the encounter.     SOCIAL (habits pertaining to sleep medicine)  History tobacco use:No   History of alcohol use: 0 per week  Caffeine use: 4 or more beverages per day    HPI:  This is a 63 y.o. PMHx Depression, Chronic pain on (on Dilaudid pain pump for past 10 years), Anxiety (on trazodone). Here fore review of the results of his sleep study showing Severe Obstructive Sleep Apnea and Sleep Related Hypoxia (details below). Normally patient goes to bed at 10-11 30 PM and wakes up at 630 AM .  The patient wakes up 2-3 time(s) during the night and has no problem going back to sleep.  Feels refreshed after waking up.     **[facility]**        -He states he is hesitant to go back on PAP therapy as he had prior PAP therapy in 2007 was unable to tolerate same described as pressures too much and pressures too little despite multiple masks tried  -Continues on dilaudid pain pump for the past 10 years  -[facility] not [facility] after last sleep study  -He states in the past he had a recalled dreamstation did use soclean/ozone device with it   Did not notice any particulate matter in mouth/nose/tubing/mask  Was told by [facility] must be replaced  -Denies ever needing supplemental O2 at home  -He states he has MRI compatible metal hardware in his neck  -He never had a titration study    -Diagnostics reviewed with patient in room today 3/17/2024  Polysomnography  Sleep latency was 1.7 minutes  Sleep efficiency was 92.1%  States he took his  "trazodone this night   Study was ordered as a split study  Did not qualify for split study on night of polysomnography within the first 2 hours  AHI was 37.8/h  (LUCITA was 0.8/h)  There was sleep-related hypoxia with longest consecutive time under 88% and 5.2 minutes  O2 caio 74%  Diagnosis severe obstructive sleep apnea  Sleep-related hypoxia  Recommendations was for auto CPAP 8-20 cm H2O, EPR 3, ramp off  Orders were sent to [facility] at patient requested preference          REVIEW OF SYSTEMS:   Is negative unless otherwise noted in HPI  [place] Sleepiness Scale: 14/24     Disclaimer History: The above history is based on this sleep physician's in room encounter with the patient. Pre encounter self administered questionnaires are taken into consideration and discussed with patient for any discordance. The above documentation by this sleep physician is the most accurate clinical information determined by in room sleep physician encounter with patient.     PHYSICAL EXAMINATION:  Vitals:    04/29/24 1300   BP: 136/69   Pulse: 72   SpO2: 97%   Weight: 83.3 kg (183 lb 9.6 oz)   Height: 180.3 cm (70.98\")    Body mass index is 25.62 kg/m².   CONSTITUTIONAL: Well appearing, in no overt pain or respiratory distress   ENT: Mallampati II, Macroglossia, no septal defects or nasal deformity  RESP SYSTEM:  Breath sounds are clear bilateral (no rales/rhonchi/wheezes), No overt respiratory distress, speaks in clear sentences without dyspnea, no accessory muscle use  CARDIOVASULAR: RRR, No edema noted  NEURO: Oriented x 3, no gross focal deficits,, no ataxia, no shuffling gait  Psychiatric: Affect is appropriate, goal oriented.      **[facility] if [facility] necessary**  ASSESSMENT AND PLAN:  Obstructive sleep apnea, adult [G47.33]  Sleep-related hypoxia  Chronic pain on long-term opioid therapy  Excessive daytime sleepiness  Intolerance of continuous positive airway pressure (CPAP) " ventilation [Z78.9]  -Positive Airway Pressure Titration ordered to guide management of sleep disordered breathing:   Patient's symptoms and examination is consistent with sleep apnea. have talked to the patient about the signs and symptoms of sleep apnea. In addition, I have also discussed pathophysiology of sleep apnea.  I also discussed the complications of untreated sleep apnea including effects on hypertension, diabetes mellitus and nonrestorative sleep with hypersomnia which can increase risk for motor vehicle accidents.  Untreated sleep apnea is also a risk factor for development of atrial fibrillation, hypertension, insulin resistance and cerebrovascular accident. Counseled no driving or operating heavy machinery while sleepy. Patient was given opportunity to ask questions and all the questions were answered.   -Counseled patient do not use so clean/ozone device with future PAP therapy  e also discussed the good sleep hygiene habits and adequate amount of sleep needed for good health.  Overweight with BMI is Body mass index is 25.62 kg/m².. Counseled weight loss will be beneficial for reduction in severity of sleep apnea, healthy diet/exercise to achieve same, follow up with primary care physician for serial monitoring and to further guide management.     Follow up After titration  . Patient's questions were answered.        EMR Dragon/Transcription disclaimer:   Much of this encounter note is an electronic transcription/translation of spoken language to printed text. The electronic translation of spoken language may permit erroneous, or at times, nonsensical words or phrases to be inadvertently transcribed; Although I have reviewed the note for such errors, some may still exist.       NPI #: [id number]    Lars Wang, DO  Sleep Medicine  [facility]  04/29/24

## 2024-04-30 ENCOUNTER — OFFICE VISIT (OUTPATIENT)
Dept: PSYCHIATRY | Facility: CLINIC | Age: 64
End: 2024-04-30
Payer: OTHER GOVERNMENT

## 2024-04-30 VITALS
DIASTOLIC BLOOD PRESSURE: 73 MMHG | HEIGHT: 71 IN | BODY MASS INDEX: 25.76 KG/M2 | HEART RATE: 68 BPM | SYSTOLIC BLOOD PRESSURE: 146 MMHG | WEIGHT: 184 LBS

## 2024-04-30 DIAGNOSIS — F41.1 GENERALIZED ANXIETY DISORDER: ICD-10-CM

## 2024-04-30 DIAGNOSIS — F51.05 INSOMNIA DUE TO MENTAL CONDITION: ICD-10-CM

## 2024-04-30 DIAGNOSIS — F33.1 MAJOR DEPRESSIVE DISORDER, RECURRENT EPISODE, MODERATE: Primary | ICD-10-CM

## 2024-04-30 PROCEDURE — 90792 PSYCH DIAG EVAL W/MED SRVCS: CPT | Performed by: STUDENT IN AN ORGANIZED HEALTH CARE EDUCATION/TRAINING PROGRAM

## 2024-04-30 RX ORDER — ARIPIPRAZOLE 2 MG/1
2 TABLET ORAL DAILY
Qty: 30 TABLET | Refills: 2 | Status: SHIPPED | OUTPATIENT
Start: 2024-04-30

## 2024-04-30 NOTE — PROGRESS NOTES
"Subjective   Stephon Castellano is a 63 y.o. male who presents today for initial evaluation     Referring Provider:  Dwayne Brandon MD  [address] [place], [place] [address]M[place] A,  [place] [address]    Chief Complaint:  depression    History of Present Illness:     04/30/2024: INITIAL VISIT Chart review:     Juan: Hydromorphone  Care Everywhere: a few non behavioral health notes, sees [facility]    Psychotropic medication chart review:  Present:  Trazodone 200 mg nightly  Cymbalta 60 mg a day    Previously:  Zoloft 50 mg a day    EKG: November 2023: 57, sinus, QTc 417  Procedures: Sleep study ordered for the future  Head imaging: November 2023 CT of the head shows no acute, MRI March 2023 shows no acute, empty sella configuration  Labs: March 2024: CMP shows creatinine 1.47, CK is elevated at 756, reassuring B12, hemoglobin is low at 11.5,  Initial Chart Review Notes: Seen by neurology in February for evaluation for seizures.  Patient notes that he is depressed and cannot sleep, depression began when he was in the [employer], his mother killed himself.  Depression began about 10 years ago.  Last year his problems became so severe that he quit his job.  He has not seen a psychiatrist for years.  Sleep medicine consult also ordered.      Patient Psychotherapy Notes:  Patient goals:  Misc:      Chart Review By Dates:      VISITS/APPOINTMENTS (BELOW):    \"Stephon\"      04/30/2024: In person.  Interview:  His/Her Story: \"[facility].\"  G14, P16  I've always had a little bit of depression.  But when I got injured, it got worse. \"I couldn't do things like I used to.\"  It's a combination of injuries, surgeries  Pain pump helps  Trazodone for 5 years  Cymbalta 5 years  Has never tried combinations, but has tried \"the gamut\" of them  Has lost 50 lbs in 3 mos, unsure why  Trouble focusing recently  Has chronic balance issues, no one knows why (cards, two neurologists, worked him up) x 3-4 " mos  Depression/Mood:  Depressed mood, anhedonia, poor energy, poor concentration, weight loss, insomnia.  Seasonal pattern: def  Severity: Moderate  Duration: all his life  Anxiety:  Uncontrolled worrying, muscle tension, fatigue, poor concentration, feeling on edge, irritability, insomnia.  Severity: Moderate  Duration: all of his life  Panic attacks: n  Psych ROS: Positive for depression, anxiety.  Negative for psychosis and rebecca.  ADHD: def  PTSD: no life threatening trauma  No SI HI AVH.  Medication compliant:      Access to Firearms: yes, locked away    PHQ-9 Depression Screening  PHQ-9 Total Score: 16    Little interest or pleasure in doing things? 2-->more than half the days   Feeling down, depressed, or hopeless? 2-->more than half the days   Trouble falling or staying asleep, or sleeping too much? 3-->nearly every day   Feeling tired or having little energy? 3-->nearly every day   Poor appetite or overeating? 2-->more than half the days   Feeling bad about yourself - or that you are a failure or have let yourself or your family down? 1-->several days   Trouble concentrating on things, such as reading the newspaper or watching television? 2-->more than half the days   Moving or speaking so slowly that other people could have noticed? Or the opposite - being so fidgety or restless that you have been moving around a lot more than usual? 1-->several days   Thoughts that you would be better off dead, or of hurting yourself in some way? 0-->not at all   PHQ-9 Total Score 16     TRIP-7  Feeling nervous, anxious or on edge: More than half the days  Not being able to stop or control worrying: More than half the days  Worrying too much about different things: More than half the days  Trouble Relaxing: Nearly every day  Being so restless that it is hard to sit still: More than half the days  Feeling afraid as if something awful might happen: Several days  Becoming easily annoyed or irritable: More than half the  days  TRIP 7 Total Score: 14  If you checked any problems, how difficult have these problems made it for you to do your work, take care of things at home, or get along with other people: Somewhat difficult    Past Surgical History:  Past Surgical History:   Procedure Laterality Date    CERVICAL DISC SURGERY  2008    COLONOSCOPY N/A 08/18/2023    Procedure: COLONOSCOPY WITH POLYPECTOMY/SNARE;  Surgeon: Jose Alejandro Fox MD;  Location: [facility] ENDOSCOPY;  Service: General;  Laterality: N/A;  COLON POLYPS    HEEL SPUR SURGERY  2005    KNEE ACL RECONSTRUCTION  2004    OTHER SURGICAL HISTORY      METAL IMPLANT    PAIN PUMP INSERTION/REVISION  2010    SPINE SURGERY  2008    Antieror c5-7 discotomy    TONSILLECTOMY  1965    VASECTOMY  2002       Problem List:  Patient Active Problem List   Diagnosis    Chronic right-sided thoracic back pain    Kidney stone    Screening due    Allergic rhinitis    Arthritis    Cervical neck pain with evidence of disc disease    Depression    Head injury    Hemorrhoid    Hypogonadism in male    Right wrist pain    Weight loss    Abdominal pain    Seizure-like activity    Palpitations    Abnormal EKG    Bradycardia    Brain fog    Fatigue    Acute kidney injury    Tremor    Slow transit constipation    Weakness of both lower extremities    Left hand weakness    Drug induced myoclonus    Insomnia due to other mental disorder    Obstructive sleep apnea syndrome    Cervical radiculopathy       Allergy:   No Known Allergies     Discontinued Medications:  There are no discontinued medications.    Current Medications:   Current Outpatient Medications   Medication Sig Dispense Refill    anastrozole (ARIMIDEX) 1 MG tablet Take 1 tablet by mouth Daily.      Cholecalciferol (Vitamin D3) 50 MCG (2000 UT) capsule Take 1 capsule by mouth Daily.      CINNAMON PO Take  by mouth.      DULoxetine (CYMBALTA) 60 MG capsule Take 1 capsule by mouth Daily.      finasteride (PROSCAR) 5 MG tablet Take 1 tablet by  mouth Daily for 360 days. 90 tablet 3    fluticasone (FLONASE) 50 MCG/ACT nasal spray 2 sprays into the nostril(s) as directed by provider Daily.      IRON, FERROUS GLUCONATE, PO Take 65 mg by mouth Daily.      Lysine 1000 MG tablet Take  by mouth.      Magnesium 250 MG tablet Take  by mouth.      multivitamin with minerals tablet tablet Take 1 tablet by mouth Daily.      Omega 3-6-9 Fatty Acids (OMEGA 3-6-9 COMPLEX PO) Take  by mouth.      pain patient supplied pump by Intrathecal route Continuous. Instructions are in drop boxes      polyethylene glycol (MiraLax) 17 GM/SCOOP powder Take 17 g by mouth Daily. (Patient taking differently: Take 17 g by mouth As Needed.) 850 g 5    tamsulosin (FLOMAX) 0.4 MG capsule 24 hr capsule Take 2 capsules by mouth Daily for 360 days. (Patient taking differently: Take 2 capsules by mouth As Needed.) 180 capsule 3    Testosterone Cypionate (DEPOTESTOTERONE CYPIONATE) 200 MG/ML injection       traZODone (DESYREL) 100 MG tablet Take 2 tablets by mouth Every Night.      Turmeric 500 MG capsule Take  by mouth.      Vitamin D-Vitamin K (K2-D3 10,000) 25580-16 UNIT-MCG capsule Take  by mouth.      ARIPiprazole (Abilify) 2 MG tablet Take 1 tablet by mouth Daily. 30 tablet 2     No current facility-administered medications for this visit.       Past Medical History:  Past Medical History:   Diagnosis Date    Alcohol abuse     Allergic rhinitis     Anemia 02/06/2024    My hand started just shaking but got worse until now i have no strength in my left hand    Anxiety 2006    Starting taking mood depression drugs    Arthritis     Asthma About 6 months    Sore Throat and was sent to ENT    Atrial fibrillation 2023    Benign prostatic hyperplasia 2015    Dad had Prostate Cancer and just did Colonoscopy and they said i have a major large Prostate    Cervical neck pain with evidence of disc disease     Chronic pain disorder     Colon polyp 2020    Had a lot of polyps on the last 2 Colonoscopies     CTS (carpal tunnel syndrome)     Depression     Difficulty walking     Erectile dysfunction     I have been on Testosterone replacements since then    Head injury     Headache May 2023    Started abou 6 months or more    Hemorrhoid     HL (hearing loss)     Have a major tinitis/ringing in my ears especially the left one    Hyperlipidemia     My blood test have stated they were high or elevated    Hypertension 2017    Given Flomax said it will probably go down    Hypogonadism in male     Insomnia     Kidney stone     Went to doctor and got medication for them and still have issues with right side    Low back pain     Stinosis of back & neck    Memory loss     Movement disorder     Sleep apnea 2007    Substance abuse [id number]    On Disulfiram for alcohol abuse-voluntary    Visual impairment     Just recently have been given eye drops       Past Psychiatric History:  Began Treatment: a year   Diagnoses: TRIP, insomnia, MDD  Psychiatrist: a year   Therapist: a year   Admission History:Denies  Medication Trials:    multiple    Self Harm: Denies  Suicide Attempts:Denies      Substance Abuse History:   Types: end of  stopped drinking using antabuse, former smoker 2ppd  Withdrawal Symptoms:Denies  Longest Period Sober: 6 mos, recently  AA: Not applicable     Social History:  Martial Status:  Employed: retired 22  Kids:Yes  House:Lives in a house   History: [employer], retired    Social History     Socioeconomic History    Marital status:    Tobacco Use    Smoking status: Former     Current packs/day: 0.00     Average packs/day: 2.0 packs/day for 8.0 years (16.0 ttl pk-yrs)     Types: Cigarettes     Start date: 1975     Quit date: 1983     Years since quittin.3     Passive exposure: Past    Smokeless tobacco: Former     Types: Snuff    Tobacco comments:     Also dipped for several years   Vaping Use    Vaping status: Never Used  "  Substance and Sexual Activity    Alcohol use: Not Currently     Alcohol/week: 14.0 standard drinks of alcohol     Types: 14 Shots of liquor per week     Comment: Stopped due to Disulfiram 250 mg    Drug use: Not Currently     Frequency: 7.0 times per week     Types: Marijuana    Sexual activity: Not Currently     Partners: Female     Birth control/protection: None, Vasectomy     Comment: Low testosterone       Family History:   Suicide Attempts:  Mom  Suicide Completions: mom  \"I think she had bipolar but I'm not sure\"      Family History   Problem Relation Age of Onset    Suicide Attempts Mother     Bipolar disorder Mother     Anxiety disorder Mother     Heart disease Mother     Osteoporosis Mother     Alcohol abuse Mother         Mother committed suicide    Depression Mother         She had major depression    Early death Mother         She committed suicide    Thyroid disease Mother         She had Thyroid issues and had surgery    Cancer Father         Prostate    Prostate cancer Father     Hearing loss Father         He has worn a hearing aids since his late 50’s    Depression Sister     Alcohol abuse Sister     Diabetes Sister         Diabetes    Arthritis Sister     Sleep apnea Sister     Obesity Sister     Thyroid disease Sister     Insomnia Sister     Narcolepsy Sister     Diabetes Brother     Stroke Other         AUNT/UNCLE GRANDPARENTS    Diabetes Other         GRANDPARENTS        Developmental History:       Childhood: saw mom and dad fight a lot, they , brother  when he was 3 yo.   High School:Completed  College: [employer]    Mental Status Exam  Appearance  : groomed, good eye contact, normal street clothes  Behavior  : pleasant and cooperative  Motor  : No abnormal  Speech  : talkative, normal rhythm, rate, volume, tone, not hyperverbal, not pressured, normal prosidy  Mood  : \"depressed\"  Affect  : euthymic, mood incongruent, good variability  Thought Content  : negative suicidal ideations, " "negative homicidal ideations, negative obsessions  Perceptions  : negative auditory hallucinations, negative visual hallucinations  Thought Process  : circumstantial  Insight/Judgement  : Fair/fair  Cognition  : grossly intact  Attention   : intact      Review of Systems:  Review of Systems   Constitutional:  Positive for diaphoresis and fatigue.   HENT:  Negative for drooling.    Eyes:  Positive for visual disturbance.   Respiratory:  Positive for cough and shortness of breath.    Cardiovascular:  Positive for palpitations. Negative for chest pain and leg swelling.   Gastrointestinal:  Negative for nausea and vomiting.   Endocrine: Positive for cold intolerance and heat intolerance.   Genitourinary:  Positive for difficulty urinating.   Musculoskeletal:  Positive for joint swelling.   Allergic/Immunologic: Negative for immunocompromised state.   Neurological:  Positive for dizziness and numbness. Negative for seizures and speech difficulty.       Physical Exam:  Physical Exam    Vital Signs:   /73   Pulse 68   Ht 180.3 cm (71\")   Wt 83.5 kg (184 lb)   BMI 25.66 kg/m²      Lab Results:   Office Visit on 04/10/2024   Component Date Value Ref Range Status    Urine Volume 04/10/2024 80   Final   Office Visit on 03/29/2024   Component Date Value Ref Range Status    Creatine Kinase 03/29/2024 756 (H)  20 - 200 U/L Final    Glucose 03/29/2024 99  65 - 99 mg/dL Final    BUN 03/29/2024 28 (H)  8 - 23 mg/dL Final    Creatinine 03/29/2024 1.47 (H)  0.76 - 1.27 mg/dL Final    Sodium 03/29/2024 139  136 - 145 mmol/L Final    Potassium 03/29/2024 4.3  3.5 - 5.2 mmol/L Final    Chloride 03/29/2024 103  98 - 107 mmol/L Final    CO2 03/29/2024 25.0  22.0 - 29.0 mmol/L Final    Calcium 03/29/2024 9.9  8.6 - 10.5 mg/dL Final    Total Protein 03/29/2024 7.5  6.0 - 8.5 g/dL Final    Albumin 03/29/2024 4.7  3.5 - 5.2 g/dL Final    ALT (SGPT) 03/29/2024 36  1 - 41 U/L Final    AST (SGOT) 03/29/2024 38  1 - 40 U/L Final    " Alkaline Phosphatase 03/29/2024 61  39 - 117 U/L Final    Total Bilirubin 03/29/2024 0.2  0.0 - 1.2 mg/dL Final    Globulin 03/29/2024 2.8  gm/dL Final    A/G Ratio 03/29/2024 1.7  g/dL Final    BUN/Creatinine Ratio 03/29/2024 19.0  7.0 - 25.0 Final    Anion Gap 03/29/2024 11.0  5.0 - 15.0 mmol/L Final    eGFR 03/29/2024 52.9 (L)  >60.0 mL/min/1.73 Final    WBC 03/29/2024 3.62  3.40 - 10.80 10*3/mm3 Final    RBC 03/29/2024 3.69 (L)  4.14 - 5.80 10*6/mm3 Final    Hemoglobin 03/29/2024 11.5 (L)  13.0 - 17.7 g/dL Final    Hematocrit 03/29/2024 34.7 (L)  37.5 - 51.0 % Final    MCV 03/29/2024 94.0  79.0 - 97.0 fL Final    MCH 03/29/2024 31.2  26.6 - 33.0 pg Final    MCHC 03/29/2024 33.1  31.5 - 35.7 g/dL Final    RDW 03/29/2024 12.3  12.3 - 15.4 % Final    RDW-SD 03/29/2024 42.7  37.0 - 54.0 fl Final    MPV 03/29/2024 10.6  6.0 - 12.0 fL Final    Platelets 03/29/2024 168  140 - 450 10*3/mm3 Final    Neutrophil % 03/29/2024 60.1  42.7 - 76.0 % Final    Lymphocyte % 03/29/2024 27.1  19.6 - 45.3 % Final    Monocyte % 03/29/2024 10.5  5.0 - 12.0 % Final    Eosinophil % 03/29/2024 1.4  0.3 - 6.2 % Final    Basophil % 03/29/2024 0.6  0.0 - 1.5 % Final    Immature Grans % 03/29/2024 0.3  0.0 - 0.5 % Final    Neutrophils, Absolute 03/29/2024 2.18  1.70 - 7.00 10*3/mm3 Final    Lymphocytes, Absolute 03/29/2024 0.98  0.70 - 3.10 10*3/mm3 Final    Monocytes, Absolute 03/29/2024 0.38  0.10 - 0.90 10*3/mm3 Final    Eosinophils, Absolute 03/29/2024 0.05  0.00 - 0.40 10*3/mm3 Final    Basophils, Absolute 03/29/2024 0.02  0.00 - 0.20 10*3/mm3 Final    Immature Grans, Absolute 03/29/2024 0.01  0.00 - 0.05 10*3/mm3 Final    nRBC 03/29/2024 0.0  0.0 - 0.2 /100 WBC Final    Vitamin B-12 03/29/2024 555  211 - 946 pg/mL Final   Office Visit on 02/28/2024   Component Date Value Ref Range Status    Creatine Kinase 02/28/2024 239 (H)  20 - 200 U/L Final    Hemoglobin A1C 02/28/2024 5.30  4.80 - 5.60 % Final    Sed Rate 02/28/2024 3  0 - 20 mm/hr  Final    C-Reactive Protein 02/28/2024 <0.30  0.00 - 0.50 mg/dL Final    Hepatitis C Ab 02/28/2024 Non-Reactive  Non-Reactive Final    HIV DUO 02/28/2024 Non-Reactive  Non-Reactive Final    WBC 02/28/2024 3.09 (L)  3.40 - 10.80 10*3/mm3 Final    RBC 02/28/2024 3.44 (L)  4.14 - 5.80 10*6/mm3 Final    Hemoglobin 02/28/2024 10.4 (L)  13.0 - 17.7 g/dL Final    Hematocrit 02/28/2024 31.4 (L)  37.5 - 51.0 % Final    MCV 02/28/2024 91.3  79.0 - 97.0 fL Final    MCH 02/28/2024 30.2  26.6 - 33.0 pg Final    MCHC 02/28/2024 33.1  31.5 - 35.7 g/dL Final    RDW 02/28/2024 12.5  12.3 - 15.4 % Final    RDW-SD 02/28/2024 40.6  37.0 - 54.0 fl Final    MPV 02/28/2024 10.3  6.0 - 12.0 fL Final    Platelets 02/28/2024 147  140 - 450 10*3/mm3 Final    Neutrophil % 02/28/2024 54.4  42.7 - 76.0 % Final    Lymphocyte % 02/28/2024 32.7  19.6 - 45.3 % Final    Monocyte % 02/28/2024 10.0  5.0 - 12.0 % Final    Eosinophil % 02/28/2024 2.3  0.3 - 6.2 % Final    Basophil % 02/28/2024 0.6  0.0 - 1.5 % Final    Immature Grans % 02/28/2024 0.0  0.0 - 0.5 % Final    Neutrophils, Absolute 02/28/2024 1.68 (L)  1.70 - 7.00 10*3/mm3 Final    Lymphocytes, Absolute 02/28/2024 1.01  0.70 - 3.10 10*3/mm3 Final    Monocytes, Absolute 02/28/2024 0.31  0.10 - 0.90 10*3/mm3 Final    Eosinophils, Absolute 02/28/2024 0.07  0.00 - 0.40 10*3/mm3 Final    Basophils, Absolute 02/28/2024 0.02  0.00 - 0.20 10*3/mm3 Final    Immature Grans, Absolute 02/28/2024 0.00  0.00 - 0.05 10*3/mm3 Final    nRBC 02/28/2024 0.0  0.0 - 0.2 /100 WBC Final   Office Visit on 02/12/2024   Component Date Value Ref Range Status    Creatine Kinase 02/13/2024 339 (H)  20 - 200 U/L Final    AChR Binding Ab 02/13/2024 <0.03  0.00 - 0.24 nmol/L Final                                   Negative:   0.00 - 0.24                                 Borderline: 0.25 - 0.40                                 Positive:         >0.40    AChR Blocking Abs 02/13/2024 13  0 - 25 % Final                                    Negative:      0 - 25                                 Borderline:   26 - 30                                 Positive:         >30    ACHR Receptor Modulating Ab 02/13/2024 0  0 - 45 % Final                           Interpretive Information:                          Negative:             0 - 45%                          Positive:               > 45%  No single value for AChR-modulating antibody should  be used as a sole basis for diagnosis or response  to therapy.    Striated Muscle Antibody 02/13/2024 Negative  Neg:<1:100 Final    Reflex Information 02/13/2024 Comment   Final    Reflex test indicated.    NMO IgG Autoantibodies 02/13/2024 <1.5  0.0 - 3.0 U/mL Final                                 Negative:      0.0 - 3.0                               Positive:           >3.0    Glucose 02/13/2024 125 (H)  65 - 99 mg/dL Final    BUN 02/13/2024 14  8 - 23 mg/dL Final    Creatinine 02/13/2024 1.50 (H)  0.76 - 1.27 mg/dL Final    Sodium 02/13/2024 140  136 - 145 mmol/L Final    Potassium 02/13/2024 4.0  3.5 - 5.2 mmol/L Final    Chloride 02/13/2024 101  98 - 107 mmol/L Final    CO2 02/13/2024 30.0 (H)  22.0 - 29.0 mmol/L Final    Calcium 02/13/2024 9.4  8.6 - 10.5 mg/dL Final    Total Protein 02/13/2024 6.8  6.0 - 8.5 g/dL Final    Albumin 02/13/2024 4.6  3.5 - 5.2 g/dL Final    ALT (SGPT) 02/13/2024 39  1 - 41 U/L Final    AST (SGOT) 02/13/2024 33  1 - 40 U/L Final    Alkaline Phosphatase 02/13/2024 64  39 - 117 U/L Final    Total Bilirubin 02/13/2024 0.2  0.0 - 1.2 mg/dL Final    Globulin 02/13/2024 2.2  gm/dL Final    A/G Ratio 02/13/2024 2.1  g/dL Final    BUN/Creatinine Ratio 02/13/2024 9.3  7.0 - 25.0 Final    Anion Gap 02/13/2024 9.0  5.0 - 15.0 mmol/L Final    eGFR 02/13/2024 51.7 (L)  >60.0 mL/min/1.73 Final    WBC 02/13/2024 3.46  3.40 - 10.80 10*3/mm3 Final    RBC 02/13/2024 3.34 (L)  4.14 - 5.80 10*6/mm3 Final    Hemoglobin 02/13/2024 10.4 (L)  13.0 - 17.7 g/dL Final    Hematocrit 02/13/2024 31.0 (L)   37.5 - 51.0 % Final    MCV 02/13/2024 92.8  79.0 - 97.0 fL Final    MCH 02/13/2024 31.1  26.6 - 33.0 pg Final    MCHC 02/13/2024 33.5  31.5 - 35.7 g/dL Final    RDW 02/13/2024 12.7  12.3 - 15.4 % Final    RDW-SD 02/13/2024 42.9  37.0 - 54.0 fl Final    MPV 02/13/2024 9.8  6.0 - 12.0 fL Final    Platelets 02/13/2024 147  140 - 450 10*3/mm3 Final    Neutrophil % 02/13/2024 36.4 (L)  42.7 - 76.0 % Final    Lymphocyte % 02/13/2024 52.0 (H)  19.6 - 45.3 % Final    Monocyte % 02/13/2024 8.1  5.0 - 12.0 % Final    Eosinophil % 02/13/2024 3.2  0.3 - 6.2 % Final    Basophil % 02/13/2024 0.3  0.0 - 1.5 % Final    Immature Grans % 02/13/2024 0.0  0.0 - 0.5 % Final    Neutrophils, Absolute 02/13/2024 1.26 (L)  1.70 - 7.00 10*3/mm3 Final    Lymphocytes, Absolute 02/13/2024 1.80  0.70 - 3.10 10*3/mm3 Final    Monocytes, Absolute 02/13/2024 0.28  0.10 - 0.90 10*3/mm3 Final    Eosinophils, Absolute 02/13/2024 0.11  0.00 - 0.40 10*3/mm3 Final    Basophils, Absolute 02/13/2024 0.01  0.00 - 0.20 10*3/mm3 Final    Immature Grans, Absolute 02/13/2024 0.00  0.00 - 0.05 10*3/mm3 Final    nRBC 02/13/2024 0.0  0.0 - 0.2 /100 WBC Final    Musk Antibody 02/13/2024 <1.0  U/mL Final    Reference Range:    Negative: <1.0    Positive: 1.0 or higher  This test was developed and its performance characteristics  determined by [facility]. It has not been cleared or approved  by the Food and Drug Administration.   Office Visit on 01/02/2024   Component Date Value Ref Range Status    Urine Volume 01/02/2024 74   Final    Color 01/02/2024 Yellow  Yellow, Straw, Dark Yellow, Mavis Final    Clarity, UA 01/02/2024 Clear  Clear Final    Specific Gravity  01/02/2024 1.015  1.005 - 1.030 Final    pH, Urine 01/02/2024 7.0  5.0 - 8.0 Final    Leukocytes 01/02/2024 Negative  Negative Final    Nitrite, UA 01/02/2024 Negative  Negative Final    Protein, POC 01/02/2024 Negative  Negative mg/dL Final    Glucose, UA 01/02/2024 Negative  Negative mg/dL Final    Ketones,  UA 01/02/2024 Negative  Negative Final    Urobilinogen, UA 01/02/2024 0.2 E.U./dL  Normal, 0.2 E.U./dL Final    Bilirubin 01/02/2024 Negative  Negative Final    Blood, UA 01/02/2024 Negative  Negative Final    Lot Number 01/02/2024 [id number]   Final    Expiration Date 01/02/2024 10/2,024   Final   Office Visit on 11/21/2023   Component Date Value Ref Range Status    Glucose 11/21/2023 112 (H)  65 - 99 mg/dL Final    BUN 11/21/2023 23  8 - 23 mg/dL Final    Creatinine 11/21/2023 2.54 (H)  0.76 - 1.27 mg/dL Final    Sodium 11/21/2023 137  136 - 145 mmol/L Final    Potassium 11/21/2023 4.9  3.5 - 5.2 mmol/L Final    Chloride 11/21/2023 97 (L)  98 - 107 mmol/L Final    CO2 11/21/2023 30.9 (H)  22.0 - 29.0 mmol/L Final    Calcium 11/21/2023 9.7  8.6 - 10.5 mg/dL Final    BUN/Creatinine Ratio 11/21/2023 9.1  7.0 - 25.0 Final    Anion Gap 11/21/2023 9.1  5.0 - 15.0 mmol/L Final    eGFR 11/21/2023 27.5 (L)  >60.0 mL/min/1.73 Final    WBC 11/21/2023 4.06  3.40 - 10.80 10*3/mm3 Final    RBC 11/21/2023 3.66 (L)  4.14 - 5.80 10*6/mm3 Final    Hemoglobin 11/21/2023 11.4 (L)  13.0 - 17.7 g/dL Final    Hematocrit 11/21/2023 34.0 (L)  37.5 - 51.0 % Final    MCV 11/21/2023 92.9  79.0 - 97.0 fL Final    MCH 11/21/2023 31.1  26.6 - 33.0 pg Final    MCHC 11/21/2023 33.5  31.5 - 35.7 g/dL Final    RDW 11/21/2023 11.5 (L)  12.3 - 15.4 % Final    RDW-SD 11/21/2023 38.5  37.0 - 54.0 fl Final    MPV 11/21/2023 10.6  6.0 - 12.0 fL Final    Platelets 11/21/2023 121 (L)  140 - 450 10*3/mm3 Final    Neutrophil % 11/21/2023 38.0 (L)  42.7 - 76.0 % Final    Lymphocyte % 11/21/2023 47.5 (H)  19.6 - 45.3 % Final    Monocyte % 11/21/2023 10.3  5.0 - 12.0 % Final    Eosinophil % 11/21/2023 3.7  0.3 - 6.2 % Final    Basophil % 11/21/2023 0.5  0.0 - 1.5 % Final    Immature Grans % 11/21/2023 0.0  0.0 - 0.5 % Final    Neutrophils, Absolute 11/21/2023 1.54 (L)  1.70 - 7.00 10*3/mm3 Final    Lymphocytes, Absolute 11/21/2023 1.93  0.70 - 3.10 10*3/mm3 Final     Monocytes, Absolute 11/21/2023 0.42  0.10 - 0.90 10*3/mm3 Final    Eosinophils, Absolute 11/21/2023 0.15  0.00 - 0.40 10*3/mm3 Final    Basophils, Absolute 11/21/2023 0.02  0.00 - 0.20 10*3/mm3 Final    Immature Grans, Absolute 11/21/2023 0.00  0.00 - 0.05 10*3/mm3 Final    nRBC 11/21/2023 0.0  0.0 - 0.2 /100 WBC Final   No results displayed because visit has over 200 results.      Office Visit on 11/14/2023   Component Date Value Ref Range Status    Glucose 11/14/2023 96  65 - 99 mg/dL Final    BUN 11/14/2023 37 (H)  8 - 23 mg/dL Final    Creatinine 11/14/2023 2.49 (H)  0.76 - 1.27 mg/dL Final    Sodium 11/14/2023 137  136 - 145 mmol/L Final    Potassium 11/14/2023 4.4  3.5 - 5.2 mmol/L Final    Chloride 11/14/2023 96 (L)  98 - 107 mmol/L Final    CO2 11/14/2023 29.8 (H)  22.0 - 29.0 mmol/L Final    Calcium 11/14/2023 9.7  8.6 - 10.5 mg/dL Final    Total Protein 11/14/2023 7.3  6.0 - 8.5 g/dL Final    Albumin 11/14/2023 4.4  3.5 - 5.2 g/dL Final    ALT (SGPT) 11/14/2023 34  1 - 41 U/L Final    AST (SGOT) 11/14/2023 19  1 - 40 U/L Final    Alkaline Phosphatase 11/14/2023 73  39 - 117 U/L Final    Total Bilirubin 11/14/2023 <0.2  0.0 - 1.2 mg/dL Final    Globulin 11/14/2023 2.9  gm/dL Final    A/G Ratio 11/14/2023 1.5  g/dL Final    BUN/Creatinine Ratio 11/14/2023 14.9  7.0 - 25.0 Final    Anion Gap 11/14/2023 11.2  5.0 - 15.0 mmol/L Final    eGFR 11/14/2023 28.1 (L)  >60.0 mL/min/1.73 Final    Creatine Kinase 11/14/2023 127  20 - 200 U/L Final    RPR 11/14/2023 Non-Reactive  Non-Reactive Final    25 Hydroxy, Vitamin D 11/14/2023 64.2  30.0 - 100.0 ng/ml Final    Prolactin 11/14/2023 7.63  4.04 - 15.20 ng/mL Final    Magnesium 11/14/2023 2.3  1.6 - 2.4 mg/dL Final    WBC 11/14/2023 4.01  3.40 - 10.80 10*3/mm3 Final    RBC 11/14/2023 3.71 (L)  4.14 - 5.80 10*6/mm3 Final    Hemoglobin 11/14/2023 11.7 (L)  13.0 - 17.7 g/dL Final    Hematocrit 11/14/2023 34.7 (L)  37.5 - 51.0 % Final    MCV 11/14/2023 93.5  79.0 -  97.0 fL Final    MCH 11/14/2023 31.5  26.6 - 33.0 pg Final    MCHC 11/14/2023 33.7  31.5 - 35.7 g/dL Final    RDW 11/14/2023 11.5 (L)  12.3 - 15.4 % Final    RDW-SD 11/14/2023 38.8  37.0 - 54.0 fl Final    MPV 11/14/2023 10.4  6.0 - 12.0 fL Final    Platelets 11/14/2023 144  140 - 450 10*3/mm3 Final    Neutrophil % 11/14/2023 50.0  42.7 - 76.0 % Final    Lymphocyte % 11/14/2023 37.4  19.6 - 45.3 % Final    Monocyte % 11/14/2023 8.7  5.0 - 12.0 % Final    Eosinophil % 11/14/2023 3.0  0.3 - 6.2 % Final    Basophil % 11/14/2023 0.7  0.0 - 1.5 % Final    Immature Grans % 11/14/2023 0.2  0.0 - 0.5 % Final    Neutrophils, Absolute 11/14/2023 2.00  1.70 - 7.00 10*3/mm3 Final    Lymphocytes, Absolute 11/14/2023 1.50  0.70 - 3.10 10*3/mm3 Final    Monocytes, Absolute 11/14/2023 0.35  0.10 - 0.90 10*3/mm3 Final    Eosinophils, Absolute 11/14/2023 0.12  0.00 - 0.40 10*3/mm3 Final    Basophils, Absolute 11/14/2023 0.03  0.00 - 0.20 10*3/mm3 Final    Immature Grans, Absolute 11/14/2023 0.01  0.00 - 0.05 10*3/mm3 Final    nRBC 11/14/2023 0.0  0.0 - 0.2 /100 WBC Final       EKG Results:  No orders to display       Imaging Results:  CT Chest Without Contrast Diagnostic    Result Date: 3/19/2024  Impression: CT scan of the chest without IV contrast demonstrating tree-in-bud airspace disease in the left lower lobe suggesting indolent infection. Follow-up CT scan of the chest in 3 months is recommended.    Electronically Signed By-SOILA TREVIÑO MD On:3/19/2024 3:23 PM          Assessment & Plan   Diagnoses and all orders for this visit:    1. Major depressive disorder, recurrent episode, moderate (Primary)  -     ARIPiprazole (Abilify) 2 MG tablet; Take 1 tablet by mouth Daily.  Dispense: 30 tablet; Refill: 2    2. Generalized anxiety disorder  -     ARIPiprazole (Abilify) 2 MG tablet; Take 1 tablet by mouth Daily.  Dispense: 30 tablet; Refill: 2    3. Insomnia due to mental condition  -     ARIPiprazole (Abilify) 2 MG tablet; Take 1  tablet by mouth Daily.  Dispense: 30 tablet; Refill: 2        Visit Diagnoses:    ICD-10-CM ICD-9-CM   1. Major depressive disorder, recurrent episode, moderate  F33.1 296.32   2. Generalized anxiety disorder  F41.1 300.02   3. Insomnia due to mental condition  F51.05 300.9     327.02     4/30/24: R/O ADHD. Start abilify, Therapy? Wgt loss, consider mirtazapine, seroquel. 6w.    PLAN:  Safety: No acute safety concerns  Therapy: None  Risk Assessment: Risk of self-harm acutely is moderate.  Risk factors include anxiety disorder, mood disorder, fhx, access to guns, and recent psychosocial stressors (pandemic). Protective factors include no present SI, no history of suicide attempts or self-harm in the past, minimal AODA, healthcare seeking, future orientation, willingness to engage in care.  Risk of self-harm chronically is also moderate, but could be further elevated in the event of treatment noncompliance and/or AODA.  Meds:  CONTINUE trazodone 50 mg qhs. 100 mg made him groggy the next day. Risks, benefits, side effects discussed with patient including GI upset, sedation, dizziness/falls risk, grogginess the following day, prolongation of the QTc interval.  Do not use before operating vehicle, vessel, or machine. After discussion of these risks and benefits, the patient voiced understanding and agreed to proceed.    START abilify 2 mg qhs. Risks, benefits, alternatives discussed with patient including increased energy, exacerbation of irritability, akathisia, movement issues, GI upset, orthostatic hypotension, increased appetite, tardive dyskinesia.  Use care when operating vehicle, vessel, or machine. After discussion of these risks and benefits, the patient voiced understanding and agreed to proceed.  CONTINUE cymbalta 60 mg qam. Risks, benefits, alternatives discussed with patient including GI upset, nausea vomiting diarrhea, theoretical decrease of seizure threshold predisposing the patient to a slightly higher  seizure risk, headaches, sexual dysfunction, serotonin syndrome, bleeding risk, increased suicidality in patients 24 years and younger, switching to rebecca/hypomania.  Also constipation and urinary retention.  After discussion of these risks and benefits, the patient voiced understanding and agreed to proceed.  Labs: none    Patient screened positive for depression based on a PHQ-9 score of 16 on 4/30/2024. Follow-up recommendations include: Prescribed antidepressant medication treatment and Suicide Risk Assessment performed.           TREATMENT PLAN/GOALS: Continue supportive psychotherapy efforts and medications as indicated. Treatment and medication options discussed during today's visit. Patient acknowledged and verbally consented to continue with current treatment plan and was educated on the importance of compliance with treatment and follow-up appointments.    MEDICATION ISSUES:  JIM reviewed as expected.  Discussed medication options and treatment plan of prescribed medication as well as the risks, benefits, and side effects including potential falls, possible impaired driving and metabolic adversities among others. Patient is agreeable to call the office with any worsening of symptoms or onset of side effects. Patient is agreeable to call 911 or go to the nearest ER should he/she begin having SI/HI. No medication side effects or related complaints today.     MEDS ORDERED DURING VISIT:  New Medications Ordered This Visit   Medications    ARIPiprazole (Abilify) 2 MG tablet     Sig: Take 1 tablet by mouth Daily.     Dispense:  30 tablet     Refill:  2       Return in about 6 weeks (around 6/11/2024).         This document has been electronically signed by Penelope Davis MD  April 30, 2024 13:26 EDT    Dictated Utilizing Dragon Dictation: Part of this note may be an electronic transcription/translation of spoken language to printed text using the Dragon Dictation System.

## 2024-04-30 NOTE — TREATMENT PLAN
Multi-Disciplinary Problems (from Behavioral Health Treatment Plan)      Active Problems       Problem: Anxiety  Start Date: 04/30/24      Problem Details: The patient self-scales this problem as a 7 with 10 being the worst.          Goal Priority Start Date Expected End Date End Date    Patient will develop and implement behavioral and cognitive strategies to reduce anxiety and irrational fears. -- 04/30/24 -- --    Goal Details: Progress toward goal:  Not appropriate to rate progress toward goal since this is the initial treatment plan.          Goal Intervention Frequency Start Date End Date    Help patient explore past emotional issues in relation to present anxiety. Q Month 04/30/24 --    Intervention Details: Duration of treatment until until remission of symptoms.          Goal Intervention Frequency Start Date End Date    Help patient develop an awareness of their cognitive and physical responses to anxiety. Q Month 04/30/24 --    Intervention Details: Duration of treatment until until remission of symptoms.                  Problem: Depression  Start Date: 04/30/24      Problem Details: The patient self-scales this problem as a 6 with 10 being the worst.          Goal Priority Start Date Expected End Date End Date    Patient will demonstrate the ability to initiate new constructive life skills outside of sessions on a consistent basis. -- 04/30/24 -- --    Goal Details: Progress toward goal:  Not appropriate to rate progress toward goal since this is the initial treatment plan.          Goal Intervention Frequency Start Date End Date    Assist patient in setting attainable activities of daily living goals. PRN 04/30/24 --      Goal Intervention Frequency Start Date End Date    Provide education about depression Q Month 04/30/24 --    Intervention Details: Duration of treatment until until remission of symptoms.          Goal Intervention Frequency Start Date End Date    Assist patient in developing healthy  coping strategies. Q Month 04/30/24 --    Intervention Details: Duration of treatment until until remission of symptoms.                          Reviewed By       Penelope Davis MD 04/30/24 8914                     I have discussed and reviewed this treatment plan with the patient.

## 2024-04-30 NOTE — PATIENT INSTRUCTIONS
1.  Please return to clinic at your next scheduled visit.  Contact the clinic ([phone]) at least 24 hours prior in the event you need to cancel.  2.  Do no harm to yourself or others.    3.  Avoid alcohol and drugs.    4.  Take all medications as prescribed.  Please contact the clinic with any concerns. If you are in need of medication refills, please call the clinic at [phone].    5. Should you want to get in touch with your provider, Dr. Penelope Davis, please utilize [employer] or contact the office ([phone]), and staff will be able to page Dr. Davis directly.  6.  In the event you have personal crisis, contact the following crisis numbers: Suicide Prevention Hotline 1-134.345.9667; GERMAINE Helpline 7-456-354-GERMAINE; [facility] Emergency Room [phone]; text HELLO to [phone]; or 437.                RIGHT DIGITAL DIAGNOSTIC MAMMOGRAM : 01/09/17 08:00:00



CLINICAL: Recalled for calcifications.



COMPARISON:12/12/16



FINDINGS:Implant displaced ML and CC magnification views were performed 

and demonstrate a group of benign-appearing posterior calcifications.  

No layering.  The calcifications may be intramuscular.  No associated 

mass or architectural distortion.  IMPRESSION: Stable probably benign 

calcifications.



BI-RADS CATEGORY:  Benign calcifications.



RECOMMENDATION: Routine mammographic screening in one year.



ACR BI-RADS MAMMOGRAPHIC CODES:

0 = Needs additional imaging evaluation; 1 = Negative; 2 = Benign; 3 = 

Probably benign; 4 = Suspicious; 5 = Malignant; 6 = Known biopsy-proven 

malignancy



COMMENT:

      1.   Dense breast tissue, i.e., adenosis, fibrocystic 

            changes, etc., may obscure an underlying neoplasm.

      2.   Approximately 10% of cancers are not detected with

            mammography.

      3.   A negative mammography report should not delay biopsy 

            if a clinically suspicious mass is present.





COMMENT:

Patient follow-up letters are generated by our ipadio application.

## 2024-05-03 ENCOUNTER — PATIENT ROUNDING (BHMG ONLY) (OUTPATIENT)
Dept: PSYCHIATRY | Facility: CLINIC | Age: 64
End: 2024-05-03
Payer: OTHER GOVERNMENT

## 2024-05-03 NOTE — PROGRESS NOTES
May 3, 2024    Hello, may I speak with Stephon Castellano?    My name is ALEXEY SORIANO NRCMA/NRCPT     I am  with [facility]  [facility]  [address] [place] [address] [phone].    Before we get started may I verify your date of birth? 1960    I am calling to officially welcome you to our practice and ask about your recent visit. Is this a good time to talk?   YES     Tell me about your visit with us. What things went well?    EVERYTHING WAS GREAT      We're always looking for ways to make our patients' experiences even better. Do you have recommendations on ways we may improve?    NOPE     Overall were you satisfied with your first visit to our practice?   YES      I appreciate you taking the time to speak with me today. Is there anything else I can do for you?   NOPE     Thank you, and have a great day.

## 2024-05-06 ENCOUNTER — OFFICE VISIT (OUTPATIENT)
Dept: INTERNAL MEDICINE | Facility: CLINIC | Age: 64
End: 2024-05-06
Payer: OTHER GOVERNMENT

## 2024-05-06 VITALS
BODY MASS INDEX: 25.81 KG/M2 | HEIGHT: 71 IN | DIASTOLIC BLOOD PRESSURE: 70 MMHG | OXYGEN SATURATION: 96 % | TEMPERATURE: 98.6 F | WEIGHT: 184.4 LBS | SYSTOLIC BLOOD PRESSURE: 128 MMHG | HEART RATE: 86 BPM | RESPIRATION RATE: 16 BRPM

## 2024-05-06 DIAGNOSIS — K52.9 CHRONIC DIARRHEA: Primary | ICD-10-CM

## 2024-05-06 DIAGNOSIS — R20.2 PARESTHESIA OF LOWER EXTREMITY: ICD-10-CM

## 2024-05-06 DIAGNOSIS — N18.31 STAGE 3A CHRONIC KIDNEY DISEASE: ICD-10-CM

## 2024-05-06 PROCEDURE — 99214 OFFICE O/P EST MOD 30 MIN: CPT | Performed by: NURSE PRACTITIONER

## 2024-05-10 ENCOUNTER — TELEPHONE (OUTPATIENT)
Dept: SURGERY | Facility: CLINIC | Age: 64
End: 2024-05-10
Payer: OTHER GOVERNMENT

## 2024-05-20 ENCOUNTER — HOSPITAL ENCOUNTER (OUTPATIENT)
Facility: HOSPITAL | Age: 64
Discharge: HOME | End: 2024-05-20
Admitting: NURSE PRACTITIONER
Payer: OTHER GOVERNMENT

## 2024-05-20 DIAGNOSIS — R20.2 PARESTHESIA OF LOWER EXTREMITY: ICD-10-CM

## 2024-05-20 PROCEDURE — 95910 NRV CNDJ TEST 7-8 STUDIES: CPT

## 2024-05-20 PROCEDURE — 95886 MUSC TEST DONE W/N TEST COMP: CPT

## 2024-05-29 ENCOUNTER — HOSPITAL ENCOUNTER (OUTPATIENT)
Dept: RESPIRATORY THERAPY | Facility: HOSPITAL | Age: 64
Discharge: HOME | End: 2024-05-29
Admitting: INTERNAL MEDICINE
Payer: OTHER GOVERNMENT

## 2024-05-29 DIAGNOSIS — R93.89 ABNORMAL CT OF THE CHEST: ICD-10-CM

## 2024-05-29 DIAGNOSIS — R06.09 DYSPNEA ON EXERTION: ICD-10-CM

## 2024-05-29 PROCEDURE — 94726 PLETHYSMOGRAPHY LUNG VOLUMES: CPT

## 2024-05-29 PROCEDURE — 94060 EVALUATION OF WHEEZING: CPT

## 2024-05-29 PROCEDURE — 94729 DIFFUSING CAPACITY: CPT

## 2024-05-29 RX ORDER — ALBUTEROL SULFATE 2.5 MG/3ML
2.5 SOLUTION RESPIRATORY (INHALATION) ONCE
Status: COMPLETED | OUTPATIENT
Start: 2024-05-29 | End: 2024-05-29

## 2024-05-29 RX ADMIN — ALBUTEROL SULFATE 2.5 MG: 2.5 SOLUTION RESPIRATORY (INHALATION) at 14:18

## 2024-05-30 ENCOUNTER — TRANSCRIBE ORDERS (OUTPATIENT)
Dept: PHYSICAL THERAPY | Facility: CLINIC | Age: 64
End: 2024-05-30
Payer: OTHER GOVERNMENT

## 2024-05-30 ENCOUNTER — OFFICE VISIT (OUTPATIENT)
Dept: INTERNAL MEDICINE | Facility: CLINIC | Age: 64
End: 2024-05-30
Payer: OTHER GOVERNMENT

## 2024-05-30 VITALS
HEART RATE: 106 BPM | TEMPERATURE: 97.8 F | OXYGEN SATURATION: 95 % | HEIGHT: 71 IN | RESPIRATION RATE: 20 BRPM | SYSTOLIC BLOOD PRESSURE: 114 MMHG | BODY MASS INDEX: 25.55 KG/M2 | WEIGHT: 182.5 LBS | DIASTOLIC BLOOD PRESSURE: 68 MMHG

## 2024-05-30 DIAGNOSIS — R20.0 NUMBNESS AND TINGLING OF BOTH FEET: Primary | ICD-10-CM

## 2024-05-30 DIAGNOSIS — N18.32 STAGE 3B CHRONIC KIDNEY DISEASE: Primary | ICD-10-CM

## 2024-05-30 DIAGNOSIS — R20.0 NUMBNESS AND TINGLING OF BOTH FEET: ICD-10-CM

## 2024-05-30 DIAGNOSIS — R20.2 NUMBNESS AND TINGLING OF BOTH FEET: Primary | ICD-10-CM

## 2024-05-30 DIAGNOSIS — R20.2 NUMBNESS AND TINGLING OF BOTH FEET: ICD-10-CM

## 2024-05-30 DIAGNOSIS — R29.898 WEAKNESS OF BOTH LOWER EXTREMITIES: ICD-10-CM

## 2024-05-30 PROCEDURE — 99214 OFFICE O/P EST MOD 30 MIN: CPT | Performed by: INTERNAL MEDICINE

## 2024-05-30 RX ORDER — NEEDLES, SAFETY 18GX1 1/2"
NEEDLE, DISPOSABLE MISCELLANEOUS
COMMUNITY
Start: 2024-05-01

## 2024-05-30 RX ORDER — HYDROMORPHONE HYDROCHLORIDE 1 MG/ML
0-2 INJECTION, SOLUTION INTRAMUSCULAR; INTRAVENOUS; SUBCUTANEOUS
COMMUNITY

## 2024-05-30 NOTE — PROGRESS NOTES
"Chief Complaint  Follow-up (FOLLOW UP review EMG testing, discuss lower extremity problems, weight loss, kidney disease )    Subjective      Stephon Castellano is a 63 y.o. male who presents to [facility]     Was seen by [facility] for second opinion. Unfortunately EMG results were not sent to the neurology office, so they could not be reviewed at the time of the visit.     Labs performed at Neurology appointment, showed worsening kidney function.     He is still losing weight. Work up for this has been normal.     Objective   Vital Signs:   Vitals:    05/30/24 1012   BP: 114/68   BP Location: Left arm   Patient Position: Sitting   Cuff Size: Adult   Pulse: 106   Resp: 20   Temp: 97.8 °F (36.6 °C)   TempSrc: Temporal   SpO2: 95%   Weight: 82.8 kg (182 lb 8 oz)   Height: 180.3 cm (70.98\")     Body mass index is 25.47 kg/m².    Wt Readings from Last 3 Encounters:   05/30/24 82.8 kg (182 lb 8 oz)   05/06/24 83.6 kg (184 lb 6.4 oz)   04/30/24 83.5 kg (184 lb)     BP Readings from Last 3 Encounters:   05/30/24 114/68   05/06/24 128/70   04/30/24 146/73       Health Maintenance   Topic Date Due    ANNUAL PHYSICAL  Never done    COLORECTAL CANCER SCREENING  08/18/2024    COVID-19 Vaccine (3 - 2023-24 season) 07/07/2024 (Originally 9/1/2023)    RSV Vaccine - Adults (1 - 1-dose 60+ series) 04/17/2025 (Originally 9/17/2019)    INFLUENZA VACCINE  08/01/2024    LIPID PANEL  09/20/2024    BMI FOLLOWUP  03/22/2025    TDAP/TD VACCINES (4 - Td or Tdap) 03/29/2029    HEPATITIS C SCREENING  Completed    ZOSTER VACCINE  Completed    Pneumococcal Vaccine 0-64  Aged Out       Physical Exam  Vitals reviewed.   Constitutional:       Appearance: Normal appearance. He is well-developed.   HENT:      Head: Normocephalic and atraumatic.      Mouth/Throat:      Pharynx: No oropharyngeal exudate.   Eyes:      Conjunctiva/sclera: Conjunctivae normal.      Pupils: Pupils are equal, round, and " reactive to light.   Neck:      Thyroid: No thyromegaly or thyroid tenderness.   Cardiovascular:      Rate and Rhythm: Normal rate and regular rhythm.      Heart sounds: No murmur heard.     No friction rub. No gallop.   Pulmonary:      Effort: Pulmonary effort is normal.      Breath sounds: Normal breath sounds. No wheezing or rhonchi.   Lymphadenopathy:      Cervical: No cervical adenopathy.   Skin:     General: Skin is warm and dry.   Neurological:      Mental Status: He is alert and oriented to person, place, and time.   Psychiatric:         Mood and Affect: Affect normal.          Result Review :  The following data was reviewed by: Paige Florez MD on 05/30/2024:  CMP          2/13/2024    09:38 3/29/2024    09:51 5/28/2024    09:36   CMP   Glucose 125  99     BUN 14  28     Creatinine 1.50  1.47     EGFR 51.7  52.9     Sodium 140  139     Potassium 4.0  4.3     Chloride 101  103     Calcium 9.4  9.9     Total Protein 6.8  7.5  7.4       Albumin 4.6  4.7  4.7       Globulin 2.2  2.8     Total Bilirubin 0.2  0.2     Alkaline Phosphatase 64  61     AST (SGOT) 33  38     ALT (SGPT) 39  36     Albumin/Globulin Ratio 2.1  1.7     BUN/Creatinine Ratio 9.3  19.0     Anion Gap 9.0  11.0        Details          This result is from an external source.             CBC          2/13/2024    09:38 2/28/2024    10:40 3/29/2024    09:51   CBC   WBC 3.46  3.09  3.62    RBC 3.34  3.44  3.69    Hemoglobin 10.4  10.4  11.5    Hematocrit 31.0  31.4  34.7    MCV 92.8  91.3  94.0    MCH 31.1  30.2  31.2    MCHC 33.5  33.1  33.1    RDW 12.7  12.5  12.3    Platelets 147  147  168      Lipid Panel          9/20/2023    16:54   Lipid Panel   Total Cholesterol 188    Triglycerides 113    HDL Cholesterol 43    VLDL Cholesterol 20    LDL Cholesterol  125    LDL/HDL Ratio 2.85      TSH          9/20/2023    16:54 11/14/2023    17:56 11/15/2023    01:57   TSH   TSH 0.580  1.130  2.010      Most Recent A1C          2/28/2024     "10:40   HGBA1C Most Recent   Hemoglobin A1C 5.30        Lab Results   Component Value Date    QPKU47LC 64.2 11/14/2023        Lab Results   Component Value Date    FREET4 0.90 (L) 11/15/2023     Lab Results   Component Value Date    EDIIJUFK95 555 03/29/2024     C Reactive Protein          2/28/2024    10:40   Common Labs   C-Reactive Protein <0.30           Procedures          Assessment & Plan  Stage 3b chronic kidney disease  Will refer to nephrology for further evaluation of worsening CKD.   Weakness of both lower extremities  EMG results reviewed with patient. He will contact neurology office regarding review of results.   Numbness and tingling of both feet    Orders Placed This Encounter   Procedures    Ambulatory Referral to Nephrology                     I spent 30 minutes caring for Stephon on this date of service. This time includes time spent by me in the following activities:preparing for the visit, reviewing tests, performing a medically appropriate examination and/or evaluation , counseling and educating the patient/family/caregiver, referring and communicating with other health care professionals , and documenting information in the medical record  FOLLOW UP  No follow-ups on file.  Patient was given instructions and counseling regarding his condition or for health maintenance advice. Please see specific information pulled into the AVS if appropriate.       Paige Florez MD  05/30/24  15:34 EDT    CURRENT & DISCONTINUED MEDICATIONS  Current Outpatient Medications   Medication Instructions    anastrozole (ARIMIDEX) 1 mg, Oral, Daily    ARIPiprazole (ABILIFY) 2 mg, Oral, Daily    BD Eclipse Syringe/Needle 23G X 1\" 3 ML misc USE TO INJECT TESTOSTERONE    CINNAMON PO Oral    DULoxetine (CYMBALTA) 60 mg, Oral, Daily    finasteride (PROSCAR) 5 mg, Oral, Daily    fluticasone (FLONASE) 50 MCG/ACT nasal spray 2 sprays, Nasal, Daily    HYDROmorphone (DILAUDID) 0-2 mg/hr, Intravenous    IRON, FERROUS " GLUCONATE, PO 65 mg, Oral, Daily    Lysine 1000 MG tablet Oral    Magnesium 250 MG tablet Oral    multivitamin with minerals tablet tablet Take 1 tablet by mouth Daily.    Omega 3-6-9 Fatty Acids (OMEGA 3-6-9 COMPLEX PO) Oral    pain patient supplied pump Intrathecal, Continuous, Instructions are in drop boxes     polyethylene glycol (MIRALAX) 17 g, Oral, Daily    tamsulosin (FLOMAX) 0.8 mg, Oral, Daily    Testosterone Cypionate (DEPOTESTOTERONE CYPIONATE) 200 MG/ML injection No dose, route, or frequency recorded.    traZODone (DESYREL) 200 mg, Oral, Nightly    Turmeric 500 MG capsule Oral    Vitamin D-Vitamin K (K2-D3 10,000) 06027-02 UNIT-MCG capsule Oral    Vitamin D3 2,000 Units, Oral, Daily       There are no discontinued medications.

## 2024-06-07 ENCOUNTER — TELEPHONE (OUTPATIENT)
Dept: INTERNAL MEDICINE | Facility: CLINIC | Age: 64
End: 2024-06-07
Payer: OTHER GOVERNMENT

## 2024-06-07 NOTE — TELEPHONE ENCOUNTER
Received request from [facility] to please fax patient's EMG / NCV from 4/21/2024 and 5/22/2024.  Fax #[phone].  10 pages total faxed as requested per , manager.

## 2024-06-14 ENCOUNTER — OFFICE VISIT (OUTPATIENT)
Dept: PSYCHIATRY | Facility: CLINIC | Age: 64
End: 2024-06-14
Payer: OTHER GOVERNMENT

## 2024-06-14 VITALS
HEIGHT: 71 IN | BODY MASS INDEX: 25.56 KG/M2 | DIASTOLIC BLOOD PRESSURE: 64 MMHG | SYSTOLIC BLOOD PRESSURE: 130 MMHG | WEIGHT: 182.6 LBS | HEART RATE: 87 BPM

## 2024-06-14 DIAGNOSIS — F41.1 GENERALIZED ANXIETY DISORDER: ICD-10-CM

## 2024-06-14 DIAGNOSIS — F51.05 INSOMNIA DUE TO MENTAL CONDITION: ICD-10-CM

## 2024-06-14 DIAGNOSIS — F33.1 MAJOR DEPRESSIVE DISORDER, RECURRENT EPISODE, MODERATE: Primary | ICD-10-CM

## 2024-06-14 RX ORDER — MIRTAZAPINE 7.5 MG/1
7.5 TABLET, FILM COATED ORAL NIGHTLY
Qty: 30 TABLET | Refills: 2 | Status: SHIPPED | OUTPATIENT
Start: 2024-06-14

## 2024-06-14 NOTE — PROGRESS NOTES
"Subjective   Stephon Castellano is a 63 y.o. male who presents today for initial evaluation     Referring Provider:  No referring provider defined for this encounter.    Chief Complaint:  depression    History of Present Illness:     2024: INITIAL VISIT Chart review:     Juan: Hydromorphone  Care Everywhere: a few non behavioral health notes, sees [facility]    Psychotropic medication chart review:  Present:  Trazodone 200 mg nightly  Cymbalta 60 mg a day    Previously:  Zoloft 50 mg a day    EKG: 2023: 57, sinus, QTc 417  Procedures: Sleep study ordered for the future  Head imaging: 2023 CT of the head shows no acute, MRI 2023 shows no acute, empty sella configuration  Labs: 2024: CMP shows creatinine 1.47, CK is elevated at 756, reassuring B12, hemoglobin is low at 11.5,  Initial Chart Review Notes: Seen by neurology in February for evaluation for seizures.  Patient notes that he is depressed and cannot sleep, depression began when he was in the [employer], his mother killed himself.  Depression began about 10 years ago.  Last year his problems became so severe that he quit his job.  He has not seen a psychiatrist for years.  Sleep medicine consult also ordered.      Patient Psychotherapy Notes:  Patient goals:  Misc:  Stage 3B CKD      Chart Review By Dates:  2024: Neurology, internal medicine.  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    Plannin24: R/O ADHD. Start abilify, Therapy? Wgt loss, consider mirtazapine, seroquel. 6w.    VISITS/APPOINTMENTS (BELOW):    \"Stephon\"    2024: In person interview:  \"I can't tell any difference.\"  I still can't eat, you basically have to be a vegetarian for kidney disease.  Weight loss related not to poor appetite, but to having to maintain a renal diet.  Discussed his marriage.  MDD: no change  TRIP: irritable still, no change  Panic attacks: n  Energy: down  Concentration: " "down  Insomnia: initial, persistent  Eating/Weight: 182 lbs  Refills: y  Substances: def  Therapy: n  Medication compliant: y  SE: n  No SI HI AVH.      04/30/2024: In person.  Interview:  His/Her Story: \"[facility].\"  G14, P16  I've always had a little bit of depression.  But when I got injured, it got worse. \"I couldn't do things like I used to.\"  It's a combination of injuries, surgeries  Pain pump helps  Trazodone for 5 years  Cymbalta 5 years  Has never tried combinations, but has tried \"the gamut\" of them  Has lost 50 lbs in 3 mos, unsure why  Trouble focusing recently  Has chronic balance issues, no one knows why (cards, two neurologists, worked him up) x 3-4 mos  Depression/Mood:  Depressed mood, anhedonia, poor energy, poor concentration, weight loss, insomnia.  Seasonal pattern: def  Severity: Moderate  Duration: all his life  Anxiety:  Uncontrolled worrying, muscle tension, fatigue, poor concentration, feeling on edge, irritability, insomnia.  Severity: Moderate  Duration: all of his life  Panic attacks: n  Psych ROS: Positive for depression, anxiety.  Negative for psychosis and rebecca.  ADHD: def  PTSD: no life threatening trauma  No SI HI AVH.  Medication compliant:      Access to Firearms: yes, locked away    PHQ-9 Depression Screening  PHQ-9 Total Score:      Little interest or pleasure in doing things?     Feeling down, depressed, or hopeless?     Trouble falling or staying asleep, or sleeping too much?     Feeling tired or having little energy?     Poor appetite or overeating?     Feeling bad about yourself - or that you are a failure or have let yourself or your family down?     Trouble concentrating on things, such as reading the newspaper or watching television?     Moving or speaking so slowly that other people could have noticed? Or the opposite - being so fidgety or restless that you have been moving around a lot more than usual?     Thoughts that you would be better off dead, or " "of hurting yourself in some way?     PHQ-9 Total Score       TRIP-7       Past Surgical History:  Past Surgical History:   Procedure Laterality Date    CERVICAL DISC SURGERY  2008    COLONOSCOPY N/A 08/18/2023    Procedure: COLONOSCOPY WITH POLYPECTOMY/SNARE;  Surgeon: Jose Alejandro Fox MD;  Location: [facility] ENDOSCOPY;  Service: General;  Laterality: N/A;  COLON POLYPS    HEEL SPUR SURGERY  2005    KNEE ACL RECONSTRUCTION  2004    OTHER SURGICAL HISTORY      METAL IMPLANT    PAIN PUMP INSERTION/REVISION  2010    SPINE SURGERY  2008    Antieror c5-7 discotomy    TONSILLECTOMY  1965    VASECTOMY  2002       Problem List:  Patient Active Problem List   Diagnosis    Chronic right-sided thoracic back pain    Kidney stone    Screening due    Allergic rhinitis    Arthritis    Cervical neck pain with evidence of disc disease    Depression    Head injury    Hemorrhoid    Hypogonadism in male    Right wrist pain    Weight loss    Abdominal pain    Seizure-like activity    Palpitations    Abnormal EKG    Bradycardia    Brain fog    Fatigue    Acute kidney injury    Tremor    Slow transit constipation    Weakness of both lower extremities    Left hand weakness    Drug induced myoclonus    Insomnia due to other mental disorder    Obstructive sleep apnea syndrome    Cervical radiculopathy       Allergy:   No Known Allergies     Discontinued Medications:  Medications Discontinued During This Encounter   Medication Reason    ARIPiprazole (Abilify) 2 MG tablet Not Efficacious       Current Medications:   Current Outpatient Medications   Medication Sig Dispense Refill    anastrozole (ARIMIDEX) 1 MG tablet Take 1 tablet by mouth Daily.      BD Eclipse Syringe/Needle 23G X 1\" 3 ML misc USE TO INJECT TESTOSTERONE      Cholecalciferol (Vitamin D3) 50 MCG (2000 UT) capsule Take 1 capsule by mouth Daily.      CINNAMON PO Take  by mouth.      DULoxetine (CYMBALTA) 60 MG capsule Take 1 capsule by mouth Daily.      finasteride (PROSCAR) 5 MG " tablet Take 1 tablet by mouth Daily for 360 days. 90 tablet 3    fluticasone (FLONASE) 50 MCG/ACT nasal spray 2 sprays into the nostril(s) as directed by provider Daily.      HYDROmorphone (DILAUDID) 1 MG/ML injection Infuse 0-2 mg/hr into a venous catheter.      IRON, FERROUS GLUCONATE, PO Take 65 mg by mouth Daily.      Lysine 1000 MG tablet Take  by mouth.      Magnesium 250 MG tablet Take  by mouth.      multivitamin with minerals tablet tablet Take 1 tablet by mouth Daily.      Omega 3-6-9 Fatty Acids (OMEGA 3-6-9 COMPLEX PO) Take  by mouth.      pain patient supplied pump by Intrathecal route Continuous. Instructions are in drop boxes      polyethylene glycol (MiraLax) 17 GM/SCOOP powder Take 17 g by mouth Daily. (Patient taking differently: Take 17 g by mouth As Needed.) 850 g 5    tamsulosin (FLOMAX) 0.4 MG capsule 24 hr capsule Take 2 capsules by mouth Daily for 360 days. (Patient taking differently: Take 2 capsules by mouth As Needed.) 180 capsule 3    Testosterone Cypionate (DEPOTESTOTERONE CYPIONATE) 200 MG/ML injection       traZODone (DESYREL) 100 MG tablet Take 2 tablets by mouth Every Night.      Turmeric 500 MG capsule Take  by mouth.      Vitamin D-Vitamin K (K2-D3 10,000) 53229-15 UNIT-MCG capsule Take  by mouth.      mirtazapine (REMERON) 7.5 MG tablet Take 1 tablet by mouth Every Night. 30 tablet 2     No current facility-administered medications for this visit.       Past Medical History:  Past Medical History:   Diagnosis Date    Alcohol abuse     Allergic rhinitis     Anemia 02/06/2024    My hand started just shaking but got worse until now i have no strength in my left hand    Anxiety 2006    Starting taking mood depression drugs    Arthritis     Asthma About 6 months    Sore Throat and was sent to ENT    Atrial fibrillation 2023    Benign prostatic hyperplasia 2015    Dad had Prostate Cancer and just did Colonoscopy and they said i have a major large Prostate    Cervical neck pain with  evidence of disc disease     Chronic pain disorder     Colon polyp     Had a lot of polyps on the last 2 Colonoscopies    CTS (carpal tunnel syndrome)     Depression     Difficulty walking     Erectile dysfunction 2017    I have been on Testosterone replacements since then    Head injury     Headache May 2023    Started abou 6 months or more    Hemorrhoid     HL (hearing loss)     Have a major tinitis/ringing in my ears especially the left one    Hyperlipidemia 2017    My blood test have stated they were high or elevated    Hypertension 2017    Given Flomax said it will probably go down    Hypogonadism in male     Insomnia     Kidney stone     Went to doctor and got medication for them and still have issues with right side    Low back pain 2010    Stinosis of back & neck    Memory loss     Movement disorder     Sleep apnea 2007    Substance abuse [id number]    On Disulfiram for alcohol abuse-voluntary    Visual impairment     Just recently have been given eye drops       Past Psychiatric History:  Began Treatment: a year   Diagnoses: TRIP, insomnia, MDD  Psychiatrist: a year   Therapist: a year   Admission History:Denies  Medication Trials:    multiple    Self Harm: Denies  Suicide Attempts:Denies      Substance Abuse History:   Types: end of  stopped drinking using antabuse, former smoker 2ppd  Withdrawal Symptoms:Denies  Longest Period Sober: 6 mos, recently  AA: Not applicable     Social History:  Martial Status:  Employed: retired 22  Kids:Yes  House:Lives in a house   History: [employer], retired    Social History     Socioeconomic History    Marital status:    Tobacco Use    Smoking status: Former     Current packs/day: 0.00     Average packs/day: 2.0 packs/day for 8.0 years (16.0 ttl pk-yrs)     Types: Cigarettes     Start date: 1975     Quit date: 1983     Years since quittin.4     Passive exposure: Past    Smokeless tobacco:  "Former     Types: Snuff    Tobacco comments:     Also dipped for several years   Vaping Use    Vaping status: Never Used   Substance and Sexual Activity    Alcohol use: Not Currently     Alcohol/week: 14.0 standard drinks of alcohol     Types: 14 Shots of liquor per week     Comment: Stopped due to Disulfiram 250 mg    Drug use: Not Currently     Frequency: 7.0 times per week     Types: Marijuana    Sexual activity: Not Currently     Partners: Female     Birth control/protection: None, Vasectomy     Comment: Low testosterone       Family History:   Suicide Attempts:  Mom  Suicide Completions: mom  \"I think she had bipolar but I'm not sure\"      Family History   Problem Relation Age of Onset    Suicide Attempts Mother     Bipolar disorder Mother     Anxiety disorder Mother     Heart disease Mother     Osteoporosis Mother     Alcohol abuse Mother         Mother committed suicide    Depression Mother         She had major depression    Early death Mother         She committed suicide    Thyroid disease Mother         She had Thyroid issues and had surgery    Cancer Father         Prostate    Prostate cancer Father     Hearing loss Father         He has worn a hearing aids since his late 50’s    Depression Sister     Alcohol abuse Sister     Diabetes Sister         Diabetes    Arthritis Sister     Sleep apnea Sister     Obesity Sister     Thyroid disease Sister     Insomnia Sister     Narcolepsy Sister     Diabetes Brother     Stroke Other         AUNT/UNCLE GRANDPARENTS    Diabetes Other         GRANDPARENTS        Developmental History:       Childhood: saw mom and dad fight a lot, they , brother  when he was 3 yo.   High School:Completed  College: [employer]    Mental Status Exam  Appearance  : groomed, good eye contact, normal street clothes  Behavior  : pleasant and cooperative  Motor  : No abnormal  Speech  : talkative, normal rhythm, rate, volume, tone, not hyperverbal, not pressured, normal prosidy  Mood  : " "\"no real change\"  Affect  : mild depression, mood congruent, good variability  Thought Content  : negative suicidal ideations, negative homicidal ideations, negative obsessions  Perceptions  : negative auditory hallucinations, negative visual hallucinations  Thought Process  : linear  Insight/Judgement  : Fair/fair  Cognition  : grossly intact  Attention   : intact      Review of Systems:  Review of Systems   Constitutional:  Positive for fatigue. Negative for diaphoresis.   HENT:  Negative for drooling.    Eyes:  Negative for visual disturbance.   Respiratory:  Negative for cough, chest tightness and shortness of breath.    Cardiovascular:  Positive for palpitations. Negative for chest pain and leg swelling.   Gastrointestinal:  Negative for abdominal pain, diarrhea, nausea and vomiting.   Endocrine: Positive for cold intolerance and heat intolerance.   Genitourinary:  Positive for difficulty urinating.   Musculoskeletal:  Positive for joint swelling.   Allergic/Immunologic: Negative for immunocompromised state.   Neurological:  Positive for dizziness, light-headedness and numbness. Negative for seizures, speech difficulty and headaches.   Psychiatric/Behavioral:  Positive for agitation. Negative for sleep disturbance.        Physical Exam:  Physical Exam    Vital Signs:   /64   Pulse 87   Ht 180.3 cm (71\")   Wt 82.8 kg (182 lb 9.6 oz)   BMI 25.47 kg/m²      Lab Results:   Office Visit on 04/10/2024   Component Date Value Ref Range Status    Urine Volume 04/10/2024 80   Final   Office Visit on 03/29/2024   Component Date Value Ref Range Status    Creatine Kinase 03/29/2024 756 (H)  20 - 200 U/L Final    Glucose 03/29/2024 99  65 - 99 mg/dL Final    BUN 03/29/2024 28 (H)  8 - 23 mg/dL Final    Creatinine 03/29/2024 1.47 (H)  0.76 - 1.27 mg/dL Final    Sodium 03/29/2024 139  136 - 145 mmol/L Final    Potassium 03/29/2024 4.3  3.5 - 5.2 mmol/L Final    Chloride 03/29/2024 103  98 - 107 mmol/L Final    CO2 " 03/29/2024 25.0  22.0 - 29.0 mmol/L Final    Calcium 03/29/2024 9.9  8.6 - 10.5 mg/dL Final    Total Protein 03/29/2024 7.5  6.0 - 8.5 g/dL Final    Albumin 03/29/2024 4.7  3.5 - 5.2 g/dL Final    ALT (SGPT) 03/29/2024 36  1 - 41 U/L Final    AST (SGOT) 03/29/2024 38  1 - 40 U/L Final    Alkaline Phosphatase 03/29/2024 61  39 - 117 U/L Final    Total Bilirubin 03/29/2024 0.2  0.0 - 1.2 mg/dL Final    Globulin 03/29/2024 2.8  gm/dL Final    A/G Ratio 03/29/2024 1.7  g/dL Final    BUN/Creatinine Ratio 03/29/2024 19.0  7.0 - 25.0 Final    Anion Gap 03/29/2024 11.0  5.0 - 15.0 mmol/L Final    eGFR 03/29/2024 52.9 (L)  >60.0 mL/min/1.73 Final    WBC 03/29/2024 3.62  3.40 - 10.80 10*3/mm3 Final    RBC 03/29/2024 3.69 (L)  4.14 - 5.80 10*6/mm3 Final    Hemoglobin 03/29/2024 11.5 (L)  13.0 - 17.7 g/dL Final    Hematocrit 03/29/2024 34.7 (L)  37.5 - 51.0 % Final    MCV 03/29/2024 94.0  79.0 - 97.0 fL Final    MCH 03/29/2024 31.2  26.6 - 33.0 pg Final    MCHC 03/29/2024 33.1  31.5 - 35.7 g/dL Final    RDW 03/29/2024 12.3  12.3 - 15.4 % Final    RDW-SD 03/29/2024 42.7  37.0 - 54.0 fl Final    MPV 03/29/2024 10.6  6.0 - 12.0 fL Final    Platelets 03/29/2024 168  140 - 450 10*3/mm3 Final    Neutrophil % 03/29/2024 60.1  42.7 - 76.0 % Final    Lymphocyte % 03/29/2024 27.1  19.6 - 45.3 % Final    Monocyte % 03/29/2024 10.5  5.0 - 12.0 % Final    Eosinophil % 03/29/2024 1.4  0.3 - 6.2 % Final    Basophil % 03/29/2024 0.6  0.0 - 1.5 % Final    Immature Grans % 03/29/2024 0.3  0.0 - 0.5 % Final    Neutrophils, Absolute 03/29/2024 2.18  1.70 - 7.00 10*3/mm3 Final    Lymphocytes, Absolute 03/29/2024 0.98  0.70 - 3.10 10*3/mm3 Final    Monocytes, Absolute 03/29/2024 0.38  0.10 - 0.90 10*3/mm3 Final    Eosinophils, Absolute 03/29/2024 0.05  0.00 - 0.40 10*3/mm3 Final    Basophils, Absolute 03/29/2024 0.02  0.00 - 0.20 10*3/mm3 Final    Immature Grans, Absolute 03/29/2024 0.01  0.00 - 0.05 10*3/mm3 Final    nRBC 03/29/2024 0.0  0.0 - 0.2  /100 WBC Final    Vitamin B-12 03/29/2024 555  211 - 946 pg/mL Final   Office Visit on 02/28/2024   Component Date Value Ref Range Status    Creatine Kinase 02/28/2024 239 (H)  20 - 200 U/L Final    Hemoglobin A1C 02/28/2024 5.30  4.80 - 5.60 % Final    Sed Rate 02/28/2024 3  0 - 20 mm/hr Final    C-Reactive Protein 02/28/2024 <0.30  0.00 - 0.50 mg/dL Final    Hepatitis C Ab 02/28/2024 Non-Reactive  Non-Reactive Final    HIV DUO 02/28/2024 Non-Reactive  Non-Reactive Final    WBC 02/28/2024 3.09 (L)  3.40 - 10.80 10*3/mm3 Final    RBC 02/28/2024 3.44 (L)  4.14 - 5.80 10*6/mm3 Final    Hemoglobin 02/28/2024 10.4 (L)  13.0 - 17.7 g/dL Final    Hematocrit 02/28/2024 31.4 (L)  37.5 - 51.0 % Final    MCV 02/28/2024 91.3  79.0 - 97.0 fL Final    MCH 02/28/2024 30.2  26.6 - 33.0 pg Final    MCHC 02/28/2024 33.1  31.5 - 35.7 g/dL Final    RDW 02/28/2024 12.5  12.3 - 15.4 % Final    RDW-SD 02/28/2024 40.6  37.0 - 54.0 fl Final    MPV 02/28/2024 10.3  6.0 - 12.0 fL Final    Platelets 02/28/2024 147  140 - 450 10*3/mm3 Final    Neutrophil % 02/28/2024 54.4  42.7 - 76.0 % Final    Lymphocyte % 02/28/2024 32.7  19.6 - 45.3 % Final    Monocyte % 02/28/2024 10.0  5.0 - 12.0 % Final    Eosinophil % 02/28/2024 2.3  0.3 - 6.2 % Final    Basophil % 02/28/2024 0.6  0.0 - 1.5 % Final    Immature Grans % 02/28/2024 0.0  0.0 - 0.5 % Final    Neutrophils, Absolute 02/28/2024 1.68 (L)  1.70 - 7.00 10*3/mm3 Final    Lymphocytes, Absolute 02/28/2024 1.01  0.70 - 3.10 10*3/mm3 Final    Monocytes, Absolute 02/28/2024 0.31  0.10 - 0.90 10*3/mm3 Final    Eosinophils, Absolute 02/28/2024 0.07  0.00 - 0.40 10*3/mm3 Final    Basophils, Absolute 02/28/2024 0.02  0.00 - 0.20 10*3/mm3 Final    Immature Grans, Absolute 02/28/2024 0.00  0.00 - 0.05 10*3/mm3 Final    nRBC 02/28/2024 0.0  0.0 - 0.2 /100 WBC Final   Office Visit on 02/12/2024   Component Date Value Ref Range Status    Creatine Kinase 02/13/2024 339 (H)  20 - 200 U/L Final    AChR Binding Ab  02/13/2024 <0.03  0.00 - 0.24 nmol/L Final                                   Negative:   0.00 - 0.24                                 Borderline: 0.25 - 0.40                                 Positive:         >0.40    AChR Blocking Abs 02/13/2024 13  0 - 25 % Final                                   Negative:      0 - 25                                 Borderline:   26 - 30                                 Positive:         >30    ACHR Receptor Modulating Ab 02/13/2024 0  0 - 45 % Final                           Interpretive Information:                          Negative:             0 - 45%                          Positive:               > 45%  No single value for AChR-modulating antibody should  be used as a sole basis for diagnosis or response  to therapy.    Striated Muscle Antibody 02/13/2024 Negative  Neg:<1:100 Final    Reflex Information 02/13/2024 Comment   Final    Reflex test indicated.    NMO IgG Autoantibodies 02/13/2024 <1.5  0.0 - 3.0 U/mL Final                                 Negative:      0.0 - 3.0                               Positive:           >3.0    Glucose 02/13/2024 125 (H)  65 - 99 mg/dL Final    BUN 02/13/2024 14  8 - 23 mg/dL Final    Creatinine 02/13/2024 1.50 (H)  0.76 - 1.27 mg/dL Final    Sodium 02/13/2024 140  136 - 145 mmol/L Final    Potassium 02/13/2024 4.0  3.5 - 5.2 mmol/L Final    Chloride 02/13/2024 101  98 - 107 mmol/L Final    CO2 02/13/2024 30.0 (H)  22.0 - 29.0 mmol/L Final    Calcium 02/13/2024 9.4  8.6 - 10.5 mg/dL Final    Total Protein 02/13/2024 6.8  6.0 - 8.5 g/dL Final    Albumin 02/13/2024 4.6  3.5 - 5.2 g/dL Final    ALT (SGPT) 02/13/2024 39  1 - 41 U/L Final    AST (SGOT) 02/13/2024 33  1 - 40 U/L Final    Alkaline Phosphatase 02/13/2024 64  39 - 117 U/L Final    Total Bilirubin 02/13/2024 0.2  0.0 - 1.2 mg/dL Final    Globulin 02/13/2024 2.2  gm/dL Final    A/G Ratio 02/13/2024 2.1  g/dL Final    BUN/Creatinine Ratio 02/13/2024 9.3  7.0 - 25.0 Final    Anion Gap  02/13/2024 9.0  5.0 - 15.0 mmol/L Final    eGFR 02/13/2024 51.7 (L)  >60.0 mL/min/1.73 Final    WBC 02/13/2024 3.46  3.40 - 10.80 10*3/mm3 Final    RBC 02/13/2024 3.34 (L)  4.14 - 5.80 10*6/mm3 Final    Hemoglobin 02/13/2024 10.4 (L)  13.0 - 17.7 g/dL Final    Hematocrit 02/13/2024 31.0 (L)  37.5 - 51.0 % Final    MCV 02/13/2024 92.8  79.0 - 97.0 fL Final    MCH 02/13/2024 31.1  26.6 - 33.0 pg Final    MCHC 02/13/2024 33.5  31.5 - 35.7 g/dL Final    RDW 02/13/2024 12.7  12.3 - 15.4 % Final    RDW-SD 02/13/2024 42.9  37.0 - 54.0 fl Final    MPV 02/13/2024 9.8  6.0 - 12.0 fL Final    Platelets 02/13/2024 147  140 - 450 10*3/mm3 Final    Neutrophil % 02/13/2024 36.4 (L)  42.7 - 76.0 % Final    Lymphocyte % 02/13/2024 52.0 (H)  19.6 - 45.3 % Final    Monocyte % 02/13/2024 8.1  5.0 - 12.0 % Final    Eosinophil % 02/13/2024 3.2  0.3 - 6.2 % Final    Basophil % 02/13/2024 0.3  0.0 - 1.5 % Final    Immature Grans % 02/13/2024 0.0  0.0 - 0.5 % Final    Neutrophils, Absolute 02/13/2024 1.26 (L)  1.70 - 7.00 10*3/mm3 Final    Lymphocytes, Absolute 02/13/2024 1.80  0.70 - 3.10 10*3/mm3 Final    Monocytes, Absolute 02/13/2024 0.28  0.10 - 0.90 10*3/mm3 Final    Eosinophils, Absolute 02/13/2024 0.11  0.00 - 0.40 10*3/mm3 Final    Basophils, Absolute 02/13/2024 0.01  0.00 - 0.20 10*3/mm3 Final    Immature Grans, Absolute 02/13/2024 0.00  0.00 - 0.05 10*3/mm3 Final    nRBC 02/13/2024 0.0  0.0 - 0.2 /100 WBC Final    Musk Antibody 02/13/2024 <1.0  U/mL Final    Reference Range:    Negative: <1.0    Positive: 1.0 or higher  This test was developed and its performance characteristics  determined by [facility]. It has not been cleared or approved  by the Food and Drug Administration.   Office Visit on 01/02/2024   Component Date Value Ref Range Status    Urine Volume 01/02/2024 74   Final    Color 01/02/2024 Yellow  Yellow, Straw, Dark Yellow, Mavis Final    Clarity, UA 01/02/2024 Clear  Clear Final    Specific Gravity  01/02/2024 1.015   1.005 - 1.030 Final    pH, Urine 01/02/2024 7.0  5.0 - 8.0 Final    Leukocytes 01/02/2024 Negative  Negative Final    Nitrite, UA 01/02/2024 Negative  Negative Final    Protein, POC 01/02/2024 Negative  Negative mg/dL Final    Glucose, UA 01/02/2024 Negative  Negative mg/dL Final    Ketones, UA 01/02/2024 Negative  Negative Final    Urobilinogen, UA 01/02/2024 0.2 E.U./dL  Normal, 0.2 E.U./dL Final    Bilirubin 01/02/2024 Negative  Negative Final    Blood, UA 01/02/2024 Negative  Negative Final    Lot Number 01/02/2024 [id number]   Final    Expiration Date 01/02/2024 10/2,024   Final       EKG Results:  No orders to display       Imaging Results:  CT Chest Without Contrast Diagnostic    Result Date: 3/19/2024  Impression: CT scan of the chest without IV contrast demonstrating tree-in-bud airspace disease in the left lower lobe suggesting indolent infection. Follow-up CT scan of the chest in 3 months is recommended.    Electronically Signed By-SOILA TREVIÑO MD On:3/19/2024 3:23 PM          Assessment & Plan   Diagnoses and all orders for this visit:    1. Major depressive disorder, recurrent episode, moderate (Primary)  -     mirtazapine (REMERON) 7.5 MG tablet; Take 1 tablet by mouth Every Night.  Dispense: 30 tablet; Refill: 2    2. Generalized anxiety disorder  -     mirtazapine (REMERON) 7.5 MG tablet; Take 1 tablet by mouth Every Night.  Dispense: 30 tablet; Refill: 2    3. Insomnia due to mental condition  -     mirtazapine (REMERON) 7.5 MG tablet; Take 1 tablet by mouth Every Night.  Dispense: 30 tablet; Refill: 2        Visit Diagnoses:    ICD-10-CM ICD-9-CM   1. Major depressive disorder, recurrent episode, moderate  F33.1 296.32   2. Generalized anxiety disorder  F41.1 300.02   3. Insomnia due to mental condition  F51.05 300.9     327.02     06/14/2024: No change. Discussed reflective listening with his wife. Stop abilify; start mirtazapine.    Allowed patient to freely discuss and process issues, such  as:  Anxiety and depression related to relationships  ... using Rogerian psychotherapeutic techniques including unconditional positive regard, reflective listening, and demonstrating clear empathy, with the goal of ameliorating symptoms and maintaining, restoring, or improving self-esteem, adaptive skills, and ego or psychological functions (Danielle et al. 1991).  Time (minutes) spent providing supportive psychotherapy: 16  (This time is exclusive to the therapy session and separate from the time spent on activities used to meet the criteria for the E/M service (history, exam, medical decision-making).)  Start: 2:23  Stop: 2:39  Functional status: mild impairment  Treatment plan: Medication management and supportive psychotherapy  Prognosis: good  Progress: stable  4 weeks     4/30/24: R/O ADHD. Start abilify, Therapy? Wgt loss, consider mirtazapine, seroquel. 6w.    PLAN:  Safety: No acute safety concerns  Therapy: None  Risk Assessment: Risk of self-harm acutely is moderate.  Risk factors include anxiety disorder, mood disorder, fhx, access to guns, and recent psychosocial stressors (pandemic). Protective factors include no present SI, no history of suicide attempts or self-harm in the past, minimal AODA, healthcare seeking, future orientation, willingness to engage in care.  Risk of self-harm chronically is also moderate, but could be further elevated in the event of treatment noncompliance and/or AODA.  Meds:  CONTINUE trazodone 50 mg qhs. 100 mg made him groggy the next day. Risks, benefits, side effects discussed with patient including GI upset, sedation, dizziness/falls risk, grogginess the following day, prolongation of the QTc interval.  Do not use before operating vehicle, vessel, or machine. After discussion of these risks and benefits, the patient voiced understanding and agreed to proceed.    STOP abilify 2 mg qhs. No change 6/24. Risks, benefits, alternatives discussed with patient including increased  energy, exacerbation of irritability, akathisia, movement issues, GI upset, orthostatic hypotension, increased appetite, tardive dyskinesia.  Use care when operating vehicle, vessel, or machine. After discussion of these risks and benefits, the patient voiced understanding and agreed to proceed.  START mirtazapine 7.5 mg qhs. Risks, benefits, alternatives discussed with patient including GI upset, sedation, dizziness with falls risk, increased appetite.  Do not use before operating vehicle, vessel, or machine. After discussion of these risks and benefits, the patient voiced understanding and agreed to proceed.  CONTINUE cymbalta 60 mg qam. Risks, benefits, alternatives discussed with patient including GI upset, nausea vomiting diarrhea, theoretical decrease of seizure threshold predisposing the patient to a slightly higher seizure risk, headaches, sexual dysfunction, serotonin syndrome, bleeding risk, increased suicidality in patients 24 years and younger, switching to rebecca/hypomania.  Also constipation and urinary retention.  After discussion of these risks and benefits, the patient voiced understanding and agreed to proceed.  Labs: none    Patient screened positive for depression based on a PHQ-9 score of 16 on 4/30/2024. Follow-up recommendations include: Prescribed antidepressant medication treatment and Suicide Risk Assessment performed.           TREATMENT PLAN/GOALS: Continue supportive psychotherapy efforts and medications as indicated. Treatment and medication options discussed during today's visit. Patient acknowledged and verbally consented to continue with current treatment plan and was educated on the importance of compliance with treatment and follow-up appointments.    MEDICATION ISSUES:  JIM reviewed as expected.  Discussed medication options and treatment plan of prescribed medication as well as the risks, benefits, and side effects including potential falls, possible impaired driving and  metabolic adversities among others. Patient is agreeable to call the office with any worsening of symptoms or onset of side effects. Patient is agreeable to call 911 or go to the nearest ER should he/she begin having SI/HI. No medication side effects or related complaints today.     MEDS ORDERED DURING VISIT:  New Medications Ordered This Visit   Medications    mirtazapine (REMERON) 7.5 MG tablet     Sig: Take 1 tablet by mouth Every Night.     Dispense:  30 tablet     Refill:  2     Please discontinue abilify       Return in about 4 weeks (around 7/12/2024).         This document has been electronically signed by Penelope Davis MD  June 14, 2024 14:44 EDT    Dictated Utilizing Dragon Dictation: Part of this note may be an electronic transcription/translation of spoken language to printed text using the Dragon Dictation System.

## 2024-06-14 NOTE — PATIENT INSTRUCTIONS
1.  Please return to clinic at your next scheduled visit.  Contact the clinic ([phone]) at least 24 hours prior in the event you need to cancel.  2.  Do no harm to yourself or others.    3.  Avoid alcohol and drugs.    4.  Take all medications as prescribed.  Please contact the clinic with any concerns. If you are in need of medication refills, please call the clinic at [phone].    5. Should you want to get in touch with your provider, Dr. Penelope Davis, please utilize [employer] or contact the office ([phone]), and staff will be able to page Dr. Davis directly.  6.  In the event you have personal crisis, contact the following crisis numbers: Suicide Prevention Hotline 1-734.961.2586; GERMAINE Helpline 6-113-529-GERMAINE; [facility] Emergency Room [phone]; text HELLO to 988271; or 357.

## 2024-06-27 ENCOUNTER — TELEPHONE (OUTPATIENT)
Dept: PSYCHIATRY | Facility: CLINIC | Age: 64
End: 2024-06-27
Payer: OTHER GOVERNMENT

## 2024-06-27 NOTE — TELEPHONE ENCOUNTER
PT(PATIENT) VERIFIED     mirtazapine (REMERON) 7.5 MG tablet (2024)     PT(PATIENT) STATES HE STOPPED TAKING THE MEDICATION AFTER APPROX 1 WEEK     PT(PATIENT) STATES HE HAD FATIGUE, BRAIN FOG, AND WAS UNABLE TO FOCUS IN THE MORNING     NEXT APPT WITH PROVIDER   Appointment with Penelope Davis MD (2024)     PROVIDER PLEASE ADVISE

## 2024-07-08 ENCOUNTER — PREP FOR SURGERY (OUTPATIENT)
Dept: OTHER | Facility: HOSPITAL | Age: 64
End: 2024-07-08
Payer: OTHER GOVERNMENT

## 2024-07-08 ENCOUNTER — OFFICE VISIT (OUTPATIENT)
Dept: SURGERY | Facility: CLINIC | Age: 64
End: 2024-07-08
Payer: OTHER GOVERNMENT

## 2024-07-08 VITALS
HEIGHT: 71 IN | BODY MASS INDEX: 25.56 KG/M2 | HEART RATE: 65 BPM | WEIGHT: 182.6 LBS | SYSTOLIC BLOOD PRESSURE: 132 MMHG | DIASTOLIC BLOOD PRESSURE: 73 MMHG

## 2024-07-08 DIAGNOSIS — R63.4 WEIGHT LOSS: ICD-10-CM

## 2024-07-08 DIAGNOSIS — K21.9 GASTROESOPHAGEAL REFLUX DISEASE WITHOUT ESOPHAGITIS: ICD-10-CM

## 2024-07-08 DIAGNOSIS — D50.9 IRON DEFICIENCY ANEMIA, UNSPECIFIED IRON DEFICIENCY ANEMIA TYPE: Primary | ICD-10-CM

## 2024-07-08 DIAGNOSIS — R53.83 FATIGUE, UNSPECIFIED TYPE: ICD-10-CM

## 2024-07-08 DIAGNOSIS — Z86.010 HISTORY OF COLONIC POLYPS: ICD-10-CM

## 2024-07-08 DIAGNOSIS — K21.00 GASTROESOPHAGEAL REFLUX DISEASE WITH ESOPHAGITIS WITHOUT HEMORRHAGE: ICD-10-CM

## 2024-07-08 DIAGNOSIS — R53.83 FATIGUE: ICD-10-CM

## 2024-07-08 PROBLEM — Z86.0100 HISTORY OF COLONIC POLYPS: Status: ACTIVE | Noted: 2024-07-08

## 2024-07-08 PROCEDURE — 99213 OFFICE O/P EST LOW 20 MIN: CPT | Performed by: NURSE PRACTITIONER

## 2024-07-08 RX ORDER — SODIUM CHLORIDE 0.9 % (FLUSH) 0.9 %
10 SYRINGE (ML) INJECTION AS NEEDED
OUTPATIENT
Start: 2024-07-08

## 2024-07-08 RX ORDER — SIMETHICONE 80 MG
TABLET,CHEWABLE ORAL
Qty: 4 TABLET | Refills: 0 | Status: SHIPPED | OUTPATIENT
Start: 2024-07-08

## 2024-07-08 RX ORDER — SODIUM CHLORIDE 9 MG/ML
40 INJECTION, SOLUTION INTRAVENOUS AS NEEDED
OUTPATIENT
Start: 2024-07-08

## 2024-07-08 RX ORDER — PEG-3350, SODIUM SULFATE, SODIUM CHLORIDE, POTASSIUM CHLORIDE, SODIUM ASCORBATE AND ASCORBIC ACID 7.5-2.691G
1000 KIT ORAL ONCE
Qty: 1000 ML | Refills: 0 | Status: SHIPPED | OUTPATIENT
Start: 2024-07-08 | End: 2024-07-08

## 2024-07-08 RX ORDER — SODIUM CHLORIDE 0.9 % (FLUSH) 0.9 %
3 SYRINGE (ML) INJECTION EVERY 12 HOURS SCHEDULED
OUTPATIENT
Start: 2024-07-08

## 2024-07-08 NOTE — PROGRESS NOTES
"Chief Complaint: Urinary Retention    Subjective         History of Present Illness  Stephon Castellano is a 63 y.o. male presents to [facility] to be seen for follow-up.    Patient was previously seen by me with last visit on 4/10/2024 for urinary retention.  His PVR at that visit was 80 mL.  We did discuss that he should continue taking tamsulosin with both capsules at bedtime as well as continue finasteride.  We also discussed that if he was not seeing improvement we may need to consider procedures.  He is here for follow-up.    IPSS 27    Previous 4/10/2024:  Patient was previously seen by me with last visit on 1/2/2024 for urinary retention.  At that visit he was continued on Flomax and added finasteride.  He is here for follow-up.     He reports he has not seen any improvement in symptoms.  He was up 4 times last night to urinate.     He also reports that he is only taking 1 tamsulosin a day due to drowsiness with the a.m. dosage.     Previous 1/2/2024:  Patient was admitted to the hospital 11/14/2023 for bradycardia.  He was found to be in urinary retention at that visit with PVRs of 250 cc.  His tamsulosin was increased to 0.8 mg.  He is here for follow-up.     Patient reports he has seen improvement in symptoms since increasing tamsulosin but is still going urinary frquency with going every hour. He does not feel he is emptying his bladder.      He takes testosterone so was also advised to take arimidex for an estrogen blocker. He has been on this combo for 3-4 months        Frequency-admits up to 30 times per day   Urgency-admits for the past year   Incontinence-denies   Nocturia-2-3   Perineal pain-denies   Dysuria-denies   Stream-\"not strong\"   GH-denies   History of stones-admits x 2, passed    surgeries-denies   Family history of  malignancy-dad prostate CA at age 60   Cardiopulmonary-bradycardia   Anticoagulants-denies   Smoker-denies     PSA  11/14/2023 0.6   "         Objective     Past Medical History:   Diagnosis Date    Alcohol abuse     Allergic rhinitis     Anemia 02/06/2024    My hand started just shaking but got worse until now i have no strength in my left hand    Anxiety 2006    Starting taking mood depression drugs    Arthritis     Asthma About 6 months    Sore Throat and was sent to ENT    Atrial fibrillation 2023    Benign prostatic hyperplasia 2015    Dad had Prostate Cancer and just did Colonoscopy and they said i have a major large Prostate    Cervical neck pain with evidence of disc disease     Chronic pain disorder     Colon polyp 2020    Had a lot of polyps on the last 2 Colonoscopies    CTS (carpal tunnel syndrome) 2005    Depression     Difficulty walking 2033    Erectile dysfunction 2017    I have been on Testosterone replacements since then    Head injury 1977    Headache May 2023    Started abou 6 months or more    Hemorrhoid     HL (hearing loss) 2004    Have a major tinitis/ringing in my ears especially the left one    Hyperlipidemia 2017    My blood test have stated they were high or elevated    Hypertension 2017    Given Flomax said it will probably go down    Hypogonadism in male     Insomnia     Kidney stone 2020    Went to doctor and got medication for them and still have issues with right side    Low back pain 2010    Stinosis of back & neck    Memory loss 2023    Movement disorder 2023    Sleep apnea 2007    Substance abuse [id number]    On Disulfiram for alcohol abuse-voluntary    Visual impairment 2023    Just recently have been given eye drops       Past Surgical History:   Procedure Laterality Date    CERVICAL DISC SURGERY  2008    COLONOSCOPY N/A 08/18/2023    Procedure: COLONOSCOPY WITH POLYPECTOMY/SNARE;  Surgeon: Jose Alejandro Fox MD;  Location: [facility] ENDOSCOPY;  Service: General;  Laterality: N/A;  COLON POLYPS    HEEL SPUR SURGERY  2005    KNEE ACL RECONSTRUCTION  2004    OTHER SURGICAL HISTORY      METAL IMPLANT    PAIN PUMP  "INSERTION/REVISION  2010    SPINE SURGERY  2008    Antieror c5-7 discotomy    TONSILLECTOMY  1965    VASECTOMY  2002         Current Outpatient Medications:     anastrozole (ARIMIDEX) 1 MG tablet, Take 1 tablet by mouth Daily., Disp: , Rfl:     BD Eclipse Syringe/Needle 23G X 1\" 3 ML misc, USE TO INJECT TESTOSTERONE, Disp: , Rfl:     Cholecalciferol (Vitamin D3) 50 MCG (2000 UT) capsule, Take 1 capsule by mouth Daily., Disp: , Rfl:     CINNAMON PO, Take  by mouth., Disp: , Rfl:     DULoxetine (CYMBALTA) 60 MG capsule, Take 1 capsule by mouth Daily., Disp: , Rfl:     finasteride (PROSCAR) 5 MG tablet, Take 1 tablet by mouth Daily for 360 days., Disp: 90 tablet, Rfl: 3    fluticasone (FLONASE) 50 MCG/ACT nasal spray, 2 sprays into the nostril(s) as directed by provider Daily., Disp: , Rfl:     HYDROmorphone (DILAUDID) 1 MG/ML injection, Infuse 0-2 mg/hr into a venous catheter., Disp: , Rfl:     IRON, FERROUS GLUCONATE, PO, Take 65 mg by mouth Daily., Disp: , Rfl:     Lysine 1000 MG tablet, Take  by mouth., Disp: , Rfl:     Magnesium 250 MG tablet, Take  by mouth., Disp: , Rfl:     multivitamin with minerals tablet tablet, Take 1 tablet by mouth Daily., Disp: , Rfl:     Omega 3-6-9 Fatty Acids (OMEGA 3-6-9 COMPLEX PO), Take  by mouth., Disp: , Rfl:     pain patient supplied pump, by Intrathecal route Continuous. Instructions are in drop boxes, Disp: , Rfl:     polyethylene glycol (MiraLax) 17 GM/SCOOP powder, Take 17 g by mouth Daily. (Patient taking differently: Take 17 g by mouth As Needed.), Disp: 850 g, Rfl: 5    simethicone (Gas-X) 80 MG chewable tablet, Take 2 tablets PO after completing movi prep and 2 tablets PO 4 hours prior to procedure., Disp: 4 tablet, Rfl: 0    tamsulosin (FLOMAX) 0.4 MG capsule 24 hr capsule, Take 2 capsules by mouth Daily for 360 days. (Patient taking differently: Take 2 capsules by mouth As Needed.), Disp: 180 capsule, Rfl: 3    Testosterone Cypionate (DEPOTESTOTERONE CYPIONATE) 200 " MG/ML injection, , Disp: , Rfl:     traZODone (DESYREL) 100 MG tablet, Take 2 tablets by mouth Every Night., Disp: , Rfl:     Turmeric 500 MG capsule, Take  by mouth., Disp: , Rfl:     Vitamin D-Vitamin K (K2-D3 10,000) 39744-95 UNIT-MCG capsule, Take  by mouth., Disp: , Rfl:     mirtazapine (REMERON) 7.5 MG tablet, Take 1 tablet by mouth Every Night. (Patient not taking: Reported on 2024), Disp: 30 tablet, Rfl: 2    No Known Allergies     Family History   Problem Relation Age of Onset    Suicide Attempts Mother     Bipolar disorder Mother     Anxiety disorder Mother     Heart disease Mother     Osteoporosis Mother     Alcohol abuse Mother         Mother committed suicide    Depression Mother         She had major depression    Early death Mother         She committed suicide    Thyroid disease Mother         She had Thyroid issues and had surgery    Cancer Father         Prostate    Prostate cancer Father     Hearing loss Father         He has worn a hearing aids since his late 50’s    Depression Sister     Alcohol abuse Sister     Diabetes Sister         Diabetes    Arthritis Sister     Sleep apnea Sister     Obesity Sister     Thyroid disease Sister     Insomnia Sister     Narcolepsy Sister     Diabetes Brother     Stroke Other         AUNT/UNCLE GRANDPARENTS    Diabetes Other         GRANDPARENTS        Social History     Socioeconomic History    Marital status:    Tobacco Use    Smoking status: Former     Current packs/day: 0.00     Average packs/day: 2.0 packs/day for 8.0 years (16.0 ttl pk-yrs)     Types: Cigarettes     Start date: 1975     Quit date: 1983     Years since quittin.5     Passive exposure: Past    Smokeless tobacco: Former     Types: Snuff    Tobacco comments:     Also dipped for several years   Vaping Use    Vaping status: Never Used   Substance and Sexual Activity    Alcohol use: Not Currently     Alcohol/week: 14.0 standard drinks of alcohol     Types: 14 Shots of  "liquor per week     Comment: Stopped due to Disulfiram 250 mg    Drug use: Not Currently     Frequency: 7.0 times per week     Types: Marijuana    Sexual activity: Not Currently     Partners: Female     Birth control/protection: None, Vasectomy     Comment: Low testosterone       Vital Signs:   /72 (BP Location: Left arm, Patient Position: Sitting, Cuff Size: Adult)   Ht 180.3 cm (71\")   Wt 82.8 kg (182 lb 8.7 oz)   BMI 25.46 kg/m²      Physical Exam  Vitals reviewed.   Constitutional:       Appearance: Normal appearance.   Neurological:      General: No focal deficit present.      Mental Status: He is alert and oriented to person, place, and time.   Psychiatric:         Mood and Affect: Mood normal.         Behavior: Behavior normal.          Result Review :   The following data was reviewed by: NARENDRA Villafana on 07/10/2024:  Results for orders placed or performed in visit on 07/10/24   Bladder Scan   Result Value Ref Range    Urine Volume 280       PSA          11/14/2023    17:56   PSA   PSA 0.6        Bladder Scan interpretation 07/10/2024    Estimation of residual urine via BVI 3000 Verathon Bladder Scan  MA/nurse performing: Becca CANNON MA  Residual Urine: 280 ml  Indication: Urinary retention    Benign prostatic hyperplasia with urinary retention   Position: Supine  Examination: Incremental scanning of the suprapubic area using 2.0 MHz transducer using copious amounts of acoustic gel.   Findings: An anechoic area was demonstrated which represented the bladder, with measurement of residual urine as noted. I inspected this myself. In that the residual urine was stable or insignificant, refer to plan for treatment and plan necessary at this time.         Procedures        Assessment and Plan    Diagnoses and all orders for this visit:    1. Urinary retention (Primary)  -     Bladder Scan  -     Uroflowmetry; Future  -     Cystoscopy; Future    2. Benign prostatic hyperplasia with urinary " retention  -     Uroflowmetry; Future  -     Cystoscopy; Future    Given his level of urinary retention and no improvement with medications, I will get a get him set up for cystoscopy with TRUS along with uroflow.  I did advise that he continue his medications until all of his studies are completed.  We did discuss possible procedures to include TURP and Aquablation.  He is not interested in Rezum.    He will follow-up with Dr. Phan based on his recommendations.      Follow Up   Return for cystoscopy with Sylvester with TRUS; please schedule Uroflow.  Patient was given instructions and counseling regarding his condition or for health maintenance advice. Please see specific information pulled into the AVS if appropriate.         This document has been electronically signed by NARENDRA Villafana  July 10, 2024 13:23 EDT

## 2024-07-08 NOTE — PROGRESS NOTES
Chief Complaint: Colonoscopy    Subjective      EGD and colonoscopy consultation       History of Present Illness  Stephon Castellano is a 63 y.o. male presents to [facility] for EGD and colonoscopy consultation.    Patient presents today on a recall letter from Dr. Fox to repeat his colonoscopy.  However patient is with iron deficiency anemia (rbc: 3.69; hgb: 10.4; hct: 31.4).  He reports that he has started iron tablets recently.    Patient reports that he has endured a 60 pound unintentional weight loss over the last 6 months.  He does admit to fatigue.  Admits to shortness of breath with exertion.    Patient denies any dysphagia.  He does admit to heartburn and indigestion and uses OTC meds to control it.  He denies any epigastric pain.  Patient is with melena however is on iron tablets.  Denies any pain before or after eating.    Patient reports that he is having diarrhea alternating constipation issues.  He has noticed a change in his bowel habits with narrow pencillike stools.  He has denied any rectal bleeding.  Denies any family history of colorectal cancer.  Admits to history of colonic polyps.    Patient admits to MUNIRA.  Testing is pending.    Denies any cardiac issues.  Denies taking a GLP-1 receptors.    8/23: Colonoscopy (Dominique): Transverse- 2- tubular adenomas; Ascending - tubular adenoma; Sigmoid- 2 - tubular adenomas.    Objective     Past Medical History:   Diagnosis Date    Alcohol abuse     Allergic rhinitis     Anemia 02/06/2024    My hand started just shaking but got worse until now i have no strength in my left hand    Anxiety 2006    Starting taking mood depression drugs    Arthritis     Asthma About 6 months    Sore Throat and was sent to ENT    Atrial fibrillation 2023    Benign prostatic hyperplasia 2015    Dad had Prostate Cancer and just did Colonoscopy and they said i have a major large Prostate    Cervical neck pain with evidence of disc disease   "   Chronic pain disorder     Colon polyp 2020    Had a lot of polyps on the last 2 Colonoscopies    CTS (carpal tunnel syndrome) 2005    Depression     Difficulty walking 2033    Erectile dysfunction 2017    I have been on Testosterone replacements since then    Head injury 1977    Headache May 2023    Started abou 6 months or more    Hemorrhoid     HL (hearing loss) 2004    Have a major tinitis/ringing in my ears especially the left one    Hyperlipidemia 2017    My blood test have stated they were high or elevated    Hypertension 2017    Given Flomax said it will probably go down    Hypogonadism in male     Insomnia     Kidney stone 2020    Went to doctor and got medication for them and still have issues with right side    Low back pain 2010    Stinosis of back & neck    Memory loss 2023    Movement disorder 2023    Sleep apnea 2007    Substance abuse [id number]    On Disulfiram for alcohol abuse-voluntary    Visual impairment 2023    Just recently have been given eye drops       Past Surgical History:   Procedure Laterality Date    CERVICAL DISC SURGERY  2008    COLONOSCOPY N/A 08/18/2023    Procedure: COLONOSCOPY WITH POLYPECTOMY/SNARE;  Surgeon: Jose Alejandro Fox MD;  Location: [facility] ENDOSCOPY;  Service: General;  Laterality: N/A;  COLON POLYPS    HEEL SPUR SURGERY  2005    KNEE ACL RECONSTRUCTION  2004    OTHER SURGICAL HISTORY      METAL IMPLANT    PAIN PUMP INSERTION/REVISION  2010    SPINE SURGERY  2008    Antieror c5-7 discotomy    TONSILLECTOMY  1965    VASECTOMY  2002       Outpatient Medications Marked as Taking for the 7/8/24 encounter (Office Visit) with Libby Williamson APRN   Medication Sig Dispense Refill    anastrozole (ARIMIDEX) 1 MG tablet Take 1 tablet by mouth Daily.      BD Eclipse Syringe/Needle 23G X 1\" 3 ML misc USE TO INJECT TESTOSTERONE      Cholecalciferol (Vitamin D3) 50 MCG (2000 UT) capsule Take 1 capsule by mouth Daily.      CINNAMON PO Take  by mouth.      DULoxetine (CYMBALTA) 60 MG " capsule Take 1 capsule by mouth Daily.      finasteride (PROSCAR) 5 MG tablet Take 1 tablet by mouth Daily for 360 days. 90 tablet 3    fluticasone (FLONASE) 50 MCG/ACT nasal spray 2 sprays into the nostril(s) as directed by provider Daily.      HYDROmorphone (DILAUDID) 1 MG/ML injection Infuse 0-2 mg/hr into a venous catheter.      IRON, FERROUS GLUCONATE, PO Take 65 mg by mouth Daily.      Lysine 1000 MG tablet Take  by mouth.      Magnesium 250 MG tablet Take  by mouth.      multivitamin with minerals tablet tablet Take 1 tablet by mouth Daily.      Omega 3-6-9 Fatty Acids (OMEGA 3-6-9 COMPLEX PO) Take  by mouth.      polyethylene glycol (MiraLax) 17 GM/SCOOP powder Take 17 g by mouth Daily. (Patient taking differently: Take 17 g by mouth As Needed.) 850 g 5    tamsulosin (FLOMAX) 0.4 MG capsule 24 hr capsule Take 2 capsules by mouth Daily for 360 days. (Patient taking differently: Take 2 capsules by mouth As Needed.) 180 capsule 3    Testosterone Cypionate (DEPOTESTOTERONE CYPIONATE) 200 MG/ML injection       traZODone (DESYREL) 100 MG tablet Take 2 tablets by mouth Every Night.      Turmeric 500 MG capsule Take  by mouth.      Vitamin D-Vitamin K (K2-D3 10,000) 81516-81 UNIT-MCG capsule Take  by mouth.         No Known Allergies     Family History   Problem Relation Age of Onset    Suicide Attempts Mother     Bipolar disorder Mother     Anxiety disorder Mother     Heart disease Mother     Osteoporosis Mother     Alcohol abuse Mother         Mother committed suicide    Depression Mother         She had major depression    Early death Mother         She committed suicide    Thyroid disease Mother         She had Thyroid issues and had surgery    Cancer Father         Prostate    Prostate cancer Father     Hearing loss Father         He has worn a hearing aids since his late 50’s    Depression Sister     Alcohol abuse Sister     Diabetes Sister         Diabetes    Arthritis Sister     Sleep apnea Sister      "Obesity Sister     Thyroid disease Sister     Insomnia Sister     Narcolepsy Sister     Diabetes Brother     Stroke Other         AUNT/UNCLE GRANDPARENTS    Diabetes Other         GRANDPARENTS        Social History     Socioeconomic History    Marital status:    Tobacco Use    Smoking status: Former     Current packs/day: 0.00     Average packs/day: 2.0 packs/day for 8.0 years (16.0 ttl pk-yrs)     Types: Cigarettes     Start date: 1975     Quit date: 1983     Years since quittin.5     Passive exposure: Past    Smokeless tobacco: Former     Types: Snuff    Tobacco comments:     Also dipped for several years   Vaping Use    Vaping status: Never Used   Substance and Sexual Activity    Alcohol use: Not Currently     Alcohol/week: 14.0 standard drinks of alcohol     Types: 14 Shots of liquor per week     Comment: Stopped due to Disulfiram 250 mg    Drug use: Not Currently     Frequency: 7.0 times per week     Types: Marijuana    Sexual activity: Not Currently     Partners: Female     Birth control/protection: None, Vasectomy     Comment: Low testosterone       Review of Systems   Constitutional:  Negative for chills and fever.   HENT:  Positive for trouble swallowing.    Gastrointestinal:  Positive for constipation, diarrhea, GERD and indigestion. Negative for abdominal distention, abdominal pain, anal bleeding, blood in stool, nausea, rectal pain and vomiting.        Vital Signs:   /73 (BP Location: Left arm, Patient Position: Sitting, Cuff Size: Adult)   Pulse 65   Ht 180.3 cm (71\")   Wt 82.8 kg (182 lb 9.6 oz)   BMI 25.47 kg/m²      Physical Exam  Vitals and nursing note reviewed.   Constitutional:       General: He is not in acute distress.     Appearance: Normal appearance. He is not ill-appearing.   HENT:      Head: Normocephalic and atraumatic.   Cardiovascular:      Rate and Rhythm: Normal rate.   Pulmonary:      Effort: Pulmonary effort is normal.      Breath sounds: No stridor. "   Abdominal:      Palpations: Abdomen is soft.      Tenderness: There is no guarding.   Musculoskeletal:         General: No deformity. Normal range of motion.   Skin:     General: Skin is warm and dry.   Neurological:      General: No focal deficit present.      Mental Status: He is alert and oriented to person, place, and time.   Psychiatric:         Mood and Affect: Mood normal.         Thought Content: Thought content normal.          Result Review :          []  Laboratory  []  Radiology  []  Pathology  []  Microbiology  []  EKG/Telemetry   []  Cardiology/Vascular   []  Old records  I spent 25 minutes caring for Stephon on this date of service. This time includes time spent by me in the following activities: reviewing tests, obtaining and/or reviewing a separately obtained history, performing a medically appropriate examination and/or evaluation, ordering medications, tests, or procedures, and documenting information in the medical record.     Assessment and Plan    Diagnoses and all orders for this visit:    1. Iron deficiency anemia, unspecified iron deficiency anemia type (Primary)    2. Gastroesophageal reflux disease without esophagitis    3. History of colonic polyps    4. Weight loss    5. Fatigue, unspecified type    Other orders  -     PEG-KCl-NaCl-NaSulf-Na Asc-C (MOVIPREP) 100 g reconstituted solution powder; Take 1,000 mL by mouth 1 (One) Time for 1 dose.  Dispense: 1000 mL; Refill: 0  -     simethicone (Gas-X) 80 MG chewable tablet; Take 2 tablets PO after completing movi prep and 2 tablets PO 4 hours prior to procedure.  Dispense: 4 tablet; Refill: 0        Follow Up   Return for Schedule EGD and colonoscopy with Dr. Fox on 8/23/2024 [facility].    Hospital arrival time: 0600.    Possible risks/complications, benefits, and alternatives to surgical or invasive procedures have been explained to patient and/or legal guardian.    Patient has been evaluated and can tolerate anesthesia  and/or sedation. Risks, benefits, and alternatives to anesthesia and sedation have been explained to the patient and/or legal guardian. Patient verbalizes understanding and is willing to proceed with the above plan.     Patient was given instructions and counseling regarding his condition or for health maintenance advice. Please see specific information pulled into the AVS if appropriate.

## 2024-07-09 ENCOUNTER — HOSPITAL ENCOUNTER (OUTPATIENT)
Dept: SLEEP MEDICINE | Facility: HOSPITAL | Age: 64
Discharge: HOME | End: 2024-07-09
Admitting: FAMILY MEDICINE
Payer: OTHER GOVERNMENT

## 2024-07-09 DIAGNOSIS — Z79.891 LONG-TERM CURRENT USE OF OPIATE ANALGESIC: ICD-10-CM

## 2024-07-09 DIAGNOSIS — Z78.9 INTOLERANCE OF CONTINUOUS POSITIVE AIRWAY PRESSURE (CPAP) VENTILATION: ICD-10-CM

## 2024-07-09 DIAGNOSIS — G47.19 EXCESSIVE DAYTIME SLEEPINESS: ICD-10-CM

## 2024-07-09 DIAGNOSIS — G47.34 SLEEP RELATED HYPOXIA: ICD-10-CM

## 2024-07-09 DIAGNOSIS — G47.33 SEVERE OBSTRUCTIVE SLEEP APNEA: ICD-10-CM

## 2024-07-09 DIAGNOSIS — G89.29 OTHER CHRONIC PAIN: ICD-10-CM

## 2024-07-09 PROCEDURE — 95811 POLYSOM 6/>YRS CPAP 4/> PARM: CPT

## 2024-07-10 ENCOUNTER — OFFICE VISIT (OUTPATIENT)
Dept: UROLOGY | Facility: CLINIC | Age: 64
End: 2024-07-10
Payer: OTHER GOVERNMENT

## 2024-07-10 ENCOUNTER — OFFICE VISIT (OUTPATIENT)
Dept: PSYCHIATRY | Facility: CLINIC | Age: 64
End: 2024-07-10
Payer: OTHER GOVERNMENT

## 2024-07-10 VITALS
BODY MASS INDEX: 26.4 KG/M2 | SYSTOLIC BLOOD PRESSURE: 130 MMHG | WEIGHT: 188.6 LBS | HEART RATE: 62 BPM | HEIGHT: 71 IN | DIASTOLIC BLOOD PRESSURE: 63 MMHG

## 2024-07-10 VITALS
HEIGHT: 71 IN | DIASTOLIC BLOOD PRESSURE: 72 MMHG | SYSTOLIC BLOOD PRESSURE: 134 MMHG | BODY MASS INDEX: 25.56 KG/M2 | WEIGHT: 182.54 LBS

## 2024-07-10 DIAGNOSIS — N40.1 BENIGN PROSTATIC HYPERPLASIA WITH URINARY RETENTION: ICD-10-CM

## 2024-07-10 DIAGNOSIS — R33.8 BENIGN PROSTATIC HYPERPLASIA WITH URINARY RETENTION: ICD-10-CM

## 2024-07-10 DIAGNOSIS — F51.05 INSOMNIA DUE TO MENTAL CONDITION: ICD-10-CM

## 2024-07-10 DIAGNOSIS — F33.1 MAJOR DEPRESSIVE DISORDER, RECURRENT EPISODE, MODERATE: Primary | ICD-10-CM

## 2024-07-10 DIAGNOSIS — F41.1 GENERALIZED ANXIETY DISORDER: ICD-10-CM

## 2024-07-10 DIAGNOSIS — R33.9 URINARY RETENTION: Primary | ICD-10-CM

## 2024-07-10 LAB — URINE VOLUME: 280

## 2024-07-10 PROCEDURE — 99214 OFFICE O/P EST MOD 30 MIN: CPT | Performed by: STUDENT IN AN ORGANIZED HEALTH CARE EDUCATION/TRAINING PROGRAM

## 2024-07-10 PROCEDURE — 90833 PSYTX W PT W E/M 30 MIN: CPT | Performed by: STUDENT IN AN ORGANIZED HEALTH CARE EDUCATION/TRAINING PROGRAM

## 2024-07-10 RX ORDER — BUPROPION HYDROCHLORIDE 100 MG/1
100 TABLET, EXTENDED RELEASE ORAL DAILY
Qty: 30 TABLET | Refills: 2 | Status: SHIPPED | OUTPATIENT
Start: 2024-07-10

## 2024-07-10 RX ORDER — FLUOXETINE 10 MG/1
10 CAPSULE ORAL DAILY
Qty: 30 CAPSULE | Refills: 2 | Status: SHIPPED | OUTPATIENT
Start: 2024-07-17

## 2024-07-10 NOTE — PATIENT INSTRUCTIONS
1.  Please return to clinic at your next scheduled visit.  Contact the clinic ([phone]) at least 24 hours prior in the event you need to cancel.  2.  Do no harm to yourself or others.    3.  Avoid alcohol and drugs.    4.  Take all medications as prescribed.  Please contact the clinic with any concerns. If you are in need of medication refills, please call the clinic at [phone].    5. Should you want to get in touch with your provider, Dr. Penelope Davis, please utilize [employer] or contact the office ([phone]), and staff will be able to page Dr. Davis directly.  6.  In the event you have personal crisis, contact the following crisis numbers: Suicide Prevention Hotline 1-571.179.6202; GERMAINE Helpline 9-370-523-GERMAINE; [facility] Emergency Room [phone]; text HELLO to 051270; or 617.

## 2024-07-10 NOTE — PROGRESS NOTES
"Subjective   Stephon Castellano is a 63 y.o. male who presents today for initial evaluation     Referring Provider:  No referring provider defined for this encounter.    Chief Complaint:  depression    History of Present Illness:     2024: INITIAL VISIT Chart review:     Juan: Hydromorphone  Care Everywhere: a few non behavioral health notes, sees [facility]    Psychotropic medication chart review:  Present:  Trazodone 200 mg nightly  Cymbalta 60 mg a day    Previously:  Zoloft 50 mg a day    EKG: 2023: 57, sinus, QTc 417  Procedures: Sleep study ordered for the future  Head imaging: 2023 CT of the head shows no acute, MRI 2023 shows no acute, empty sella configuration  Labs: 2024: CMP shows creatinine 1.47, CK is elevated at 756, reassuring B12, hemoglobin is low at 11.5,  Initial Chart Review Notes: Seen by neurology in February for evaluation for seizures.  Patient notes that he is depressed and cannot sleep, depression began when he was in the [employer], his mother killed himself.  Depression began about 10 years ago.  Last year his problems became so severe that he quit his job.  He has not seen a psychiatrist for years.  Sleep medicine consult also ordered.      Patient Psychotherapy Notes:  Patient goals:  Misc:  Stage 3B CKD  Tried bupropion long ago (for depression 10 years ago)      Chart Review By Dates:  07/10/2024: urology, sleep med, gen surg, bladder scan 280 mL. Mirtazapine led to brain fog, fatigue, poor focus in am.  2024: Neurology, internal medicine.  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    Plannin2024: No change. Discussed reflective listening with his wife. Stop abilify; start mirtazapine.  24: R/O ADHD. Start abilify, Therapy? Wgt loss, consider mirtazapine, seroquel. 6w.    VISITS/APPOINTMENTS (BELOW):    \"Stephon\"    07/10/2024: In person interview:  \"It made me really " "tired.\"  Exacerbated the fatigue.  I have no energy, I can't go out there to cut the grass.  No medication has ever helped me for depression. \"I've been depressed for the last 10 years.\"  Discussed his marriage.  Tried talking to her  Tried asking her to go   MDD: no change  TRIP: irritable still, no change  Panic attacks: n  Energy: down  Concentration: down  Insomnia: initial, persistent  Eating/Weight: 188, 182 lbs  Refills: y  Substances: def  Therapy: n  Medication compliant: y  SE: n  No SI HI AVH.      06/14/2024: In person interview:  \"I can't tell any difference.\"  I still can't eat, you basically have to be a vegetarian for kidney disease.  Weight loss related not to poor appetite, but to having to maintain a renal diet.  Discussed his marriage.  MDD: no change  TRIP: irritable still, no change  Panic attacks: n  Energy: down  Concentration: down  Insomnia: initial, persistent  Eating/Weight: 182 lbs  Refills: y  Substances: def  Therapy: n  Medication compliant: y  SE: n  No SI HI AVH.      04/30/2024: In person.  Interview:  His/Her Story: \"[facility].\"  G14, P16  I've always had a little bit of depression.  But when I got injured, it got worse. \"I couldn't do things like I used to.\"  It's a combination of injuries, surgeries  Pain pump helps  Trazodone for 5 years  Cymbalta 5 years  Has never tried combinations, but has tried \"the gamut\" of them  Has lost 50 lbs in 3 mos, unsure why  Trouble focusing recently  Has chronic balance issues, no one knows why (cards, two neurologists, worked him up) x 3-4 mos  Depression/Mood:  Depressed mood, anhedonia, poor energy, poor concentration, weight loss, insomnia.  Seasonal pattern: def  Severity: Moderate  Duration: all his life  Anxiety:  Uncontrolled worrying, muscle tension, fatigue, poor concentration, feeling on edge, irritability, insomnia.  Severity: Moderate  Duration: all of his life  Panic attacks: n  Psych ROS: Positive for depression, " anxiety.  Negative for psychosis and rebecca.  ADHD: def  PTSD: no life threatening trauma  No SI HI AVH.  Medication compliant:      Access to Firearms: yes, locked away    PHQ-9 Depression Screening  PHQ-9 Total Score:      Little interest or pleasure in doing things?     Feeling down, depressed, or hopeless?     Trouble falling or staying asleep, or sleeping too much?     Feeling tired or having little energy?     Poor appetite or overeating?     Feeling bad about yourself - or that you are a failure or have let yourself or your family down?     Trouble concentrating on things, such as reading the newspaper or watching television?     Moving or speaking so slowly that other people could have noticed? Or the opposite - being so fidgety or restless that you have been moving around a lot more than usual?     Thoughts that you would be better off dead, or of hurting yourself in some way?     PHQ-9 Total Score       TRIP-7       Past Surgical History:  Past Surgical History:   Procedure Laterality Date    CERVICAL DISC SURGERY  2008    COLONOSCOPY N/A 08/18/2023    Procedure: COLONOSCOPY WITH POLYPECTOMY/SNARE;  Surgeon: Jose Alejandro Fox MD;  Location: [facility] ENDOSCOPY;  Service: General;  Laterality: N/A;  COLON POLYPS    HEEL SPUR SURGERY  2005    KNEE ACL RECONSTRUCTION  2004    OTHER SURGICAL HISTORY      METAL IMPLANT    PAIN PUMP INSERTION/REVISION  2010    SPINE SURGERY  2008    Antieror c5-7 discotomy    TONSILLECTOMY  1965    VASECTOMY  2002       Problem List:  Patient Active Problem List   Diagnosis    Chronic right-sided thoracic back pain    Kidney stone    Screening due    Allergic rhinitis    Arthritis    Cervical neck pain with evidence of disc disease    Depression    Head injury    Hemorrhoid    Hypogonadism in male    Right wrist pain    Weight loss    Abdominal pain    Seizure-like activity    Palpitations    Abnormal EKG    Bradycardia    Brain fog    Fatigue    Acute kidney injury    Tremor     "Slow transit constipation    Weakness of both lower extremities    Left hand weakness    Drug induced myoclonus    Insomnia due to other mental disorder    Obstructive sleep apnea syndrome    Cervical radiculopathy    Iron deficiency anemia    Gastroesophageal reflux disease with esophagitis without hemorrhage    History of colonic polyps       Allergy:   No Known Allergies     Discontinued Medications:  Medications Discontinued During This Encounter   Medication Reason    DULoxetine (CYMBALTA) 60 MG capsule *Therapy completed    mirtazapine (REMERON) 7.5 MG tablet          Current Medications:   Current Outpatient Medications   Medication Sig Dispense Refill    anastrozole (ARIMIDEX) 1 MG tablet Take 1 tablet by mouth Daily.      BD Eclipse Syringe/Needle 23G X 1\" 3 ML misc USE TO INJECT TESTOSTERONE      Cholecalciferol (Vitamin D3) 50 MCG (2000 UT) capsule Take 1 capsule by mouth Daily.      CINNAMON PO Take  by mouth.      finasteride (PROSCAR) 5 MG tablet Take 1 tablet by mouth Daily for 360 days. 90 tablet 3    fluticasone (FLONASE) 50 MCG/ACT nasal spray 2 sprays into the nostril(s) as directed by provider Daily.      HYDROmorphone (DILAUDID) 1 MG/ML injection Infuse 0-2 mg/hr into a venous catheter.      IRON, FERROUS GLUCONATE, PO Take 65 mg by mouth Daily.      Lysine 1000 MG tablet Take  by mouth.      Magnesium 250 MG tablet Take  by mouth.      multivitamin with minerals tablet tablet Take 1 tablet by mouth Daily.      Omega 3-6-9 Fatty Acids (OMEGA 3-6-9 COMPLEX PO) Take  by mouth.      pain patient supplied pump by Intrathecal route Continuous. Instructions are in drop boxes      polyethylene glycol (MiraLax) 17 GM/SCOOP powder Take 17 g by mouth Daily. (Patient taking differently: Take 17 g by mouth As Needed.) 850 g 5    simethicone (Gas-X) 80 MG chewable tablet Take 2 tablets PO after completing movi prep and 2 tablets PO 4 hours prior to procedure. 4 tablet 0    tamsulosin (FLOMAX) 0.4 MG capsule " 24 hr capsule Take 2 capsules by mouth Daily for 360 days. (Patient taking differently: Take 2 capsules by mouth As Needed.) 180 capsule 3    Testosterone Cypionate (DEPOTESTOTERONE CYPIONATE) 200 MG/ML injection       traZODone (DESYREL) 100 MG tablet Take 2 tablets by mouth Every Night.      Turmeric 500 MG capsule Take  by mouth.      Vitamin D-Vitamin K (K2-D3 10,000) 81434-24 UNIT-MCG capsule Take  by mouth.      buPROPion SR (Wellbutrin SR) 100 MG 12 hr tablet Take 1 tablet by mouth Daily. 30 tablet 2    [START ON 7/17/2024] FLUoxetine (PROzac) 10 MG capsule Take 1 capsule by mouth Daily. Start prozac 7/17 30 capsule 2     No current facility-administered medications for this visit.       Past Medical History:  Past Medical History:   Diagnosis Date    Alcohol abuse     Allergic rhinitis     Anemia 02/06/2024    My hand started just shaking but got worse until now i have no strength in my left hand    Anxiety 2006    Starting taking mood depression drugs    Arthritis     Asthma About 6 months    Sore Throat and was sent to ENT    Atrial fibrillation 2023    Benign prostatic hyperplasia 2015    Dad had Prostate Cancer and just did Colonoscopy and they said i have a major large Prostate    Cervical neck pain with evidence of disc disease     Chronic pain disorder     Colon polyp 2020    Had a lot of polyps on the last 2 Colonoscopies    CTS (carpal tunnel syndrome) 2005    Depression     Difficulty walking 2033    Erectile dysfunction 2017    I have been on Testosterone replacements since then    Head injury 1977    Headache May 2023    Started abou 6 months or more    Hemorrhoid     HL (hearing loss) 2004    Have a major tinitis/ringing in my ears especially the left one    Hyperlipidemia 2017    My blood test have stated they were high or elevated    Hypertension 2017    Given Flomax said it will probably go down    Hypogonadism in male     Insomnia     Kidney stone 2020    Went to doctor and got medication  "for them and still have issues with right side    Low back pain     Stinosis of back & neck    Memory loss     Movement disorder     Sleep apnea 2007    Substance abuse [id number]    On Disulfiram for alcohol abuse-voluntary    Visual impairment     Just recently have been given eye drops       Past Psychiatric History:  Began Treatment: a year   Diagnoses: TRIP, insomnia, MDD  Psychiatrist: a year   Therapist: a year   Admission History:Denies  Medication Trials:    multiple    Self Harm: Denies  Suicide Attempts:Denies      Substance Abuse History:   Types: end of  stopped drinking using antabuse, former smoker 2ppd  Withdrawal Symptoms:Denies  Longest Period Sober: 6 mos, recently  AA: Not applicable     Social History:  Martial Status:  Employed: retired 22  Kids:Yes  House:Lives in a house   History: [employer], retired    Social History     Socioeconomic History    Marital status:    Tobacco Use    Smoking status: Former     Current packs/day: 0.00     Average packs/day: 2.0 packs/day for 8.0 years (16.0 ttl pk-yrs)     Types: Cigarettes     Start date: 1975     Quit date: 1983     Years since quittin.5     Passive exposure: Past    Smokeless tobacco: Former     Types: Snuff    Tobacco comments:     Also dipped for several years   Vaping Use    Vaping status: Never Used   Substance and Sexual Activity    Alcohol use: Not Currently     Alcohol/week: 14.0 standard drinks of alcohol     Types: 14 Shots of liquor per week     Comment: Stopped due to Disulfiram 250 mg    Drug use: Not Currently     Frequency: 7.0 times per week     Types: Marijuana    Sexual activity: Not Currently     Partners: Female     Birth control/protection: None, Vasectomy     Comment: Low testosterone       Family History:   Suicide Attempts:  Mom  Suicide Completions: mom  \"I think she had bipolar but I'm not sure\"      Family History   Problem Relation Age of Onset    " "Suicide Attempts Mother     Bipolar disorder Mother     Anxiety disorder Mother     Heart disease Mother     Osteoporosis Mother     Alcohol abuse Mother         Mother committed suicide    Depression Mother         She had major depression    Early death Mother         She committed suicide    Thyroid disease Mother         She had Thyroid issues and had surgery    Cancer Father         Prostate    Prostate cancer Father     Hearing loss Father         He has worn a hearing aids since his late 50’s    Depression Sister     Alcohol abuse Sister     Diabetes Sister         Diabetes    Arthritis Sister     Sleep apnea Sister     Obesity Sister     Thyroid disease Sister     Insomnia Sister     Narcolepsy Sister     Diabetes Brother     Stroke Other         AUNT/UNCLE GRANDPARENTS    Diabetes Other         GRANDPARENTS        Developmental History:       Childhood: saw mom and dad fight a lot, they , brother  when he was 3 yo.   High School:Completed  College: [employer]    Mental Status Exam  Appearance  : groomed, good eye contact, normal street clothes  Behavior  : pleasant and cooperative  Motor  : No abnormal  Speech  : talkative, normal rhythm, rate, volume, tone, not hyperverbal, not pressured, normal prosidy  Mood  : \"no real change\"  Affect  : mild depression, mood congruent, good variability  Thought Content  : negative suicidal ideations, negative homicidal ideations, negative obsessions  Perceptions  : negative auditory hallucinations, negative visual hallucinations  Thought Process  : linear  Insight/Judgement  : Fair/fair  Cognition  : grossly intact  Attention   : intact      Review of Systems:  Review of Systems   Constitutional:  Positive for fatigue. Negative for diaphoresis.   HENT:  Negative for drooling.    Eyes:  Negative for visual disturbance.   Respiratory:  Negative for cough, chest tightness and shortness of breath.    Cardiovascular:  Positive for chest pain and palpitations. Negative " "for leg swelling.   Gastrointestinal:  Positive for diarrhea. Negative for abdominal pain, nausea and vomiting.   Endocrine: Positive for cold intolerance and heat intolerance.   Genitourinary:  Positive for difficulty urinating.   Musculoskeletal:  Negative for joint swelling.   Allergic/Immunologic: Negative for immunocompromised state.   Neurological:  Positive for dizziness, light-headedness and numbness. Negative for seizures, speech difficulty and headaches.   Psychiatric/Behavioral:  Positive for sleep disturbance. Negative for agitation.        Physical Exam:  Physical Exam    Vital Signs:   /63   Pulse 62   Ht 180.3 cm (71\")   Wt 85.5 kg (188 lb 9.6 oz)   BMI 26.30 kg/m²      Lab Results:   Office Visit on 07/10/2024   Component Date Value Ref Range Status    Urine Volume 07/10/2024 280   Final   Office Visit on 04/10/2024   Component Date Value Ref Range Status    Urine Volume 04/10/2024 80   Final   Office Visit on 03/29/2024   Component Date Value Ref Range Status    Creatine Kinase 03/29/2024 756 (H)  20 - 200 U/L Final    Glucose 03/29/2024 99  65 - 99 mg/dL Final    BUN 03/29/2024 28 (H)  8 - 23 mg/dL Final    Creatinine 03/29/2024 1.47 (H)  0.76 - 1.27 mg/dL Final    Sodium 03/29/2024 139  136 - 145 mmol/L Final    Potassium 03/29/2024 4.3  3.5 - 5.2 mmol/L Final    Chloride 03/29/2024 103  98 - 107 mmol/L Final    CO2 03/29/2024 25.0  22.0 - 29.0 mmol/L Final    Calcium 03/29/2024 9.9  8.6 - 10.5 mg/dL Final    Total Protein 03/29/2024 7.5  6.0 - 8.5 g/dL Final    Albumin 03/29/2024 4.7  3.5 - 5.2 g/dL Final    ALT (SGPT) 03/29/2024 36  1 - 41 U/L Final    AST (SGOT) 03/29/2024 38  1 - 40 U/L Final    Alkaline Phosphatase 03/29/2024 61  39 - 117 U/L Final    Total Bilirubin 03/29/2024 0.2  0.0 - 1.2 mg/dL Final    Globulin 03/29/2024 2.8  gm/dL Final    A/G Ratio 03/29/2024 1.7  g/dL Final    BUN/Creatinine Ratio 03/29/2024 19.0  7.0 - 25.0 Final    Anion Gap 03/29/2024 11.0  5.0 - 15.0 " mmol/L Final    eGFR 03/29/2024 52.9 (L)  >60.0 mL/min/1.73 Final    WBC 03/29/2024 3.62  3.40 - 10.80 10*3/mm3 Final    RBC 03/29/2024 3.69 (L)  4.14 - 5.80 10*6/mm3 Final    Hemoglobin 03/29/2024 11.5 (L)  13.0 - 17.7 g/dL Final    Hematocrit 03/29/2024 34.7 (L)  37.5 - 51.0 % Final    MCV 03/29/2024 94.0  79.0 - 97.0 fL Final    MCH 03/29/2024 31.2  26.6 - 33.0 pg Final    MCHC 03/29/2024 33.1  31.5 - 35.7 g/dL Final    RDW 03/29/2024 12.3  12.3 - 15.4 % Final    RDW-SD 03/29/2024 42.7  37.0 - 54.0 fl Final    MPV 03/29/2024 10.6  6.0 - 12.0 fL Final    Platelets 03/29/2024 168  140 - 450 10*3/mm3 Final    Neutrophil % 03/29/2024 60.1  42.7 - 76.0 % Final    Lymphocyte % 03/29/2024 27.1  19.6 - 45.3 % Final    Monocyte % 03/29/2024 10.5  5.0 - 12.0 % Final    Eosinophil % 03/29/2024 1.4  0.3 - 6.2 % Final    Basophil % 03/29/2024 0.6  0.0 - 1.5 % Final    Immature Grans % 03/29/2024 0.3  0.0 - 0.5 % Final    Neutrophils, Absolute 03/29/2024 2.18  1.70 - 7.00 10*3/mm3 Final    Lymphocytes, Absolute 03/29/2024 0.98  0.70 - 3.10 10*3/mm3 Final    Monocytes, Absolute 03/29/2024 0.38  0.10 - 0.90 10*3/mm3 Final    Eosinophils, Absolute 03/29/2024 0.05  0.00 - 0.40 10*3/mm3 Final    Basophils, Absolute 03/29/2024 0.02  0.00 - 0.20 10*3/mm3 Final    Immature Grans, Absolute 03/29/2024 0.01  0.00 - 0.05 10*3/mm3 Final    nRBC 03/29/2024 0.0  0.0 - 0.2 /100 WBC Final    Vitamin B-12 03/29/2024 555  211 - 946 pg/mL Final   Office Visit on 02/28/2024   Component Date Value Ref Range Status    Creatine Kinase 02/28/2024 239 (H)  20 - 200 U/L Final    Hemoglobin A1C 02/28/2024 5.30  4.80 - 5.60 % Final    Sed Rate 02/28/2024 3  0 - 20 mm/hr Final    C-Reactive Protein 02/28/2024 <0.30  0.00 - 0.50 mg/dL Final    Hepatitis C Ab 02/28/2024 Non-Reactive  Non-Reactive Final    HIV DUO 02/28/2024 Non-Reactive  Non-Reactive Final    WBC 02/28/2024 3.09 (L)  3.40 - 10.80 10*3/mm3 Final    RBC 02/28/2024 3.44 (L)  4.14 - 5.80  10*6/mm3 Final    Hemoglobin 02/28/2024 10.4 (L)  13.0 - 17.7 g/dL Final    Hematocrit 02/28/2024 31.4 (L)  37.5 - 51.0 % Final    MCV 02/28/2024 91.3  79.0 - 97.0 fL Final    MCH 02/28/2024 30.2  26.6 - 33.0 pg Final    MCHC 02/28/2024 33.1  31.5 - 35.7 g/dL Final    RDW 02/28/2024 12.5  12.3 - 15.4 % Final    RDW-SD 02/28/2024 40.6  37.0 - 54.0 fl Final    MPV 02/28/2024 10.3  6.0 - 12.0 fL Final    Platelets 02/28/2024 147  140 - 450 10*3/mm3 Final    Neutrophil % 02/28/2024 54.4  42.7 - 76.0 % Final    Lymphocyte % 02/28/2024 32.7  19.6 - 45.3 % Final    Monocyte % 02/28/2024 10.0  5.0 - 12.0 % Final    Eosinophil % 02/28/2024 2.3  0.3 - 6.2 % Final    Basophil % 02/28/2024 0.6  0.0 - 1.5 % Final    Immature Grans % 02/28/2024 0.0  0.0 - 0.5 % Final    Neutrophils, Absolute 02/28/2024 1.68 (L)  1.70 - 7.00 10*3/mm3 Final    Lymphocytes, Absolute 02/28/2024 1.01  0.70 - 3.10 10*3/mm3 Final    Monocytes, Absolute 02/28/2024 0.31  0.10 - 0.90 10*3/mm3 Final    Eosinophils, Absolute 02/28/2024 0.07  0.00 - 0.40 10*3/mm3 Final    Basophils, Absolute 02/28/2024 0.02  0.00 - 0.20 10*3/mm3 Final    Immature Grans, Absolute 02/28/2024 0.00  0.00 - 0.05 10*3/mm3 Final    nRBC 02/28/2024 0.0  0.0 - 0.2 /100 WBC Final   Office Visit on 02/12/2024   Component Date Value Ref Range Status    Creatine Kinase 02/13/2024 339 (H)  20 - 200 U/L Final    AChR Binding Ab 02/13/2024 <0.03  0.00 - 0.24 nmol/L Final                                   Negative:   0.00 - 0.24                                 Borderline: 0.25 - 0.40                                 Positive:         >0.40    AChR Blocking Abs 02/13/2024 13  0 - 25 % Final                                   Negative:      0 - 25                                 Borderline:   26 - 30                                 Positive:         >30    ACHR Receptor Modulating Ab 02/13/2024 0  0 - 45 % Final                           Interpretive Information:                           Negative:             0 - 45%                          Positive:               > 45%  No single value for AChR-modulating antibody should  be used as a sole basis for diagnosis or response  to therapy.    Striated Muscle Antibody 02/13/2024 Negative  Neg:<1:100 Final    Reflex Information 02/13/2024 Comment   Final    Reflex test indicated.    NMO IgG Autoantibodies 02/13/2024 <1.5  0.0 - 3.0 U/mL Final                                 Negative:      0.0 - 3.0                               Positive:           >3.0    Glucose 02/13/2024 125 (H)  65 - 99 mg/dL Final    BUN 02/13/2024 14  8 - 23 mg/dL Final    Creatinine 02/13/2024 1.50 (H)  0.76 - 1.27 mg/dL Final    Sodium 02/13/2024 140  136 - 145 mmol/L Final    Potassium 02/13/2024 4.0  3.5 - 5.2 mmol/L Final    Chloride 02/13/2024 101  98 - 107 mmol/L Final    CO2 02/13/2024 30.0 (H)  22.0 - 29.0 mmol/L Final    Calcium 02/13/2024 9.4  8.6 - 10.5 mg/dL Final    Total Protein 02/13/2024 6.8  6.0 - 8.5 g/dL Final    Albumin 02/13/2024 4.6  3.5 - 5.2 g/dL Final    ALT (SGPT) 02/13/2024 39  1 - 41 U/L Final    AST (SGOT) 02/13/2024 33  1 - 40 U/L Final    Alkaline Phosphatase 02/13/2024 64  39 - 117 U/L Final    Total Bilirubin 02/13/2024 0.2  0.0 - 1.2 mg/dL Final    Globulin 02/13/2024 2.2  gm/dL Final    A/G Ratio 02/13/2024 2.1  g/dL Final    BUN/Creatinine Ratio 02/13/2024 9.3  7.0 - 25.0 Final    Anion Gap 02/13/2024 9.0  5.0 - 15.0 mmol/L Final    eGFR 02/13/2024 51.7 (L)  >60.0 mL/min/1.73 Final    WBC 02/13/2024 3.46  3.40 - 10.80 10*3/mm3 Final    RBC 02/13/2024 3.34 (L)  4.14 - 5.80 10*6/mm3 Final    Hemoglobin 02/13/2024 10.4 (L)  13.0 - 17.7 g/dL Final    Hematocrit 02/13/2024 31.0 (L)  37.5 - 51.0 % Final    MCV 02/13/2024 92.8  79.0 - 97.0 fL Final    MCH 02/13/2024 31.1  26.6 - 33.0 pg Final    MCHC 02/13/2024 33.5  31.5 - 35.7 g/dL Final    RDW 02/13/2024 12.7  12.3 - 15.4 % Final    RDW-SD 02/13/2024 42.9  37.0 - 54.0 fl Final    MPV 02/13/2024 9.8   6.0 - 12.0 fL Final    Platelets 02/13/2024 147  140 - 450 10*3/mm3 Final    Neutrophil % 02/13/2024 36.4 (L)  42.7 - 76.0 % Final    Lymphocyte % 02/13/2024 52.0 (H)  19.6 - 45.3 % Final    Monocyte % 02/13/2024 8.1  5.0 - 12.0 % Final    Eosinophil % 02/13/2024 3.2  0.3 - 6.2 % Final    Basophil % 02/13/2024 0.3  0.0 - 1.5 % Final    Immature Grans % 02/13/2024 0.0  0.0 - 0.5 % Final    Neutrophils, Absolute 02/13/2024 1.26 (L)  1.70 - 7.00 10*3/mm3 Final    Lymphocytes, Absolute 02/13/2024 1.80  0.70 - 3.10 10*3/mm3 Final    Monocytes, Absolute 02/13/2024 0.28  0.10 - 0.90 10*3/mm3 Final    Eosinophils, Absolute 02/13/2024 0.11  0.00 - 0.40 10*3/mm3 Final    Basophils, Absolute 02/13/2024 0.01  0.00 - 0.20 10*3/mm3 Final    Immature Grans, Absolute 02/13/2024 0.00  0.00 - 0.05 10*3/mm3 Final    nRBC 02/13/2024 0.0  0.0 - 0.2 /100 WBC Final    Musk Antibody 02/13/2024 <1.0  U/mL Final    Reference Range:    Negative: <1.0    Positive: 1.0 or higher  This test was developed and its performance characteristics  determined by [facility]. It has not been cleared or approved  by the Food and Drug Administration.       EKG Results:  No orders to display       Imaging Results:  CT Chest Without Contrast Diagnostic    Result Date: 3/19/2024  Impression: CT scan of the chest without IV contrast demonstrating tree-in-bud airspace disease in the left lower lobe suggesting indolent infection. Follow-up CT scan of the chest in 3 months is recommended.    Electronically Signed By-SOILA TREVIÑO MD On:3/19/2024 3:23 PM          Assessment & Plan   Diagnoses and all orders for this visit:    1. Major depressive disorder, recurrent episode, moderate (Primary)  -     Ambulatory Referral to Behavioral Health  -     buPROPion SR (Wellbutrin SR) 100 MG 12 hr tablet; Take 1 tablet by mouth Daily.  Dispense: 30 tablet; Refill: 2  -     FLUoxetine (PROzac) 10 MG capsule; Take 1 capsule by mouth Daily. Start prozac 7/17  Dispense: 30  capsule; Refill: 2    2. Generalized anxiety disorder  -     Ambulatory Referral to Behavioral Health  -     buPROPion SR (Wellbutrin SR) 100 MG 12 hr tablet; Take 1 tablet by mouth Daily.  Dispense: 30 tablet; Refill: 2  -     FLUoxetine (PROzac) 10 MG capsule; Take 1 capsule by mouth Daily. Start prozac 7/17  Dispense: 30 capsule; Refill: 2    3. Insomnia due to mental condition  -     Ambulatory Referral to Behavioral Health  -     buPROPion SR (Wellbutrin SR) 100 MG 12 hr tablet; Take 1 tablet by mouth Daily.  Dispense: 30 tablet; Refill: 2  -     FLUoxetine (PROzac) 10 MG capsule; Take 1 capsule by mouth Daily. Start prozac 7/17  Dispense: 30 capsule; Refill: 2        Visit Diagnoses:    ICD-10-CM ICD-9-CM   1. Major depressive disorder, recurrent episode, moderate  F33.1 296.32   2. Generalized anxiety disorder  F41.1 300.02   3. Insomnia due to mental condition  F51.05 300.9     327.02     07/10/2024: Mirtazapine led to brain fog, fatigue, poor focus in am. Stop it. [facility] for marriage issues (individual therapy). Pt is not on duloxetine. Pt needs energizing meds. Start wellbutrin for a week, then add prozac.    Allowed patient to freely discuss and process issues, such as:  Anxiety and depression regarding relationships.  ... using Rogerian psychotherapeutic techniques including unconditional positive regard, reflective listening, and demonstrating clear empathy, with the goal of ameliorating symptoms and maintaining, restoring, or improving self-esteem, adaptive skills, and ego or psychological functions (Danielle et al. 1991).  Time (minutes) spent providing supportive psychotherapy:   (This time is exclusive to the therapy session and separate from the time spent on activities used to meet the criteria for the E/M service (history, exam, medical decision-making).)  Start: 3:50  Stop: 4:06  Functional status: mild impairment  Treatment plan: Medication management and supportive psychotherapy  Prognosis:  good  Progress:  4w    06/14/2024: No change. Discussed reflective listening with his wife. Stop abilify; start mirtazapine.    4/30/24: R/O ADHD. Start abilify, Therapy? Wgt loss, consider mirtazapine, seroquel. 6w.    PLAN:  Safety: No acute safety concerns  Therapy: None  Risk Assessment: Risk of self-harm acutely is moderate.  Risk factors include anxiety disorder, mood disorder, fhx, access to guns, and recent psychosocial stressors (pandemic). Protective factors include no present SI, no history of suicide attempts or self-harm in the past, minimal AODA, healthcare seeking, future orientation, willingness to engage in care.  Risk of self-harm chronically is also moderate, but could be further elevated in the event of treatment noncompliance and/or AODA.  Meds:  START wellbutrin  mg qam. Risks, benefits, alternatives discussed with patient including nausea, GI upset, increased energy, exacerbation of irritability, insomnia, lowering of seizure threshold.  After discussion of these risks and benefits, the patient voiced understanding and agreed to proceed.  START prozac a week after wellbutrin 10 mg qam. Risks, benefits, alternatives discussed with patient including GI upset, nausea vomiting diarrhea, theoretical decrease of seizure threshold predisposing the patient to a slightly higher seizure risk, headaches, sexual dysfunction, serotonin syndrome, bleeding risk, increased suicidality in patients 24 years and younger, switching to rebecca/hypomania.  After discussion of these risks and benefits, the patient voiced understanding and agreed to proceed.  CONTINUE trazodone 50 mg qhs. 100 mg made him groggy the next day. Risks, benefits, side effects discussed with patient including GI upset, sedation, dizziness/falls risk, grogginess the following day, prolongation of the QTc interval.  Do not use before operating vehicle, vessel, or machine. After discussion of these risks and benefits, the patient voiced  understanding and agreed to proceed.    S/P:  abilify 2 mg qhs. No change 6/24.   mirtazapine 7.5 mg qhs. Sedation 7/24  Labs: none    Patient screened positive for depression based on a PHQ-9 score of 16 on 4/30/2024. Follow-up recommendations include: Prescribed antidepressant medication treatment and Suicide Risk Assessment performed.           TREATMENT PLAN/GOALS: Continue supportive psychotherapy efforts and medications as indicated. Treatment and medication options discussed during today's visit. Patient acknowledged and verbally consented to continue with current treatment plan and was educated on the importance of compliance with treatment and follow-up appointments.    MEDICATION ISSUES:  JIM reviewed as expected.  Discussed medication options and treatment plan of prescribed medication as well as the risks, benefits, and side effects including potential falls, possible impaired driving and metabolic adversities among others. Patient is agreeable to call the office with any worsening of symptoms or onset of side effects. Patient is agreeable to call 911 or go to the nearest ER should he/she begin having SI/HI. No medication side effects or related complaints today.     MEDS ORDERED DURING VISIT:  New Medications Ordered This Visit   Medications    buPROPion SR (Wellbutrin SR) 100 MG 12 hr tablet     Sig: Take 1 tablet by mouth Daily.     Dispense:  30 tablet     Refill:  2     Dc mirtazapine and abilify thank you    FLUoxetine (PROzac) 10 MG capsule     Sig: Take 1 capsule by mouth Daily. Start prozac 7/17     Dispense:  30 capsule     Refill:  2       Return in about 15 days (around 7/25/2024) for already scheduled.         This document has been electronically signed by Penelope Davis MD  July 10, 2024 16:06 EDT    Dictated Utilizing Dragon Dictation: Part of this note may be an electronic transcription/translation of spoken language to printed text using the Dragon Dictation System.

## 2024-07-12 PROCEDURE — 95811 POLYSOM 6/>YRS CPAP 4/> PARM: CPT | Performed by: FAMILY MEDICINE

## 2024-07-15 ENCOUNTER — PROCEDURE VISIT (OUTPATIENT)
Dept: UROLOGY | Facility: CLINIC | Age: 64
End: 2024-07-15
Payer: OTHER GOVERNMENT

## 2024-07-15 DIAGNOSIS — N40.1 BENIGN PROSTATIC HYPERPLASIA WITH URINARY RETENTION: Primary | ICD-10-CM

## 2024-07-15 DIAGNOSIS — R33.8 BENIGN PROSTATIC HYPERPLASIA WITH URINARY RETENTION: Primary | ICD-10-CM

## 2024-07-15 NOTE — PROGRESS NOTES
Uroflow.    Q-Clark.  8.9 mL/s    Average flow.  4.3 mL/s    Voided volume.  215 mL    Interval.  2    Patient has obstructive flow.  Further discussion and treatment is deferred to Ms. Rebeca Loyola

## 2024-07-22 ENCOUNTER — DOCUMENTATION (OUTPATIENT)
Dept: SLEEP MEDICINE | Facility: HOSPITAL | Age: 64
End: 2024-07-22
Payer: OTHER GOVERNMENT

## 2024-07-22 DIAGNOSIS — G47.33 OBSTRUCTIVE SLEEP APNEA, ADULT: Primary | ICD-10-CM

## 2024-07-22 DIAGNOSIS — G47.31 CENTRAL SLEEP APNEA DUE TO DRUG: ICD-10-CM

## 2024-07-22 DIAGNOSIS — T50.905A CENTRAL SLEEP APNEA DUE TO DRUG: ICD-10-CM

## 2024-07-22 NOTE — PROGRESS NOTES
Date 7/22/2024    Informed today by sleep staff patient no longer wishes to go through [facility] and wants to use [facility] as his DME.      -Have placed an order to [facility] for:    Bilevel S/T  Mode: S/T  IPAP 17 cm H2O  EPAP 12 cm H2O  RR: 10 bpm  TI max: 2.0 seconds  TI min 0.3 seconds  Ramp off      Mask fitting with AirFit F20 fullface mask (patient may ultimately choose any type of mask he wants).    Follow up with me 31-90 days after starting PAP for compliance check in and therapy review.      NPI #: [id number]    Lars Wang, DO  Sleep Medicine  Psychiatric  07/22/24

## 2024-07-25 ENCOUNTER — OFFICE VISIT (OUTPATIENT)
Dept: PSYCHIATRY | Facility: CLINIC | Age: 64
End: 2024-07-25
Payer: OTHER GOVERNMENT

## 2024-07-25 VITALS
HEART RATE: 83 BPM | DIASTOLIC BLOOD PRESSURE: 66 MMHG | BODY MASS INDEX: 26.77 KG/M2 | HEIGHT: 71 IN | SYSTOLIC BLOOD PRESSURE: 135 MMHG | WEIGHT: 191.2 LBS

## 2024-07-25 DIAGNOSIS — F33.1 MAJOR DEPRESSIVE DISORDER, RECURRENT EPISODE, MODERATE: Primary | ICD-10-CM

## 2024-07-25 DIAGNOSIS — F41.1 GENERALIZED ANXIETY DISORDER: ICD-10-CM

## 2024-07-25 DIAGNOSIS — F51.05 INSOMNIA DUE TO MENTAL CONDITION: ICD-10-CM

## 2024-07-25 RX ORDER — QUETIAPINE FUMARATE 25 MG/1
25 TABLET, FILM COATED ORAL NIGHTLY
Qty: 30 TABLET | Refills: 2 | Status: SHIPPED | OUTPATIENT
Start: 2024-07-25

## 2024-07-25 RX ORDER — QUETIAPINE FUMARATE 25 MG/1
25 TABLET, FILM COATED ORAL NIGHTLY
Qty: 30 TABLET | Refills: 2 | Status: SHIPPED | OUTPATIENT
Start: 2024-07-25 | End: 2024-07-25 | Stop reason: SDUPTHER

## 2024-07-25 RX ORDER — POLYETHYLENE GLYCOL 3350, SODIUM SULFATE, SODIUM CHLORIDE, POTASSIUM CHLORIDE, ASCORBIC ACID, SODIUM ASCORBATE 7.5-2.691G
KIT ORAL
COMMUNITY
Start: 2024-07-11

## 2024-07-25 NOTE — PATIENT INSTRUCTIONS
1.  Please return to clinic at your next scheduled visit.  Contact the clinic ([phone]) at least 24 hours prior in the event you need to cancel.  2.  Do no harm to yourself or others.    3.  Avoid alcohol and drugs.    4.  Take all medications as prescribed.  Please contact the clinic with any concerns. If you are in need of medication refills, please call the clinic at [phone].    5. Should you want to get in touch with your provider, Dr. Penelope Davis, please utilize [facility] or contact the office ([phone]), and staff will be able to page Dr. Davis directly.  6.  In the event you have personal crisis, contact the following crisis numbers: Suicide Prevention Hotline 1-167.647.6019; GERMAINE Helpline 8-075-572-FPQY; [facility] [phone]; text HELLO to 230878; or 511.     Virtual Help Desk: [phone]    Stop bupropion. Wait 2 weeks, then stop trazodone, and switch over to quetiapine. If the dose of quetiapine is not strong enough for the insomnia, then double it. If it doesn't work for insomnia, call.

## 2024-07-25 NOTE — PROGRESS NOTES
Subjective   Stephon Castellano is a 63 y.o. male who presents today for initial evaluation     Referring Provider:  No referring provider defined for this encounter.  [facility] endocrinologist    Chief Complaint:  depression    History of Present Illness:     2024: INITIAL VISIT Chart review:     Juan: Hydromorphone  Care Everywhere: a few non behavioral health notes, sees [facility]    Psychotropic medication chart review:  Present:  Trazodone 200 mg nightly  Cymbalta 60 mg a day    Previously:  Zoloft 50 mg a day    EKG: 2023: 57, sinus, QTc 417  Procedures: Sleep study ordered for the future  Head imaging: 2023 CT of the head shows no acute, MRI 2023 shows no acute, empty sella configuration  Labs: 2024: CMP shows creatinine 1.47, CK is elevated at 756, reassuring B12, hemoglobin is low at 11.5,  Initial Chart Review Notes: Seen by neurology in February for evaluation for seizures.  Patient notes that he is depressed and cannot sleep, depression began when he was in the [employer], his mother killed himself.  Depression began about 10 years ago.  Last year his problems became so severe that he quit his job.  He has not seen a psychiatrist for years.  Sleep medicine consult also ordered.      Patient Psychotherapy Notes:  Patient goals:  Misc:  Stage 3B CKD  Tried bupropion long ago (for depression 10 years ago)  Mom may have been bipolar, committed SA      Chart Review By Dates:  24: Sleep: settings recs, retaining per bladder scan.   07/10/24: urology, sleep med, gen surg, bladder scan 280 mL. Mirtazapine led to brain fog, fatigue, poor focus in am.  2024: Neurology, internal medicine.  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    Plannin/10/2024: Mirtazapine led to brain fog, fatigue, poor focus in am. Stop it. [facility] for marriage issues (individual therapy). Pt is not on duloxetine. Pt needs energizing meds. Start  "wellbutrin for a week, then add prozac.  06/14/2024: No change. Discussed reflective listening with his wife. Stop abilify; start mirtazapine.  4/30/24: R/O ADHD. Start abilify, Therapy? Wgt loss, consider mirtazapine, seroquel. 6w.    VISITS/APPOINTMENTS (BELOW):    \"Stephon\"    07/25/2024: In person interview:  \"It made me really tired.\"  I'm wearing a brace on my right hand for CTS, driving a lot.  Wife broke her ankle in 3 places.  I'm more angry  Fatigue, but also \"doing everything\" for his wife  Discussed his marriage.  Fighting, baseline  MDD: now improved to stable, \"I haven't cried in a long time\"  ADHD:   Elementary school:   Grades? poor  Special classes or failures? Reading failed, failed 2nd grade  Got in trouble? no  Referral for ADHD testing? no  FHx:   Presently:  Problems with attention to detail, problems with sustained attention, problems listening when spoken to directly, failure to finish tasks, avoids tasks that require sustained mental effort, easily distracted, forgetting things, losing things, hard to organize, talks a lot and cutting people off, drifts off during conversations    TRIP: irritable, worse  Panic attacks: n  Energy: down  Concentration: not at goal intermittently  Insomnia: initial, now stable  Started CPAP, sleeping better  Eating/Weight: 191, 188, 182 lbs (goal is 190)  Refills: y  Substances: def  Therapy: forgot to call them back  Medication compliant: y  SE: n  No SI HI AVH.      7/10/24: In person interview:  \"It made me really tired.\"  Exacerbated the fatigue.  I have no energy, I can't go out there to cut the grass.  No medication has ever helped me for depression. \"I've been depressed for the last 10 years.\"  Discussed his marriage.  Tried talking to her  Tried asking her to go   MDD: no change  TRIP: irritable still, no change  Panic attacks: n  Energy: down  Concentration: down  Insomnia: initial, persistent  Eating/Weight: 188, 182 lbs  Refills: y  Substances: " "def  Therapy: n  Medication compliant: y  SE: n  No SI HI AVH.      06/14/2024: In person interview:  \"I can't tell any difference.\"  I still can't eat, you basically have to be a vegetarian for kidney disease.  Weight loss related not to poor appetite, but to having to maintain a renal diet.  Discussed his marriage.  MDD: no change  TRIP: irritable still, no change  Panic attacks: n  Energy: down  Concentration: down  Insomnia: initial, persistent  Eating/Weight: 182 lbs  Refills: y  Substances: def  Therapy: n  Medication compliant: y  SE: n  No SI HI AVH.      04/30/2024: In person.  Interview:  His/Her Story: \"[facility].\"  G14, P16  I've always had a little bit of depression.  But when I got injured, it got worse. \"I couldn't do things like I used to.\"  It's a combination of injuries, surgeries  Pain pump helps  Trazodone for 5 years  Cymbalta 5 years  Has never tried combinations, but has tried \"the gamut\" of them  Has lost 50 lbs in 3 mos, unsure why  Trouble focusing recently  Has chronic balance issues, no one knows why (cards, two neurologists, worked him up) x 3-4 mos  Depression/Mood:  Depressed mood, anhedonia, poor energy, poor concentration, weight loss, insomnia.  Seasonal pattern: def  Severity: Moderate  Duration: all his life  Anxiety:  Uncontrolled worrying, muscle tension, fatigue, poor concentration, feeling on edge, irritability, insomnia.  Severity: Moderate  Duration: all of his life  Panic attacks: n  Psych ROS: Positive for depression, anxiety.  Negative for psychosis and rebecca.  ADHD: def  PTSD: no life threatening trauma  No SI HI AVH.  Medication compliant:      Access to Firearms: yes, locked away    PHQ-9 Depression Screening  PHQ-9 Total Score:      Little interest or pleasure in doing things?     Feeling down, depressed, or hopeless?     Trouble falling or staying asleep, or sleeping too much?     Feeling tired or having little energy?     Poor appetite or overeating?   "   Feeling bad about yourself - or that you are a failure or have let yourself or your family down?     Trouble concentrating on things, such as reading the newspaper or watching television?     Moving or speaking so slowly that other people could have noticed? Or the opposite - being so fidgety or restless that you have been moving around a lot more than usual?     Thoughts that you would be better off dead, or of hurting yourself in some way?     PHQ-9 Total Score       TRIP-7       Past Surgical History:  Past Surgical History:   Procedure Laterality Date    CERVICAL DISC SURGERY  2008    COLONOSCOPY N/A 08/18/2023    Procedure: COLONOSCOPY WITH POLYPECTOMY/SNARE;  Surgeon: Jose Alejandro Fox MD;  [facility] ENDOSCOPY;  Service: General;  Laterality: N/A;  COLON POLYPS    HEEL SPUR SURGERY  2005    KNEE ACL RECONSTRUCTION  2004    OTHER SURGICAL HISTORY      METAL IMPLANT    PAIN PUMP INSERTION/REVISION  2010    SPINE SURGERY  2008    Antieror c5-7 discotomy    TONSILLECTOMY  1965    VASECTOMY  2002       Problem List:  Patient Active Problem List   Diagnosis    Chronic right-sided thoracic back pain    Kidney stone    Screening due    Allergic rhinitis    Arthritis    Cervical neck pain with evidence of disc disease    Depression    Head injury    Hemorrhoid    Hypogonadism in male    Right wrist pain    Weight loss    Abdominal pain    Seizure-like activity    Palpitations    Abnormal EKG    Bradycardia    Brain fog    Fatigue    Acute kidney injury    Tremor    Slow transit constipation    Weakness of both lower extremities    Left hand weakness    Drug induced myoclonus    Insomnia due to other mental disorder    Obstructive sleep apnea syndrome    Cervical radiculopathy    Iron deficiency anemia    Gastroesophageal reflux disease with esophagitis without hemorrhage    History of colonic polyps       Allergy:   No Known Allergies     Discontinued Medications:  Medications Discontinued During This  "Encounter   Medication Reason    buPROPion SR (Wellbutrin SR) 100 MG 12 hr tablet Side effects    QUEtiapine (SEROquel) 25 MG tablet Reorder           Current Medications:   Current Outpatient Medications   Medication Sig Dispense Refill    anastrozole (ARIMIDEX) 1 MG tablet Take 1 tablet by mouth Daily.      BD Eclipse Syringe/Needle 23G X 1\" 3 ML misc USE TO INJECT TESTOSTERONE      Cholecalciferol (Vitamin D3) 50 MCG (2000 UT) capsule Take 1 capsule by mouth Daily.      CINNAMON PO Take  by mouth.      finasteride (PROSCAR) 5 MG tablet Take 1 tablet by mouth Daily for 360 days. 90 tablet 3    FLUoxetine (PROzac) 10 MG capsule Take 1 capsule by mouth Daily. Start prozac 7/17 30 capsule 2    fluticasone (FLONASE) 50 MCG/ACT nasal spray 2 sprays into the nostril(s) as directed by provider Daily.      HYDROmorphone (DILAUDID) 1 MG/ML injection Infuse 0-2 mg/hr into a venous catheter.      IRON, FERROUS GLUCONATE, PO Take 65 mg by mouth Daily.      Lysine 1000 MG tablet Take  by mouth.      Magnesium 250 MG tablet Take  by mouth.      MoviPrep 100 g reconstituted solution powder       multivitamin with minerals tablet tablet Take 1 tablet by mouth Daily.      Omega 3-6-9 Fatty Acids (OMEGA 3-6-9 COMPLEX PO) Take  by mouth.      pain patient supplied pump by Intrathecal route Continuous. Instructions are in drop boxes      polyethylene glycol (MiraLax) 17 GM/SCOOP powder Take 17 g by mouth Daily. (Patient taking differently: Take 17 g by mouth As Needed.) 850 g 5    QUEtiapine (SEROquel) 25 MG tablet Take 1 tablet by mouth Every Night. 30 tablet 2    simethicone (Gas-X) 80 MG chewable tablet Take 2 tablets PO after completing movi prep and 2 tablets PO 4 hours prior to procedure. 4 tablet 0    tamsulosin (FLOMAX) 0.4 MG capsule 24 hr capsule Take 2 capsules by mouth Daily for 360 days. (Patient taking differently: Take 2 capsules by mouth As Needed.) 180 capsule 3    Testosterone Cypionate (DEPOTESTOTERONE CYPIONATE) " 200 MG/ML injection       traZODone (DESYREL) 100 MG tablet Take 2 tablets by mouth Every Night.      Turmeric 500 MG capsule Take  by mouth.      Vitamin D-Vitamin K (K2-D3 10,000) 30687-07 UNIT-MCG capsule Take  by mouth.       No current facility-administered medications for this visit.       Past Medical History:  Past Medical History:   Diagnosis Date    Alcohol abuse     Allergic rhinitis     Anemia 02/06/2024    My hand started just shaking but got worse until now i have no strength in my left hand    Anxiety 2006    Starting taking mood depression drugs    Arthritis     Asthma About 6 months    Sore Throat and was sent to ENT    Atrial fibrillation 2023    Benign prostatic hyperplasia 2015    Dad had Prostate Cancer and just did Colonoscopy and they said i have a major large Prostate    Cervical neck pain with evidence of disc disease     Chronic pain disorder     Colon polyp 2020    Had a lot of polyps on the last 2 Colonoscopies    CTS (carpal tunnel syndrome) 2005    Depression     Difficulty walking 2033    Erectile dysfunction 2017    I have been on Testosterone replacements since then    Head injury 1977    Headache May 2023    Started abou 6 months or more    Hemorrhoid     HL (hearing loss) 2004    Have a major tinitis/ringing in my ears especially the left one    Hyperlipidemia 2017    My blood test have stated they were high or elevated    Hypertension 2017    Given Flomax said it will probably go down    Hypogonadism in male     Insomnia     Kidney stone 2020    Went to doctor and got medication for them and still have issues with right side    Low back pain 2010    Stinosis of back & neck    Memory loss 2023    Movement disorder 2023    Sleep apnea 2007    Substance abuse [id number]    On Disulfiram for alcohol abuse-voluntary    Visual impairment 2023    Just recently have been given eye drops       Past Psychiatric History:  Began Treatment: a year 2019  Diagnoses: TRIP, insomnia,  "MDD  Psychiatrist: a year 2019  Therapist: a year 2019  Admission History:Denies  Medication Trials:    multiple    Self Harm: Denies  Suicide Attempts:Denies      Substance Abuse History:   Types: end of  stopped drinking using antabuse, former smoker 2ppd  Withdrawal Symptoms:Denies  Longest Period Sober: 6 mos, recently  AA: Not applicable     Social History:  Martial Status:  Employed: retired 22  Kids:Yes  House:Lives in a house   History: [employer], retired    Social History     Socioeconomic History    Marital status:    Tobacco Use    Smoking status: Former     Current packs/day: 0.00     Average packs/day: 2.0 packs/day for 8.0 years (16.0 ttl pk-yrs)     Types: Cigarettes     Start date: 1975     Quit date: 1983     Years since quittin.5     Passive exposure: Past    Smokeless tobacco: Former     Types: Snuff    Tobacco comments:     Also dipped for several years   Vaping Use    Vaping status: Never Used   Substance and Sexual Activity    Alcohol use: Not Currently     Alcohol/week: 14.0 standard drinks of alcohol     Types: 14 Shots of liquor per week     Comment: Stopped due to Disulfiram 250 mg    Drug use: Not Currently     Frequency: 7.0 times per week     Types: Marijuana    Sexual activity: Not Currently     Partners: Female     Birth control/protection: None, Vasectomy     Comment: Low testosterone       Family History:   Suicide Attempts:  Mom  Suicide Completions: mom  \"I think she had bipolar but I'm not sure\"      Family History   Problem Relation Age of Onset    Suicide Attempts Mother     Bipolar disorder Mother     Anxiety disorder Mother     Heart disease Mother     Osteoporosis Mother     Alcohol abuse Mother         Mother committed suicide    Depression Mother         She had major depression    Early death Mother         She committed suicide    Thyroid disease Mother         She had Thyroid issues and had surgery    Cancer Father         Prostate " "   Prostate cancer Father     Hearing loss Father         He has worn a hearing aids since his late 50’s    Depression Sister     Alcohol abuse Sister     Diabetes Sister         Diabetes    Arthritis Sister     Sleep apnea Sister     Obesity Sister     Thyroid disease Sister     Insomnia Sister     Narcolepsy Sister     Diabetes Brother     Stroke Other         AUNT/UNCLE GRANDPARENTS    Diabetes Other         GRANDPARENTS        Developmental History:       Childhood: saw mom and dad fight a lot, they , brother  when he was 3 yo.   High School:Completed  College: [employer]    Mental Status Exam  Appearance  : groomed, good eye contact, normal street clothes  Behavior  : pleasant and cooperative  Motor  : No abnormal  Speech  : talkative, normal rhythm, rate, volume, tone, not hyperverbal, not pressured, normal prosidy  Mood  : \"I'm not depressed\"  Affect  : euthymic, mood congruent, good variability  Thought Content  : negative suicidal ideations, negative homicidal ideations, negative obsessions  Perceptions  : negative auditory hallucinations, negative visual hallucinations  Thought Process  : linear  Insight/Judgement  : Fair/fair  Cognition  : grossly intact  Attention   : intact      Review of Systems:  Review of Systems   Constitutional:  Positive for fatigue. Negative for diaphoresis.   HENT:  Negative for drooling.    Eyes:  Negative for visual disturbance.   Respiratory:  Negative for cough, chest tightness and shortness of breath.    Cardiovascular:  Positive for chest pain and palpitations. Negative for leg swelling.   Gastrointestinal:  Positive for constipation. Negative for abdominal pain, diarrhea, nausea and vomiting.   Endocrine: Positive for cold intolerance and heat intolerance.   Genitourinary:  Positive for difficulty urinating.   Musculoskeletal:  Negative for joint swelling.   Allergic/Immunologic: Negative for immunocompromised state.   Neurological:  Positive for dizziness, " "light-headedness and numbness. Negative for seizures, speech difficulty and headaches.   Psychiatric/Behavioral:  Positive for sleep disturbance. Negative for agitation.        Physical Exam:  Physical Exam    Vital Signs:   /66   Pulse 83   Ht 180.3 cm (71\")   Wt 86.7 kg (191 lb 3.2 oz)   BMI 26.67 kg/m²      Lab Results:   Office Visit on 07/10/2024   Component Date Value Ref Range Status    Urine Volume 07/10/2024 280   Final   Office Visit on 04/10/2024   Component Date Value Ref Range Status    Urine Volume 04/10/2024 80   Final   Office Visit on 03/29/2024   Component Date Value Ref Range Status    Creatine Kinase 03/29/2024 756 (H)  20 - 200 U/L Final    Glucose 03/29/2024 99  65 - 99 mg/dL Final    BUN 03/29/2024 28 (H)  8 - 23 mg/dL Final    Creatinine 03/29/2024 1.47 (H)  0.76 - 1.27 mg/dL Final    Sodium 03/29/2024 139  136 - 145 mmol/L Final    Potassium 03/29/2024 4.3  3.5 - 5.2 mmol/L Final    Chloride 03/29/2024 103  98 - 107 mmol/L Final    CO2 03/29/2024 25.0  22.0 - 29.0 mmol/L Final    Calcium 03/29/2024 9.9  8.6 - 10.5 mg/dL Final    Total Protein 03/29/2024 7.5  6.0 - 8.5 g/dL Final    Albumin 03/29/2024 4.7  3.5 - 5.2 g/dL Final    ALT (SGPT) 03/29/2024 36  1 - 41 U/L Final    AST (SGOT) 03/29/2024 38  1 - 40 U/L Final    Alkaline Phosphatase 03/29/2024 61  39 - 117 U/L Final    Total Bilirubin 03/29/2024 0.2  0.0 - 1.2 mg/dL Final    Globulin 03/29/2024 2.8  gm/dL Final    A/G Ratio 03/29/2024 1.7  g/dL Final    BUN/Creatinine Ratio 03/29/2024 19.0  7.0 - 25.0 Final    Anion Gap 03/29/2024 11.0  5.0 - 15.0 mmol/L Final    eGFR 03/29/2024 52.9 (L)  >60.0 mL/min/1.73 Final    WBC 03/29/2024 3.62  3.40 - 10.80 10*3/mm3 Final    RBC 03/29/2024 3.69 (L)  4.14 - 5.80 10*6/mm3 Final    Hemoglobin 03/29/2024 11.5 (L)  13.0 - 17.7 g/dL Final    Hematocrit 03/29/2024 34.7 (L)  37.5 - 51.0 % Final    MCV 03/29/2024 94.0  79.0 - 97.0 fL Final    MCH 03/29/2024 31.2  26.6 - 33.0 pg Final    MCHC " 03/29/2024 33.1  31.5 - 35.7 g/dL Final    RDW 03/29/2024 12.3  12.3 - 15.4 % Final    RDW-SD 03/29/2024 42.7  37.0 - 54.0 fl Final    MPV 03/29/2024 10.6  6.0 - 12.0 fL Final    Platelets 03/29/2024 168  140 - 450 10*3/mm3 Final    Neutrophil % 03/29/2024 60.1  42.7 - 76.0 % Final    Lymphocyte % 03/29/2024 27.1  19.6 - 45.3 % Final    Monocyte % 03/29/2024 10.5  5.0 - 12.0 % Final    Eosinophil % 03/29/2024 1.4  0.3 - 6.2 % Final    Basophil % 03/29/2024 0.6  0.0 - 1.5 % Final    Immature Grans % 03/29/2024 0.3  0.0 - 0.5 % Final    Neutrophils, Absolute 03/29/2024 2.18  1.70 - 7.00 10*3/mm3 Final    Lymphocytes, Absolute 03/29/2024 0.98  0.70 - 3.10 10*3/mm3 Final    Monocytes, Absolute 03/29/2024 0.38  0.10 - 0.90 10*3/mm3 Final    Eosinophils, Absolute 03/29/2024 0.05  0.00 - 0.40 10*3/mm3 Final    Basophils, Absolute 03/29/2024 0.02  0.00 - 0.20 10*3/mm3 Final    Immature Grans, Absolute 03/29/2024 0.01  0.00 - 0.05 10*3/mm3 Final    nRBC 03/29/2024 0.0  0.0 - 0.2 /100 WBC Final    Vitamin B-12 03/29/2024 555  211 - 946 pg/mL Final   Office Visit on 02/28/2024   Component Date Value Ref Range Status    Creatine Kinase 02/28/2024 239 (H)  20 - 200 U/L Final    Hemoglobin A1C 02/28/2024 5.30  4.80 - 5.60 % Final    Sed Rate 02/28/2024 3  0 - 20 mm/hr Final    C-Reactive Protein 02/28/2024 <0.30  0.00 - 0.50 mg/dL Final    Hepatitis C Ab 02/28/2024 Non-Reactive  Non-Reactive Final    HIV DUO 02/28/2024 Non-Reactive  Non-Reactive Final    WBC 02/28/2024 3.09 (L)  3.40 - 10.80 10*3/mm3 Final    RBC 02/28/2024 3.44 (L)  4.14 - 5.80 10*6/mm3 Final    Hemoglobin 02/28/2024 10.4 (L)  13.0 - 17.7 g/dL Final    Hematocrit 02/28/2024 31.4 (L)  37.5 - 51.0 % Final    MCV 02/28/2024 91.3  79.0 - 97.0 fL Final    MCH 02/28/2024 30.2  26.6 - 33.0 pg Final    MCHC 02/28/2024 33.1  31.5 - 35.7 g/dL Final    RDW 02/28/2024 12.5  12.3 - 15.4 % Final    RDW-SD 02/28/2024 40.6  37.0 - 54.0 fl Final    MPV 02/28/2024 10.3  6.0 -  12.0 fL Final    Platelets 02/28/2024 147  140 - 450 10*3/mm3 Final    Neutrophil % 02/28/2024 54.4  42.7 - 76.0 % Final    Lymphocyte % 02/28/2024 32.7  19.6 - 45.3 % Final    Monocyte % 02/28/2024 10.0  5.0 - 12.0 % Final    Eosinophil % 02/28/2024 2.3  0.3 - 6.2 % Final    Basophil % 02/28/2024 0.6  0.0 - 1.5 % Final    Immature Grans % 02/28/2024 0.0  0.0 - 0.5 % Final    Neutrophils, Absolute 02/28/2024 1.68 (L)  1.70 - 7.00 10*3/mm3 Final    Lymphocytes, Absolute 02/28/2024 1.01  0.70 - 3.10 10*3/mm3 Final    Monocytes, Absolute 02/28/2024 0.31  0.10 - 0.90 10*3/mm3 Final    Eosinophils, Absolute 02/28/2024 0.07  0.00 - 0.40 10*3/mm3 Final    Basophils, Absolute 02/28/2024 0.02  0.00 - 0.20 10*3/mm3 Final    Immature Grans, Absolute 02/28/2024 0.00  0.00 - 0.05 10*3/mm3 Final    nRBC 02/28/2024 0.0  0.0 - 0.2 /100 WBC Final   Office Visit on 02/12/2024   Component Date Value Ref Range Status    Creatine Kinase 02/13/2024 339 (H)  20 - 200 U/L Final    AChR Binding Ab 02/13/2024 <0.03  0.00 - 0.24 nmol/L Final                                   Negative:   0.00 - 0.24                                 Borderline: 0.25 - 0.40                                 Positive:         >0.40    AChR Blocking Abs 02/13/2024 13  0 - 25 % Final                                   Negative:      0 - 25                                 Borderline:   26 - 30                                 Positive:         >30    ACHR Receptor Modulating Ab 02/13/2024 0  0 - 45 % Final                           Interpretive Information:                          Negative:             0 - 45%                          Positive:               > 45%  No single value for AChR-modulating antibody should  be used as a sole basis for diagnosis or response  to therapy.    Striated Muscle Antibody 02/13/2024 Negative  Neg:<1:100 Final    Reflex Information 02/13/2024 Comment   Final    Reflex test indicated.    NMO IgG Autoantibodies 02/13/2024 <1.5  0.0 - 3.0  U/mL Final                                 Negative:      0.0 - 3.0                               Positive:           >3.0    Glucose 02/13/2024 125 (H)  65 - 99 mg/dL Final    BUN 02/13/2024 14  8 - 23 mg/dL Final    Creatinine 02/13/2024 1.50 (H)  0.76 - 1.27 mg/dL Final    Sodium 02/13/2024 140  136 - 145 mmol/L Final    Potassium 02/13/2024 4.0  3.5 - 5.2 mmol/L Final    Chloride 02/13/2024 101  98 - 107 mmol/L Final    CO2 02/13/2024 30.0 (H)  22.0 - 29.0 mmol/L Final    Calcium 02/13/2024 9.4  8.6 - 10.5 mg/dL Final    Total Protein 02/13/2024 6.8  6.0 - 8.5 g/dL Final    Albumin 02/13/2024 4.6  3.5 - 5.2 g/dL Final    ALT (SGPT) 02/13/2024 39  1 - 41 U/L Final    AST (SGOT) 02/13/2024 33  1 - 40 U/L Final    Alkaline Phosphatase 02/13/2024 64  39 - 117 U/L Final    Total Bilirubin 02/13/2024 0.2  0.0 - 1.2 mg/dL Final    Globulin 02/13/2024 2.2  gm/dL Final    A/G Ratio 02/13/2024 2.1  g/dL Final    BUN/Creatinine Ratio 02/13/2024 9.3  7.0 - 25.0 Final    Anion Gap 02/13/2024 9.0  5.0 - 15.0 mmol/L Final    eGFR 02/13/2024 51.7 (L)  >60.0 mL/min/1.73 Final    WBC 02/13/2024 3.46  3.40 - 10.80 10*3/mm3 Final    RBC 02/13/2024 3.34 (L)  4.14 - 5.80 10*6/mm3 Final    Hemoglobin 02/13/2024 10.4 (L)  13.0 - 17.7 g/dL Final    Hematocrit 02/13/2024 31.0 (L)  37.5 - 51.0 % Final    MCV 02/13/2024 92.8  79.0 - 97.0 fL Final    MCH 02/13/2024 31.1  26.6 - 33.0 pg Final    MCHC 02/13/2024 33.5  31.5 - 35.7 g/dL Final    RDW 02/13/2024 12.7  12.3 - 15.4 % Final    RDW-SD 02/13/2024 42.9  37.0 - 54.0 fl Final    MPV 02/13/2024 9.8  6.0 - 12.0 fL Final    Platelets 02/13/2024 147  140 - 450 10*3/mm3 Final    Neutrophil % 02/13/2024 36.4 (L)  42.7 - 76.0 % Final    Lymphocyte % 02/13/2024 52.0 (H)  19.6 - 45.3 % Final    Monocyte % 02/13/2024 8.1  5.0 - 12.0 % Final    Eosinophil % 02/13/2024 3.2  0.3 - 6.2 % Final    Basophil % 02/13/2024 0.3  0.0 - 1.5 % Final    Immature Grans % 02/13/2024 0.0  0.0 - 0.5 % Final     Neutrophils, Absolute 02/13/2024 1.26 (L)  1.70 - 7.00 10*3/mm3 Final    Lymphocytes, Absolute 02/13/2024 1.80  0.70 - 3.10 10*3/mm3 Final    Monocytes, Absolute 02/13/2024 0.28  0.10 - 0.90 10*3/mm3 Final    Eosinophils, Absolute 02/13/2024 0.11  0.00 - 0.40 10*3/mm3 Final    Basophils, Absolute 02/13/2024 0.01  0.00 - 0.20 10*3/mm3 Final    Immature Grans, Absolute 02/13/2024 0.00  0.00 - 0.05 10*3/mm3 Final    nRBC 02/13/2024 0.0  0.0 - 0.2 /100 WBC Final    Musk Antibody 02/13/2024 <1.0  U/mL Final    Reference Range:    Negative: <1.0    Positive: 1.0 or higher  This test was developed and its performance characteristics  determined by [facility]. It has not been cleared or approved  by the Food and Drug Administration.       EKG Results:  No orders to display       Imaging Results:  CT Chest Without Contrast Diagnostic    Result Date: 3/19/2024  Impression: CT scan of the chest without IV contrast demonstrating tree-in-bud airspace disease in the left lower lobe suggesting indolent infection. Follow-up CT scan of the chest in 3 months is recommended.    Electronically Signed By-SOILA TREVIÑO MD On:3/19/2024 3:23 PM          Assessment & Plan   Diagnoses and all orders for this visit:    1. Major depressive disorder, recurrent episode, moderate (Primary)  -     Discontinue: QUEtiapine (SEROquel) 25 MG tablet; Take 1 tablet by mouth Every Night.  Dispense: 30 tablet; Refill: 2  -     QUEtiapine (SEROquel) 25 MG tablet; Take 1 tablet by mouth Every Night.  Dispense: 30 tablet; Refill: 2    2. Generalized anxiety disorder  -     Discontinue: QUEtiapine (SEROquel) 25 MG tablet; Take 1 tablet by mouth Every Night.  Dispense: 30 tablet; Refill: 2  -     QUEtiapine (SEROquel) 25 MG tablet; Take 1 tablet by mouth Every Night.  Dispense: 30 tablet; Refill: 2    3. Insomnia due to mental condition  -     Discontinue: QUEtiapine (SEROquel) 25 MG tablet; Take 1 tablet by mouth Every Night.  Dispense: 30 tablet; Refill: 2  -      QUEtiapine (SEROquel) 25 MG tablet; Take 1 tablet by mouth Every Night.  Dispense: 30 tablet; Refill: 2        Visit Diagnoses:    ICD-10-CM ICD-9-CM   1. Major depressive disorder, recurrent episode, moderate  F33.1 296.32   2. Generalized anxiety disorder  F41.1 300.02   3. Insomnia due to mental condition  F51.05 300.9     327.02     07/25/2024: Stop wellbutrin, in 2 wks, hold trazodone and start seroquel for irritability, mood stability. Poor energy may be due to anemia. Mood improved, but irritable as well. Mom has hx of what he thinks was bipolar. Avoiding lithium 2/2 CKD.     Allowed patient to freely discuss and process issues, such as:  Anxiety and depression regarding medical conditions.  Anxiety and depression regarding relationships.  ... using Rogerian psychotherapeutic techniques including unconditional positive regard, reflective listening, and demonstrating clear empathy, with the goal of ameliorating symptoms and maintaining, restoring, or improving self-esteem, adaptive skills, and ego or psychological functions (Danielle et al. 1991).  Time (minutes) spent providing supportive psychotherapy: 16  (This time is exclusive to the therapy session and separate from the time spent on activities used to meet the criteria for the E/M service (history, exam, medical decision-making).)  Start: 2:27  Stop: 2:43  Functional status: mild impairment  Treatment plan: Medication management and supportive psychotherapy  Prognosis: good  Progress: improving  4w    07/10/2024: Mirtazapine led to brain fog, fatigue, poor focus in am. Stop it. [facility] for marriage issues (individual therapy). Pt is not on duloxetine. Pt needs energizing meds. Start wellbutrin for a week, then add prozac.    06/14/2024: No change. Discussed reflective listening with his wife. Stop abilify; start mirtazapine.    4/30/24: R/O ADHD. Start abilify, Therapy? Wgt loss, consider mirtazapine, seroquel. 6w.    PLAN:  Safety: No acute safety  concerns  Therapy: None  Risk Assessment: Risk of self-harm acutely is moderate.  Risk factors include anxiety disorder, mood disorder, fhx, access to guns, and recent psychosocial stressors (pandemic). Protective factors include no present SI, no history of suicide attempts or self-harm in the past, minimal AODA, healthcare seeking, future orientation, willingness to engage in care.  Risk of self-harm chronically is also moderate, but could be further elevated in the event of treatment noncompliance and/or AODA.  Meds:  STOP wellbutrin  mg qam. Irritable 7/24  CONTINUE prozac 10 mg qam. Risks, benefits, alternatives discussed with patient including GI upset, nausea vomiting diarrhea, theoretical decrease of seizure threshold predisposing the patient to a slightly higher seizure risk, headaches, sexual dysfunction, serotonin syndrome, bleeding risk, increased suicidality in patients 24 years and younger, switching to rebecca/hypomania.  After discussion of these risks and benefits, the patient voiced understanding and agreed to proceed.  START seroquel 25 mg qhs. Risks, benefits, alternatives discussed with patient including nausea and vomiting, GI upset, sedation, dizziness, falls, akathisia, hypotension, increased appetite, lowering of seizure threshold, theoretical risk of tardive dyskinesia, extrapyramidal symptoms, movement issues, restless legs syndrome. Use care when operating vehicle, vessel, or machine. After discussion of these risks and benefits, the patient voiced understanding and agreed to proceed.  HOLD trazodone 50 mg qhs. 100 mg made him groggy the next day. Risks, benefits, side effects discussed with patient including GI upset, sedation, dizziness/falls risk, grogginess the following day, prolongation of the QTc interval.  Do not use before operating vehicle, vessel, or machine. After discussion of these risks and benefits, the patient voiced understanding and agreed to proceed.     S/P:  abilify 2 mg qhs. No change 6/24.   mirtazapine 7.5 mg qhs. Sedation 7/24  Seroquel didn't work in the past 7/24  Labs: none    Patient screened positive for depression based on a PHQ-9 score of 16 on 4/30/2024. Follow-up recommendations include: Prescribed antidepressant medication treatment and Suicide Risk Assessment performed.           TREATMENT PLAN/GOALS: Continue supportive psychotherapy efforts and medications as indicated. Treatment and medication options discussed during today's visit. Patient acknowledged and verbally consented to continue with current treatment plan and was educated on the importance of compliance with treatment and follow-up appointments.    MEDICATION ISSUES:  JIM reviewed as expected.  Discussed medication options and treatment plan of prescribed medication as well as the risks, benefits, and side effects including potential falls, possible impaired driving and metabolic adversities among others. Patient is agreeable to call the office with any worsening of symptoms or onset of side effects. Patient is agreeable to call 911 or go to the nearest ER should he/she begin having SI/HI. No medication side effects or related complaints today.     MEDS ORDERED DURING VISIT:  New Medications Ordered This Visit   Medications    QUEtiapine (SEROquel) 25 MG tablet     Sig: Take 1 tablet by mouth Every Night.     Dispense:  30 tablet     Refill:  2     Please dc bupropoin       Return in about 4 weeks (around 8/22/2024).         This document has been electronically signed by Penelope Davis MD  July 25, 2024 14:52 EDT    Dictated Utilizing Dragon Dictation: Part of this note may be an electronic transcription/translation of spoken language to printed text using the Dragon Dictation System.

## 2024-07-26 ENCOUNTER — OFFICE VISIT (OUTPATIENT)
Dept: INTERNAL MEDICINE | Facility: CLINIC | Age: 64
End: 2024-07-26
Payer: OTHER GOVERNMENT

## 2024-07-26 VITALS
HEIGHT: 71 IN | SYSTOLIC BLOOD PRESSURE: 122 MMHG | DIASTOLIC BLOOD PRESSURE: 68 MMHG | RESPIRATION RATE: 18 BRPM | BODY MASS INDEX: 26.6 KG/M2 | OXYGEN SATURATION: 97 % | HEART RATE: 64 BPM | WEIGHT: 190 LBS | TEMPERATURE: 98.9 F

## 2024-07-26 DIAGNOSIS — R53.83 OTHER FATIGUE: ICD-10-CM

## 2024-07-26 DIAGNOSIS — D64.9 ANEMIA, UNSPECIFIED TYPE: ICD-10-CM

## 2024-07-26 DIAGNOSIS — N18.32 STAGE 3B CHRONIC KIDNEY DISEASE: Primary | ICD-10-CM

## 2024-07-26 LAB
25(OH)D3 SERPL-MCNC: 51.1 NG/ML (ref 30–100)
ALBUMIN SERPL-MCNC: 4.4 G/DL (ref 3.5–5.2)
ALBUMIN/GLOB SERPL: 1.9 G/DL
ALP SERPL-CCNC: 69 U/L (ref 39–117)
ALT SERPL W P-5'-P-CCNC: 25 U/L (ref 1–41)
ANION GAP SERPL CALCULATED.3IONS-SCNC: 10 MMOL/L (ref 5–15)
AST SERPL-CCNC: 23 U/L (ref 1–40)
BASOPHILS # BLD AUTO: 0.03 10*3/MM3 (ref 0–0.2)
BASOPHILS NFR BLD AUTO: 0.8 % (ref 0–1.5)
BILIRUB SERPL-MCNC: 0.3 MG/DL (ref 0–1.2)
BUN SERPL-MCNC: 30 MG/DL (ref 8–23)
BUN/CREAT SERPL: 13.3 (ref 7–25)
CALCIUM SPEC-SCNC: 9.5 MG/DL (ref 8.6–10.5)
CHLORIDE SERPL-SCNC: 98 MMOL/L (ref 98–107)
CHOLEST SERPL-MCNC: 178 MG/DL (ref 0–200)
CO2 SERPL-SCNC: 29 MMOL/L (ref 22–29)
CREAT SERPL-MCNC: 2.25 MG/DL (ref 0.76–1.27)
DEPRECATED RDW RBC AUTO: 42.4 FL (ref 37–54)
EGFRCR SERPLBLD CKD-EPI 2021: 31.8 ML/MIN/1.73
EOSINOPHIL # BLD AUTO: 0.24 10*3/MM3 (ref 0–0.4)
EOSINOPHIL NFR BLD AUTO: 6 % (ref 0.3–6.2)
ERYTHROCYTE [DISTWIDTH] IN BLOOD BY AUTOMATED COUNT: 12.4 % (ref 12.3–15.4)
FOLATE SERPL-MCNC: 16.1 NG/ML (ref 4.78–24.2)
GLOBULIN UR ELPH-MCNC: 2.3 GM/DL
GLUCOSE SERPL-MCNC: 91 MG/DL (ref 65–99)
HBA1C MFR BLD: 5.4 % (ref 4.8–5.6)
HCT VFR BLD AUTO: 27 % (ref 37.5–51)
HDLC SERPL-MCNC: 54 MG/DL (ref 40–60)
HGB BLD-MCNC: 8.8 G/DL (ref 13–17.7)
IMM GRANULOCYTES # BLD AUTO: 0.01 10*3/MM3 (ref 0–0.05)
IMM GRANULOCYTES NFR BLD AUTO: 0.3 % (ref 0–0.5)
IRON 24H UR-MRATE: 86 MCG/DL (ref 59–158)
IRON SATN MFR SERPL: 23 % (ref 20–50)
LDLC SERPL CALC-MCNC: 111 MG/DL (ref 0–100)
LDLC/HDLC SERPL: 2.03 {RATIO}
LYMPHOCYTES # BLD AUTO: 1.63 10*3/MM3 (ref 0.7–3.1)
LYMPHOCYTES NFR BLD AUTO: 40.9 % (ref 19.6–45.3)
MCH RBC QN AUTO: 30.2 PG (ref 26.6–33)
MCHC RBC AUTO-ENTMCNC: 32.6 G/DL (ref 31.5–35.7)
MCV RBC AUTO: 92.8 FL (ref 79–97)
MONOCYTES # BLD AUTO: 0.44 10*3/MM3 (ref 0.1–0.9)
MONOCYTES NFR BLD AUTO: 11 % (ref 5–12)
NEUTROPHILS NFR BLD AUTO: 1.64 10*3/MM3 (ref 1.7–7)
NEUTROPHILS NFR BLD AUTO: 41 % (ref 42.7–76)
NRBC BLD AUTO-RTO: 0 /100 WBC (ref 0–0.2)
PLATELET # BLD AUTO: 131 10*3/MM3 (ref 140–450)
PMV BLD AUTO: 10.1 FL (ref 6–12)
POTASSIUM SERPL-SCNC: 4.3 MMOL/L (ref 3.5–5.2)
PROT SERPL-MCNC: 6.7 G/DL (ref 6–8.5)
RBC # BLD AUTO: 2.91 10*6/MM3 (ref 4.14–5.8)
SODIUM SERPL-SCNC: 137 MMOL/L (ref 136–145)
TIBC SERPL-MCNC: 370 MCG/DL (ref 298–536)
TRANSFERRIN SERPL-MCNC: 248 MG/DL (ref 200–360)
TRIGL SERPL-MCNC: 71 MG/DL (ref 0–150)
TSH SERPL DL<=0.05 MIU/L-ACNC: 1.45 UIU/ML (ref 0.27–4.2)
VIT B12 BLD-MCNC: 676 PG/ML (ref 211–946)
VLDLC SERPL-MCNC: 13 MG/DL (ref 5–40)
WBC NRBC COR # BLD AUTO: 3.99 10*3/MM3 (ref 3.4–10.8)

## 2024-07-26 PROCEDURE — 85025 COMPLETE CBC W/AUTO DIFF WBC: CPT | Performed by: INTERNAL MEDICINE

## 2024-07-26 PROCEDURE — 82607 VITAMIN B-12: CPT | Performed by: INTERNAL MEDICINE

## 2024-07-26 PROCEDURE — 82746 ASSAY OF FOLIC ACID SERUM: CPT | Performed by: INTERNAL MEDICINE

## 2024-07-26 PROCEDURE — 36415 COLL VENOUS BLD VENIPUNCTURE: CPT | Performed by: INTERNAL MEDICINE

## 2024-07-26 PROCEDURE — 83540 ASSAY OF IRON: CPT | Performed by: INTERNAL MEDICINE

## 2024-07-26 PROCEDURE — 80053 COMPREHEN METABOLIC PANEL: CPT | Performed by: INTERNAL MEDICINE

## 2024-07-26 PROCEDURE — 99213 OFFICE O/P EST LOW 20 MIN: CPT | Performed by: INTERNAL MEDICINE

## 2024-07-26 PROCEDURE — 83036 HEMOGLOBIN GLYCOSYLATED A1C: CPT | Performed by: INTERNAL MEDICINE

## 2024-07-26 PROCEDURE — 84466 ASSAY OF TRANSFERRIN: CPT | Performed by: INTERNAL MEDICINE

## 2024-07-26 PROCEDURE — 80061 LIPID PANEL: CPT | Performed by: INTERNAL MEDICINE

## 2024-07-26 PROCEDURE — 84443 ASSAY THYROID STIM HORMONE: CPT | Performed by: INTERNAL MEDICINE

## 2024-07-26 PROCEDURE — 82306 VITAMIN D 25 HYDROXY: CPT | Performed by: INTERNAL MEDICINE

## 2024-07-26 RX ORDER — FLUTICASONE PROPIONATE 50 MCG
2 SPRAY, SUSPENSION (ML) NASAL DAILY
Qty: 16 G | Refills: 11 | Status: SHIPPED | OUTPATIENT
Start: 2024-07-26

## 2024-07-26 RX ORDER — CARBOXYMETHYLCELLULOSE SODIUM 5 MG/ML
2 SOLUTION/ DROPS OPHTHALMIC 3 TIMES DAILY PRN
Qty: 30 ML | Refills: 12 | Status: SHIPPED | OUTPATIENT
Start: 2024-07-26

## 2024-07-29 ENCOUNTER — TELEPHONE (OUTPATIENT)
Dept: INTERNAL MEDICINE | Facility: CLINIC | Age: 64
End: 2024-07-29
Payer: OTHER GOVERNMENT

## 2024-07-29 NOTE — TELEPHONE ENCOUNTER
I called the patient and left a voicemail for him to call the office back.    RELAY:  I called the patient and left them a voicemail stating that we seen that he seen the lab results and wanted to make sure he did not have any questions at this time. If he does he can contact the office. Thank you.

## 2024-07-29 NOTE — PROGRESS NOTES
"Chief Complaint  Follow-up (Follow up, still fatigue, can not focus, does not keep up with regular things that he needs to keep up with, wants to also talk about neuropathy,  and anemia, dry eyes, wants to discuss vitamins or b12 injections to help with the fatigue and wants to make sure his kidney levels are good, his therapist is changing his medications as well )    Subjective      Stephon Castellano is a 63 y.o. male who presents to [facility]     Presenting for fatigue and lack of focus.     Is now seeing psychiatry and has been having medication adjustments.     Wanting to check kidney levels.       Objective   Vital Signs:   Vitals:    07/26/24 1404   BP: 122/68   BP Location: Left arm   Patient Position: Sitting   Cuff Size: Adult   Pulse: 64   Resp: 18   Temp: 98.9 °F (37.2 °C)   TempSrc: Temporal   SpO2: 97%   Weight: 86.2 kg (190 lb)   Height: 180.3 cm (71\")     Body mass index is 26.5 kg/m².    Wt Readings from Last 3 Encounters:   07/26/24 86.2 kg (190 lb)   07/25/24 86.7 kg (191 lb 3.2 oz)   07/10/24 85.5 kg (188 lb 9.6 oz)     BP Readings from Last 3 Encounters:   07/26/24 122/68   07/25/24 135/66   07/10/24 130/63       Health Maintenance   Topic Date Due    ANNUAL PHYSICAL  Never done    COVID-19 Vaccine (3 - 2023-24 season) 09/01/2023    COLORECTAL CANCER SCREENING  08/18/2024    INFLUENZA VACCINE  08/01/2024    BMI FOLLOWUP  05/30/2025    LIPID PANEL  07/26/2025    TDAP/TD VACCINES (4 - Td or Tdap) 03/29/2029    HEPATITIS C SCREENING  Completed    ZOSTER VACCINE  Completed    Pneumococcal Vaccine 0-64  Aged Out       Physical Exam  Vitals reviewed.   Constitutional:       Appearance: Normal appearance. He is well-developed.   HENT:      Head: Normocephalic and atraumatic.      Mouth/Throat:      Pharynx: No oropharyngeal exudate.   Eyes:      Conjunctiva/sclera: Conjunctivae normal.      Pupils: Pupils are equal, round, and reactive to light.   Neck:     " " Thyroid: No thyromegaly or thyroid tenderness.   Cardiovascular:      Rate and Rhythm: Normal rate and regular rhythm.      Heart sounds: No murmur heard.     No friction rub. No gallop.   Pulmonary:      Effort: Pulmonary effort is normal.      Breath sounds: Normal breath sounds. No wheezing or rhonchi.   Lymphadenopathy:      Cervical: No cervical adenopathy.   Skin:     General: Skin is warm and dry.   Neurological:      Mental Status: He is alert and oriented to person, place, and time.   Psychiatric:         Mood and Affect: Affect normal.          Result Review :  The following data was reviewed by: Paige Florez MD on 2024:         Procedures          Assessment & Plan  Stage 3b chronic kidney disease  Checking labs  Other fatigue  Checking lbs today  Anemia, unspecified type    Orders Placed This Encounter   Procedures    Comprehensive Metabolic Panel    TSH    Lipid Panel    Iron Profile    Vitamin B12 & Folate    Vitamin D,25-Hydroxy    Hemoglobin A1c    CBC Auto Differential    CBC & Differential     New Medications Ordered This Visit   Medications    fluticasone (FLONASE) 50 MCG/ACT nasal spray     Si sprays into the nostril(s) as directed by provider Daily.     Dispense:  16 g     Refill:  11    carboxymethylcellulose (REFRESH PLUS) 0.5 % solution     Sig: Administer 2 drops to both eyes 3 (Three) Times a Day As Needed for Dry Eyes.     Dispense:  30 mL     Refill:  12                    FOLLOW UP  Return for As needed.  Patient was given instructions and counseling regarding his condition or for health maintenance advice. Please see specific information pulled into the AVS if appropriate.       Paige Florez MD  24  11:00 EDT    CURRENT & DISCONTINUED MEDICATIONS  Current Outpatient Medications   Medication Instructions    anastrozole (ARIMIDEX) 1 mg, Daily    BD Eclipse Syringe/Needle 23G X 1\" 3 ML misc USE TO INJECT TESTOSTERONE    carboxymethylcellulose " (REFRESH PLUS) 0.5 % solution 2 drops, Both Eyes, 3 Times Daily PRN    CINNAMON PO Oral    finasteride (PROSCAR) 5 mg, Oral, Daily    FLUoxetine (PROZAC) 10 mg, Oral, Daily, Start prozac 7/17    fluticasone (FLONASE) 50 MCG/ACT nasal spray 2 sprays, Nasal, Daily    HYDROmorphone (DILAUDID) 0-2 mg/hr, Intravenous    IRON, FERROUS GLUCONATE, PO 65 mg, Oral, Daily    Lysine 1000 MG tablet Oral    Magnesium 250 MG tablet Oral    MoviPrep 100 g reconstituted solution powder     multivitamin with minerals tablet tablet Take 1 tablet by mouth Daily.    Omega 3-6-9 Fatty Acids (OMEGA 3-6-9 COMPLEX PO) Oral    pain patient supplied pump Intrathecal, Continuous, Instructions are in drop boxes     polyethylene glycol (MIRALAX) 17 g, Oral, Daily    QUEtiapine (SEROQUEL) 25 mg, Oral, Nightly    simethicone (Gas-X) 80 MG chewable tablet Take 2 tablets PO after completing movi prep and 2 tablets PO 4 hours prior to procedure.    tamsulosin (FLOMAX) 0.8 mg, Oral, Daily    Testosterone Cypionate (DEPOTESTOTERONE CYPIONATE) 200 MG/ML injection No dose, route, or frequency recorded.    traZODone (DESYREL) 100 mg, Oral, Nightly    Turmeric 500 MG capsule Oral    Vitamin D-Vitamin K (K2-D3 10,000) 73816-31 UNIT-MCG capsule Take  by mouth.    Vitamin D3 2,000 Units, Daily       Medications Discontinued During This Encounter   Medication Reason    fluticasone (FLONASE) 50 MCG/ACT nasal spray Reorder

## 2024-07-30 ENCOUNTER — HOSPITAL ENCOUNTER (OUTPATIENT)
Dept: CT IMAGING | Facility: HOSPITAL | Age: 64
Discharge: HOME | End: 2024-07-30
Admitting: INTERNAL MEDICINE
Payer: OTHER GOVERNMENT

## 2024-07-30 DIAGNOSIS — R93.89 ABNORMAL CT OF THE CHEST: ICD-10-CM

## 2024-07-30 DIAGNOSIS — R06.09 DYSPNEA ON EXERTION: ICD-10-CM

## 2024-07-30 DIAGNOSIS — R93.89 ABNORMAL CHEST CT: ICD-10-CM

## 2024-07-30 DIAGNOSIS — R63.4 UNEXPLAINED WEIGHT LOSS: ICD-10-CM

## 2024-07-30 PROCEDURE — 71250 CT THORAX DX C-: CPT

## 2024-08-06 NOTE — PROGRESS NOTES
Chief Complaint/Reason for Referral:   NEW PT - HEMATOLOGY - Anemia, unspecified type    Paige Florez*  Paige Florez MD    Records Obtained:  Records of the patients history including those obtained from [facility] EMR were reviewed and summarized in detail.    Subjective    History of Present Illness    Stephon Castellano presents to [facility] HEMATOLOGY [facility] ONCOLOGY for Normocytic Anemia    Colonoscopy should be repeated in August 2024 with Dr. Fox, we will request an EGD also.    PMH of alcohol abuse, substance abuse [id number], CKD stage 3, MUNIRA, anemia    Patient's hemoglobin is currently 8.8 which has dropped over the past 6 months.  White blood cell count and MCV are normal platelet count is 131 slightly decreased CPK is increased iron level of 86 saturation of 23 there is no ferritin level patient has stage III chronic kidney disease EGFR of 31.8 creatinine to 2.25 patient is scheduled to see nephrology today.  Patient is also seeing pulmonology for shortness of breath CT of the chest and PFT have been performed CT reveals no acute disease process.  Patient had serum immunofixation with electrophoresis which was normal no monoclonal protein was detected but light chains were not performed.  Patient sed rate is elevated at 22 CRP is elevated 0.5    Patient has had unexplained weight loss and iron deficiency anemia per history for approximately one year.  Vitamin B12 was normal at 555 TSH and T4 were normal HIV was negative.  Patient has been taking ferrous gluconate daily.    Patient recently saw urology in July 2020 for urinary retention, his postvoid residual was 80 cc he is currently taking tamsulosin and finasteride.  He is taking testosterone and Arimidex.    History of atrial fibrillation in 2023.    Family history of prostate cancer with his father at the age of 60    Oncology/Hematology History    No history exists.     Review of Systems   Constitutional:  Positive  "for fatigue, fever (sweats) and unexpected weight loss (50 lbs in one year).   Neurological:  Positive for dizziness and weakness.   All other systems reviewed and are negative.    Current Outpatient Medications on File Prior to Visit   Medication Sig Dispense Refill    BD Eclipse Syringe/Needle 23G X 1\" 3 ML misc USE TO INJECT TESTOSTERONE      carboxymethylcellulose (REFRESH PLUS) 0.5 % solution Administer 2 drops to both eyes 3 (Three) Times a Day As Needed for Dry Eyes. 30 mL 12    CINNAMON PO Take  by mouth.      fluticasone (FLONASE) 50 MCG/ACT nasal spray 2 sprays into the nostril(s) as directed by provider Daily. 16 g 11    HYDROmorphone (DILAUDID) 1 MG/ML injection Infuse 0-2 mg/hr into a venous catheter.      IRON, FERROUS GLUCONATE, PO Take 65 mg by mouth Daily.      Lysine 1000 MG tablet Take  by mouth.      Magnesium 250 MG tablet Take  by mouth.      mirtazapine (REMERON) 15 MG tablet May cause drowsiness.Take or use exactly as directed.Obtain advice for OTCs.      multivitamin with minerals tablet tablet Take 1 tablet by mouth Daily.      Omega 3-6-9 Fatty Acids (OMEGA 3-6-9 COMPLEX PO) Take  by mouth.      pain patient supplied pump by Intrathecal route Continuous. Instructions are in drop boxes      simethicone (Gas-X) 80 MG chewable tablet Take 2 tablets PO after completing movi prep and 2 tablets PO 4 hours prior to procedure. 4 tablet 0    tamsulosin (FLOMAX) 0.4 MG capsule 24 hr capsule Take 2 capsules by mouth Daily for 360 days. (Patient taking differently: Take 2 capsules by mouth As Needed.) 180 capsule 3    Testosterone Cypionate (DEPOTESTOTERONE CYPIONATE) 200 MG/ML injection       Turmeric 500 MG capsule Take  by mouth.      FLUoxetine (PROzac) 10 MG capsule Take 1 capsule by mouth Daily. Start prozac 7/17 30 capsule 2    Lubricant Eye Drops PF 0.5 % solution       MoviPrep 100 g reconstituted solution powder  (Patient not taking: Reported on 8/7/2024)      traZODone (DESYREL) 100 MG " tablet Take 1 tablet by mouth Every Night. (Patient not taking: Reported on 8/7/2024)       No current facility-administered medications on file prior to visit.     No Known Allergies  Past Medical History:   Diagnosis Date    Alcohol abuse     Allergic rhinitis     Anemia 02/06/2024    My hand started just shaking but got worse until now i have no strength in my left hand    Anxiety 2006    Starting taking mood depression drugs    Arthritis     Asthma About 6 months    Sore Throat and was sent to ENT    Atrial fibrillation 2023    Benign prostatic hyperplasia 2015    Dad had Prostate Cancer and just did Colonoscopy and they said i have a major large Prostate    Cervical neck pain with evidence of disc disease     Chronic pain disorder     Colon polyp 2020    Had a lot of polyps on the last 2 Colonoscopies    CTS (carpal tunnel syndrome) 2005    Depression     Difficulty walking 2033    Erectile dysfunction 2017    I have been on Testosterone replacements since then    Head injury 1977    Headache May 2023    Started abou 6 months or more    Hemorrhoid     HL (hearing loss) 2004    Have a major tinitis/ringing in my ears especially the left one    Hyperlipidemia 2017    My blood test have stated they were high or elevated    Hypertension 2017    Given Flomax said it will probably go down    Hypogonadism in male     Insomnia     Kidney stone 2020    Went to doctor and got medication for them and still have issues with right side    Low back pain 2010    Stinosis of back & neck    Memory loss 2023    Movement disorder 2023    Sleep apnea 2007    Substance abuse [id number]    On Disulfiram for alcohol abuse-voluntary    Visual impairment 2023    Just recently have been given eye drops     Past Surgical History:   Procedure Laterality Date    CERVICAL DISC SURGERY  2008    COLONOSCOPY N/A 08/18/2023    Procedure: COLONOSCOPY WITH POLYPECTOMY/SNARE;  Surgeon: Jose Alejandro Fox MD;  Location: [facility] ENDOSCOPY;  Service:  General;  Laterality: N/A;  COLON POLYPS    HEEL SPUR SURGERY  2005    KNEE ACL RECONSTRUCTION  2004    OTHER SURGICAL HISTORY      METAL IMPLANT    PAIN PUMP INSERTION/REVISION  2010    SPINE SURGERY  2008    Antieror c5-7 discotomy    TONSILLECTOMY  1965    VASECTOMY  2002     Social History     Socioeconomic History    Marital status:    Tobacco Use    Smoking status: Former     Current packs/day: 0.00     Average packs/day: 2.0 packs/day for 8.0 years (16.0 ttl pk-yrs)     Types: Cigarettes     Start date: 1975     Quit date: 1983     Years since quittin.6     Passive exposure: Past    Smokeless tobacco: Former     Types: Snuff    Tobacco comments:     Also dipped for several years   Vaping Use    Vaping status: Never Used   Substance and Sexual Activity    Alcohol use: Not Currently     Alcohol/week: 14.0 standard drinks of alcohol     Types: 14 Shots of liquor per week     Comment: Stopped due to Disulfiram 250 mg    Drug use: Not Currently     Frequency: 7.0 times per week     Types: Marijuana    Sexual activity: Not Currently     Partners: Female     Birth control/protection: None, Vasectomy     Comment: Low testosterone     Family History   Problem Relation Age of Onset    Suicide Attempts Mother     Bipolar disorder Mother     Anxiety disorder Mother     Heart disease Mother     Osteoporosis Mother     Alcohol abuse Mother         Mother committed suicide    Depression Mother         She had major depression    Early death Mother         She committed suicide    Thyroid disease Mother         She had Thyroid issues and had surgery    Cancer Father         Prostate    Prostate cancer Father     Hearing loss Father         He has worn a hearing aids since his late 50’s    Depression Sister     Alcohol abuse Sister     Diabetes Sister         Diabetes    Arthritis Sister     Sleep apnea Sister     Obesity Sister     Thyroid disease Sister     Insomnia Sister     Narcolepsy Sister      Diabetes Brother     Stroke Other         AUNT/UNCLE GRANDPARENTS    Diabetes Other         GRANDPARENTS      Immunization History   Administered Date(s) Administered    COVID-19 (SHRAVAN) 03/10/2021    Covid-19 (Pfizer) Gray Cap Monovalent 01/28/2022    DTaP 03/29/2019    Fluzone (or Fluarix & Flulaval for VFC) >6mos 09/25/2019, 01/25/2022, 03/15/2024    Fluzone Quad >6mos (Multi-dose) 10/01/2019    H1N1 Inj 12/16/2009    Hepatitis A 05/08/1995, 11/08/1999    Influenza Injectable Mdck Pf Quad 10/27/2020, 02/15/2023    Influenza LAIV (Nasal) 01/24/2005, 11/09/2005    Influenza Quad Vaccine (Inpatient) 10/04/2011, 10/15/2015    Influenza Whole 12/03/2003, 10/12/2011    Influenza, Unspecified 10/01/2018, 10/27/2020, 02/15/2023    MMR 04/25/2003    Measles 05/02/1984    Meningococcal Polysaccharide 04/19/2002    OPV 10/03/1986    Plague 01/27/1986, 03/10/1986, 06/13/1989    Pneumococcal Conjugate 20-Valent (PCV20) 09/01/2022    Pneumococcal, Unspecified 09/01/2022    Rubella 05/02/1984    Shingrix 01/25/2022, 08/02/2022    Td (TDVAX) 11/08/1999    Tdap 09/28/2010, 03/29/2019    Typhoid, Unspecified 11/13/2001    Yellow Fever 05/13/1994    influenza Split 11/21/2001, 11/01/2002, 10/30/2007, 10/07/2009, 10/20/2009     Tobacco Use: Medium Risk (8/7/2024)    Patient History     Smoking Tobacco Use: Former     Smokeless Tobacco Use: Former     Passive Exposure: Past     Objective     Physical Exam  Vitals and nursing note reviewed.   Constitutional:       General: He is not in acute distress.     Appearance: Normal appearance. He is not ill-appearing.   HENT:      Head: Normocephalic.   Eyes:      Conjunctiva/sclera: Conjunctivae normal.   Cardiovascular:      Rate and Rhythm: Normal rate.   Pulmonary:      Effort: Pulmonary effort is normal.   Abdominal:      Palpations: There is no hepatomegaly or splenomegaly.   Lymphadenopathy:      Cervical: No cervical adenopathy.      Right cervical: No superficial cervical  "adenopathy.     Left cervical: No superficial cervical adenopathy.      Upper Body:      Right upper body: No axillary adenopathy.      Left upper body: No axillary adenopathy.   Skin:     Coloration: Skin is not jaundiced.   Neurological:      Mental Status: He is alert.   Psychiatric:         Mood and Affect: Mood normal.         Behavior: Behavior normal. Behavior is cooperative.         Thought Content: Thought content normal.         Judgment: Judgment normal.       Vitals:    08/07/24 0830   BP: 129/62   Pulse: 69   Resp: 18   Temp: 98.7 °F (37.1 °C)   TempSrc: Temporal   SpO2: 96%   Weight: 85.5 kg (188 lb 7.9 oz)   Height: 180.3 cm (71\")   PainSc:   5   PainLoc: Neck     Wt Readings from Last 3 Encounters:   08/07/24 85.5 kg (188 lb 7.9 oz)   07/26/24 86.2 kg (190 lb)   07/25/24 86.7 kg (191 lb 3.2 oz)      ECOG score: 0         ECOG: (0) Fully Active - Able to Carry On All Pre-disease Performance Without Restriction  Fall Risk Assessment was completed, and patient is at low risk for falls.  PHQ-9 Total Score: 1       The patient is  experiencing fatigue. Fatigue score: 9    PT/OT Functional Screening:   Speech Functional Screening:   Rehab to be ordered:     Vitals:    08/07/24 0830   PainSc:   5   PainLoc: Neck           Stephon Castellano reports a pain score of 5.  Given his pain assessment as noted, treatment options were discussed and the following options were decided upon as a follow-up plan to address the patient's pain: continuation of current treatment plan for pain.     Patient screened positive for depression based on a PHQ-9 score of 1 on 8/7/2024. Follow-up recommendations include: Elevated PHQ score reflective of acute illness, not depression.  Result Review :  The following data was reviewed by: Tommy Doshi PA-C on 08/07/2024:    RED BLOOD CELLs:  Lab Results   Component Value Date    RBC 2.91 (L) 07/26/2024    HGB 8.8 (L) 07/26/2024    HCT 27.0 (L) 07/26/2024    MCV 92.8 07/26/2024     " "(L) 07/26/2024      WHITE BLOOD CELLs:  Lab Results   Component Value Date    WBC 3.99 07/26/2024    NEUTROABS 1.64 (L) 07/26/2024    LYMPHSABS 1.63 07/26/2024    EOSABS 0.24 07/26/2024    BASOSABS 0.03 07/26/2024     RBC EVALUATION (MICROCYTOSIS/NORMOCYTOSIS):  Lab Results   Component Value Date    MCV 92.8 07/26/2024    IRON 86 07/26/2024    LABIRON 23 07/26/2024    TIBC 370 07/26/2024    TRANSFERRIN 248 07/26/2024     No results found for: \"HEMOCHROMATO\"  HEMOLYSIS EVALUATION:  Lab Results   Component Value Date    MCV 92.8 07/26/2024     Sed Rate   Date Value Ref Range Status   02/28/2024 3 0 - 20 mm/hr Final     C-Reactive Protein   Date Value Ref Range Status   02/28/2024 <0.30 0.00 - 0.50 mg/dL Final     MACROCYTOSIS EVALUATION:  Lab Results   Component Value Date    MCV 92.8 07/26/2024    [id number] 676 07/26/2024    FOLATE 16.10 07/26/2024     RENAL FUNCTION TESTs:  Lab Results   Component Value Date    EGFR 31.8 (L) 07/26/2024    BUN 30 (H) 07/26/2024     LIVER FUNCTION TESTs:  Lab Results   Component Value Date    ALT 25 07/26/2024    AST 23 07/26/2024    BILITOT 0.3 07/26/2024    BILIDIR <10 09/20/2023    BILIDIR <10 09/20/2023    BILIDIR <10 09/20/2023    ALKPHOS 69 07/26/2024    PROTEINTOT 6.7 07/26/2024    ALBUMIN 4.4 07/26/2024     GLUCOSE EVALUATION:  Lab Results   Component Value Date    GLUCOSE 91 07/26/2024    HGBA1C 5.40 07/26/2024     SERUM CHEMISTRIES:  Lab Results   Component Value Date     07/26/2024    K 4.3 07/26/2024    CL 98 07/26/2024    CO2 29.0 07/26/2024    CALCIUM 9.5 07/26/2024     URINALYSIS:  Lab Results   Component Value Date    RBCUR Negative 01/02/2024    LEUKOCYTESUR Negative 01/02/2024    BILIRUBINUR Negative 01/02/2024    PHUR 7.0 01/02/2024    SPECGRAVUR 1.008 11/14/2023     THYROID FUNCTION TESTs:  TSH   Date Value Ref Range Status   07/26/2024 1.450 0.270 - 4.200 uIU/mL Final     Free T4   Date Value Ref Range Status   11/15/2023 0.90 (L) 0.93 - 1.70 ng/dL Final "     T3, Free   Date Value Ref Range Status   09/20/2023 3.05 2.00 - 4.40 pg/mL Final     TUMOR MARKERS:  Lab Results   Component Value Date    PSA 0.6 11/14/2023     IgA   Date Value Ref Range Status   05/28/2024 187 68 - 378 mg/dL Final     CT Chest Without Contrast Diagnostic    Result Date: 7/31/2024  Impression: 1.Interval resolution of previously noted groundglass changes in the left lower lobe. No acute pulmonary infiltrates are appreciated. 2.Coronary artery calcification. Electronically Signed: Obdulio Tellez MD  7/31/2024 9:38 AM EDT  Workstation ID: [id number]       Assessment and Plan:  Diagnoses and all orders for this visit:    1. Normocytic anemia (Primary)  -     H. Pylori Antigen, Stool - Stool, Per Rectum  -     Vitamin B12 & Folate  -     Comprehensive Metabolic Panel  -     Urinalysis With Microscopic - Urine, Clean Catch  -     Occult Blood, Fecal By Immunoassay - Stool, Per Rectum  -     Reticulocytes  -     Lactate Dehydrogenase  -     Haptoglobin  -     Iron Profile  -     Ferritin  -     CBC & Differential  -     Peripheral Blood Smear  -     Sedimentation Rate  -     HIV-1 & HIV-2 Antibodies  -     Hepatitis Panel, Acute  -     Zinc  -     Copper, Serum  -     Erythropoietin  -     Immunoglobulin Free LT Chains Blood  -     LAURA + PE    2. Obstructive sleep apnea syndrome  Comments:  on CPAP  Orders:  -     Comprehensive Metabolic Panel  -     CBC & Differential  -     Peripheral Blood Smear    3. CKD stage 3b, GFR 30-44 ml/min  Comments:  Continue nephrology  Orders:  -     Vitamin B12 & Folate  -     Comprehensive Metabolic Panel  -     Urinalysis With Microscopic - Urine, Clean Catch  -     Iron Profile  -     Ferritin  -     CBC & Differential  -     Peripheral Blood Smear  -     Erythropoietin    4. Weight loss, unintentional  -     H. Pylori Antigen, Stool - Stool, Per Rectum  -     Vitamin B12 & Folate  -     Comprehensive Metabolic Panel  -     Urinalysis With Microscopic - Urine, Clean  Catch  -     Occult Blood, Fecal By Immunoassay - Stool, Per Rectum  -     Reticulocytes  -     Lactate Dehydrogenase  -     Haptoglobin  -     Iron Profile  -     Ferritin  -     CBC & Differential  -     Peripheral Blood Smear  -     Sedimentation Rate  -     HIV-1 & HIV-2 Antibodies  -     Hepatitis Panel, Acute  -     Zinc  -     Copper, Serum  -     Erythropoietin      -Encouraged patient to remain up to date on all recommended preventative medicine services.  Some examples include:  Diabetes screening, Hypertensive screening, Immunizations, Lipid screenings (cholesterol), Depression screenings, Colorectal cancer screening, HIV screening, Cervical cancer screening, Breast cancer screening, Osteoporosis screening, Alcohol screening, Dental screening, Tobacco Use screening, Dietary screening, etc  -Patient has a significant history of unintentional weight loss with progressive anemia and progressive chronic kidney disease currently at stage 3B.  Obviously occult malignancy is in the differential, patient's been unable to have a CT scan of the abdomen pelvis with contrast due to his kidney function.  We discussed this at length I have requested that nephrology contact me or his PCP to arrange a protocol potentially with prehydration and post hydration.  Stool studies were ordered today to check for H. pylori but also occult blood if those are positive I will contact Dr. Fox and request that his EGD colonoscopy be moved up to as soon as possible.    Patient Follow Up: 1 month with MD    I spent 45 minutes caring for Stephon on this date of service. This time includes time spent by me in the following activities:preparing for the visit, reviewing tests, obtaining and/or reviewing a separately obtained history, performing a medically appropriate examination and/or evaluation , counseling and educating the patient/family/caregiver, ordering medications, tests, or procedures, documenting information in the medical  record, and independently interpreting results and communicating that information with the patient/family/caregiver    Patient was given instructions and counseling regarding his condition or for health maintenance advice. Please see specific information pulled into the AVS if appropriate.

## 2024-08-07 ENCOUNTER — CONSULT (OUTPATIENT)
Dept: ONCOLOGY | Facility: HOSPITAL | Age: 64
End: 2024-08-07
Payer: OTHER GOVERNMENT

## 2024-08-07 ENCOUNTER — LAB (OUTPATIENT)
Dept: ONCOLOGY | Facility: HOSPITAL | Age: 64
End: 2024-08-07
Payer: OTHER GOVERNMENT

## 2024-08-07 VITALS
HEIGHT: 71 IN | TEMPERATURE: 98.7 F | WEIGHT: 188.49 LBS | OXYGEN SATURATION: 96 % | HEART RATE: 69 BPM | SYSTOLIC BLOOD PRESSURE: 129 MMHG | RESPIRATION RATE: 18 BRPM | BODY MASS INDEX: 26.39 KG/M2 | DIASTOLIC BLOOD PRESSURE: 62 MMHG

## 2024-08-07 DIAGNOSIS — N18.32 CKD STAGE 3B, GFR 30-44 ML/MIN: ICD-10-CM

## 2024-08-07 DIAGNOSIS — D50.8 OTHER IRON DEFICIENCY ANEMIA: Primary | ICD-10-CM

## 2024-08-07 DIAGNOSIS — R79.89 ABNORMAL CBC: ICD-10-CM

## 2024-08-07 DIAGNOSIS — G47.33 OBSTRUCTIVE SLEEP APNEA SYNDROME: ICD-10-CM

## 2024-08-07 DIAGNOSIS — R76.8 ELEVATED SERUM IMMUNOGLOBULIN FREE LIGHT CHAIN LEVEL: ICD-10-CM

## 2024-08-07 DIAGNOSIS — D64.9 NORMOCYTIC ANEMIA: Primary | ICD-10-CM

## 2024-08-07 DIAGNOSIS — R63.4 WEIGHT LOSS, UNINTENTIONAL: ICD-10-CM

## 2024-08-07 LAB
ALBUMIN SERPL-MCNC: 4.4 G/DL (ref 3.5–5.2)
ALBUMIN/GLOB SERPL: 1.9 G/DL
ALP SERPL-CCNC: 80 U/L (ref 39–117)
ALT SERPL W P-5'-P-CCNC: 33 U/L (ref 1–41)
ANION GAP SERPL CALCULATED.3IONS-SCNC: 5.7 MMOL/L (ref 5–15)
ANISOCYTOSIS BLD QL: ABNORMAL
AST SERPL-CCNC: 24 U/L (ref 1–40)
BACTERIA UR QL AUTO: NORMAL /HPF
BASOPHILS # BLD AUTO: 0.02 10*3/MM3 (ref 0–0.2)
BASOPHILS NFR BLD AUTO: 0.5 % (ref 0–1.5)
BILIRUB SERPL-MCNC: 0.2 MG/DL (ref 0–1.2)
BILIRUB UR QL STRIP: NEGATIVE
BUN SERPL-MCNC: 34 MG/DL (ref 8–23)
BUN/CREAT SERPL: 17.1 (ref 7–25)
CALCIUM SPEC-SCNC: 9.4 MG/DL (ref 8.6–10.5)
CHLORIDE SERPL-SCNC: 102 MMOL/L (ref 98–107)
CLARITY UR: CLEAR
CO2 SERPL-SCNC: 32.3 MMOL/L (ref 22–29)
COLOR UR: YELLOW
CREAT SERPL-MCNC: 1.99 MG/DL (ref 0.76–1.27)
DEPRECATED RDW RBC AUTO: 49.2 FL (ref 37–54)
EGFRCR SERPLBLD CKD-EPI 2021: 36.8 ML/MIN/1.73
EOSINOPHIL # BLD AUTO: 0.19 10*3/MM3 (ref 0–0.4)
EOSINOPHIL # BLD MANUAL: 0.23 10*3/MM3 (ref 0–0.4)
EOSINOPHIL NFR BLD AUTO: 4.9 % (ref 0.3–6.2)
EOSINOPHIL NFR BLD MANUAL: 6 % (ref 0.3–6.2)
ERYTHROCYTE [DISTWIDTH] IN BLOOD BY AUTOMATED COUNT: 13.4 % (ref 12.3–15.4)
ERYTHROCYTE [SEDIMENTATION RATE] IN BLOOD: 1 MM/HR (ref 0–20)
FERRITIN SERPL-MCNC: 483.7 NG/ML (ref 30–400)
FOLATE SERPL-MCNC: 14 NG/ML (ref 4.78–24.2)
GLOBULIN UR ELPH-MCNC: 2.3 GM/DL
GLUCOSE SERPL-MCNC: 92 MG/DL (ref 65–99)
GLUCOSE UR STRIP-MCNC: NEGATIVE MG/DL
HAPTOGLOB SERPL-MCNC: 110 MG/DL (ref 30–200)
HAV IGM SERPL QL IA: NORMAL
HBV CORE IGM SERPL QL IA: NORMAL
HBV SURFACE AG SERPL QL IA: NORMAL
HCT VFR BLD AUTO: 31 % (ref 37.5–51)
HCV AB SER QL: NORMAL
HGB BLD-MCNC: 9.8 G/DL (ref 13–17.7)
HGB UR QL STRIP.AUTO: NEGATIVE
HIV 1+2 AB+HIV1P24 AG SERPLBLD IA.RAPID: NORMAL
HIV 1+2 AB+HIV1P24 AG SERPLBLD IA.RAPID: NORMAL
HYALINE CASTS UR QL AUTO: NORMAL /LPF
IMM GRANULOCYTES # BLD AUTO: 0 10*3/MM3 (ref 0–0.05)
IMM GRANULOCYTES NFR BLD AUTO: 0 % (ref 0–0.5)
IRON 24H UR-MRATE: 101 MCG/DL (ref 59–158)
IRON SATN MFR SERPL: 28 % (ref 20–50)
KETONES UR QL STRIP: NEGATIVE
LDH SERPL-CCNC: 173 U/L (ref 135–225)
LEUKOCYTE ESTERASE UR QL STRIP.AUTO: NEGATIVE
LYMPHOCYTES # BLD AUTO: 1.3 10*3/MM3 (ref 0.7–3.1)
LYMPHOCYTES # BLD MANUAL: 1.08 10*3/MM3 (ref 0.7–3.1)
LYMPHOCYTES NFR BLD AUTO: 33.9 % (ref 19.6–45.3)
LYMPHOCYTES NFR BLD MANUAL: 10 % (ref 5–12)
MACROCYTES BLD QL SMEAR: ABNORMAL
MCH RBC QN AUTO: 31.3 PG (ref 26.6–33)
MCHC RBC AUTO-ENTMCNC: 31.6 G/DL (ref 31.5–35.7)
MCV RBC AUTO: 99 FL (ref 79–97)
METAMYELOCYTES NFR BLD MANUAL: 4 % (ref 0–0)
MONOCYTES # BLD AUTO: 0.45 10*3/MM3 (ref 0.1–0.9)
MONOCYTES # BLD: 0.38 10*3/MM3 (ref 0.1–0.9)
MONOCYTES NFR BLD AUTO: 11.7 % (ref 5–12)
NEUTROPHILS # BLD AUTO: 2 10*3/MM3 (ref 1.7–7)
NEUTROPHILS NFR BLD AUTO: 1.88 10*3/MM3 (ref 1.7–7)
NEUTROPHILS NFR BLD AUTO: 49 % (ref 42.7–76)
NEUTROPHILS NFR BLD MANUAL: 46 % (ref 42.7–76)
NEUTS BAND NFR BLD MANUAL: 6 % (ref 0–5)
NITRITE UR QL STRIP: NEGATIVE
PATHOLOGY REVIEW: YES
PH UR STRIP.AUTO: 5.5 [PH] (ref 5–8)
PLATELET # BLD AUTO: 140 10*3/MM3 (ref 140–450)
PMV BLD AUTO: 9.8 FL (ref 6–12)
POTASSIUM SERPL-SCNC: 4.4 MMOL/L (ref 3.5–5.2)
PROT SERPL-MCNC: 6.7 G/DL (ref 6–8.5)
PROT UR QL STRIP: NEGATIVE
RBC # BLD AUTO: 3.13 10*6/MM3 (ref 4.14–5.8)
RBC # UR STRIP: NORMAL /HPF
REF LAB TEST METHOD: NORMAL
RETICS # AUTO: 0.03 10*6/MM3 (ref 0.02–0.13)
RETICS/RBC NFR AUTO: 1.09 % (ref 0.7–1.9)
SMALL PLATELETS BLD QL SMEAR: ABNORMAL
SMUDGE CELLS IN BLOOD BY LIGHT MICROSCOPY: 2 /100 WBC
SODIUM SERPL-SCNC: 140 MMOL/L (ref 136–145)
SP GR UR STRIP: 1.01 (ref 1–1.03)
SQUAMOUS #/AREA URNS HPF: NORMAL /HPF
TIBC SERPL-MCNC: 362 MCG/DL (ref 298–536)
TRANSFERRIN SERPL-MCNC: 243 MG/DL (ref 200–360)
UROBILINOGEN UR QL STRIP: NORMAL
VARIANT LYMPHS NFR BLD MANUAL: 2 % (ref 0–5)
VARIANT LYMPHS NFR BLD MANUAL: 26 % (ref 19.6–45.3)
VIT B12 BLD-MCNC: 695 PG/ML (ref 211–946)
WBC # UR STRIP: NORMAL /HPF
WBC MORPH BLD: NORMAL
WBC NRBC COR # BLD AUTO: 3.84 10*3/MM3 (ref 3.4–10.8)

## 2024-08-07 PROCEDURE — 84466 ASSAY OF TRANSFERRIN: CPT | Performed by: PHYSICIAN ASSISTANT

## 2024-08-07 PROCEDURE — 80074 ACUTE HEPATITIS PANEL: CPT | Performed by: PHYSICIAN ASSISTANT

## 2024-08-07 PROCEDURE — 82668 ASSAY OF ERYTHROPOIETIN: CPT | Performed by: PHYSICIAN ASSISTANT

## 2024-08-07 PROCEDURE — 82525 ASSAY OF COPPER: CPT | Performed by: PHYSICIAN ASSISTANT

## 2024-08-07 PROCEDURE — 85652 RBC SED RATE AUTOMATED: CPT | Performed by: PHYSICIAN ASSISTANT

## 2024-08-07 PROCEDURE — 82784 ASSAY IGA/IGD/IGG/IGM EACH: CPT | Performed by: PHYSICIAN ASSISTANT

## 2024-08-07 PROCEDURE — 36415 COLL VENOUS BLD VENIPUNCTURE: CPT | Performed by: PHYSICIAN ASSISTANT

## 2024-08-07 PROCEDURE — 86334 IMMUNOFIX E-PHORESIS SERUM: CPT | Performed by: PHYSICIAN ASSISTANT

## 2024-08-07 PROCEDURE — 84630 ASSAY OF ZINC: CPT | Performed by: PHYSICIAN ASSISTANT

## 2024-08-07 PROCEDURE — 83010 ASSAY OF HAPTOGLOBIN QUANT: CPT | Performed by: PHYSICIAN ASSISTANT

## 2024-08-07 PROCEDURE — 81001 URINALYSIS AUTO W/SCOPE: CPT | Performed by: PHYSICIAN ASSISTANT

## 2024-08-07 PROCEDURE — 84165 PROTEIN E-PHORESIS SERUM: CPT | Performed by: PHYSICIAN ASSISTANT

## 2024-08-07 PROCEDURE — 83540 ASSAY OF IRON: CPT | Performed by: PHYSICIAN ASSISTANT

## 2024-08-07 PROCEDURE — G0463 HOSPITAL OUTPT CLINIC VISIT: HCPCS | Performed by: PHYSICIAN ASSISTANT

## 2024-08-07 PROCEDURE — 80053 COMPREHEN METABOLIC PANEL: CPT | Performed by: PHYSICIAN ASSISTANT

## 2024-08-07 PROCEDURE — G0475 HIV COMBINATION ASSAY: HCPCS | Performed by: PHYSICIAN ASSISTANT

## 2024-08-07 PROCEDURE — 82746 ASSAY OF FOLIC ACID SERUM: CPT | Performed by: PHYSICIAN ASSISTANT

## 2024-08-07 PROCEDURE — 85045 AUTOMATED RETICULOCYTE COUNT: CPT | Performed by: PHYSICIAN ASSISTANT

## 2024-08-07 PROCEDURE — 85025 COMPLETE CBC W/AUTO DIFF WBC: CPT | Performed by: PHYSICIAN ASSISTANT

## 2024-08-07 PROCEDURE — 83615 LACTATE (LD) (LDH) ENZYME: CPT | Performed by: PHYSICIAN ASSISTANT

## 2024-08-07 PROCEDURE — 82728 ASSAY OF FERRITIN: CPT | Performed by: PHYSICIAN ASSISTANT

## 2024-08-07 PROCEDURE — 83521 IG LIGHT CHAINS FREE EACH: CPT | Performed by: PHYSICIAN ASSISTANT

## 2024-08-07 PROCEDURE — 82607 VITAMIN B-12: CPT | Performed by: PHYSICIAN ASSISTANT

## 2024-08-07 RX ORDER — ASPIRIN 325 MG
TABLET ORAL
COMMUNITY
Start: 2024-07-26

## 2024-08-07 RX ORDER — MIRTAZAPINE 15 MG/1
TABLET, FILM COATED ORAL
COMMUNITY
Start: 2024-06-14 | End: 2025-06-14

## 2024-08-08 ENCOUNTER — LAB (OUTPATIENT)
Dept: LAB | Facility: HOSPITAL | Age: 64
End: 2024-08-08
Payer: OTHER GOVERNMENT

## 2024-08-08 LAB
ALBUMIN SERPL ELPH-MCNC: 4 G/DL (ref 2.9–4.4)
ALBUMIN/GLOB SERPL: 1.5 {RATIO} (ref 0.7–1.7)
ALPHA1 GLOB SERPL ELPH-MCNC: 0.2 G/DL (ref 0–0.4)
ALPHA2 GLOB SERPL ELPH-MCNC: 0.6 G/DL (ref 0.4–1)
B-GLOBULIN SERPL ELPH-MCNC: 0.9 G/DL (ref 0.7–1.3)
CYTO UR: NORMAL
EPO SERPL-ACNC: 10.2 MIU/ML (ref 2.6–18.5)
GAMMA GLOB SERPL ELPH-MCNC: 1 G/DL (ref 0.4–1.8)
GLOBULIN SER-MCNC: 2.7 G/DL (ref 2.2–3.9)
HEMOCCULT STL QL IA: POSITIVE
IGA SERPL-MCNC: 174 MG/DL (ref 61–437)
IGG SERPL-MCNC: 1043 MG/DL (ref 603–1613)
IGM SERPL-MCNC: 82 MG/DL (ref 20–172)
INTERPRETATION SERPL IEP-IMP: NORMAL
KAPPA LC FREE SER-MCNC: 48.1 MG/L (ref 3.3–19.4)
KAPPA LC FREE/LAMBDA FREE SER: 2 {RATIO} (ref 0.26–1.65)
LAB AP CASE REPORT: NORMAL
LAB AP CLINICAL INFORMATION: NORMAL
LABORATORY COMMENT REPORT: NORMAL
LAMBDA LC FREE SERPL-MCNC: 24.1 MG/L (ref 5.7–26.3)
M PROTEIN SERPL ELPH-MCNC: NORMAL G/DL
PATH REPORT.FINAL DX SPEC: NORMAL
PATH REPORT.GROSS SPEC: NORMAL
PROT SERPL-MCNC: 6.7 G/DL (ref 6–8.5)

## 2024-08-08 PROCEDURE — 82274 ASSAY TEST FOR BLOOD FECAL: CPT | Performed by: PHYSICIAN ASSISTANT

## 2024-08-09 LAB
COPPER SERPL-MCNC: 76 UG/DL (ref 69–132)
Lab: NORMAL
ZINC SERPL-MCNC: 75 UG/DL (ref 44–115)

## 2024-08-12 ENCOUNTER — ANESTHESIA EVENT (OUTPATIENT)
Dept: GASTROENTEROLOGY | Facility: HOSPITAL | Age: 64
End: 2024-08-12
Payer: OTHER GOVERNMENT

## 2024-08-12 RX ORDER — SODIUM CHLORIDE, SODIUM LACTATE, POTASSIUM CHLORIDE, CALCIUM CHLORIDE 600; 310; 30; 20 MG/100ML; MG/100ML; MG/100ML; MG/100ML
30 INJECTION, SOLUTION INTRAVENOUS CONTINUOUS
Status: CANCELLED | OUTPATIENT
Start: 2024-08-12

## 2024-08-12 NOTE — ANESTHESIA PREPROCEDURE EVALUATION
Anesthesia Evaluation     Patient summary reviewed and Nursing notes reviewed   NPO Solid Status: > 8 hours  NPO Liquid Status: > 6 hours           Airway   Mallampati: I  TM distance: >3 FB  Neck ROM: full  No difficulty expected  Dental - normal exam     Pulmonary - normal exam    breath sounds clear to auscultation  (+) asthma (mild, intermittent),sleep apnea on CPAP  Cardiovascular - normal exam  Exercise tolerance: good (4-7 METS)    ECG reviewed  Rhythm: regular  Rate: normal    (+) hypertension well controlled, dysrhythmias Atrial Fib, hyperlipidemia      Neuro/Psych  (+) headaches (hx of headaches), tremors (hx of tremors), numbness, psychiatric history (hx of anxiety, depression, insomnia) Anxiety and Depression  GI/Hepatic/Renal/Endo    (+) renal disease (hx of EDIL and Kidney Stones)- ARF and stones    Musculoskeletal     (+) neck pain  Abdominal    Substance History   (+) alcohol use (Hx of ETOH abuse)     OB/GYN          Other   arthritis,     ROS/Med Hx Other: Hx iron def anemia, colon polyps    EKG 11/14/23:  SR with Anterioseptal infarct    Holter Monitor 11/14/23:  1.  The underlying rhythm was sinus  2.  The minimum heart rate was 56 bpm on 1/19/2023 at 10:03  3.  The maximal heart rate was 138 bpm on 1/19/2023 at 9:56.  4.  One PVC was recorded  5.  Extremely rare PACs in the form of singles and 4 beat run of SVT (a total of 24 beats were recorded).  6.  A few episodes of asymptomatic sinus tachycardia in the range of 101 to 138 bpm  7.  No evidence of atrial flutter or fibrillation  8.  There were no pauses  9.  There were 0 patient triggers.                     Anesthesia Plan    ASA 3     general   total IV anesthesia  (Total IV Anesthesia    Patient understands anesthesia not responsible for dental damage.  )  intravenous induction     Anesthetic plan, risks, benefits, and alternatives have been provided, discussed and informed consent has been obtained with: patient.  Pre-procedure education  provided  Plan discussed with CRNA.      CODE STATUS:

## 2024-08-12 NOTE — PRE-PROCEDURE INSTRUCTIONS
"Instructed on date and arrival time of 0830. Instructed that arrival time is not their procedure time but allows time to prepare for procedure.  Come to entrance \"C\". Must have  over age 18 to drive home.  May have two visitors; however, children under 12 must stay in waiting room.  Discussed clear liquid diet (no red or purple), bowel prep, and NPO.  May take medications as usual except for blood thinners, diabetic medications, and weight loss medications.  Verbalized understanding of instructions given.  Instructed to call for questions or concerns.  "

## 2024-08-13 ENCOUNTER — HOSPITAL ENCOUNTER (OUTPATIENT)
Facility: HOSPITAL | Age: 64
Setting detail: HOSPITAL OUTPATIENT SURGERY
Discharge: HOME | End: 2024-08-13
Attending: SURGERY | Admitting: SURGERY
Payer: OTHER GOVERNMENT

## 2024-08-13 ENCOUNTER — ANESTHESIA (OUTPATIENT)
Dept: GASTROENTEROLOGY | Facility: HOSPITAL | Age: 64
End: 2024-08-13
Payer: OTHER GOVERNMENT

## 2024-08-13 VITALS
TEMPERATURE: 97.2 F | BODY MASS INDEX: 26.32 KG/M2 | HEART RATE: 63 BPM | WEIGHT: 188.71 LBS | SYSTOLIC BLOOD PRESSURE: 117 MMHG | OXYGEN SATURATION: 98 % | DIASTOLIC BLOOD PRESSURE: 74 MMHG | RESPIRATION RATE: 9 BRPM

## 2024-08-13 DIAGNOSIS — R53.83 FATIGUE: ICD-10-CM

## 2024-08-13 DIAGNOSIS — K21.00 GASTROESOPHAGEAL REFLUX DISEASE WITH ESOPHAGITIS WITHOUT HEMORRHAGE: ICD-10-CM

## 2024-08-13 DIAGNOSIS — Z86.010 HISTORY OF COLONIC POLYPS: ICD-10-CM

## 2024-08-13 DIAGNOSIS — R63.4 WEIGHT LOSS: ICD-10-CM

## 2024-08-13 DIAGNOSIS — D50.9 IRON DEFICIENCY ANEMIA, UNSPECIFIED IRON DEFICIENCY ANEMIA TYPE: ICD-10-CM

## 2024-08-13 PROCEDURE — 25010000002 PROPOFOL 10 MG/ML EMULSION: Performed by: NURSE ANESTHETIST, CERTIFIED REGISTERED

## 2024-08-13 PROCEDURE — 88305 TISSUE EXAM BY PATHOLOGIST: CPT | Performed by: SURGERY

## 2024-08-13 PROCEDURE — 25810000003 LACTATED RINGERS PER 1000 ML: Performed by: NURSE ANESTHETIST, CERTIFIED REGISTERED

## 2024-08-13 RX ORDER — SODIUM CHLORIDE 9 MG/ML
40 INJECTION, SOLUTION INTRAVENOUS AS NEEDED
Status: DISCONTINUED | OUTPATIENT
Start: 2024-08-13 | End: 2024-08-13 | Stop reason: HOSPADM

## 2024-08-13 RX ORDER — SODIUM CHLORIDE 0.9 % (FLUSH) 0.9 %
10 SYRINGE (ML) INJECTION AS NEEDED
Status: DISCONTINUED | OUTPATIENT
Start: 2024-08-13 | End: 2024-08-13 | Stop reason: HOSPADM

## 2024-08-13 RX ORDER — PROPOFOL 10 MG/ML
VIAL (ML) INTRAVENOUS AS NEEDED
Status: DISCONTINUED | OUTPATIENT
Start: 2024-08-13 | End: 2024-08-13 | Stop reason: SURG

## 2024-08-13 RX ORDER — PHENYLEPHRINE HCL IN 0.9% NACL 1 MG/10 ML
SYRINGE (ML) INTRAVENOUS AS NEEDED
Status: DISCONTINUED | OUTPATIENT
Start: 2024-08-13 | End: 2024-08-13 | Stop reason: SURG

## 2024-08-13 RX ORDER — PANTOPRAZOLE SODIUM 40 MG/1
40 TABLET, DELAYED RELEASE ORAL DAILY
Qty: 30 TABLET | Refills: 1 | Status: SHIPPED | OUTPATIENT
Start: 2024-08-13 | End: 2025-08-13

## 2024-08-13 RX ORDER — SODIUM CHLORIDE, SODIUM LACTATE, POTASSIUM CHLORIDE, CALCIUM CHLORIDE 600; 310; 30; 20 MG/100ML; MG/100ML; MG/100ML; MG/100ML
INJECTION, SOLUTION INTRAVENOUS CONTINUOUS PRN
Status: DISCONTINUED | OUTPATIENT
Start: 2024-08-13 | End: 2024-08-13 | Stop reason: SURG

## 2024-08-13 RX ORDER — SODIUM CHLORIDE 0.9 % (FLUSH) 0.9 %
3 SYRINGE (ML) INJECTION EVERY 12 HOURS SCHEDULED
Status: DISCONTINUED | OUTPATIENT
Start: 2024-08-13 | End: 2024-08-13 | Stop reason: HOSPADM

## 2024-08-13 RX ORDER — SUCRALFATE 1 G/1
1 TABLET ORAL 3 TIMES DAILY
Qty: 120 TABLET | Refills: 1 | Status: SHIPPED | OUTPATIENT
Start: 2024-08-13 | End: 2025-08-13

## 2024-08-13 RX ORDER — LIDOCAINE HYDROCHLORIDE 20 MG/ML
INJECTION, SOLUTION EPIDURAL; INFILTRATION; INTRACAUDAL; PERINEURAL AS NEEDED
Status: DISCONTINUED | OUTPATIENT
Start: 2024-08-13 | End: 2024-08-13 | Stop reason: SURG

## 2024-08-13 RX ADMIN — PROPOFOL 100 MG: 10 INJECTION, EMULSION INTRAVENOUS at 09:36

## 2024-08-13 RX ADMIN — Medication 100 MCG: at 09:40

## 2024-08-13 RX ADMIN — Medication 100 MCG: at 10:14

## 2024-08-13 RX ADMIN — SODIUM CHLORIDE, POTASSIUM CHLORIDE, SODIUM LACTATE AND CALCIUM CHLORIDE: 600; 310; 30; 20 INJECTION, SOLUTION INTRAVENOUS at 09:33

## 2024-08-13 RX ADMIN — PROPOFOL 200 MCG/KG/MIN: 10 INJECTION, EMULSION INTRAVENOUS at 09:36

## 2024-08-13 RX ADMIN — LIDOCAINE HYDROCHLORIDE 50 MG: 20 INJECTION, SOLUTION INTRAVENOUS at 09:36

## 2024-08-13 NOTE — ANESTHESIA POSTPROCEDURE EVALUATION
Patient: Stephon Castellano    Procedure Summary       Date: 08/13/24 Room / Location: [facility] ENDOSCOPY 3 / [facility] ENDOSCOPY    Anesthesia Start: 0933 Anesthesia Stop: 1027    Procedures:       COLONOSCOPY WITH POLYPECTOMIES      ESOPHAGOGASTRODUODENOSCOPY WITH BIOPSIES Diagnosis:       Iron deficiency anemia, unspecified iron deficiency anemia type      Gastroesophageal reflux disease with esophagitis without hemorrhage      History of colonic polyps      Fatigue      Weight loss      (Iron deficiency anemia, unspecified iron deficiency anemia type [D50.9])      (Gastroesophageal reflux disease with esophagitis without hemorrhage [K21.00])      (History of colonic polyps [Z86.010])      (Fatigue [R53.83])      (Weight loss [R63.4])    Surgeons: Jose Alejandro Fox MD Provider: Sheba Love CRNA    Anesthesia Type: general ASA Status: 3            Anesthesia Type: general    Vitals  Vitals Value Taken Time   /74 08/13/24 1051   Temp 36.2 °C (97.2 °F) 08/13/24 1027   Pulse 59 08/13/24 1054   Resp 9 08/13/24 1030   SpO2 97 % 08/13/24 1054   Vitals shown include unfiled device data.        Post Anesthesia Care and Evaluation    Patient location during evaluation: bedside  Patient participation: complete - patient participated  Level of consciousness: awake  Pain management: adequate    Airway patency: patent  Anesthetic complications: No anesthetic complications  PONV Status: controlled  Cardiovascular status: acceptable and stable  Respiratory status: acceptable

## 2024-08-13 NOTE — H&P
EGD and colonoscopy consultation        History of Present Illness  Stephon Castellano is a 63 y.o. male presents to [facility] for EGD and colonoscopy consultation.     Patient presents today on a recall letter from Dr. Fox to repeat his colonoscopy.  However patient is with iron deficiency anemia (rbc: 3.69; hgb: 10.4; hct: 31.4).  He reports that he has started iron tablets recently.     Patient reports that he has endured a 60 pound unintentional weight loss over the last 6 months.  He does admit to fatigue.  Admits to shortness of breath with exertion.     Patient denies any dysphagia.  He does admit to heartburn and indigestion and uses OTC meds to control it.  He denies any epigastric pain.  Patient is with melena however is on iron tablets.  Denies any pain before or after eating.     Patient reports that he is having diarrhea alternating constipation issues.  He has noticed a change in his bowel habits with narrow pencillike stools.  He has denied any rectal bleeding.  Denies any family history of colorectal cancer.  Admits to history of colonic polyps.     Patient admits to MUNIRA.  Testing is pending.     Denies any cardiac issues.  Denies taking a GLP-1 receptors.     8/23: Colonoscopy (Dominique): Transverse- 2- tubular adenomas; Ascending - tubular adenoma; Sigmoid- 2 - tubular adenomas.           Objective  Medical History        Past Medical History:   Diagnosis Date    Alcohol abuse      Allergic rhinitis      Anemia 02/06/2024     My hand started just shaking but got worse until now i have no strength in my left hand    Anxiety 2006     Starting taking mood depression drugs    Arthritis      Asthma About 6 months     Sore Throat and was sent to ENT    Atrial fibrillation 2023    Benign prostatic hyperplasia 2015     Dad had Prostate Cancer and just did Colonoscopy and they said i have a major large Prostate    Cervical neck pain with evidence of disc disease       "Chronic pain disorder      Colon polyp 2020     Had a lot of polyps on the last 2 Colonoscopies    CTS (carpal tunnel syndrome) 2005    Depression      Difficulty walking 2033    Erectile dysfunction 2017     I have been on Testosterone replacements since then    Head injury 1977    Headache May 2023     Started abou 6 months or more    Hemorrhoid      HL (hearing loss) 2004     Have a major tinitis/ringing in my ears especially the left one    Hyperlipidemia 2017     My blood test have stated they were high or elevated    Hypertension 2017     Given Flomax said it will probably go down    Hypogonadism in male      Insomnia      Kidney stone 2020     Went to doctor and got medication for them and still have issues with right side    Low back pain 2010     Stinosis of back & neck    Memory loss 2023    Movement disorder 2023    Sleep apnea 2007    Substance abuse [id number]     On Disulfiram for alcohol abuse-voluntary    Visual impairment 2023     Just recently have been given eye drops            Surgical History         Past Surgical History:   Procedure Laterality Date    CERVICAL DISC SURGERY   2008    COLONOSCOPY N/A 08/18/2023     Procedure: COLONOSCOPY WITH POLYPECTOMY/SNARE;  Surgeon: Jose Alejandro Fox MD;  Location: [facility] END[facility];  Service: General;  Laterality: N/A;  COLON POLYPS    HEEL SPUR SURGERY   2005    KNEE ACL RECONSTRUCTION   2004    OTHER SURGICAL HISTORY         METAL IMPLANT    PAIN PUMP INSERTION/REVISION   2010    SPINE SURGERY   2008     Antieror c5-7 discotomy    TONSILLECTOMY   1965    VASECTOMY   2002            Medications Taking          Outpatient Medications Marked as Taking for the 7/8/24 encounter (Office Visit) with Clay April, APRN   Medication Sig Dispense Refill    anastrozole (ARIMIDEX) 1 MG tablet Take 1 tablet by mouth Daily.        BD Eclipse Syringe/Needle 23G X 1\" 3 ML misc USE TO INJECT TESTOSTERONE        Cholecalciferol (Vitamin D3) 50 MCG (2000 UT) capsule Take 1 " capsule by mouth Daily.        CINNAMON PO Take  by mouth.        DULoxetine (CYMBALTA) 60 MG capsule Take 1 capsule by mouth Daily.        finasteride (PROSCAR) 5 MG tablet Take 1 tablet by mouth Daily for 360 days. 90 tablet 3    fluticasone (FLONASE) 50 MCG/ACT nasal spray 2 sprays into the nostril(s) as directed by provider Daily.        HYDROmorphone (DILAUDID) 1 MG/ML injection Infuse 0-2 mg/hr into a venous catheter.        IRON, FERROUS GLUCONATE, PO Take 65 mg by mouth Daily.        Lysine 1000 MG tablet Take  by mouth.        Magnesium 250 MG tablet Take  by mouth.        multivitamin with minerals tablet tablet Take 1 tablet by mouth Daily.        Omega 3-6-9 Fatty Acids (OMEGA 3-6-9 COMPLEX PO) Take  by mouth.        polyethylene glycol (MiraLax) 17 GM/SCOOP powder Take 17 g by mouth Daily. (Patient taking differently: Take 17 g by mouth As Needed.) 850 g 5    tamsulosin (FLOMAX) 0.4 MG capsule 24 hr capsule Take 2 capsules by mouth Daily for 360 days. (Patient taking differently: Take 2 capsules by mouth As Needed.) 180 capsule 3    Testosterone Cypionate (DEPOTESTOTERONE CYPIONATE) 200 MG/ML injection          traZODone (DESYREL) 100 MG tablet Take 2 tablets by mouth Every Night.        Turmeric 500 MG capsule Take  by mouth.        Vitamin D-Vitamin K (K2-D3 10,000) 25443-11 UNIT-MCG capsule Take  by mouth.                Allergies   No Known Allergies               Family History   Problem Relation Age of Onset    Suicide Attempts Mother      Bipolar disorder Mother      Anxiety disorder Mother      Heart disease Mother      Osteoporosis Mother      Alcohol abuse Mother           Mother committed suicide    Depression Mother           She had major depression    Early death Mother           She committed suicide    Thyroid disease Mother           She had Thyroid issues and had surgery    Cancer Father           Prostate    Prostate cancer Father      Hearing loss Father           He has worn a  "hearing aids since his late 50’s    Depression Sister      Alcohol abuse Sister      Diabetes Sister           Diabetes    Arthritis Sister      Sleep apnea Sister      Obesity Sister      Thyroid disease Sister      Insomnia Sister      Narcolepsy Sister      Diabetes Brother      Stroke Other           AUNT/UNCLE GRANDPARENTS    Diabetes Other           GRANDPARENTS          Social History   Social History            Socioeconomic History    Marital status:    Tobacco Use    Smoking status: Former       Current packs/day: 0.00       Average packs/day: 2.0 packs/day for 8.0 years (16.0 ttl pk-yrs)       Types: Cigarettes       Start date: 1975       Quit date: 1983       Years since quittin.5       Passive exposure: Past    Smokeless tobacco: Former       Types: Snuff    Tobacco comments:       Also dipped for several years   Vaping Use    Vaping status: Never Used   Substance and Sexual Activity    Alcohol use: Not Currently       Alcohol/week: 14.0 standard drinks of alcohol       Types: 14 Shots of liquor per week       Comment: Stopped due to Disulfiram 250 mg    Drug use: Not Currently       Frequency: 7.0 times per week       Types: Marijuana    Sexual activity: Not Currently       Partners: Female       Birth control/protection: None, Vasectomy       Comment: Low testosterone            Review of Systems   Constitutional:  Negative for chills and fever.   HENT:  Positive for trouble swallowing.    Gastrointestinal:  Positive for constipation, diarrhea, GERD and indigestion. Negative for abdominal distention, abdominal pain, anal bleeding, blood in stool, nausea, rectal pain and vomiting.         Vital Signs:   /73 (BP Location: Left arm, Patient Position: Sitting, Cuff Size: Adult)   Pulse 65   Ht 180.3 cm (71\")   Wt 82.8 kg (182 lb 9.6 oz)   BMI 25.47 kg/m²      Physical Exam  Vitals and nursing note reviewed.   Constitutional:       General: He is not in acute distress.     " Appearance: Normal appearance. He is not ill-appearing.   HENT:      Head: Normocephalic and atraumatic.   Cardiovascular:      Rate and Rhythm: Normal rate.   Pulmonary:      Effort: Pulmonary effort is normal.      Breath sounds: No stridor.   Abdominal:      Palpations: Abdomen is soft.      Tenderness: There is no guarding.   Musculoskeletal:         General: No deformity. Normal range of motion.   Skin:     General: Skin is warm and dry.   Neurological:      General: No focal deficit present.      Mental Status: He is alert and oriented to person, place, and time.   Psychiatric:         Mood and Affect: Mood normal.         Thought Content: Thought content normal.                  Result Review  :            []  Laboratory  []  Radiology  []  Pathology  []  Microbiology  []  EKG/Telemetry   []  Cardiology/Vascular   []  Old records  I spent 25 minutes caring for Stephon on this date of service. This time includes time spent by me in the following activities: reviewing tests, obtaining and/or reviewing a separately obtained history, performing a medically appropriate examination and/or evaluation, ordering medications, tests, or procedures, and documenting information in the medical record.        Assessment  Assessment and Plan    Diagnoses and all orders for this visit:     1. Iron deficiency anemia, unspecified iron deficiency anemia type (Primary)     2. Gastroesophageal reflux disease without esophagitis     3. History of colonic polyps     4. Weight loss     5. Fatigue, unspecified type     Other orders  -     PEG-KCl-NaCl-NaSulf-Na Asc-C (MOVIPREP) 100 g reconstituted solution powder; Take 1,000 mL by mouth 1 (One) Time for 1 dose.  Dispense: 1000 mL; Refill: 0  -     simethicone (Gas-X) 80 MG chewable tablet; Take 2 tablets PO after completing movi prep and 2 tablets PO 4 hours prior to procedure.  Dispense: 4 tablet; Refill: 0           Follow Up   Return for Schedule EGD and colonoscopy with   Dominique on 8/23/2024 [facility].     Hospital arrival time: 0600.     Possible risks/complications, benefits, and alternatives to surgical or invasive procedures have been explained to patient and/or legal guardian.     Patient has been evaluated and can tolerate anesthesia and/or sedation. Risks, benefits, and alternatives to anesthesia and sedation have been explained to the patient and/or legal guardian. Patient verbalizes understanding and is willing to proceed with the above plan.      Patient was given instructions and counseling regarding his condition or for health maintenance advice. Please see specific information pulled into the AVS if appropriate.

## 2024-08-22 ENCOUNTER — LAB (OUTPATIENT)
Dept: LAB | Facility: HOSPITAL | Age: 64
End: 2024-08-22
Payer: OTHER GOVERNMENT

## 2024-08-22 ENCOUNTER — OFFICE VISIT (OUTPATIENT)
Dept: PSYCHIATRY | Facility: CLINIC | Age: 64
End: 2024-08-22
Payer: OTHER GOVERNMENT

## 2024-08-22 VITALS
HEIGHT: 71 IN | HEART RATE: 64 BPM | SYSTOLIC BLOOD PRESSURE: 127 MMHG | BODY MASS INDEX: 27.44 KG/M2 | DIASTOLIC BLOOD PRESSURE: 64 MMHG | WEIGHT: 196 LBS

## 2024-08-22 DIAGNOSIS — F51.05 INSOMNIA DUE TO MENTAL CONDITION: ICD-10-CM

## 2024-08-22 DIAGNOSIS — F33.1 MAJOR DEPRESSIVE DISORDER, RECURRENT EPISODE, MODERATE: Primary | ICD-10-CM

## 2024-08-22 DIAGNOSIS — F41.1 GENERALIZED ANXIETY DISORDER: ICD-10-CM

## 2024-08-22 RX ORDER — QUETIAPINE FUMARATE 50 MG/1
25 TABLET, FILM COATED ORAL
COMMUNITY
Start: 2024-08-12

## 2024-08-22 RX ORDER — FLUOXETINE HYDROCHLORIDE 20 MG/1
20 CAPSULE ORAL DAILY
Qty: 90 CAPSULE | Refills: 1 | Status: SHIPPED | OUTPATIENT
Start: 2024-08-22

## 2024-08-22 NOTE — PROGRESS NOTES
Subjective   Stephon Castellano is a 63 y.o. male who presents today for initial evaluation     Referring Provider:  No referring provider defined for this encounter.  [facility] endocrinologist    Chief Complaint:  depression    History of Present Illness:     2024: INITIAL VISIT Chart review:     Juan: Hydromorphone  Care Everywhere: a few non behavioral health notes, sees [facility]    Psychotropic medication chart review:  Present:  Trazodone 200 mg nightly  Cymbalta 60 mg a day    Previously:  Zoloft 50 mg a day    EKG: 2023: 57, sinus, QTc 417  Procedures: Sleep study ordered for the future  Head imaging: 2023 CT of the head shows no acute, MRI 2023 shows no acute, empty sella configuration  Labs: 2024: CMP shows creatinine 1.47, CK is elevated at 756, reassuring B12, hemoglobin is low at 11.5,  Initial Chart Review Notes: Seen by neurology in February for evaluation for seizures.  Patient notes that he is depressed and cannot sleep, depression began when he was in the [employer], his mother killed himself.  Depression began about 10 years ago.  Last year his problems became so severe that he quit his job.  He has not seen a psychiatrist for years.  Sleep medicine consult also ordered.      Patient Psychotherapy Notes:  Patient goals:  Misc:  Stage 4 CKD (nephro 2024)  Tried bupropion long ago (for depression 10 years ago)  Mom may have been bipolar, committed SA      Chart Review By Dates:  2024: admitted for colonoscopy , gene surg, int med, nephro, onc, reassuring copper, zinc, hepatitis panel.  24: Sleep: settings recs, retaining per bladder scan.   07/10/24: urology, sleep med, gen surg, bladder scan 280 mL. Mirtazapine led to brain fog, fatigue, poor focus in am.  2024: Neurology, internal medicine.  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    Plannin2024: Stop wellbutrin, in 2 wks, hold  "trazodone and start seroquel for irritability, mood stability. Poor energy may be due to anemia. Mood improved, but irritable as well. Mom has hx of what he thinks was bipolar. Avoiding lithium 2/2 CKD.   07/10/2024: Mirtazapine led to brain fog, fatigue, poor focus in am. Stop it. [place] for marriage issues (individual therapy). Pt is not on duloxetine. Pt needs energizing meds. Start wellbutrin for a week, then add prozac.  06/14/2024: No change. Discussed reflective listening with his wife. Stop abilify; start mirtazapine.  4/30/24: R/O ADHD. Start abilify, Therapy? Wgt loss, consider mirtazapine, seroquel. 6w.    VISITS/APPOINTMENTS (BELOW):    \"Stephon\"    08/22/2024: In person interview:  \"I think the medicine is working good.\"  I thought you wanted me to stop the prozac  Still no energy, no motivation  It's been this way for some time.  Anemia, CKD, pain pump (dilaudid)  Mornings that are the worst  Less irritated  Less fighting with wife, \"we aren't arguing like we used to.\"  MDD: stable, possible anhedonia  TRIP: irritability improved  Panic attacks: stable  Energy: down chronically  Concentration: \"seems to be ok\"  Insomnia: initial, now stable on CPAP  Eating/Weight: 196, 191, 188, 182 lbs (goal is 190)  Refills: y  Substances: def  Therapy: never set up  Medication compliant: y  SE: n  No SI HI AV.      07/25/2024: In person interview:  \"It made me really tired.\"  I'm wearing a brace on my right hand for CTS, driving a lot.  Wife broke her ankle in 3 places.  I'm more angry  Fatigue, but also \"doing everything\" for his wife  Discussed his marriage.  Fighting, baseline  MDD: now improved to stable, \"I haven't cried in a long time\"  ADHD:   Elementary school:   Grades? poor  Special classes or failures? Reading failed, failed 2nd grade  Got in trouble? no  Referral for ADHD testing? no  FHx: denies  Presently:  Problems with attention to detail, problems with sustained attention, problems listening when " "spoken to directly, failure to finish tasks, avoids tasks that require sustained mental effort, easily distracted, forgetting things, losing things, hard to organize, talks a lot and cutting people off, drifts off during conversations  TRIP: irritable, worse  Panic attacks: n  Energy: down  Concentration: not at goal intermittently  Insomnia: initial, now stable  Started CPAP, sleeping better  Eating/Weight: 191, 188, 182 lbs (goal is 190)  Refills: y  Substances: def  Therapy: forgot to call them back  Medication compliant: y  SE: n  No SI HI AVH.      7/10/24: In person interview:  \"It made me really tired.\"  Exacerbated the fatigue.  I have no energy, I can't go out there to cut the grass.  No medication has ever helped me for depression. \"I've been depressed for the last 10 years.\"  Discussed his marriage.  Tried talking to her  Tried asking her to go   MDD: no change  TRIP: irritable still, no change  Panic attacks: n  Energy: down  Concentration: down  Insomnia: initial, persistent  Eating/Weight: 188, 182 lbs  Refills: y  Substances: def  Therapy: n  Medication compliant: y  SE: n  No SI HI AVH.      06/14/2024: In person interview:  \"I can't tell any difference.\"  I still can't eat, you basically have to be a vegetarian for kidney disease.  Weight loss related not to poor appetite, but to having to maintain a renal diet.  Discussed his marriage.  MDD: no change  TRIP: irritable still, no change  Panic attacks: n  Energy: down  Concentration: down  Insomnia: initial, persistent  Eating/Weight: 182 lbs  Refills: y  Substances: def  Therapy: n  Medication compliant: y  SE: n  No SI HI AVH.      04/30/2024: In person.  Interview:  His/Her Story: \"[facility]  G14, P16  I've always had a little bit of depression.  But when I got injured, it got worse. \"I couldn't do things like I used to.\"  It's a combination of injuries, surgeries  Pain pump helps  Trazodone for 5 years  Cymbalta 5 years  Has never " "tried combinations, but has tried \"the gamut\" of them  Has lost 50 lbs in 3 mos, unsure why  Trouble focusing recently  Has chronic balance issues, no one knows why (cards, two neurologists, worked him up) x 3-4 mos  Depression/Mood:  Depressed mood, anhedonia, poor energy, poor concentration, weight loss, insomnia.  Seasonal pattern: def  Severity: Moderate  Duration: all his life  Anxiety:  Uncontrolled worrying, muscle tension, fatigue, poor concentration, feeling on edge, irritability, insomnia.  Severity: Moderate  Duration: all of his life  Panic attacks: n  Psych ROS: Positive for depression, anxiety.  Negative for psychosis and rebecca.  ADHD: def  PTSD: no life threatening trauma  No SI HI AVH.  Medication compliant:      Access to Firearms: yes, locked away    PHQ-9 Depression Screening  PHQ-9 Total Score:      Little interest or pleasure in doing things?     Feeling down, depressed, or hopeless?     Trouble falling or staying asleep, or sleeping too much?     Feeling tired or having little energy?     Poor appetite or overeating?     Feeling bad about yourself - or that you are a failure or have let yourself or your family down?     Trouble concentrating on things, such as reading the newspaper or watching television?     Moving or speaking so slowly that other people could have noticed? Or the opposite - being so fidgety or restless that you have been moving around a lot more than usual?     Thoughts that you would be better off dead, or of hurting yourself in some way?     PHQ-9 Total Score       TRIP-7       Past Surgical History:  Past Surgical History:   Procedure Laterality Date    CERVICAL DISC SURGERY  2008    COLONOSCOPY N/A 08/18/2023    Procedure: COLONOSCOPY WITH POLYPECTOMY/SNARE;  Surgeon: Jose Alejandro Fox MD;  Location: [facility] ENDOSCOPY;  Service: General;  Laterality: N/A;  COLON POLYPS    COLONOSCOPY N/A 8/13/2024    Procedure: COLONOSCOPY WITH POLYPECTOMIES;  Surgeon: Jose Alejandro Fox, " "MD;  Location: [facility] ENDOSCOPY;  Service: General;  Laterality: N/A;  COLON POLYPS    ENDOSCOPY N/A 8/13/2024    Procedure: ESOPHAGOGASTRODUODENOSCOPY WITH BIOPSIES;  Surgeon: Jose Alejandro Fox MD;  Location: [facility] ENDOSCOPY;  Service: General;  Laterality: N/A;  GASTRITIS. SUPERFICIAL GASTRIC ULCERS    HEEL SPUR SURGERY  2005    KNEE ACL RECONSTRUCTION  2004    OTHER SURGICAL HISTORY      METAL IMPLANT    PAIN PUMP INSERTION/REVISION  2010    SPINE SURGERY  2008    Antieror c5-7 discotomy    TONSILLECTOMY  1965    VASECTOMY  2002       Problem List:  Patient Active Problem List   Diagnosis    Chronic right-sided thoracic back pain    Kidney stone    Screening due    Allergic rhinitis    Arthritis    Cervical neck pain with evidence of disc disease    Depression    Head injury    Hemorrhoid    Hypogonadism in male    Right wrist pain    Weight loss    Abdominal pain    Seizure-like activity    Palpitations    Abnormal EKG    Bradycardia    Brain fog    Fatigue    Acute kidney injury    Tremor    Slow transit constipation    Weakness of both lower extremities    Left hand weakness    Drug induced myoclonus    Insomnia due to other mental disorder    Obstructive sleep apnea syndrome    Cervical radiculopathy    Iron deficiency anemia    Gastroesophageal reflux disease with esophagitis without hemorrhage    History of colonic polyps       Allergy:   No Known Allergies     Discontinued Medications:  Medications Discontinued During This Encounter   Medication Reason    mirtazapine (REMERON) 15 MG tablet     pantoprazole (Protonix) 40 MG EC tablet     FLUoxetine (PROzac) 10 MG capsule     sucralfate (Carafate) 1 g tablet              Current Medications:   Current Outpatient Medications   Medication Sig Dispense Refill    BD Eclipse Syringe/Needle 23G X 1\" 3 ML misc USE TO INJECT TESTOSTERONE      carboxymethylcellulose (REFRESH PLUS) 0.5 % solution Administer 2 drops to both eyes 3 (Three) Times a Day As Needed for " Dry Eyes. 30 mL 12    CINNAMON PO Take  by mouth.      FLUoxetine (PROzac) 20 MG capsule Take 1 capsule by mouth Daily. Start prozac 7/17 90 capsule 1    fluticasone (FLONASE) 50 MCG/ACT nasal spray 2 sprays into the nostril(s) as directed by provider Daily. 16 g 11    HYDROmorphone (DILAUDID) 1 MG/ML injection Infuse 0-2 mg/hr into a venous catheter.      IRON, FERROUS GLUCONATE, PO Take 65 mg by mouth Daily.      Lubricant Eye Drops PF 0.5 % solution       Lysine 1000 MG tablet Take  by mouth.      Magnesium 250 MG tablet Take  by mouth.      multivitamin with minerals tablet tablet Take 1 tablet by mouth Daily.      Omega 3-6-9 Fatty Acids (OMEGA 3-6-9 COMPLEX PO) Take  by mouth.      pain patient supplied pump by Intrathecal route Continuous. Instructions are in drop boxes      QUEtiapine (SEROquel) 50 MG tablet Take 0.5 tablets by mouth.      simethicone (Gas-X) 80 MG chewable tablet Take 2 tablets PO after completing movi prep and 2 tablets PO 4 hours prior to procedure. 4 tablet 0    tamsulosin (FLOMAX) 0.4 MG capsule 24 hr capsule Take 2 capsules by mouth Daily for 360 days. (Patient taking differently: Take 2 capsules by mouth As Needed.) 180 capsule 3    Testosterone Cypionate (DEPOTESTOTERONE CYPIONATE) 200 MG/ML injection       Turmeric 500 MG capsule Take  by mouth.      traZODone (DESYREL) 100 MG tablet Take 1 tablet by mouth Every Night. (Patient not taking: Reported on 8/7/2024)       No current facility-administered medications for this visit.       Past Medical History:  Past Medical History:   Diagnosis Date    Alcohol abuse     Allergic rhinitis     Anemia 02/06/2024    My hand started just shaking but got worse until now i have no strength in my left hand    Anxiety 2006    Starting taking mood depression drugs    Arthritis     Asthma About 6 months    Sore Throat and was sent to ENT    Atrial fibrillation 2023    Benign prostatic hyperplasia 2015    Dad had Prostate Cancer and just did  Colonoscopy and they said i have a major large Prostate    Cervical neck pain with evidence of disc disease     Chronic pain disorder     Colon polyp 2020    Had a lot of polyps on the last 2 Colonoscopies    CTS (carpal tunnel syndrome) 2005    Depression     Difficulty walking 2033    Erectile dysfunction 2017    I have been on Testosterone replacements since then    Head injury 1977    Headache May 2023    Started abou 6 months or more    Hemorrhoid     HL (hearing loss) 2004    Have a major tinitis/ringing in my ears especially the left one    Hyperlipidemia 2017    My blood test have stated they were high or elevated    Hypertension 2017    Given Flomax said it will probably go down    Hypogonadism in male     Insomnia     Kidney stone 2020    Went to doctor and got medication for them and still have issues with right side    Low back pain 2010    Stinosis of back & neck    Memory loss 2023    Movement disorder 2023    Sleep apnea 2007    Substance abuse [id number]    On Disulfiram for alcohol abuse-voluntary    Visual impairment 2023    Just recently have been given eye drops       Past Psychiatric History:  Began Treatment: a year 2019  Diagnoses: TRIP, insomnia, MDD  Psychiatrist: a year 2019  Therapist: a year 2019  Admission History:Denies  Medication Trials:    multiple    Self Harm: Denies  Suicide Attempts:Denies      Substance Abuse History:   Types: end of 2023 stopped drinking using antabuse, former smoker 2ppd  Withdrawal Symptoms:Denies  Longest Period Sober: 6 mos, recently  AA: Not applicable     Social History:  Martial Status:  Employed: retired 7/31/23  Kids:Yes  House:Lives in a house   History: [employer], retired    Social History     Socioeconomic History    Marital status:    Tobacco Use    Smoking status: Former     Current packs/day: 0.00     Average packs/day: 2.0 packs/day for 8.0 years (16.0 ttl pk-yrs)     Types: Cigarettes     Start date: 1/1/1975     Quit date:  "1983     Years since quittin.6     Passive exposure: Past    Smokeless tobacco: Former     Types: Snuff    Tobacco comments:     Also dipped for several years   Vaping Use    Vaping status: Never Used   Substance and Sexual Activity    Alcohol use: Not Currently     Alcohol/week: 14.0 standard drinks of alcohol     Types: 14 Shots of liquor per week     Comment: Stopped due to Disulfiram 250 mg    Drug use: Not Currently     Frequency: 7.0 times per week     Types: Marijuana    Sexual activity: Not Currently     Partners: Female     Birth control/protection: None, Vasectomy     Comment: Low testosterone       Family History:   Suicide Attempts:  Mom  Suicide Completions: mom  \"I think she had bipolar but I'm not sure\"      Family History   Problem Relation Age of Onset    Suicide Attempts Mother     Bipolar disorder Mother     Anxiety disorder Mother     Heart disease Mother     Osteoporosis Mother     Alcohol abuse Mother         Mother committed suicide    Depression Mother         She had major depression    Early death Mother         She committed suicide    Thyroid disease Mother         She had Thyroid issues and had surgery    Cancer Father         Prostate    Prostate cancer Father     Hearing loss Father         He has worn a hearing aids since his late 50’s    Depression Sister     Alcohol abuse Sister     Diabetes Sister         Diabetes    Arthritis Sister     Sleep apnea Sister     Obesity Sister     Thyroid disease Sister     Insomnia Sister     Narcolepsy Sister     Diabetes Brother     Stroke Other         AUNT/UNCLE GRANDPARENTS    Diabetes Other         GRANDPARENTS        Developmental History:       Childhood: saw mom and dad fight a lot, they , brother  when he was 3 yo.   High School:Completed  College: [employer]    Mental Status Exam  Appearance  : groomed, good eye contact, normal street clothes  Behavior  : pleasant and cooperative  Motor  : No abnormal  Speech  : talkative, " "normal rhythm, rate, volume, tone, not hyperverbal, not pressured, normal prosidy  Mood  : \"I just have no energy in the mornings\"  Affect  : euthymic, mood congruent, good variability  Thought Content  : negative suicidal ideations, negative homicidal ideations, negative obsessions  Perceptions  : negative auditory hallucinations, negative visual hallucinations  Thought Process  : linear  Insight/Judgement  : Fair/fair  Cognition  : grossly intact  Attention   : intact      Review of Systems:  Review of Systems   Constitutional:  Positive for fatigue. Negative for diaphoresis.   HENT:  Negative for drooling.    Eyes:  Negative for visual disturbance.   Respiratory:  Negative for cough, chest tightness and shortness of breath.    Cardiovascular:  Negative for chest pain, palpitations and leg swelling.   Gastrointestinal:  Positive for constipation. Negative for abdominal pain, diarrhea, nausea and vomiting.   Endocrine: Positive for cold intolerance and heat intolerance.   Genitourinary:  Positive for difficulty urinating.   Musculoskeletal:  Negative for joint swelling.   Allergic/Immunologic: Negative for immunocompromised state.   Neurological:  Positive for dizziness and numbness. Negative for seizures, speech difficulty, light-headedness and headaches.   Psychiatric/Behavioral:  Positive for agitation and sleep disturbance.        Physical Exam:  Physical Exam    Vital Signs:   /64   Pulse 64   Ht 180.3 cm (71\")   Wt 88.9 kg (196 lb)   BMI 27.34 kg/m²      Lab Results:   Admission on 08/13/2024, Discharged on 08/13/2024   Component Date Value Ref Range Status    Case Report 08/13/2024    Final                    Value:Surgical Pathology Report                         Case: [id number]                                  Authorizing Provider:  Jose Alejandro Fox MD      Collected:           08/13/2024 09:56 AM          Ordering Location:     [facility] Received:            08/13/2024 11:06 "                                  SUITES                                                                       Pathologist:           Stephy Vallejo MD                                                     Specimens:   1) - Large Intestine, Hepatic Flexure, HEPATIC FLEXURE POLYPS                                       2) - Small Intestine, Duodenum, DUODENUM BX                                                         3) - Gastric, Antrum, ANTRUM BX                                                                     4) - GE Junction, GE JUNCTION BX                                                                    5) - Esophagus, Mid, MID ESOPHAGUS BX                                                      Clinical Information 08/13/2024    Final                    Value:This result contains rich text formatting which cannot be displayed here.    Final Diagnosis 08/13/2024    Final                    Value:This result contains rich text formatting which cannot be displayed here.    Gross Description 08/13/2024    Final                    Value:This result contains rich text formatting which cannot be displayed here.    Microscopic Description 08/13/2024    Final                    Value:This result contains rich text formatting which cannot be displayed here.   Consult on 08/07/2024   Component Date Value Ref Range Status    Folate 08/07/2024 14.00  4.78 - 24.20 ng/mL Final    Vitamin B-12 08/07/2024 695  211 - 946 pg/mL Final    Glucose 08/07/2024 92  65 - 99 mg/dL Final    BUN 08/07/2024 34 (H)  8 - 23 mg/dL Final    Creatinine 08/07/2024 1.99 (H)  0.76 - 1.27 mg/dL Final    Sodium 08/07/2024 140  136 - 145 mmol/L Final    Potassium 08/07/2024 4.4  3.5 - 5.2 mmol/L Final    Chloride 08/07/2024 102  98 - 107 mmol/L Final    CO2 08/07/2024 32.3 (H)  22.0 - 29.0 mmol/L Final    Calcium 08/07/2024 9.4  8.6 - 10.5 mg/dL Final    Total Protein 08/07/2024 6.7  6.0 - 8.5 g/dL Final    Albumin 08/07/2024 4.4  3.5 - 5.2  g/dL Final    ALT (SGPT) 08/07/2024 33  1 - 41 U/L Final    AST (SGOT) 08/07/2024 24  1 - 40 U/L Final    Alkaline Phosphatase 08/07/2024 80  39 - 117 U/L Final    Total Bilirubin 08/07/2024 0.2  0.0 - 1.2 mg/dL Final    Globulin 08/07/2024 2.3  gm/dL Final    A/G Ratio 08/07/2024 1.9  g/dL Final    BUN/Creatinine Ratio 08/07/2024 17.1  7.0 - 25.0 Final    Anion Gap 08/07/2024 5.7  5.0 - 15.0 mmol/L Final    eGFR 08/07/2024 36.8 (L)  >60.0 mL/min/1.73 Final    Occult Blood, Fecal by Immunoassay 08/08/2024 Positive (A)  Negative Final    Reticulocyte % 08/07/2024 1.09  0.70 - 1.90 % Final    Reticulocyte Absolute 08/07/2024 0.0344  0.0200 - 0.1300 10*6/mm3 Final    LDH 08/07/2024 173  135 - 225 U/L Final    Haptoglobin 08/07/2024 110  30 - 200 mg/dL Final    Iron 08/07/2024 101  59 - 158 mcg/dL Final    Iron Saturation (TSAT) 08/07/2024 28  20 - 50 % Final    Transferrin 08/07/2024 243  200 - 360 mg/dL Final    TIBC 08/07/2024 362  298 - 536 mcg/dL Final    Ferritin 08/07/2024 483.70 (H)  30.00 - 400.00 ng/mL Final    Pathology Review 08/07/2024 Yes   Final    Sed Rate 08/07/2024 1  0 - 20 mm/hr Final    Hepatitis B Surface Ag 08/07/2024 Non-Reactive  Non-Reactive Final    Hep A IgM 08/07/2024 Non-Reactive  Non-Reactive Final    Hep B C IgM 08/07/2024 Non-Reactive  Non-Reactive Final    Hepatitis C Ab 08/07/2024 Non-Reactive  Non-Reactive Final    Zinc 08/07/2024 75  44 - 115 ug/dL Final                                    Detection Limit = 5    Copper 08/07/2024 76  69 - 132 ug/dL Final                                    Detection Limit = 5    Erythropoietin 08/07/2024 10.2  2.6 - 18.5 mIU/mL Final    [employer] DxI 800 Immunoassay System  Values obtained with different assay methods or kits cannot be used  interchangeably. Results cannot be interpreted as absolute evidence  of the presence or absence of malignant disease.    Free Light Chain, [place] 08/07/2024 48.1 (H)  3.3 - 19.4 mg/L Final    Free  Lambda Light Chains 08/07/2024 24.1  5.7 - 26.3 mg/L Final    Kappa/Lambda Ratio 08/07/2024 2.00 (H)  0.26 - 1.65 Final    IgG 08/07/2024 1043  603 - 1613 mg/dL Final    IgA 08/07/2024 174  61 - 437 mg/dL Final    IgM 08/07/2024 82  20 - 172 mg/dL Final    Total Protein 08/07/2024 6.7  6.0 - 8.5 g/dL Final    Albumin 08/07/2024 4.0  2.9 - 4.4 g/dL Final    Alpha-1-Globulin 08/07/2024 0.2  0.0 - 0.4 g/dL Final    Alpha-2-Globulin 08/07/2024 0.6  0.4 - 1.0 g/dL Final    Beta Globulin 08/07/2024 0.9  0.7 - 1.3 g/dL Final    Gamma Globulin 08/07/2024 1.0  0.4 - 1.8 g/dL Final    M-Walter 08/07/2024 Not Observed  Not Observed g/dL Final    Globulin 08/07/2024 2.7  2.2 - 3.9 g/dL Final    A/G Ratio 08/07/2024 1.5  0.7 - 1.7 Final    Immunofixation Reflex, Serum 08/07/2024 Comment   Final    No monoclonality detected.    Please note 08/07/2024 Comment   Final    Protein electrophoresis scan will follow via computer, mail, or   delivery.    Color, UA 08/07/2024 Yellow  Yellow, Straw Final    Appearance, UA 08/07/2024 Clear  Clear Final    pH, UA 08/07/2024 5.5  5.0 - 8.0 Final    Specific Gravity, UA 08/07/2024 1.009  1.005 - 1.030 Final    Glucose, UA 08/07/2024 Negative  Negative Final    Ketones, UA 08/07/2024 Negative  Negative Final    Bilirubin, UA 08/07/2024 Negative  Negative Final    Blood, UA 08/07/2024 Negative  Negative Final    Protein, UA 08/07/2024 Negative  Negative Final    Leuk Esterase, UA 08/07/2024 Negative  Negative Final    Nitrite, UA 08/07/2024 Negative  Negative Final    Urobilinogen, UA 08/07/2024 0.2 E.U./dL  0.2 - 1.0 E.U./dL Final    RBC, UA 08/07/2024 0-2  None Seen, 0-2 /HPF Final    WBC, UA 08/07/2024 0-2  None Seen, 0-2 /HPF Final    Bacteria, UA 08/07/2024 None Seen  None Seen /HPF Final    Squamous Epithelial Cells, UA 08/07/2024 0-2  None Seen, 0-2 /HPF Final    Hyaline Casts, UA 08/07/2024 None Seen  None Seen /LPF Final    Methodology 08/07/2024 Automated Microscopy   Final     WBC 08/07/2024 3.84  3.40 - 10.80 10*3/mm3 Final    RBC 08/07/2024 3.13 (L)  4.14 - 5.80 10*6/mm3 Final    Hemoglobin 08/07/2024 9.8 (L)  13.0 - 17.7 g/dL Final    Hematocrit 08/07/2024 31.0 (L)  37.5 - 51.0 % Final    MCV 08/07/2024 99.0 (H)  79.0 - 97.0 fL Final    MCH 08/07/2024 31.3  26.6 - 33.0 pg Final    MCHC 08/07/2024 31.6  31.5 - 35.7 g/dL Final    RDW 08/07/2024 13.4  12.3 - 15.4 % Final    RDW-SD 08/07/2024 49.2  37.0 - 54.0 fl Final    MPV 08/07/2024 9.8  6.0 - 12.0 fL Final    Platelets 08/07/2024 140  140 - 450 10*3/mm3 Final    Neutrophil % 08/07/2024 49.0  42.7 - 76.0 % Final    Lymphocyte % 08/07/2024 33.9  19.6 - 45.3 % Final    Monocyte % 08/07/2024 11.7  5.0 - 12.0 % Final    Eosinophil % 08/07/2024 4.9  0.3 - 6.2 % Final    Basophil % 08/07/2024 0.5  0.0 - 1.5 % Final    Immature Grans % 08/07/2024 0.0  0.0 - 0.5 % Final    Neutrophils, Absolute 08/07/2024 1.88  1.70 - 7.00 10*3/mm3 Final    Lymphocytes, Absolute 08/07/2024 1.30  0.70 - 3.10 10*3/mm3 Final    Monocytes, Absolute 08/07/2024 0.45  0.10 - 0.90 10*3/mm3 Final    Eosinophils, Absolute 08/07/2024 0.19  0.00 - 0.40 10*3/mm3 Final    Basophils, Absolute 08/07/2024 0.02  0.00 - 0.20 10*3/mm3 Final    Immature Grans, Absolute 08/07/2024 0.00  0.00 - 0.05 10*3/mm3 Final    HIV-1/ HIV-2 Ab 08/07/2024 Non-Reactive  Non-Reactive Final    HIV-1 P24 Ag Screen 08/07/2024 Non-Reactive  Non-Reactive Final    Neutrophil % 08/07/2024 46.0  42.7 - 76.0 % Final    Lymphocyte % 08/07/2024 26.0  19.6 - 45.3 % Final    Monocyte % 08/07/2024 10.0  5.0 - 12.0 % Final    Eosinophil % 08/07/2024 6.0  0.3 - 6.2 % Final    Bands %  08/07/2024 6.0 (H)  0.0 - 5.0 % Final    Metamyelocyte % 08/07/2024 4.0 (H)  0.0 - 0.0 % Final    Atypical Lymphocyte % 08/07/2024 2.0  0.0 - 5.0 % Final    Neutrophils Absolute 08/07/2024 2.00  1.70 - 7.00 10*3/mm3 Final    Lymphocytes Absolute 08/07/2024 1.08  0.70 - 3.10 10*3/mm3 Final    Monocytes Absolute 08/07/2024 0.38  0.10  - 0.90 10*3/mm3 Final    Eosinophils Absolute 08/07/2024 0.23  0.00 - 0.40 10*3/mm3 Final    Smudge Cells 08/07/2024 2.0  /100 WBC Final    Anisocytosis 08/07/2024 Slight/1+  None Seen Final    Macrocytes 08/07/2024 Slight/1+  None Seen Final    WBC Morphology 08/07/2024 Normal  Normal Final    Platelet Estimate 08/07/2024 Decreased  Normal Final    Final Diagnosis 08/07/2024    Final                    Value:This result contains rich text formatting which cannot be displayed here.    Clinical Information 08/07/2024    Final                    Value:This result contains rich text formatting which cannot be displayed here.    Gross Description 08/07/2024    Final                    Value:This result contains rich text formatting which cannot be displayed here.    Microscopic Description 08/07/2024    Final                    Value:This result contains rich text formatting which cannot be displayed here.    Case Report 08/07/2024    Final                    Value:Surgical Pathology Report                         Case: [id number]                                  Authorizing Provider:  Tommy Doshi PA-C        Collected:           08/07/2024 09:51 AM          Ordering Location:     [facility]     Received:            08/08/2024 08:55 AM                                 GROUP HEMATOLOGY &                                                                                  ONCOLOGY                                                                     Pathologist:           Amarilis Jamison DO                                                       Specimen:    Arm, Right                                                                                 Consult Global Result 08/07/2024 Comment^Text^TXT   Final    Peripheral Blood:  1) Mild relative increase in CD8+/CD57+ T cells   immunophenotypically compatible with T-cell large granular   lymphocytes/T-LGLs (5.1% of leukocytes). See comment.  2) No other significant  immunophenotypic abnormalities   detected.  DISCLAIMER: REFER TO HARDCOPY OR PDF FOR COMPLETE RESULT.   If synopsis provided, clinical decisions should not be   based on this interfaced synopsis alone.  Performed at:  1 - [facility] [employer].  [address] [place] [address] [address]  [address]  :  Clary Pickett M.D., Ph.D., Phone:  [phone]   Office Visit on 07/26/2024   Component Date Value Ref Range Status    Glucose 07/26/2024 91  65 - 99 mg/dL Final    BUN 07/26/2024 30 (H)  8 - 23 mg/dL Final    Creatinine 07/26/2024 2.25 (H)  0.76 - 1.27 mg/dL Final    Sodium 07/26/2024 137  136 - 145 mmol/L Final    Potassium 07/26/2024 4.3  3.5 - 5.2 mmol/L Final    Chloride 07/26/2024 98  98 - 107 mmol/L Final    CO2 07/26/2024 29.0  22.0 - 29.0 mmol/L Final    Calcium 07/26/2024 9.5  8.6 - 10.5 mg/dL Final    Total Protein 07/26/2024 6.7  6.0 - 8.5 g/dL Final    Albumin 07/26/2024 4.4  3.5 - 5.2 g/dL Final    ALT (SGPT) 07/26/2024 25  1 - 41 U/L Final    AST (SGOT) 07/26/2024 23  1 - 40 U/L Final    Alkaline Phosphatase 07/26/2024 69  39 - 117 U/L Final    Total Bilirubin 07/26/2024 0.3  0.0 - 1.2 mg/dL Final    Globulin 07/26/2024 2.3  gm/dL Final    A/G Ratio 07/26/2024 1.9  g/dL Final    BUN/Creatinine Ratio 07/26/2024 13.3  7.0 - 25.0 Final    Anion Gap 07/26/2024 10.0  5.0 - 15.0 mmol/L Final    eGFR 07/26/2024 31.8 (L)  >60.0 mL/min/1.73 Final    TSH 07/26/2024 1.450  0.270 - 4.200 uIU/mL Final    Total Cholesterol 07/26/2024 178  0 - 200 mg/dL Final    Triglycerides 07/26/2024 71  0 - 150 mg/dL Final    HDL Cholesterol 07/26/2024 54  40 - 60 mg/dL Final    LDL Cholesterol  07/26/2024 111 (H)  0 - 100 mg/dL Final    VLDL Cholesterol 07/26/2024 13  5 - 40 mg/dL Final    LDL/HDL Ratio 07/26/2024 2.03   Final    Iron 07/26/2024 86  59 - 158 mcg/dL Final    Iron Saturation (TSAT) 07/26/2024 23  20 - 50 % Final    Transferrin 07/26/2024 248  200 - 360 mg/dL Final    TIBC 07/26/2024  370  298 - 536 mcg/dL Final    Folate 07/26/2024 16.10  4.78 - 24.20 ng/mL Final    Vitamin B-12 07/26/2024 676  211 - 946 pg/mL Final    25 Hydroxy, Vitamin D 07/26/2024 51.1  30.0 - 100.0 ng/ml Final    Hemoglobin A1C 07/26/2024 5.40  4.80 - 5.60 % Final    WBC 07/26/2024 3.99  3.40 - 10.80 10*3/mm3 Final    RBC 07/26/2024 2.91 (L)  4.14 - 5.80 10*6/mm3 Final    Hemoglobin 07/26/2024 8.8 (L)  13.0 - 17.7 g/dL Final    Hematocrit 07/26/2024 27.0 (L)  37.5 - 51.0 % Final    MCV 07/26/2024 92.8  79.0 - 97.0 fL Final    MCH 07/26/2024 30.2  26.6 - 33.0 pg Final    MCHC 07/26/2024 32.6  31.5 - 35.7 g/dL Final    RDW 07/26/2024 12.4  12.3 - 15.4 % Final    RDW-SD 07/26/2024 42.4  37.0 - 54.0 fl Final    MPV 07/26/2024 10.1  6.0 - 12.0 fL Final    Platelets 07/26/2024 131 (L)  140 - 450 10*3/mm3 Final    Neutrophil % 07/26/2024 41.0 (L)  42.7 - 76.0 % Final    Lymphocyte % 07/26/2024 40.9  19.6 - 45.3 % Final    Monocyte % 07/26/2024 11.0  5.0 - 12.0 % Final    Eosinophil % 07/26/2024 6.0  0.3 - 6.2 % Final    Basophil % 07/26/2024 0.8  0.0 - 1.5 % Final    Immature Grans % 07/26/2024 0.3  0.0 - 0.5 % Final    Neutrophils, Absolute 07/26/2024 1.64 (L)  1.70 - 7.00 10*3/mm3 Final    Lymphocytes, Absolute 07/26/2024 1.63  0.70 - 3.10 10*3/mm3 Final    Monocytes, Absolute 07/26/2024 0.44  0.10 - 0.90 10*3/mm3 Final    Eosinophils, Absolute 07/26/2024 0.24  0.00 - 0.40 10*3/mm3 Final    Basophils, Absolute 07/26/2024 0.03  0.00 - 0.20 10*3/mm3 Final    Immature Grans, Absolute 07/26/2024 0.01  0.00 - 0.05 10*3/mm3 Final    nRBC 07/26/2024 0.0  0.0 - 0.2 /100 WBC Final   Office Visit on 07/10/2024   Component Date Value Ref Range Status    Urine Volume 07/10/2024 280   Final   Office Visit on 04/10/2024   Component Date Value Ref Range Status    Urine Volume 04/10/2024 80   Final   Office Visit on 03/29/2024   Component Date Value Ref Range Status    Creatine Kinase 03/29/2024 756 (H)  20 - 200 U/L Final    Glucose 03/29/2024  99  65 - 99 mg/dL Final    BUN 03/29/2024 28 (H)  8 - 23 mg/dL Final    Creatinine 03/29/2024 1.47 (H)  0.76 - 1.27 mg/dL Final    Sodium 03/29/2024 139  136 - 145 mmol/L Final    Potassium 03/29/2024 4.3  3.5 - 5.2 mmol/L Final    Chloride 03/29/2024 103  98 - 107 mmol/L Final    CO2 03/29/2024 25.0  22.0 - 29.0 mmol/L Final    Calcium 03/29/2024 9.9  8.6 - 10.5 mg/dL Final    Total Protein 03/29/2024 7.5  6.0 - 8.5 g/dL Final    Albumin 03/29/2024 4.7  3.5 - 5.2 g/dL Final    ALT (SGPT) 03/29/2024 36  1 - 41 U/L Final    AST (SGOT) 03/29/2024 38  1 - 40 U/L Final    Alkaline Phosphatase 03/29/2024 61  39 - 117 U/L Final    Total Bilirubin 03/29/2024 0.2  0.0 - 1.2 mg/dL Final    Globulin 03/29/2024 2.8  gm/dL Final    A/G Ratio 03/29/2024 1.7  g/dL Final    BUN/Creatinine Ratio 03/29/2024 19.0  7.0 - 25.0 Final    Anion Gap 03/29/2024 11.0  5.0 - 15.0 mmol/L Final    eGFR 03/29/2024 52.9 (L)  >60.0 mL/min/1.73 Final    WBC 03/29/2024 3.62  3.40 - 10.80 10*3/mm3 Final    RBC 03/29/2024 3.69 (L)  4.14 - 5.80 10*6/mm3 Final    Hemoglobin 03/29/2024 11.5 (L)  13.0 - 17.7 g/dL Final    Hematocrit 03/29/2024 34.7 (L)  37.5 - 51.0 % Final    MCV 03/29/2024 94.0  79.0 - 97.0 fL Final    MCH 03/29/2024 31.2  26.6 - 33.0 pg Final    MCHC 03/29/2024 33.1  31.5 - 35.7 g/dL Final    RDW 03/29/2024 12.3  12.3 - 15.4 % Final    RDW-SD 03/29/2024 42.7  37.0 - 54.0 fl Final    MPV 03/29/2024 10.6  6.0 - 12.0 fL Final    Platelets 03/29/2024 168  140 - 450 10*3/mm3 Final    Neutrophil % 03/29/2024 60.1  42.7 - 76.0 % Final    Lymphocyte % 03/29/2024 27.1  19.6 - 45.3 % Final    Monocyte % 03/29/2024 10.5  5.0 - 12.0 % Final    Eosinophil % 03/29/2024 1.4  0.3 - 6.2 % Final    Basophil % 03/29/2024 0.6  0.0 - 1.5 % Final    Immature Grans % 03/29/2024 0.3  0.0 - 0.5 % Final    Neutrophils, Absolute 03/29/2024 2.18  1.70 - 7.00 10*3/mm3 Final    Lymphocytes, Absolute 03/29/2024 0.98  0.70 - 3.10 10*3/mm3 Final    Monocytes, Absolute  03/29/2024 0.38  0.10 - 0.90 10*3/mm3 Final    Eosinophils, Absolute 03/29/2024 0.05  0.00 - 0.40 10*3/mm3 Final    Basophils, Absolute 03/29/2024 0.02  0.00 - 0.20 10*3/mm3 Final    Immature Grans, Absolute 03/29/2024 0.01  0.00 - 0.05 10*3/mm3 Final    nRBC 03/29/2024 0.0  0.0 - 0.2 /100 WBC Final    Vitamin B-12 03/29/2024 555  211 - 946 pg/mL Final   Office Visit on 02/28/2024   Component Date Value Ref Range Status    Creatine Kinase 02/28/2024 239 (H)  20 - 200 U/L Final    Hemoglobin A1C 02/28/2024 5.30  4.80 - 5.60 % Final    Sed Rate 02/28/2024 3  0 - 20 mm/hr Final    C-Reactive Protein 02/28/2024 <0.30  0.00 - 0.50 mg/dL Final    Hepatitis C Ab 02/28/2024 Non-Reactive  Non-Reactive Final    HIV DUO 02/28/2024 Non-Reactive  Non-Reactive Final    WBC 02/28/2024 3.09 (L)  3.40 - 10.80 10*3/mm3 Final    RBC 02/28/2024 3.44 (L)  4.14 - 5.80 10*6/mm3 Final    Hemoglobin 02/28/2024 10.4 (L)  13.0 - 17.7 g/dL Final    Hematocrit 02/28/2024 31.4 (L)  37.5 - 51.0 % Final    MCV 02/28/2024 91.3  79.0 - 97.0 fL Final    MCH 02/28/2024 30.2  26.6 - 33.0 pg Final    MCHC 02/28/2024 33.1  31.5 - 35.7 g/dL Final    RDW 02/28/2024 12.5  12.3 - 15.4 % Final    RDW-SD 02/28/2024 40.6  37.0 - 54.0 fl Final    MPV 02/28/2024 10.3  6.0 - 12.0 fL Final    Platelets 02/28/2024 147  140 - 450 10*3/mm3 Final    Neutrophil % 02/28/2024 54.4  42.7 - 76.0 % Final    Lymphocyte % 02/28/2024 32.7  19.6 - 45.3 % Final    Monocyte % 02/28/2024 10.0  5.0 - 12.0 % Final    Eosinophil % 02/28/2024 2.3  0.3 - 6.2 % Final    Basophil % 02/28/2024 0.6  0.0 - 1.5 % Final    Immature Grans % 02/28/2024 0.0  0.0 - 0.5 % Final    Neutrophils, Absolute 02/28/2024 1.68 (L)  1.70 - 7.00 10*3/mm3 Final    Lymphocytes, Absolute 02/28/2024 1.01  0.70 - 3.10 10*3/mm3 Final    Monocytes, Absolute 02/28/2024 0.31  0.10 - 0.90 10*3/mm3 Final    Eosinophils, Absolute 02/28/2024 0.07  0.00 - 0.40 10*3/mm3 Final    Basophils, Absolute 02/28/2024 0.02  0.00 -  0.20 10*3/mm3 Final    Immature Grans, Absolute 02/28/2024 0.00  0.00 - 0.05 10*3/mm3 Final    nRBC 02/28/2024 0.0  0.0 - 0.2 /100 WBC Final       EKG Results:  No orders to display       Imaging Results:  CT Chest Without Contrast Diagnostic    Result Date: 3/19/2024  Impression: CT scan of the chest without IV contrast demonstrating tree-in-bud airspace disease in the left lower lobe suggesting indolent infection. Follow-up CT scan of the chest in 3 months is recommended.    Electronically Signed By-SOILA TREVIÑO MD On:3/19/2024 3:23 PM          Assessment & Plan   Diagnoses and all orders for this visit:    1. Major depressive disorder, recurrent episode, moderate (Primary)  -     FLUoxetine (PROzac) 20 MG capsule; Take 1 capsule by mouth Daily. Start prozac 7/17  Dispense: 90 capsule; Refill: 1    2. Generalized anxiety disorder  -     FLUoxetine (PROzac) 20 MG capsule; Take 1 capsule by mouth Daily. Start prozac 7/17  Dispense: 90 capsule; Refill: 1    3. Insomnia due to mental condition  -     FLUoxetine (PROzac) 20 MG capsule; Take 1 capsule by mouth Daily. Start prozac 7/17  Dispense: 90 capsule; Refill: 1        Visit Diagnoses:    ICD-10-CM ICD-9-CM   1. Major depressive disorder, recurrent episode, moderate  F33.1 296.32   2. Generalized anxiety disorder  F41.1 300.02   3. Insomnia due to mental condition  F51.05 300.9     327.02     08/22/2024: Improving, increase prozac for possible MDD.    Allowed patient to freely discuss and process issues, such as:  Anxiety and depression regarding medical conditions.  Anxiety and depression regarding relationships.  ... using Rogerian psychotherapeutic techniques including unconditional positive regard, reflective listening, and demonstrating clear empathy, with the goal of ameliorating symptoms and maintaining, restoring, or improving self-esteem, adaptive skills, and ego or psychological functions (Danielle et al. 1991).  Time (minutes) spent providing supportive  psychotherapy: 16  (This time is exclusive to the therapy session and separate from the time spent on activities used to meet the criteria for the E/M service (history, exam, medical decision-making).)  Start: 9:57  Stop: 10:13  Functional status: mild impairment  Treatment plan: Medication management and supportive psychotherapy  Prognosis: good  Progress: improving  4w    07/25/2024: Stop wellbutrin, in 2 wks, hold trazodone and start seroquel for irritability, mood stability. Poor energy may be due to anemia. Mood improved, but irritable as well. Mom has hx of what he thinks was bipolar. Avoiding lithium 2/2 CKD.     07/10/2024: Mirtazapine led to brain fog, fatigue, poor focus in am. Stop it. [facility] for marriage issues (individual therapy). Pt is not on duloxetine. Pt needs energizing meds. Start wellbutrin for a week, then add prozac.    06/14/2024: No change. Discussed reflective listening with his wife. Stop abilify; start mirtazapine.    4/30/24: R/O ADHD. Start abilify, Therapy? Wgt loss, consider mirtazapine, seroquel. 6w.    PLAN:  Safety: No acute safety concerns  Therapy: None  Risk Assessment: Risk of self-harm acutely is moderate.  Risk factors include anxiety disorder, mood disorder, fhx, access to guns, and recent psychosocial stressors (pandemic). Protective factors include no present SI, no history of suicide attempts or self-harm in the past, minimal AODA, healthcare seeking, future orientation, willingness to engage in care.  Risk of self-harm chronically is also moderate, but could be further elevated in the event of treatment noncompliance and/or AODA.  Meds:  INCREASE prozac 10 to 20 mg qam. Risks, benefits, alternatives discussed with patient including GI upset, nausea vomiting diarrhea, theoretical decrease of seizure threshold predisposing the patient to a slightly higher seizure risk, headaches, sexual dysfunction, serotonin syndrome, bleeding risk, increased suicidality in patients 24  years and younger, switching to rebecca/hypomania.  After discussion of these risks and benefits, the patient voiced understanding and agreed to proceed.  CONTINUE seroquel 25 mg qhs. Risks, benefits, alternatives discussed with patient including nausea and vomiting, GI upset, sedation, dizziness, falls, akathisia, hypotension, increased appetite, lowering of seizure threshold, theoretical risk of tardive dyskinesia, extrapyramidal symptoms, movement issues, restless legs syndrome. Use care when operating vehicle, vessel, or machine. After discussion of these risks and benefits, the patient voiced understanding and agreed to proceed.  STOPPED trazodone 50 mg qhs. 100 mg made him groggy the next day. 7/24.    S/P:  wellbutrin  mg qam. Irritable 7/24  abilify 2 mg qhs. No change 6/24.   mirtazapine 7.5 mg qhs. Sedation 7/24  Seroquel didn't work in the past 7/24  Labs: none    Patient screened positive for depression based on a PHQ-9 score of 1 on 8/7/2024. Follow-up recommendations include: Prescribed antidepressant medication treatment and Suicide Risk Assessment performed.           TREATMENT PLAN/GOALS: Continue supportive psychotherapy efforts and medications as indicated. Treatment and medication options discussed during today's visit. Patient acknowledged and verbally consented to continue with current treatment plan and was educated on the importance of compliance with treatment and follow-up appointments.    MEDICATION ISSUES:  JIM reviewed as expected.  Discussed medication options and treatment plan of prescribed medication as well as the risks, benefits, and side effects including potential falls, possible impaired driving and metabolic adversities among others. Patient is agreeable to call the office with any worsening of symptoms or onset of side effects. Patient is agreeable to call 911 or go to the nearest ER should he/she begin having SI/HI. No medication side effects or related complaints  today.     MEDS ORDERED DURING VISIT:  New Medications Ordered This Visit   Medications    FLUoxetine (PROzac) 20 MG capsule     Sig: Take 1 capsule by mouth Daily. Start prozac 7/17     Dispense:  90 capsule     Refill:  1     Replaces 10 mg dose       Return in about 4 weeks (around 9/19/2024).         This document has been electronically signed by Penelope Davis MD  August 22, 2024 10:18 EDT    Dictated Utilizing Dragon Dictation: Part of this note may be an electronic transcription/translation of spoken language to printed text using the Dragon Dictation System.

## 2024-08-22 NOTE — PATIENT INSTRUCTIONS
1.  Please return to clinic at your next scheduled visit.  Contact the clinic ([phone]) at least 24 hours prior in the event you need to cancel.  2.  Do no harm to yourself or others.    3.  Avoid alcohol and drugs.    4.  Take all medications as prescribed.  Please contact the clinic with any concerns. If you are in need of medication refills, please call the clinic at [phone].    5. Should you want to get in touch with your provider, Dr. Penelope Davis, please utilize [employer] or contact the office ([phone]), and staff will be able to page Dr. Davis directly.  6.  In the event you have personal crisis, contact the following crisis numbers: Suicide Prevention Hotline 1-596.409.6286; GERMAINE Helpline 1-128-240-GERMAINE; [facility] Emergency Room [phone]; text HELLO to [phone]; or 376.

## 2024-08-22 NOTE — TREATMENT PLAN
Multi-Disciplinary Problems (from Behavioral Health Treatment Plan)      Active Problems       Problem: Anxiety  Start Date: 08/22/24      Problem Details: The patient self-scales this problem as a 3 with 10 being the worst.    medical conditions, family conflict        Goal Priority Start Date Expected End Date End Date    Patient will develop and implement behavioral and cognitive strategies to reduce anxiety and irrational fears. -- 08/22/24 02/20/25 --    Goal Details: Progress toward goal:  improving          Goal Intervention Frequency Start Date End Date    Help patient explore past emotional issues in relation to present anxiety. Q Month 08/22/24 --    Intervention Details: Duration of treatment until remission of symptoms.          Goal Intervention Frequency Start Date End Date    Help patient develop an awareness of their cognitive and physical responses to anxiety. Q Month 08/22/24 --    Intervention Details: Duration of treatment until remission of symptoms.                  Problem: Depression  Start Date: 08/22/24      Problem Details: The patient self-scales this problem as a 3 with 10 being the worst.    medical conditions, family conflict        Goal Priority Start Date Expected End Date End Date    Patient will demonstrate the ability to initiate new constructive life skills outside of sessions on a consistent basis. -- 08/22/24 02/20/25 --    Goal Details: Progress toward goal:  improving          Goal Intervention Frequency Start Date End Date    Assist patient in setting attainable activities of daily living goals. PRN 08/22/24 --      Goal Intervention Frequency Start Date End Date    Provide education about depression Q Month 08/22/24 --    Intervention Details: Duration of treatment until remission of symptoms.          Goal Intervention Frequency Start Date End Date    Assist patient in developing healthy coping strategies. Q Month 08/22/24 --    Intervention Details: Duration of treatment  until remission of symptoms.                          Reviewed By       Penelope Davis MD 08/22/24 1847                     I have discussed and reviewed this treatment plan with the patient.

## 2024-08-22 NOTE — PLAN OF CARE
Tomaserian psychotherapy to help manage depression and anxiety related to medical conditions, family conflict

## 2024-08-23 PROCEDURE — 84156 ASSAY OF PROTEIN URINE: CPT | Performed by: PHYSICIAN ASSISTANT

## 2024-08-23 PROCEDURE — 84166 PROTEIN E-PHORESIS/URINE/CSF: CPT | Performed by: PHYSICIAN ASSISTANT

## 2024-08-24 PROBLEM — N40.1 BENIGN PROSTATIC HYPERPLASIA WITH URINARY RETENTION: Status: ACTIVE | Noted: 2024-08-24

## 2024-08-24 PROBLEM — R33.8 BENIGN PROSTATIC HYPERPLASIA WITH URINARY RETENTION: Status: ACTIVE | Noted: 2024-08-24

## 2024-08-24 NOTE — PROGRESS NOTES
---------------Transrectal Ultrasound of the Prostate-----------    The procedure is done for the indication of BPH.   No enema prior to the procedure. Timeout was undertaken documenting the correct patient,site and procedure. The patient has also taken antibiotics prior to the procedure.     He was placed in the left lateral decubitis position.  A transrectal ultrasound probe was the lubricated, covered with a condom, and placed into the rectum. The ultrasound machine at 7.5 Mhz was used to perform the ultrasound exam. The prostate was scanned in both transverse and longitudinal fashion. Multiple images were obtained. L      The prostate height is 2.6 mm.     The prostate width is 5.3 mm.     The prostate length is 4.0 mm.     The total prostate volume is 31 gms.     The appearance of the prostate is normal.       The patient tolerated the procedure well. There was minimal bleeding at the end of the procedure.       Procedures       Urinalysis was checked today and was negative for signs of infection      Cytoscopy Procedure:     Procedure: Flexible cytoscope was passed per urethra into the bladder without difficulty after proper consent. The bladder was inspected in a systematic meridian fashion.       3 cm prostate, no median lobe    Moderate trabeculation.  No pathology      There were no tumors, lesions, stones, or other abnormalities noted within the bladder. Of note, there was no increased vascularity as well. Both ureteral orifices were identified and were normal in appearance. The flexible cytoscope was removed. The patient tolerated the procedure well.         This document has been electronically signed by Charles Phan MD  August 24, 2024 05:44 EDT      History of Present Illness  Stephon Castellano is a 63 y.o. male         BPH      Flomax 0.8 nightly and finasteride  Nocturia x 4  Frequency every 30 minutes, urgency, no incontinence, weak stream    1/24 urinary retention -start finasteride    7/24  Q-Clark 8.9,    7/24 IPSS 27    Still gets erections, moderately worried about this.    Nephrolithiasis x 2.  No  surgeries  Father with prostate cancer at 60    8/24 UA - 0-2 RBCs, no bacteria, 1.9, GFR 36    PVR    4/24   080  1/24    250    11/23 CT abdomen/pelvis without - 1 mm nonobstructing stone right kidney.  Negative otherwise  11/23 renal ultrasound - small left renal cyst.  No hydronephrosis    History of bradycardia  No anticoagulation  Non-smoker        PSA    11/23 0.6        Objective                   Assessment and Plan    Diagnoses and all orders for this visit:    1. Benign prostatic hyperplasia with urinary retention (Primary)      Patient is interested Aquablation.  Risks and benefits were discussed including bleeding, infection and damage to the urinary system.  We also discussed the risk of anesthesia up to and including death.  Patient voiced understanding and would like to proceed.    Discussed 1% risk of incontinence

## 2024-08-26 ENCOUNTER — PREP FOR SURGERY (OUTPATIENT)
Dept: OTHER | Facility: HOSPITAL | Age: 64
End: 2024-08-26
Payer: OTHER GOVERNMENT

## 2024-08-26 ENCOUNTER — PROCEDURE VISIT (OUTPATIENT)
Dept: UROLOGY | Facility: CLINIC | Age: 64
End: 2024-08-26
Payer: OTHER GOVERNMENT

## 2024-08-26 DIAGNOSIS — N40.1 BENIGN PROSTATIC HYPERPLASIA WITH LOWER URINARY TRACT SYMPTOMS, SYMPTOM DETAILS UNSPECIFIED: Primary | ICD-10-CM

## 2024-08-26 DIAGNOSIS — N40.1 BENIGN PROSTATIC HYPERPLASIA WITH URINARY RETENTION: Primary | ICD-10-CM

## 2024-08-26 DIAGNOSIS — R33.8 BENIGN PROSTATIC HYPERPLASIA WITH URINARY RETENTION: Primary | ICD-10-CM

## 2024-08-26 DIAGNOSIS — R33.9 URINARY RETENTION: ICD-10-CM

## 2024-08-26 PROBLEM — N40.0 BPH WITHOUT OBSTRUCTION/LOWER URINARY TRACT SYMPTOMS: Status: ACTIVE | Noted: 2024-08-26

## 2024-08-26 RX ORDER — SODIUM CHLORIDE 0.9 % (FLUSH) 0.9 %
3 SYRINGE (ML) INJECTION EVERY 12 HOURS SCHEDULED
OUTPATIENT
Start: 2024-08-26

## 2024-08-26 RX ORDER — SODIUM CHLORIDE 0.9 % (FLUSH) 0.9 %
10 SYRINGE (ML) INJECTION AS NEEDED
OUTPATIENT
Start: 2024-08-26

## 2024-08-26 RX ORDER — LEVOFLOXACIN 5 MG/ML
500 INJECTION, SOLUTION INTRAVENOUS ONCE
OUTPATIENT
Start: 2024-08-26 | End: 2024-08-26

## 2024-08-26 RX ORDER — SODIUM CHLORIDE 9 MG/ML
100 INJECTION, SOLUTION INTRAVENOUS CONTINUOUS
OUTPATIENT
Start: 2024-08-26

## 2024-08-26 RX ORDER — SODIUM CHLORIDE 9 MG/ML
40 INJECTION, SOLUTION INTRAVENOUS AS NEEDED
OUTPATIENT
Start: 2024-08-26

## 2024-08-27 ENCOUNTER — OFFICE VISIT (OUTPATIENT)
Dept: SURGERY | Facility: CLINIC | Age: 64
End: 2024-08-27
Payer: OTHER GOVERNMENT

## 2024-08-27 VITALS
SYSTOLIC BLOOD PRESSURE: 133 MMHG | WEIGHT: 197 LBS | DIASTOLIC BLOOD PRESSURE: 71 MMHG | HEIGHT: 71 IN | BODY MASS INDEX: 27.58 KG/M2 | HEART RATE: 57 BPM

## 2024-08-27 DIAGNOSIS — K29.30 CHRONIC SUPERFICIAL GASTRITIS WITHOUT BLEEDING: ICD-10-CM

## 2024-08-27 DIAGNOSIS — Z98.890 S/P COLONOSCOPY WITH POLYPECTOMY: ICD-10-CM

## 2024-08-27 DIAGNOSIS — Z98.890 S/P ENDOSCOPY: ICD-10-CM

## 2024-08-27 DIAGNOSIS — K25.3 ACUTE GASTRIC ULCER WITHOUT HEMORRHAGE OR PERFORATION: Primary | ICD-10-CM

## 2024-08-27 DIAGNOSIS — D12.6 TUBULAR ADENOMA OF COLON: ICD-10-CM

## 2024-08-27 DIAGNOSIS — K29.80 DUODENITIS: ICD-10-CM

## 2024-08-27 LAB
ALBUMIN MFR UR ELPH: 17.7 %
ALPHA1 GLOB MFR UR ELPH: 2.9 %
ALPHA2 GLOB MFR UR ELPH: 11.6 %
B-GLOBULIN MFR UR ELPH: 48.8 %
GAMMA GLOB MFR UR ELPH: 19.1 %
LABORATORY COMMENT REPORT: NORMAL
M PROTEIN MFR UR ELPH: NORMAL %
PROT UR-MCNC: <4 MG/DL

## 2024-08-27 PROCEDURE — 99213 OFFICE O/P EST LOW 20 MIN: CPT | Performed by: NURSE PRACTITIONER

## 2024-08-27 RX ORDER — PANTOPRAZOLE SODIUM 40 MG/1
40 TABLET, DELAYED RELEASE ORAL DAILY
Qty: 30 TABLET | Refills: 1 | Status: SHIPPED | OUTPATIENT
Start: 2024-08-27 | End: 2025-08-27

## 2024-08-27 RX ORDER — SUCRALFATE 1 G/1
1 TABLET ORAL 3 TIMES DAILY
Qty: 120 TABLET | Refills: 1 | Status: SHIPPED | OUTPATIENT
Start: 2024-08-27 | End: 2025-08-27

## 2024-08-27 NOTE — PROGRESS NOTES
Chief Complaint: Follow-up    Subjective      Follow-up visit       History of Present Illness  Stephon Castellano is a 63 y.o. male presents to [facility] for follow-up visit.    Patient presents today for follow-up visit after undergoing EGD and colonoscopy on 8/13/2024 performed by Dr. Jose Alejandro Fox.  Patient was with 1 nonbleeding superficial gastric ulcer with adherent clot found in the gastric antrum; mild chronic duodenitis; patient was with a mildly increased intraepithelial eosinophils of the midesophagus per EGD/pathology.    Patient was also with a sessile tubular adenoma of the hepatic flexure colonoscopy/pathology.  Resection and retrieval of this polyp were complete.    Results for orders placed or performed during the hospital encounter of 08/13/24   Tissue Pathology Exam    Specimen: A: Large Intestine, Hepatic Flexure; Polyp    B: Small Intestine, Duodenum; Tissue    C: Gastric, Antrum; Tissue    D: GE Junction; Tissue    E: Esophagus, Mid; Tissue   Result Value Ref Range    Case Report       Surgical Pathology Report                         Case: [id number]                                  Authorizing Provider:  Jose Alejandro Fox MD      Collected:           08/13/2024 09:56 AM          Ordering Location:     [facility] Received:            08/13/2024 11:06 AM                                 SUITES                                                                       Pathologist:           Stephy Vallejo MD                                                     Specimens:   1) - Large Intestine, Hepatic Flexure, HEPATIC FLEXURE POLYPS                                       2) - Small Intestine, Duodenum, DUODENUM BX                                                         3) - Gastric, Antrum, ANTRUM BX                                                                     4) - GE Junction, GE JUNCTION BX                                                   "                  5) - Esophagus, Mid, MID ESOPHAGUS BX                                                      Clinical Information       Iron deficiency anemia, unspecified iron deficiency anemia type  Gastroesophageal reflux disease with esophagitis without hemorrhage  History of colonic polyps  Fatigue  Weight loss      Final Diagnosis       1. Hepatic flexure colon polyps, biopsy:   - Fragments of tubular adenoma      2.  Duodenum, biopsy:   - Mild chronic duodenitis       3. Stomach, antrum, biopsy:   - Gastric antrum mucosa with no significant pathologic change   - Negative for Helicobacter pylori on routine H&E stain   - Negative for intestinal metaplasia, dysplasia and malignancy      4. Gastroesophageal junction, biopsy:   - Squamocolumnar mucosa with mild chronic inflammation   - Negative for intestinal metaplasia, dysplasia and malignancy      5. Mid esophagus, biopsy:   -Mildly increased intraepithelial eosinophils (up to 11 eosinophils per high power field)          Gross Description       1. Large Intestine, Hepatic Flexure.  Received in formalin and labeled \" hepatic flexure polyps\" are three fragments of tan soft tissue measuring 0.3-0.4 cm in greatest dimension. The specimen is entirely submitted in one cassette.    2. Small Intestine, Duodenum.  Received in formalin and labeled \" duodenum\" are two fragments of tan soft tissue measuring 0.3-0.4 cm in greatest dimension. The specimen is entirely submitted in one cassette.    3. Gastric, Antrum.  Received in formalin and labeled \" antrum\" is a 0.7 cm aggregate of tan soft tissue fragments. The specimen is entirely submitted in one cassette.    4. GE Junction.  Received in formalin and labeled \" GE junction\" is a 0.7 cm aggregate of tan soft tissue fragments. The specimen is entirely submitted in one cassette.    5. Esophagus, Mid.  Received in formalin and labeled \" \"midesophagus\" are two fragments of tan soft tissue measuring 0.3-0.4 cm in greatest " dimension. The specimen is entirely submitted in one cassette.   JULIETTE      Microscopic Description       Microscopic examination performed.       EGD was performed without difficulty.  Colonoscopy was technically difficult and complex due to significant looping.  Successful completion of the procedure was aided by applying abdominal pressure.  Patient tolerated the procedure well.    Patient denies any postoperative complications.  Objective     Past Medical History:   Diagnosis Date    Alcohol abuse     Allergic rhinitis     Anemia 02/06/2024    My hand started just shaking but got worse until now i have no strength in my left hand    Anxiety 2006    Starting taking mood depression drugs    Arthritis     Asthma About 6 months    Sore Throat and was sent to ENT    Atrial fibrillation 2023    Benign prostatic hyperplasia 2015    Dad had Prostate Cancer and just did Colonoscopy and they said i have a major large Prostate    Cervical neck pain with evidence of disc disease     Chronic pain disorder     Colon polyp 2020    Had a lot of polyps on the last 2 Colonoscopies    CTS (carpal tunnel syndrome) 2005    Depression     Difficulty walking 2033    Erectile dysfunction 2017    I have been on Testosterone replacements since then    Head injury 1977    Headache May 2023    Started abou 6 months or more    Hemorrhoid     HL (hearing loss) 2004    Have a major tinitis/ringing in my ears especially the left one    Hyperlipidemia 2017    My blood test have stated they were high or elevated    Hypertension 2017    Given Flomax said it will probably go down    Hypogonadism in male     Insomnia     Kidney stone 2020    Went to doctor and got medication for them and still have issues with right side    Low back pain 2010    Stinosis of back & neck    Memory loss 2023    Movement disorder 2023    Sleep apnea 2007    Substance abuse [id number]    On Disulfiram for alcohol abuse-voluntary    Visual impairment 2023    Just recently  "have been given eye drops       Past Surgical History:   Procedure Laterality Date    CERVICAL DISC SURGERY  2008    COLONOSCOPY N/A 08/18/2023    Procedure: COLONOSCOPY WITH POLYPECTOMY/SNARE;  Surgeon: Jose Alejandro Fox MD;  Location: [facility] ENDOSCOPY;  Service: General;  Laterality: N/A;  COLON POLYPS    COLONOSCOPY N/A 8/13/2024    Procedure: COLONOSCOPY WITH POLYPECTOMIES;  Surgeon: Jose Alejandro Fox MD;  Location: [facility] ENDOSCOPY;  Service: General;  Laterality: N/A;  COLON POLYPS    ENDOSCOPY N/A 8/13/2024    Procedure: ESOPHAGOGASTRODUODENOSCOPY WITH BIOPSIES;  Surgeon: Jose Alejandro Fox MD;  Location: [facility] ENDOSCOPY;  Service: General;  Laterality: N/A;  GASTRITIS. SUPERFICIAL GASTRIC ULCERS    HEEL SPUR SURGERY  2005    KNEE ACL RECONSTRUCTION  2004    OTHER SURGICAL HISTORY      METAL IMPLANT    PAIN PUMP INSERTION/REVISION  2010    SPINE SURGERY  2008    Antieror c5-7 discotomy    TONSILLECTOMY  1965    VASECTOMY  2002       Outpatient Medications Marked as Taking for the 8/27/24 encounter (Office Visit) with Libby Williamson APRN   Medication Sig Dispense Refill    BD Eclipse Syringe/Needle 23G X 1\" 3 ML misc USE TO INJECT TESTOSTERONE      carboxymethylcellulose (REFRESH PLUS) 0.5 % solution Administer 2 drops to both eyes 3 (Three) Times a Day As Needed for Dry Eyes. 30 mL 12    CINNAMON PO Take  by mouth.      FLUoxetine (PROzac) 20 MG capsule Take 1 capsule by mouth Daily. Start prozac 7/17 90 capsule 1    fluticasone (FLONASE) 50 MCG/ACT nasal spray 2 sprays into the nostril(s) as directed by provider Daily. 16 g 11    HYDROmorphone (DILAUDID) 1 MG/ML injection Infuse 0-2 mg/hr into a venous catheter.      IRON, FERROUS GLUCONATE, PO Take 65 mg by mouth Daily.      Lubricant Eye Drops PF 0.5 % solution       Lysine 1000 MG tablet Take  by mouth.      Magnesium 250 MG tablet Take  by mouth.      multivitamin with minerals tablet tablet Take 1 tablet by mouth Daily.      Omega 3-6-9 Fatty Acids " (OMEGA 3-6-9 COMPLEX PO) Take  by mouth.      pain patient supplied pump by Intrathecal route Continuous. Instructions are in drop boxes      QUEtiapine (SEROquel) 50 MG tablet Take 0.5 tablets by mouth.      simethicone (Gas-X) 80 MG chewable tablet Take 2 tablets PO after completing movi prep and 2 tablets PO 4 hours prior to procedure. 4 tablet 0    tamsulosin (FLOMAX) 0.4 MG capsule 24 hr capsule Take 2 capsules by mouth Daily for 360 days. (Patient taking differently: Take 2 capsules by mouth As Needed.) 180 capsule 3    Testosterone Cypionate (DEPOTESTOTERONE CYPIONATE) 200 MG/ML injection       Turmeric 500 MG capsule Take  by mouth.         No Known Allergies     Family History   Problem Relation Age of Onset    Suicide Attempts Mother     Bipolar disorder Mother     Anxiety disorder Mother     Heart disease Mother     Osteoporosis Mother     Alcohol abuse Mother         Mother committed suicide    Depression Mother         She had major depression    Early death Mother         She committed suicide    Thyroid disease Mother         She had Thyroid issues and had surgery    Cancer Father         Prostate    Prostate cancer Father     Hearing loss Father         He has worn a hearing aids since his late 50’s    Depression Sister     Alcohol abuse Sister     Diabetes Sister         Diabetes    Arthritis Sister     Sleep apnea Sister     Obesity Sister     Thyroid disease Sister     Insomnia Sister     Narcolepsy Sister     Diabetes Brother     Stroke Other         AUNT/UNCLE GRANDPARENTS    Diabetes Other         GRANDPARENTS        Social History     Socioeconomic History    Marital status:    Tobacco Use    Smoking status: Former     Current packs/day: 0.00     Average packs/day: 2.0 packs/day for 8.0 years (16.0 ttl pk-yrs)     Types: Cigarettes     Start date: 1975     Quit date: 1983     Years since quittin.6     Passive exposure: Past    Smokeless tobacco: Former     Types: Snuff     "Tobacco comments:     Also dipped for several years   Vaping Use    Vaping status: Never Used   Substance and Sexual Activity    Alcohol use: Not Currently     Alcohol/week: 14.0 standard drinks of alcohol     Types: 14 Shots of liquor per week     Comment: Stopped due to Disulfiram 250 mg    Drug use: Not Currently     Frequency: 7.0 times per week     Types: Marijuana    Sexual activity: Not Currently     Partners: Female     Birth control/protection: None, Vasectomy     Comment: Low testosterone       Review of Systems   Constitutional:  Negative for chills and fever.   HENT:  Negative for trouble swallowing.    Gastrointestinal:  Positive for abdominal pain, GERD and indigestion. Negative for abdominal distention, anal bleeding, blood in stool, constipation, diarrhea, nausea, rectal pain and vomiting.        Vital Signs:   /71 (BP Location: Right arm, Patient Position: Sitting, Cuff Size: Adult)   Pulse 57   Ht 180.3 cm (71\")   Wt 89.4 kg (197 lb)   BMI 27.48 kg/m²      Physical Exam  Vitals and nursing note reviewed.   Constitutional:       General: He is not in acute distress.     Appearance: Normal appearance. He is not ill-appearing.   HENT:      Head: Normocephalic and atraumatic.   Cardiovascular:      Rate and Rhythm: Normal rate.   Pulmonary:      Effort: Pulmonary effort is normal.      Breath sounds: No stridor.   Abdominal:      Palpations: Abdomen is soft.      Tenderness: There is no guarding.   Musculoskeletal:         General: No deformity. Normal range of motion.   Skin:     General: Skin is warm and dry.      Findings: No bruising.   Neurological:      General: No focal deficit present.      Mental Status: He is alert and oriented to person, place, and time.   Psychiatric:         Mood and Affect: Mood normal.         Thought Content: Thought content normal.          Result Review :          []  Laboratory  []  Radiology  []  Pathology  []  Microbiology  []  EKG/Telemetry   []  " Cardiology/Vascular   []  Old records  Today reviewed Dr. Fox's operative and pathology report.     Assessment and Plan    Diagnoses and all orders for this visit:    1. Acute gastric ulcer without hemorrhage or perforation (Primary)    2. Duodenitis    3. Chronic superficial gastritis without bleeding    4. S/P endoscopy    5. S/P colonoscopy with polypectomy    6. Tubular adenoma of colon    Other orders  -     pantoprazole (Protonix) 40 MG EC tablet; Take 1 tablet by mouth Daily.  Dispense: 30 tablet; Refill: 1  -     sucralfate (Carafate) 1 g tablet; Take 1 tablet by mouth 3 (Three) Times a Day. Before meals  Dispense: 120 tablet; Refill: 1        Follow Up   Return for Repeat EGD in 1 year; rescreen colon in 5; follow-up in the interim as needed.    Take PPI or H2 blockers as prescribed  Avoid ASA and other NSAIDs such as ibuprofen  Avoid spicy foods and caffeine  Avoid smoking and excessive alcohol intake  Reduce stress/start stress reduction techniques    Avoid high fat diets  Increase fiber intake    Per AGA guidelines patient will follow-up for colonoscopy surveillance as directed.    Patient was given instructions and counseling regarding his condition or for health maintenance advice. Please see specific information pulled into the AVS if appropriate.     Thank you for allowing me to participate in the care of this patient. Please call with questions or concerns.

## 2024-09-04 ENCOUNTER — TELEPHONE (OUTPATIENT)
Dept: UROLOGY | Facility: CLINIC | Age: 64
End: 2024-09-04
Payer: OTHER GOVERNMENT

## 2024-09-04 NOTE — TELEPHONE ENCOUNTER
Left voicemail reminding patient to do urine culture this week prior to procedure.    Okay for hub to relay message.

## 2024-09-05 ENCOUNTER — TELEPHONE (OUTPATIENT)
Dept: UROLOGY | Facility: CLINIC | Age: 64
End: 2024-09-05
Payer: OTHER GOVERNMENT

## 2024-09-05 ENCOUNTER — TELEPHONE (OUTPATIENT)
Dept: ONCOLOGY | Facility: HOSPITAL | Age: 64
End: 2024-09-05

## 2024-09-05 NOTE — TELEPHONE ENCOUNTER
PT NEEDS TO R/S SURGERY THAT IS ON FOR 9/12. HE RECENTLY RETIRED AND HAS A LAPSE IN HIS INSURANCE COVERAGE UNTIL 10/01. PLEASE GIVE HIM A CALL.

## 2024-09-05 NOTE — TELEPHONE ENCOUNTER
Caller: Stephon Castellano    Relationship to patient: Self    Best call back number: [phone]    Chief complaint: RESCHEDULE    Type of visit: FOLLOW UP    Requested date: AFTER 10-1     If rescheduling, when is the original appointment: 9-9     Additional notes:INSURANCE ISSUES, WILL NOT HAVE INSURANCE IN EFFECT UNTIL 10-1

## 2024-09-16 ENCOUNTER — TELEPHONE (OUTPATIENT)
Dept: SURGERY | Facility: CLINIC | Age: 64
End: 2024-09-16

## 2024-10-03 NOTE — PATIENT INSTRUCTIONS
1.  Please return to clinic at your next scheduled visit.  Contact the clinic ([phone]) at least 24 hours prior in the event you need to cancel.  2.  Do no harm to yourself or others.    3.  Avoid alcohol and drugs.    4.  Take all medications as prescribed.  Please contact the clinic with any concerns. If you are in need of medication refills, please call the clinic at [phone].    5. Should you want to get in touch with your provider, Dr. Penelope Davis, please utilize [employer] or contact the office ([phone]), and staff will be able to page Dr. Davis directly.  6.  In the event you have personal crisis, contact the following crisis numbers: Suicide Prevention Hotline 1-495.324.7517; GERMAINE Helpline 2-171-832-GERMAINE; [facility] Emergency Room [phone]; text HELLO to 005698; or 827.

## 2024-10-03 NOTE — PROGRESS NOTES
Subjective   Stephon Castellano is a 64 y.o. male who presents today for initial evaluation     Referring Provider:  No referring provider defined for this encounter.  [facility] endocrinologist    Chief Complaint:  depression    History of Present Illness:     2024: INITIAL VISIT Chart review:     Juan: Hydromorphone  Care Everywhere: a few non behavioral health notes, sees [facility]    Psychotropic medication chart review:  Present:  Trazodone 200 mg nightly  Cymbalta 60 mg a day    Previously:  Zoloft 50 mg a day    EKG: 2023: 57, sinus, QTc 417  Procedures: Sleep study ordered for the future  Head imaging: 2023 CT of the head shows no acute, MRI 2023 shows no acute, empty sella configuration  Labs: 2024: CMP shows creatinine 1.47, CK is elevated at 756, reassuring B12, hemoglobin is low at 11.5,  Initial Chart Review Notes: Seen by neurology in February for evaluation for seizures.  Patient notes that he is depressed and cannot sleep, depression began when he was in the [employer], his mother killed himself.  Depression began about 10 years ago.  Last year his problems became so severe that he quit his job.  He has not seen a psychiatrist for years.  Sleep medicine consult also ordered.      Chart Review By Dates:  10/04/2024: gen surg, hand surg, neur, ortho, unknown, urology,   2024: admitted for colonoscopy , gene surg, int med, nephro, onc, reassuring copper, zinc, hepatitis panel.  24: Sleep: settings recs, retaining per bladder scan.   07/10/24: urology, sleep med, gen surg, bladder scan 280 mL. Mirtazapine led to brain fog, fatigue, poor focus in am.  2024: Neurology, internal medicine.  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    Plannin2024: Improving, increase prozac for possible MDD.  2024: Stop wellbutrin, in 2 wks, hold trazodone and start seroquel for irritability, mood stability.  "Poor energy may be due to anemia. Mood improved, but irritable as well. Mom has hx of what he thinks was bipolar. Avoiding lithium 2/2 CKD.   07/10/2024: Mirtazapine led to brain fog, fatigue, poor focus in am. Stop it. [facility] for marriage issues (individual therapy). Pt is not on duloxetine. Pt needs energizing meds. Start wellbutrin for a week, then add prozac.  06/14/2024: No change. Discussed reflective listening with his wife. Stop abilify; start mirtazapine.  4/30/24: R/O ADHD. Start abilify, Therapy? Wgt loss, consider mirtazapine, seroquel. 6w.    VISITS/APPOINTMENTS (BELOW):    \"Stephon\"  Stage 4 CKD (nephro 8/2024)  Tried bupropion long ago (for depression 10 years ago)  Mom may have been bipolar, committed SA    10/04/2024:   In person interview:  \"I'm doing pretty good.\"  Low energy, motivation  It's been this way for some time.  Anemia, CKD, pain pump (dilaudid)  Mornings that are the worst  Wife: no blowouts or fights  MDD: depressed but it's not bothering me so much, mild  TRIP: mild, irritability  Panic attacks: stable  Energy: down chronically  Concentration: stable  Insomnia: initial, now stable on CPAP, 5-6 hours a night  Eating/Weight: 206, 196, 191, 188, 182 lbs (goal is 190)  Refills: y  Substances: def  Therapy: never set up  Medication compliant: y  SE: n  No SI [facility].      08/22/2024:   In person interview:  \"I think the medicine is working good.\"  I thought you wanted me to stop the prozac  Still no energy, no motivation  It's been this way for some time.  Anemia, CKD, pain pump (dilaudid)  Mornings that are the worst  Less irritated  Less fighting with wife, \"we aren't arguing like we used to.\"  MDD: stable, possible anhedonia  TRIP: irritability improved  Panic attacks: stable  Energy: down chronically  Concentration: \"seems to be ok\"  Insomnia: initial, now stable on CPAP  Eating/Weight: 196, 191, 188, 182 lbs (goal is 190)  Refills: y  Substances: def  Therapy: never set up  Medication " "compliant: y  SE: n  No [facility].      07/25/2024: In person interview:  \"It made me really tired.\"  I'm wearing a brace on my right hand for CTS, driving a lot.  Wife broke her ankle in 3 places.  I'm more angry  Fatigue, but also \"doing everything\" for his wife  Discussed his marriage.  Fighting, baseline  MDD: now improved to stable, \"I haven't cried in a long time\"  ADHD:   Elementary school:   Grades? poor  Special classes or failures? Reading failed, failed 2nd grade  Got in trouble? no  Referral for ADHD testing? no  FHx: denies  Presently:  Problems with attention to detail, problems with sustained attention, problems listening when spoken to directly, failure to finish tasks, avoids tasks that require sustained mental effort, easily distracted, forgetting things, losing things, hard to organize, talks a lot and cutting people off, drifts off during conversations  TRIP: irritable, worse  Panic attacks: n  Energy: down  Concentration: not at goal intermittently  Insomnia: initial, now stable  Started CPAP, sleeping better  Eating/Weight: 191, 188, 182 lbs (goal is 190)  Refills: y  Substances: def  Therapy: forgot to call them back  Medication compliant: y  SE: n  No AM[facility].      7/10/24: In person interview:  \"It made me really tired.\"  Exacerbated the fatigue.  I have no energy, I can't go out there to cut the grass.  No medication has ever helped me for depression. \"I've been depressed for the last 10 years.\"  Discussed his marriage.  Tried talking to her  Tried asking her to go   MDD: no change  TRIP: irritable still, no change  Panic attacks: n  Energy: down  Concentration: down  Insomnia: initial, persistent  Eating/Weight: 188, 182 lbs  Refills: y  Substances: def  Therapy: n  Medication compliant: y  SE: n  No AMAN[facility]EP [facility].      06/14/2024: In person interview:  \"I can't tell any difference.\"  I still can't eat, you basically have to be a vegetarian for kidney disease.  Weight loss related not to poor " "appetite, but to having to maintain a renal diet.  Discussed his marriage.  MDD: no change  TRIP: irritable still, no change  Panic attacks: n  Energy: down  Concentration: down  Insomnia: initial, persistent  Eating/Weight: 182 lbs  Refills: y  Substances: def  Therapy: n  Medication compliant: y  SE: n  No SI [facility].      04/30/2024: In person.  Interview:  His/Her Story: \"[facility]  G14, P16  I've always had a little bit of depression.  But when I got injured, it got worse. \"I couldn't do things like I used to.\"  It's a combination of injuries, surgeries  Pain pump helps  Trazodone for 5 years  Cymbalta 5 years  Has never tried combinations, but has tried \"the gamut\" of them  Has lost 50 lbs in 3 mos, unsure why  Trouble focusing recently  Has chronic balance issues, no one knows why (cards, two neurologists, worked him up) x 3-4 mos  Depression/Mood:  Depressed mood, anhedonia, poor energy, poor concentration, weight loss, insomnia.  Seasonal pattern: def  Severity: Moderate  Duration: all his life  Anxiety:  Uncontrolled worrying, muscle tension, fatigue, poor concentration, feeling on edge, irritability, insomnia.  Severity: Moderate  Duration: all of his life  Panic attacks: n  Psych ROS: Positive for depression, anxiety.  Negative for psychosis and rebecca.  ADHD: def  PTSD: no life threatening trauma  No SI [facility].  Medication compliant:      Access to Firearms: yes, locked away    PHQ-9 Depression Screening  PHQ-9 Total Score:      Little interest or pleasure in doing things?     Feeling down, depressed, or hopeless?     Trouble falling or staying asleep, or sleeping too much?     Feeling tired or having little energy?     Poor appetite or overeating?     Feeling bad about yourself - or that you are a failure or have let yourself or your family down?     Trouble concentrating on things, such as reading the newspaper or watching television?     Moving or speaking so slowly that other people " could have noticed? Or the opposite - being so fidgety or restless that you have been moving around a lot more than usual?     Thoughts that you would be better off dead, or of hurting yourself in some way?     PHQ-9 Total Score       TRIP-7       Past Surgical History:  Past Surgical History:   Procedure Laterality Date    CERVICAL DISC SURGERY  2008    COLONOSCOPY N/A 08/18/2023    Procedure: COLONOSCOPY WITH POLYPECTOMY/SNARE;  Surgeon: Jose Alejandro Fox MD;  Location: [facility] ENDOSCOPY;  Service: General;  Laterality: N/A;  COLON POLYPS    COLONOSCOPY N/A 8/13/2024    Procedure: COLONOSCOPY WITH POLYPECTOMIES;  Surgeon: Jose Alejandro Fox MD;  Location: [facility] ENDOSCOPY;  Service: General;  Laterality: N/A;  COLON POLYPS    ENDOSCOPY N/A 8/13/2024    Procedure: ESOPHAGOGASTRODUODENOSCOPY WITH BIOPSIES;  Surgeon: Jose Alejandro Fox MD;  Location: [facility] ENDOSCOPY;  Service: General;  Laterality: N/A;  GASTRITIS. SUPERFICIAL GASTRIC ULCERS    HEEL SPUR SURGERY  2005    KNEE ACL RECONSTRUCTION  2004    OTHER SURGICAL HISTORY      METAL IMPLANT    PAIN PUMP INSERTION/REVISION  2010    SPINE SURGERY  2008    Antieror c5-7 discotomy    TONSILLECTOMY  1965    VASECTOMY  2002       Problem List:  Patient Active Problem List   Diagnosis    Chronic right-sided thoracic back pain    Kidney stone    Screening due    Allergic rhinitis    Arthritis    Cervical neck pain with evidence of disc disease    Depression    Head injury    Hemorrhoid    Hypogonadism in male    Right wrist pain    Weight loss    Abdominal pain    Seizure-like activity    Palpitations    Abnormal EKG    Bradycardia    Brain fog    Fatigue    Acute kidney injury    Tremor    Slow transit constipation    Weakness of both lower extremities    Left hand weakness    Drug induced myoclonus    Insomnia due to other mental disorder    Obstructive sleep apnea syndrome    Cervical radiculopathy    Iron deficiency anemia    Gastroesophageal reflux disease with  "esophagitis without hemorrhage    History of colonic polyps    Benign prostatic hyperplasia with urinary retention    BPH without obstruction/lower urinary tract symptoms    Benign prostatic hyperplasia with lower urinary tract symptoms       Allergy:   No Known Allergies     Discontinued Medications:  Medications Discontinued During This Encounter   Medication Reason    QUEtiapine (SEROquel) 50 MG tablet Reorder               Current Medications:   Current Outpatient Medications   Medication Sig Dispense Refill    Alpha-Lipoic Acid 600 MG tablet Take 1 tablet by mouth Daily.      BD Eclipse Syringe/Needle 23G X 1\" 3 ML misc USE TO INJECT TESTOSTERONE      carboxymethylcellulose (REFRESH PLUS) 0.5 % solution Administer 2 drops to both eyes 3 (Three) Times a Day As Needed for Dry Eyes. 30 mL 12    CINNAMON PO Take  by mouth.      FLUoxetine (PROzac) 20 MG capsule Take 1 capsule by mouth Daily. Start prozac 7/17 90 capsule 1    fluticasone (FLONASE) 50 MCG/ACT nasal spray 2 sprays into the nostril(s) as directed by provider Daily. 16 g 11    HYDROmorphone (DILAUDID) 1 MG/ML injection Infuse 0-2 mg/hr into a venous catheter.      IRON, FERROUS GLUCONATE, PO Take 65 mg by mouth Daily.      Lubricant Eye Drops PF 0.5 % solution       Lysine 1000 MG tablet Take  by mouth.      Magnesium 250 MG tablet Take  by mouth.      multivitamin with minerals tablet tablet Take 1 tablet by mouth Daily.      Omega 3-6-9 Fatty Acids (OMEGA 3-6-9 COMPLEX PO) Take  by mouth.      pain patient supplied pump by Intrathecal route Continuous. Instructions are in drop boxes      pantoprazole (Protonix) 40 MG EC tablet Take 1 tablet by mouth Daily. 30 tablet 1    QUEtiapine (SEROquel) 25 MG tablet Take 1 tablet by mouth Every Night. 90 tablet 3    simethicone (Gas-X) 80 MG chewable tablet Take 2 tablets PO after completing movi prep and 2 tablets PO 4 hours prior to procedure. 4 tablet 0    sucralfate (Carafate) 1 g tablet Take 1 tablet by " mouth 3 (Three) Times a Day. Before meals 120 tablet 1    tamsulosin (FLOMAX) 0.4 MG capsule 24 hr capsule Take 2 capsules by mouth Daily for 360 days. (Patient taking differently: Take 2 capsules by mouth As Needed.) 180 capsule 3    Testosterone Cypionate (DEPOTESTOTERONE CYPIONATE) 200 MG/ML injection       Turmeric 500 MG capsule Take  by mouth.      FLUoxetine (PROzac) 10 MG capsule Take 1 capsule by mouth Daily. 90 capsule 1    traZODone (DESYREL) 100 MG tablet Take 1 tablet by mouth Every Night. (Patient not taking: Reported on 8/7/2024)       No current facility-administered medications for this visit.       Past Medical History:  Past Medical History:   Diagnosis Date    Alcohol abuse     Allergic rhinitis     Anemia 02/06/2024    My hand started just shaking but got worse until now i have no strength in my left hand    Anxiety 2006    Starting taking mood depression drugs    Arthritis     Asthma About 6 months    Sore Throat and was sent to ENT    Atrial fibrillation 2023    Benign prostatic hyperplasia 2015    Dad had Prostate Cancer and just did Colonoscopy and they said i have a major large Prostate    Cervical neck pain with evidence of disc disease     Chronic pain disorder     Colon polyp 2020    Had a lot of polyps on the last 2 Colonoscopies    CTS (carpal tunnel syndrome) 2005    Depression     Difficulty walking 2033    Erectile dysfunction 2017    I have been on Testosterone replacements since then    Head injury 1977    Headache May 2023    Started abou 6 months or more    Hemorrhoid     HL (hearing loss) 2004    Have a major tinitis/ringing in my ears especially the left one    Hyperlipidemia 2017    My blood test have stated they were high or elevated    Hypertension 2017    Given Flomax said it will probably go down    Hypogonadism in male     Insomnia     Kidney stone 2020    Went to doctor and got medication for them and still have issues with right side    Low back pain 2010    Stinosis  "of back & neck    Memory loss     Movement disorder     Sleep apnea 2007    Substance abuse [id number]    On Disulfiram for alcohol abuse-voluntary    Visual impairment     Just recently have been given eye drops       Past Psychiatric History:  Began Treatment: a year   Diagnoses: TRIP, insomnia, MDD  Psychiatrist: a year   Therapist: a year   Admission History:Denies  Medication Trials:    multiple    Self Harm: Denies  Suicide Attempts:Denies      Substance Abuse History:   Types: end of  stopped drinking using antabuse, former smoker 2ppd  Withdrawal Symptoms:Denies  Longest Period Sober: 6 mos, recently  AA: Not applicable     Social History:  Martial Status:  Employed: retired 22  Kids:Yes  House:Lives in a house   History: [employer], retired    Social History     Socioeconomic History    Marital status:    Tobacco Use    Smoking status: Former     Current packs/day: 0.00     Average packs/day: 2.0 packs/day for 8.0 years (16.0 ttl pk-yrs)     Types: Cigarettes     Start date: 1975     Quit date: 1983     Years since quittin.7     Passive exposure: Past    Smokeless tobacco: Former     Types: Snuff    Tobacco comments:     Also dipped for several years   Vaping Use    Vaping status: Never Used   Substance and Sexual Activity    Alcohol use: Not Currently     Alcohol/week: 14.0 standard drinks of alcohol     Types: 14 Shots of liquor per week     Comment: Stopped due to Disulfiram 250 mg    Drug use: Not Currently     Frequency: 7.0 times per week     Types: Marijuana    Sexual activity: Not Currently     Partners: Female     Birth control/protection: None, Vasectomy     Comment: Low testosterone       Family History:   Suicide Attempts:  Mom  Suicide Completions: mom  \"I think she had bipolar but I'm not sure\"      Family History   Problem Relation Age of Onset    Suicide Attempts Mother     Bipolar disorder Mother     Anxiety disorder Mother     " "Heart disease Mother     Osteoporosis Mother     Alcohol abuse Mother         Mother committed suicide    Depression Mother         She had major depression    Early death Mother         She committed suicide    Thyroid disease Mother         She had Thyroid issues and had surgery    Cancer Father         Prostate    Prostate cancer Father     Hearing loss Father         He has worn a hearing aids since his late 50’s    Depression Sister     Alcohol abuse Sister     Diabetes Sister         Diabetes    Arthritis Sister     Sleep apnea Sister     Obesity Sister     Thyroid disease Sister     Insomnia Sister     Narcolepsy Sister     Diabetes Brother     Stroke Other         AUNT/UNCLE GRANDPARENTS    Diabetes Other         GRANDPARENTS        Developmental History:       Childhood: saw mom and dad fight a lot, they , brother  when he was 5 yo.   High School:Completed  College: [employer]    Mental Status Exam  Appearance  : groomed, good eye contact, normal street clothes  Behavior  : pleasant and cooperative  Motor  : No abnormal  Speech  : talkative, normal rhythm, rate, volume, tone, not hyperverbal, not pressured, normal prosidy  Mood  : \"I'm doing pretty good\"  Affect  : euthymic, mood congruent, good variability  Thought Content  : negative suicidal ideations, negative homicidal ideations, negative obsessions  Perceptions  : negative auditory hallucinations, negative visual hallucinations  Thought Process  : linear  Insight/Judgement  : Fair/fair  Cognition  : grossly intact  Attention   : intact      Review of Systems:  Review of Systems   Constitutional:  Positive for fatigue. Negative for diaphoresis.   HENT:  Negative for drooling.    Eyes:  Negative for visual disturbance.   Respiratory:  Negative for cough, chest tightness and shortness of breath.    Cardiovascular:  Negative for chest pain, palpitations and leg swelling.   Gastrointestinal:  Negative for abdominal pain, constipation, diarrhea, nausea " "and vomiting.   Endocrine: Negative for cold intolerance and heat intolerance.   Genitourinary:  Negative for difficulty urinating.   Musculoskeletal:  Negative for joint swelling.   Allergic/Immunologic: Negative for immunocompromised state.   Neurological:  Positive for dizziness and light-headedness. Negative for seizures, speech difficulty, numbness and headaches.   Psychiatric/Behavioral:  Positive for agitation and sleep disturbance.        Physical Exam:  Physical Exam    Vital Signs:   /69   Pulse 81   Ht 180.3 cm (71\")   Wt 93.4 kg (206 lb)   BMI 28.73 kg/m²      Lab Results:   Admission on 08/13/2024, Discharged on 08/13/2024   Component Date Value Ref Range Status    Case Report 08/13/2024    Final                    Value:Surgical Pathology Report                         Case: [id number]                                  Authorizing Provider:  Jose Alejandro Fox MD      Collected:           08/13/2024 09:56 AM          Ordering Location:     [facility] Received:            08/13/2024 11:06 AM                                 SUITES                                                                       Pathologist:           Stephy Vallejo MD                                                     Specimens:   1) - Large Intestine, Hepatic Flexure, HEPATIC FLEXURE POLYPS                                       2) - Small Intestine, Duodenum, DUODENUM BX                                                         3) - Gastric, Antrum, ANTRUM BX                                                                     4) - GE Junction, GE JUNCTION BX                                                                    5) - Esophagus, Mid, MID ESOPHAGUS BX                                                      Clinical Information 08/13/2024    Final                    Value:This result contains rich text formatting which cannot be displayed here.    Final Diagnosis 08/13/2024    Final              "       Value:This result contains rich text formatting which cannot be displayed here.    Gross Description 08/13/2024    Final                    Value:This result contains rich text formatting which cannot be displayed here.    Microscopic Description 08/13/2024    Final                    Value:This result contains rich text formatting which cannot be displayed here.   Consult on 08/07/2024   Component Date Value Ref Range Status    Folate 08/07/2024 14.00  4.78 - 24.20 ng/mL Final    Vitamin B-12 08/07/2024 695  211 - 946 pg/mL Final    Glucose 08/07/2024 92  65 - 99 mg/dL Final    BUN 08/07/2024 34 (H)  8 - 23 mg/dL Final    Creatinine 08/07/2024 1.99 (H)  0.76 - 1.27 mg/dL Final    Sodium 08/07/2024 140  136 - 145 mmol/L Final    Potassium 08/07/2024 4.4  3.5 - 5.2 mmol/L Final    Chloride 08/07/2024 102  98 - 107 mmol/L Final    CO2 08/07/2024 32.3 (H)  22.0 - 29.0 mmol/L Final    Calcium 08/07/2024 9.4  8.6 - 10.5 mg/dL Final    Total Protein 08/07/2024 6.7  6.0 - 8.5 g/dL Final    Albumin 08/07/2024 4.4  3.5 - 5.2 g/dL Final    ALT (SGPT) 08/07/2024 33  1 - 41 U/L Final    AST (SGOT) 08/07/2024 24  1 - 40 U/L Final    Alkaline Phosphatase 08/07/2024 80  39 - 117 U/L Final    Total Bilirubin 08/07/2024 0.2  0.0 - 1.2 mg/dL Final    Globulin 08/07/2024 2.3  gm/dL Final    A/G Ratio 08/07/2024 1.9  g/dL Final    BUN/Creatinine Ratio 08/07/2024 17.1  7.0 - 25.0 Final    Anion Gap 08/07/2024 5.7  5.0 - 15.0 mmol/L Final    eGFR 08/07/2024 36.8 (L)  >60.0 mL/min/1.73 Final    Occult Blood, Fecal by Immunoassay 08/08/2024 Positive (A)  Negative Final    Reticulocyte % 08/07/2024 1.09  0.70 - 1.90 % Final    Reticulocyte Absolute 08/07/2024 0.0344  0.0200 - 0.1300 10*6/mm3 Final    LDH 08/07/2024 173  135 - 225 U/L Final    Haptoglobin 08/07/2024 110  30 - 200 mg/dL Final    Iron 08/07/2024 101  59 - 158 mcg/dL Final    Iron Saturation (TSAT) 08/07/2024 28  20 - 50 % Final    Transferrin 08/07/2024 243  200 - 360  mg/dL Final    TIBC 08/07/2024 362  298 - 536 mcg/dL Final    Ferritin 08/07/2024 483.70 (H)  30.00 - 400.00 ng/mL Final    Pathology Review 08/07/2024 Yes   Final    Sed Rate 08/07/2024 1  0 - 20 mm/hr Final    Hepatitis B Surface Ag 08/07/2024 Non-Reactive  Non-Reactive Final    Hep A IgM 08/07/2024 Non-Reactive  Non-Reactive Final    Hep B C IgM 08/07/2024 Non-Reactive  Non-Reactive Final    Hepatitis C Ab 08/07/2024 Non-Reactive  Non-Reactive Final    Zinc 08/07/2024 75  44 - 115 ug/dL Final                                    Detection Limit = 5    Copper 08/07/2024 76  69 - 132 ug/dL Final                                    Detection Limit = 5    Erythropoietin 08/07/2024 10.2  2.6 - 18.5 mIU/mL Final    [employer] DxI 800 Immunoassay System  Values obtained with different assay methods or kits cannot be used  interchangeably. Results cannot be interpreted as absolute evidence  of the presence or absence of malignant disease.    Free Light Chain, [place] 08/07/2024 48.1 (H)  3.3 - 19.4 mg/L Final    Free Lambda Light Chains 08/07/2024 24.1  5.7 - 26.3 mg/L Final    Kappa/Lambda Ratio 08/07/2024 2.00 (H)  0.26 - 1.65 Final    IgG 08/07/2024 1043  603 - 1613 mg/dL Final    IgA 08/07/2024 174  61 - 437 mg/dL Final    IgM 08/07/2024 82  20 - 172 mg/dL Final    Total Protein 08/07/2024 6.7  6.0 - 8.5 g/dL Final    Albumin 08/07/2024 4.0  2.9 - 4.4 g/dL Final    Alpha-1-Globulin 08/07/2024 0.2  0.0 - 0.4 g/dL Final    Alpha-2-Globulin 08/07/2024 0.6  0.4 - 1.0 g/dL Final    Beta Globulin 08/07/2024 0.9  0.7 - 1.3 g/dL Final    Gamma Globulin 08/07/2024 1.0  0.4 - 1.8 g/dL Final    M-Walter 08/07/2024 Not Observed  Not Observed g/dL Final    Globulin 08/07/2024 2.7  2.2 - 3.9 g/dL Final    A/G Ratio 08/07/2024 1.5  0.7 - 1.7 Final    Immunofixation Reflex, Serum 08/07/2024 Comment   Final    No monoclonality detected.    Please note 08/07/2024 Comment   Final    Protein electrophoresis scan will follow via  computer, mail, or   delivery.    Color, UA 08/07/2024 Yellow  Yellow, Straw Final    Appearance, UA 08/07/2024 Clear  Clear Final    pH, UA 08/07/2024 5.5  5.0 - 8.0 Final    Specific Gravity, UA 08/07/2024 1.009  1.005 - 1.030 Final    Glucose, UA 08/07/2024 Negative  Negative Final    Ketones, UA 08/07/2024 Negative  Negative Final    Bilirubin, UA 08/07/2024 Negative  Negative Final    Blood, UA 08/07/2024 Negative  Negative Final    Protein, UA 08/07/2024 Negative  Negative Final    Leuk Esterase, UA 08/07/2024 Negative  Negative Final    Nitrite, UA 08/07/2024 Negative  Negative Final    Urobilinogen, UA 08/07/2024 0.2 E.U./dL  0.2 - 1.0 E.U./dL Final    RBC, UA 08/07/2024 0-2  None Seen, 0-2 /HPF Final    WBC, UA 08/07/2024 0-2  None Seen, 0-2 /HPF Final    Bacteria, UA 08/07/2024 None Seen  None Seen /HPF Final    Squamous Epithelial Cells, UA 08/07/2024 0-2  None Seen, 0-2 /HPF Final    Hyaline Casts, UA 08/07/2024 None Seen  None Seen /LPF Final    Methodology 08/07/2024 Automated Microscopy   Final    WBC 08/07/2024 3.84  3.40 - 10.80 10*3/mm3 Final    RBC 08/07/2024 3.13 (L)  4.14 - 5.80 10*6/mm3 Final    Hemoglobin 08/07/2024 9.8 (L)  13.0 - 17.7 g/dL Final    Hematocrit 08/07/2024 31.0 (L)  37.5 - 51.0 % Final    MCV 08/07/2024 99.0 (H)  79.0 - 97.0 fL Final    MCH 08/07/2024 31.3  26.6 - 33.0 pg Final    MCHC 08/07/2024 31.6  31.5 - 35.7 g/dL Final    RDW 08/07/2024 13.4  12.3 - 15.4 % Final    RDW-SD 08/07/2024 49.2  37.0 - 54.0 fl Final    MPV 08/07/2024 9.8  6.0 - 12.0 fL Final    Platelets 08/07/2024 140  140 - 450 10*3/mm3 Final    Neutrophil % 08/07/2024 49.0  42.7 - 76.0 % Final    Lymphocyte % 08/07/2024 33.9  19.6 - 45.3 % Final    Monocyte % 08/07/2024 11.7  5.0 - 12.0 % Final    Eosinophil % 08/07/2024 4.9  0.3 - 6.2 % Final    Basophil % 08/07/2024 0.5  0.0 - 1.5 % Final    Immature Grans % 08/07/2024 0.0  0.0 - 0.5 % Final    Neutrophils, Absolute 08/07/2024 1.88  1.70 - 7.00  10*3/mm3 Final    Lymphocytes, Absolute 08/07/2024 1.30  0.70 - 3.10 10*3/mm3 Final    Monocytes, Absolute 08/07/2024 0.45  0.10 - 0.90 10*3/mm3 Final    Eosinophils, Absolute 08/07/2024 0.19  0.00 - 0.40 10*3/mm3 Final    Basophils, Absolute 08/07/2024 0.02  0.00 - 0.20 10*3/mm3 Final    Immature Grans, Absolute 08/07/2024 0.00  0.00 - 0.05 10*3/mm3 Final    HIV-1/ HIV-2 Ab 08/07/2024 Non-Reactive  Non-Reactive Final    HIV-1 P24 Ag Screen 08/07/2024 Non-Reactive  Non-Reactive Final    Neutrophil % 08/07/2024 46.0  42.7 - 76.0 % Final    Lymphocyte % 08/07/2024 26.0  19.6 - 45.3 % Final    Monocyte % 08/07/2024 10.0  5.0 - 12.0 % Final    Eosinophil % 08/07/2024 6.0  0.3 - 6.2 % Final    Bands %  08/07/2024 6.0 (H)  0.0 - 5.0 % Final    Metamyelocyte % 08/07/2024 4.0 (H)  0.0 - 0.0 % Final    Atypical Lymphocyte % 08/07/2024 2.0  0.0 - 5.0 % Final    Neutrophils Absolute 08/07/2024 2.00  1.70 - 7.00 10*3/mm3 Final    Lymphocytes Absolute 08/07/2024 1.08  0.70 - 3.10 10*3/mm3 Final    Monocytes Absolute 08/07/2024 0.38  0.10 - 0.90 10*3/mm3 Final    Eosinophils Absolute 08/07/2024 0.23  0.00 - 0.40 10*3/mm3 Final    Smudge Cells 08/07/2024 2.0  /100 WBC Final    Anisocytosis 08/07/2024 Slight/1+  None Seen Final    Macrocytes 08/07/2024 Slight/1+  None Seen Final    WBC Morphology 08/07/2024 Normal  Normal Final    Platelet Estimate 08/07/2024 Decreased  Normal Final    Final Diagnosis 08/07/2024    Final                    Value:This result contains rich text formatting which cannot be displayed here.    Clinical Information 08/07/2024    Final                    Value:This result contains rich text formatting which cannot be displayed here.    Gross Description 08/07/2024    Final                    Value:This result contains rich text formatting which cannot be displayed here.    Microscopic Description 08/07/2024    Final                    Value:This result contains rich text formatting which cannot be  displayed here.    Case Report 08/07/2024    Final                    Value:Surgical Pathology Report                         Case: [id number]                                  Authorizing Provider:  Tommy Doshi PA-C        Collected:           08/07/2024 09:51 AM          Ordering Location:     [facility]     Received:            08/08/2024 08:55 AM                                 GROUP HEMATOLOGY &                                                                                  ONCOLOGY                                                                     Pathologist:           Amarilis Jamison DO                                                       Specimen:    Arm, Right                                                                                 Consult Global Result 08/07/2024 Comment^Text^TXT   Final    Peripheral Blood:  1) Mild relative increase in CD8+/CD57+ T cells   immunophenotypically compatible with T-cell large granular   lymphocytes/T-LGLs (5.1% of leukocytes). See comment.  2) No other significant immunophenotypic abnormalities   detected.  DISCLAIMER: REFER TO HARDCOPY OR PDF FOR COMPLETE RESULT.   If synopsis provided, clinical decisions should not be   based on this interfaced synopsis alone.  Performed at:  1 - [facility] [employer].  [address] [place], [place] [address] [id number]  :  Clary Pickett M.D., Ph.D., Phone:  [phone]    Total Protein, Urine 08/23/2024 <4.0  Not Estab. mg/dL Final    **Verified by repeat analysis**    Albumin, U 08/23/2024 17.7  % Final    Alpha-1-Globulin, U 08/23/2024 2.9  % Final    Alpha-2-Globulin, U 08/23/2024 11.6  % Final    Beta Globulin, U 08/23/2024 48.8  % Final    Gamma Globulin, Urine 08/23/2024 19.1  % Final    M-Walter 08/23/2024 Not Observed  Not Observed % Final    Please note 08/23/2024 Comment   Final    Protein electrophoresis scan will follow via computer, mail, or   delivery.    Office Visit on 07/26/2024   Component Date Value Ref Range Status    Glucose 07/26/2024 91  65 - 99 mg/dL Final    BUN 07/26/2024 30 (H)  8 - 23 mg/dL Final    Creatinine 07/26/2024 2.25 (H)  0.76 - 1.27 mg/dL Final    Sodium 07/26/2024 137  136 - 145 mmol/L Final    Potassium 07/26/2024 4.3  3.5 - 5.2 mmol/L Final    Chloride 07/26/2024 98  98 - 107 mmol/L Final    CO2 07/26/2024 29.0  22.0 - 29.0 mmol/L Final    Calcium 07/26/2024 9.5  8.6 - 10.5 mg/dL Final    Total Protein 07/26/2024 6.7  6.0 - 8.5 g/dL Final    Albumin 07/26/2024 4.4  3.5 - 5.2 g/dL Final    ALT (SGPT) 07/26/2024 25  1 - 41 U/L Final    AST (SGOT) 07/26/2024 23  1 - 40 U/L Final    Alkaline Phosphatase 07/26/2024 69  39 - 117 U/L Final    Total Bilirubin 07/26/2024 0.3  0.0 - 1.2 mg/dL Final    Globulin 07/26/2024 2.3  gm/dL Final    A/G Ratio 07/26/2024 1.9  g/dL Final    BUN/Creatinine Ratio 07/26/2024 13.3  7.0 - 25.0 Final    Anion Gap 07/26/2024 10.0  5.0 - 15.0 mmol/L Final    eGFR 07/26/2024 31.8 (L)  >60.0 mL/min/1.73 Final    TSH 07/26/2024 1.450  0.270 - 4.200 uIU/mL Final    Total Cholesterol 07/26/2024 178  0 - 200 mg/dL Final    Triglycerides 07/26/2024 71  0 - 150 mg/dL Final    HDL Cholesterol 07/26/2024 54  40 - 60 mg/dL Final    LDL Cholesterol  07/26/2024 111 (H)  0 - 100 mg/dL Final    VLDL Cholesterol 07/26/2024 13  5 - 40 mg/dL Final    LDL/HDL Ratio 07/26/2024 2.03   Final    Iron 07/26/2024 86  59 - 158 mcg/dL Final    Iron Saturation (TSAT) 07/26/2024 23  20 - 50 % Final    Transferrin 07/26/2024 248  200 - 360 mg/dL Final    TIBC 07/26/2024 370  298 - 536 mcg/dL Final    Folate 07/26/2024 16.10  4.78 - 24.20 ng/mL Final    Vitamin B-12 07/26/2024 676  211 - 946 pg/mL Final    25 Hydroxy, Vitamin D 07/26/2024 51.1  30.0 - 100.0 ng/ml Final    Hemoglobin A1C 07/26/2024 5.40  4.80 - 5.60 % Final    WBC 07/26/2024 3.99  3.40 - 10.80 10*3/mm3 Final    RBC 07/26/2024 2.91 (L)  4.14 - 5.80 10*6/mm3 Final    Hemoglobin  07/26/2024 8.8 (L)  13.0 - 17.7 g/dL Final    Hematocrit 07/26/2024 27.0 (L)  37.5 - 51.0 % Final    MCV 07/26/2024 92.8  79.0 - 97.0 fL Final    MCH 07/26/2024 30.2  26.6 - 33.0 pg Final    MCHC 07/26/2024 32.6  31.5 - 35.7 g/dL Final    RDW 07/26/2024 12.4  12.3 - 15.4 % Final    RDW-SD 07/26/2024 42.4  37.0 - 54.0 fl Final    MPV 07/26/2024 10.1  6.0 - 12.0 fL Final    Platelets 07/26/2024 131 (L)  140 - 450 10*3/mm3 Final    Neutrophil % 07/26/2024 41.0 (L)  42.7 - 76.0 % Final    Lymphocyte % 07/26/2024 40.9  19.6 - 45.3 % Final    Monocyte % 07/26/2024 11.0  5.0 - 12.0 % Final    Eosinophil % 07/26/2024 6.0  0.3 - 6.2 % Final    Basophil % 07/26/2024 0.8  0.0 - 1.5 % Final    Immature Grans % 07/26/2024 0.3  0.0 - 0.5 % Final    Neutrophils, Absolute 07/26/2024 1.64 (L)  1.70 - 7.00 10*3/mm3 Final    Lymphocytes, Absolute 07/26/2024 1.63  0.70 - 3.10 10*3/mm3 Final    Monocytes, Absolute 07/26/2024 0.44  0.10 - 0.90 10*3/mm3 Final    Eosinophils, Absolute 07/26/2024 0.24  0.00 - 0.40 10*3/mm3 Final    Basophils, Absolute 07/26/2024 0.03  0.00 - 0.20 10*3/mm3 Final    Immature Grans, Absolute 07/26/2024 0.01  0.00 - 0.05 10*3/mm3 Final    nRBC 07/26/2024 0.0  0.0 - 0.2 /100 WBC Final   Office Visit on 07/10/2024   Component Date Value Ref Range Status    Urine Volume 07/10/2024 280   Final   Office Visit on 04/10/2024   Component Date Value Ref Range Status    Urine Volume 04/10/2024 80   Final       EKG Results:  No orders to display       Imaging Results:  CT Chest Without Contrast Diagnostic    Result Date: 3/19/2024  Impression: CT scan of the chest without IV contrast demonstrating tree-in-bud airspace disease in the left lower lobe suggesting indolent infection. Follow-up CT scan of the chest in 3 months is recommended.    Electronically Signed By-SOILA TREVIÑO MD On:3/19/2024 3:23 PM          Assessment & Plan   Diagnoses and all orders for this visit:    1. Major depressive disorder, recurrent episode,  moderate (Primary)  -     FLUoxetine (PROzac) 10 MG capsule; Take 1 capsule by mouth Daily.  Dispense: 90 capsule; Refill: 1  -     QUEtiapine (SEROquel) 25 MG tablet; Take 1 tablet by mouth Every Night.  Dispense: 90 tablet; Refill: 3    2. Generalized anxiety disorder  -     FLUoxetine (PROzac) 10 MG capsule; Take 1 capsule by mouth Daily.  Dispense: 90 capsule; Refill: 1  -     QUEtiapine (SEROquel) 25 MG tablet; Take 1 tablet by mouth Every Night.  Dispense: 90 tablet; Refill: 3    3. Insomnia due to mental condition  -     FLUoxetine (PROzac) 10 MG capsule; Take 1 capsule by mouth Daily.  Dispense: 90 capsule; Refill: 1  -     QUEtiapine (SEROquel) 25 MG tablet; Take 1 tablet by mouth Every Night.  Dispense: 90 tablet; Refill: 3        Visit Diagnoses:    ICD-10-CM ICD-9-CM   1. Major depressive disorder, recurrent episode, moderate  F33.1 296.32   2. Generalized anxiety disorder  F41.1 300.02   3. Insomnia due to mental condition  F51.05 300.9     327.02     10/04/2024: Improving, still MDD, TRIP, increase prozac further.    Allowed patient to freely discuss and process issues, such as:  Anxiety and depression regarding medical conditions.  Anxiety and depression regarding relationships.  ... using Rogerian psychotherapeutic techniques including unconditional positive regard, reflective listening, and demonstrating clear empathy, with the goal of ameliorating symptoms and maintaining, restoring, or improving self-esteem, adaptive skills, and ego or psychological functions (Danielle et al. 1991), the long-term goal of which is to develop a better, healthier perspective and help the patient bear their circumstances more easily.  Time (minutes) spent providing supportive psychotherapy: 16  (This time is exclusive to the therapy session and separate from the time spent on activities used to meet the criteria for the E/M service (history, exam, medical decision-making).)  Start: 9:30  Stop: 9:46  Functional status:  mild impairment  Treatment plan: Medication management and supportive psychotherapy  Prognosis: good  Progress: improving  5w    08/22/2024: Improving, increase prozac for possible MDD.    07/25/2024: Stop wellbutrin, in 2 wks, hold trazodone and start seroquel for irritability, mood stability. Poor energy may be due to anemia. Mood improved, but irritable as well. Mom has hx of what he thinks was bipolar. Avoiding lithium 2/2 CKD.     07/10/2024: Mirtazapine led to brain fog, fatigue, poor focus in am. Stop it. [facility] for marriage issues (individual therapy). Pt is not on duloxetine. Pt needs energizing meds. Start wellbutrin for a week, then add prozac.    06/14/2024: No change. Discussed reflective listening with his wife. Stop abilify; start mirtazapine.    4/30/24: R/O ADHD. Start abilify, Therapy? Wgt loss, consider mirtazapine, seroquel. 6w.    PLAN:  Safety: No acute safety concerns  Therapy: None  Risk Assessment: Risk of self-harm acutely is moderate.  Risk factors include anxiety disorder, mood disorder, fhx, access to guns, and recent psychosocial stressors (pandemic). Protective factors include no present SI, no history of suicide attempts or self-harm in the past, minimal AODA, healthcare seeking, future orientation, willingness to engage in care.  Risk of self-harm chronically is also moderate, but could be further elevated in the event of treatment noncompliance and/or AODA.  Meds:  INCREASE prozac 20 to 30 mg qam. Risks, benefits, alternatives discussed with patient including GI upset, nausea vomiting diarrhea, theoretical decrease of seizure threshold predisposing the patient to a slightly higher seizure risk, headaches, sexual dysfunction, serotonin syndrome, bleeding risk, increased suicidality in patients 24 years and younger, switching to rebecca/hypomania.  After discussion of these risks and benefits, the patient voiced understanding and agreed to proceed.  CONTINUE seroquel 25 mg qhs. Risks,  benefits, alternatives discussed with patient including nausea and vomiting, GI upset, sedation, dizziness, falls, akathisia, hypotension, increased appetite, lowering of seizure threshold, theoretical risk of tardive dyskinesia, extrapyramidal symptoms, movement issues, restless legs syndrome. Use care when operating vehicle, vessel, or machine. After discussion of these risks and benefits, the patient voiced understanding and agreed to proceed.  S/P:  trazodone 50 mg qhs. 100 mg made him groggy the next day. 7/24.    wellbutrin  mg qam. Irritable 7/24  abilify 2 mg qhs. No change 6/24.   mirtazapine 7.5 mg qhs. Sedation 7/24  Seroquel didn't work in the past 7/24  Labs: none    Patient screened positive for depression based on a PHQ-9 score of 1 on 8/7/2024. Follow-up recommendations include: Prescribed antidepressant medication treatment and Suicide Risk Assessment performed.           TREATMENT PLAN/GOALS: Continue supportive psychotherapy efforts and medications as indicated. Treatment and medication options discussed during today's visit. Patient acknowledged and verbally consented to continue with current treatment plan and was educated on the importance of compliance with treatment and follow-up appointments.    MEDICATION ISSUES:  JIM reviewed as expected.  Discussed medication options and treatment plan of prescribed medication as well as the risks, benefits, and side effects including potential falls, possible impaired driving and metabolic adversities among others. Patient is agreeable to call the office with any worsening of symptoms or onset of side effects. Patient is agreeable to call 911 or go to the nearest ER should he/she begin having SI/HI. No medication side effects or related complaints today.     MEDS ORDERED DURING VISIT:  New Medications Ordered This Visit   Medications    FLUoxetine (PROzac) 10 MG capsule     Sig: Take 1 capsule by mouth Daily.     Dispense:  90 capsule      Refill:  1     Total dose now 20+10 = 30 mg daily. Thank you for the help. Please call with questions: [phone].    QUEtiapine (SEROquel) 25 MG tablet     Sig: Take 1 tablet by mouth Every Night.     Dispense:  90 tablet     Refill:  3     Refills. Thank you for the help. Please call with questions: [phone].       Return in about 5 weeks (around 11/8/2024).         This document has been electronically signed by Penelope Davis MD  October 4, 2024 09:48 EDT    Dictated Utilizing Dragon Dictation: Part of this note may be an electronic transcription/translation of spoken language to printed text using the Dragon Dictation System.

## 2024-10-04 ENCOUNTER — OFFICE VISIT (OUTPATIENT)
Dept: PSYCHIATRY | Facility: CLINIC | Age: 64
End: 2024-10-04
Payer: MEDICARE

## 2024-10-04 VITALS
HEIGHT: 71 IN | BODY MASS INDEX: 28.84 KG/M2 | WEIGHT: 206 LBS | HEART RATE: 81 BPM | DIASTOLIC BLOOD PRESSURE: 69 MMHG | SYSTOLIC BLOOD PRESSURE: 124 MMHG

## 2024-10-04 DIAGNOSIS — F33.1 MAJOR DEPRESSIVE DISORDER, RECURRENT EPISODE, MODERATE: Primary | ICD-10-CM

## 2024-10-04 DIAGNOSIS — F51.05 INSOMNIA DUE TO MENTAL CONDITION: ICD-10-CM

## 2024-10-04 DIAGNOSIS — F41.1 GENERALIZED ANXIETY DISORDER: ICD-10-CM

## 2024-10-04 RX ORDER — FLUOXETINE 10 MG/1
10 CAPSULE ORAL DAILY
Qty: 90 CAPSULE | Refills: 1 | Status: SHIPPED | OUTPATIENT
Start: 2024-10-04

## 2024-10-04 RX ORDER — ALPHA LIPOIC ACID 600 MG
1 TABLET ORAL DAILY
COMMUNITY
Start: 2024-08-29 | End: 2025-08-29

## 2024-10-04 RX ORDER — QUETIAPINE FUMARATE 25 MG/1
25 TABLET, FILM COATED ORAL NIGHTLY
Qty: 90 TABLET | Refills: 3 | Status: SHIPPED | OUTPATIENT
Start: 2024-10-04

## 2024-10-07 ENCOUNTER — TELEPHONE (OUTPATIENT)
Dept: UROLOGY | Facility: CLINIC | Age: 64
End: 2024-10-07
Payer: MEDICARE

## 2024-10-07 NOTE — TELEPHONE ENCOUNTER
PATIENT CALLED TO VERIFY WHETHER OR NOT HE IS SCHEDULED FOR AQUABLATION WITH DR. TREVINO ON 10/08/24.  I TOLD HIM THAT I DO NOT SEE THAT HE IS SCHEDULED FOR SURGERY TOMORROW.    I TOLD HIM THAT I COULD SEE THAT IT WAS SCHEDULED ON 09/12/24, AND CANCELED, DUE TO LAPSE IN INSURANCE COVERAGE UNTIL 10/01/24.      ABIMAEL UPDATED HIS INSURANCE.    I TOLD PATIENT I WOULD LEAVE A MESSAGE FOR CHARLA TO CALL HIM.  SHE IS NOT HERE TODAY.    #[phone]

## 2024-10-08 ENCOUNTER — TELEPHONE (OUTPATIENT)
Dept: ONCOLOGY | Facility: HOSPITAL | Age: 64
End: 2024-10-08

## 2024-10-08 ENCOUNTER — OFFICE VISIT (OUTPATIENT)
Dept: ONCOLOGY | Facility: HOSPITAL | Age: 64
End: 2024-10-08
Payer: MEDICARE

## 2024-10-08 VITALS
OXYGEN SATURATION: 97 % | DIASTOLIC BLOOD PRESSURE: 61 MMHG | TEMPERATURE: 98.6 F | HEART RATE: 66 BPM | RESPIRATION RATE: 18 BRPM | WEIGHT: 211.42 LBS | SYSTOLIC BLOOD PRESSURE: 124 MMHG | HEIGHT: 71 IN | BODY MASS INDEX: 29.6 KG/M2

## 2024-10-08 DIAGNOSIS — N18.32 CKD STAGE 3B, GFR 30-44 ML/MIN: ICD-10-CM

## 2024-10-08 DIAGNOSIS — D64.9 NORMOCYTIC ANEMIA: Primary | ICD-10-CM

## 2024-10-08 PROCEDURE — G0463 HOSPITAL OUTPT CLINIC VISIT: HCPCS | Performed by: INTERNAL MEDICINE

## 2024-10-08 RX ORDER — QUETIAPINE FUMARATE 50 MG/1
0.5 TABLET, FILM COATED ORAL
COMMUNITY
Start: 2024-08-12

## 2024-10-08 NOTE — TELEPHONE ENCOUNTER
Spoke with patient on date and time of follow up appt.  Advised pt we will be at our new location.  Pt is aware.

## 2024-10-08 NOTE — PROGRESS NOTES
"Chief Complaint/Reason for Referral:   FOLLOW UP 2     Paige Florez*  Paige Florez MD    Records Obtained:  Records of the patients history including those obtained from [facility] EMR were reviewed and summarized in detail.    Subjective    History of Present Illness    Stephon Castellano presents to [facility] HEMATOLOGY & ONCOLOGY for Normocytic Anemia    Patient is a 63 yo M with PMH of alcohol abuse, substance abuse [id number], CKD stage 3, MUNIRA, anemia,   atrial fibrillation in 2023 presenting with anemia. Follows with nephrology.  Reports daily fatigue that is significant. Reports lightheadedness. Denies any bleeding.  No fevers or chills or infections.  Recent colonoscopy and EGD with benign findings.  Does note weight loss of 50 lbs in 1 year. On oral iron.       Hematology History  2/13/24: Hgb 10.4  8/7/24: Hgb 9.8, MCV 99, plt 140, WBC 3.84, ferritin 483, iron sat 28, B12 362, folate 14, , hapto normal, copper 76, zinc 75, SPEP without M-spike, kappa/lambda FLC ratio 2 (wnl for his GFR)  8/13/24: EGD and c-scope: H pylori negative, benign findings.      Oncology/Hematology History    No history exists.     Review of Systems   Constitutional:  Positive for fatigue, fever (sweats) and unexpected weight loss (50 lbs in one year).   Musculoskeletal:  Positive for neck pain.   Neurological:  Positive for dizziness and weakness.   All other systems reviewed and are negative.    Current Outpatient Medications on File Prior to Visit   Medication Sig Dispense Refill    Alpha-Lipoic Acid 600 MG tablet Take 1 tablet by mouth Daily.      BD Eclipse Syringe/Needle 23G X 1\" 3 ML misc USE TO INJECT TESTOSTERONE      carboxymethylcellulose (REFRESH PLUS) 0.5 % solution Administer 2 drops to both eyes 3 (Three) Times a Day As Needed for Dry Eyes. 30 mL 12    CINNAMON PO Take  by mouth.      FLUoxetine (PROzac) 10 MG capsule Take 1 capsule by mouth Daily. 90 capsule 1    FLUoxetine " (PROzac) 20 MG capsule Take 1 capsule by mouth Daily. Start prozac 7/17 90 capsule 1    fluticasone (FLONASE) 50 MCG/ACT nasal spray 2 sprays into the nostril(s) as directed by provider Daily. 16 g 11    HYDROmorphone (DILAUDID) 1 MG/ML injection Infuse 0-2 mg/hr into a venous catheter.      IRON, FERROUS GLUCONATE, PO Take 65 mg by mouth Daily.      Lubricant Eye Drops PF 0.5 % solution       Lysine 1000 MG tablet Take  by mouth.      Magnesium 250 MG tablet Take  by mouth.      multivitamin with minerals tablet tablet Take 1 tablet by mouth Daily.      Omega 3-6-9 Fatty Acids (OMEGA 3-6-9 COMPLEX PO) Take  by mouth.      pain patient supplied pump by Intrathecal route Continuous. Instructions are in drop boxes      pantoprazole (Protonix) 40 MG EC tablet Take 1 tablet by mouth Daily. 30 tablet 1    QUEtiapine (SEROquel) 25 MG tablet Take 1 tablet by mouth Every Night. 90 tablet 3    simethicone (Gas-X) 80 MG chewable tablet Take 2 tablets PO after completing movi prep and 2 tablets PO 4 hours prior to procedure. 4 tablet 0    sucralfate (Carafate) 1 g tablet Take 1 tablet by mouth 3 (Three) Times a Day. Before meals 120 tablet 1    tamsulosin (FLOMAX) 0.4 MG capsule 24 hr capsule Take 2 capsules by mouth Daily for 360 days. (Patient taking differently: Take 2 capsules by mouth As Needed.) 180 capsule 3    Testosterone Cypionate (DEPOTESTOTERONE CYPIONATE) 200 MG/ML injection       Turmeric 500 MG capsule Take  by mouth.       No current facility-administered medications on file prior to visit.     No Known Allergies  Past Medical History:   Diagnosis Date    Alcohol abuse     Allergic rhinitis     Anemia 02/06/2024    My hand started just shaking but got worse until now i have no strength in my left hand    Anxiety 2006    Starting taking mood depression drugs    Arthritis     Asthma About 6 months    Sore Throat and was sent to ENT    Atrial fibrillation 2023    Benign prostatic hyperplasia 2015    Dad had  Prostate Cancer and just did Colonoscopy and they said i have a major large Prostate    Cervical neck pain with evidence of disc disease     Chronic pain disorder     Colon polyp 2020    Had a lot of polyps on the last 2 Colonoscopies    CTS (carpal tunnel syndrome) 2005    Depression     Difficulty walking 2033    Erectile dysfunction 2017    I have been on Testosterone replacements since then    Head injury 1977    Headache May 2023    Started abou 6 months or more    Hemorrhoid     HL (hearing loss) 2004    Have a major tinitis/ringing in my ears especially the left one    Hyperlipidemia 2017    My blood test have stated they were high or elevated    Hypertension 2017    Given Flomax said it will probably go down    Hypogonadism in male     Insomnia     Kidney stone 2020    Went to doctor and got medication for them and still have issues with right side    Low back pain 2010    Stinosis of back & neck    Memory loss 2023    Movement disorder 2023    Sleep apnea 2007    Substance abuse [id number]    On Disulfiram for alcohol abuse-voluntary    Visual impairment 2023    Just recently have been given eye drops     Past Surgical History:   Procedure Laterality Date    CERVICAL DISC SURGERY  2008    COLONOSCOPY N/A 08/18/2023    Procedure: COLONOSCOPY WITH POLYPECTOMY/SNARE;  Surgeon: Jose Alejandro Fox MD;  Location: [facility] ENDOSCOPY;  Service: General;  Laterality: N/A;  COLON POLYPS    COLONOSCOPY N/A 8/13/2024    Procedure: COLONOSCOPY WITH POLYPECTOMIES;  Surgeon: Jose Alejandro Fox MD;  Location: [facility] ENDOSCOPY;  Service: General;  Laterality: N/A;  COLON POLYPS    ENDOSCOPY N/A 8/13/2024    Procedure: ESOPHAGOGASTRODUODENOSCOPY WITH BIOPSIES;  Surgeon: Jose Alejandro Fox MD;  Location: [facility] ENDOSCOPY;  Service: General;  Laterality: N/A;  GASTRITIS. SUPERFICIAL GASTRIC ULCERS    HEEL SPUR SURGERY  2005    KNEE ACL RECONSTRUCTION  2004    OTHER SURGICAL HISTORY      METAL IMPLANT    PAIN PUMP  INSERTION/REVISION      SPINE SURGERY  2008    Antieror c5-7 discotomy    TONSILLECTOMY  1965    VASECTOMY  2002     Social History     Socioeconomic History    Marital status:    Tobacco Use    Smoking status: Former     Current packs/day: 0.00     Average packs/day: 2.0 packs/day for 8.0 years (16.0 ttl pk-yrs)     Types: Cigarettes     Start date: 1975     Quit date: 1983     Years since quittin.7     Passive exposure: Past    Smokeless tobacco: Former     Types: Snuff    Tobacco comments:     Also dipped for several years   Vaping Use    Vaping status: Never Used   Substance and Sexual Activity    Alcohol use: Not Currently     Alcohol/week: 14.0 standard drinks of alcohol     Types: 14 Shots of liquor per week     Comment: Stopped due to Disulfiram 250 mg    Drug use: Not Currently     Frequency: 7.0 times per week     Types: Marijuana    Sexual activity: Not Currently     Partners: Female     Birth control/protection: None, Vasectomy     Comment: Low testosterone     Family History   Problem Relation Age of Onset    Suicide Attempts Mother     Bipolar disorder Mother     Anxiety disorder Mother     Heart disease Mother     Osteoporosis Mother     Alcohol abuse Mother         Mother committed suicide    Depression Mother         She had major depression    Early death Mother         She committed suicide    Thyroid disease Mother         She had Thyroid issues and had surgery    Cancer Father         Prostate    Prostate cancer Father     Hearing loss Father         He has worn a hearing aids since his late 50’s    Depression Sister     Alcohol abuse Sister     Diabetes Sister         Diabetes    Arthritis Sister     Sleep apnea Sister     Obesity Sister     Thyroid disease Sister     Insomnia Sister     Narcolepsy Sister     Diabetes Brother     Stroke Other         AUNT/UNCLE GRANDPARENTS    Diabetes Other         GRANDPARENTS      Immunization History   Administered Date(s)  Administered    COVID-19 (SHRAVAN) 03/10/2021    Covid-19 (Pfizer) Gray Cap Monovalent 01/28/2022    DTaP 03/29/2019    FluMist 2-49yrs (Nasal) 01/24/2005, 11/09/2005    Fluzone  >6mos 10/04/2011, 10/15/2015    Fluzone (or Fluarix & Flulaval for VFC) >6mos 09/25/2019, 01/25/2022, 03/15/2024    Fluzone High-Dose 65+YRS 09/18/2024    Fluzone Quad >6mos (Multi-dose) 10/01/2019    H1N1 Inj 12/16/2009    Hepatitis A 05/08/1995, 11/08/1999    Influenza Injectable Mdck Pf Quad 10/27/2020, 02/15/2023    Influenza Whole 12/03/2003, 10/12/2011    Influenza, Unspecified 10/01/2018, 10/27/2020, 02/15/2023    MMR 04/25/2003    Measles 05/02/1984    Meningococcal Polysaccharide 04/19/2002    OPV 10/03/1986    Plague 01/27/1986, 03/10/1986, 06/13/1989    Pneumococcal Conjugate 20-Valent (PCV20) 09/01/2022    Pneumococcal, Unspecified 09/01/2022    Rubella 05/02/1984    Shingrix 01/25/2022, 08/02/2022    Td (TDVAX) 11/08/1999    Tdap 09/28/2010, 03/29/2019    Typhoid, Unspecified 11/13/2001    Yellow Fever 05/13/1994    influenza Split 11/21/2001, 11/01/2002, 10/30/2007, 10/07/2009, 10/20/2009     Tobacco Use: Medium Risk (10/8/2024)    Patient History     Smoking Tobacco Use: Former     Smokeless Tobacco Use: Former     Passive Exposure: Past     Objective     Physical Exam  Constitutional:       Appearance: Normal appearance.   HENT:      Head: Normocephalic and atraumatic.      Nose: Nose normal.   Eyes:      Conjunctiva/sclera: Conjunctivae normal.   Pulmonary:      Effort: Pulmonary effort is normal.   Neurological:      General: No focal deficit present.      Mental Status: He is alert. Mental status is at baseline.   Psychiatric:         Mood and Affect: Mood normal.         Behavior: Behavior normal.         Thought Content: Thought content normal.       Vitals:    10/08/24 1109   BP: 124/61   Pulse: 66   Resp: 18   Temp: 98.6 °F (37 °C)   TempSrc: Temporal   SpO2: 97%   Weight: 95.9 kg (211 lb 6.7 oz)   Height: 180.3 cm  "(70.98\")   PainSc:   6   PainLoc: Neck       Wt Readings from Last 3 Encounters:   08/27/24 89.4 kg (197 lb)   08/13/24 85.6 kg (188 lb 11.4 oz)   08/07/24 85.5 kg (188 lb 7.9 oz)                ECOG: (0) Fully Active - Able to Carry On All Pre-disease Performance Without Restriction  Fall Risk Assessment was completed, and patient is at low risk for falls.  PHQ-9 Total Score:         The patient is  experiencing fatigue. Fatigue score: 9    PT/OT Functional Screening:   Speech Functional Screening:   Rehab to be ordered:         Result Review :  The following data was reviewed by: Ronen Solomon MD on 10/08/24:    RED BLOOD CELLs:  Lab Results   Component Value Date    RBC 3.13 (L) 08/07/2024    HGB 9.8 (L) 08/07/2024    HCT 31.0 (L) 08/07/2024    MCV 99.0 (H) 08/07/2024     08/07/2024      WHITE BLOOD CELLs:  Lab Results   Component Value Date    WBC 3.84 08/07/2024    NEUTROABS 1.88 08/07/2024    NEUTROABS 2.00 08/07/2024    LYMPHSABS 1.30 08/07/2024    MONOABS 0.38 08/07/2024    EOSABS 0.19 08/07/2024    EOSABS 0.23 08/07/2024    BASOSABS 0.02 08/07/2024     RBC EVALUATION (MICROCYTOSIS/NORMOCYTOSIS):  Lab Results   Component Value Date    RETIC 0.0344 08/07/2024    MCV 99.0 (H) 08/07/2024    IRON 101 08/07/2024    FERRITIN 483.70 (H) 08/07/2024    LABIRON 28 08/07/2024    TIBC 362 08/07/2024    TRANSFERRIN 243 08/07/2024     No results found for: \"HEMOCHROMATO\"  HEMOLYSIS EVALUATION:  Lab Results   Component Value Date    MCV 99.0 (H) 08/07/2024     08/07/2024    HAPTOGLOBIN 110 08/07/2024    RETIC 0.0344 08/07/2024     Sed Rate   Date Value Ref Range Status   08/07/2024 1 0 - 20 mm/hr Final     C-Reactive Protein   Date Value Ref Range Status   02/28/2024 <0.30 0.00 - 0.50 mg/dL Final     MACROCYTOSIS EVALUATION:  Lab Results   Component Value Date    RETIC 0.0344 08/07/2024    MCV 99.0 (H) 08/07/2024    ISIPRHNZ01 695 08/07/2024    FOLATE 14.00 08/07/2024     RENAL FUNCTION TESTs:  Lab " Results   Component Value Date    EGFR 36.8 (L) 08/07/2024    BUN 34 (H) 08/07/2024     LIVER FUNCTION TESTs:  Lab Results   Component Value Date    ALT 33 08/07/2024    AST 24 08/07/2024    BILITOT 0.2 08/07/2024    BILIDIR <10 09/20/2023    BILIDIR <10 09/20/2023    BILIDIR <10 09/20/2023    ALKPHOS 80 08/07/2024    PROTEINTOT 6.7 08/07/2024    ALBUMIN 4.4 08/07/2024    ALBUMIN 4.0 08/07/2024     GLUCOSE EVALUATION:  Lab Results   Component Value Date    GLUCOSE 92 08/07/2024    HGBA1C 5.40 07/26/2024     SERUM CHEMISTRIES:  Lab Results   Component Value Date     08/07/2024    K 4.4 08/07/2024     08/07/2024    CO2 32.3 (H) 08/07/2024    CALCIUM 9.4 08/07/2024     URINALYSIS:  Lab Results   Component Value Date    RBCUR Negative 01/02/2024    LEUKOCYTESUR Negative 08/07/2024    BILIRUBINUR Negative 08/07/2024    PHUR 5.5 08/07/2024    SPECGRAVUR 1.009 08/07/2024     THYROID FUNCTION TESTs:  TSH   Date Value Ref Range Status   07/26/2024 1.450 0.270 - 4.200 uIU/mL Final     Free T4   Date Value Ref Range Status   11/15/2023 0.90 (L) 0.93 - 1.70 ng/dL Final     T3, Free   Date Value Ref Range Status   09/20/2023 3.05 2.00 - 4.40 pg/mL Final     TUMOR MARKERS:  Lab Results   Component Value Date    PSA 0.6 11/14/2023     Free Lambda Light Chains   Date Value Ref Range Status   08/07/2024 24.1 5.7 - 26.3 mg/L Final     IgA   Date Value Ref Range Status   08/07/2024 174 61 - 437 mg/dL Final     Renal note personally reviewed       Assessment and Plan:  Diagnoses and all orders for this visit:    1. Normocytic anemia (Primary)  -     CT Guided Bone Marrow Biopsy And Aspiration; Future  -     Bone Marrow Exam; Standing    2. CKD stage 3b, GFR 30-44 ml/min        Anemia  Mild thrombocytopenia  CKD IIIB  Reviewed labs. Hgb down to 9.8. MCV increasing.  Patient is symptomatic.  EGD and colonoscopy on the 17th with benign findings, H. pylori negative.  Patient without any iron deficiency.  Ferritin actually  elevated above 400.  Myeloma labs negative.  Hemolysis ruled out.  B12 and folate normal.  This could be from his chronic kidney disease in which case erythropoietin can be considered, however do suspect that the hemoglobin is lower than what be expected with his level of kidney disease.  Patient also with microcytosis and borderline low platelets.  For these reasons, would recommend further evaluation with bone marrow biopsy for possible MDS.  This has been ordered.  Will follow-up with patient after this has been performed.      Patient Follow Up: 3-4 weeks      I spent 30 minutes caring for Stephon on this date of service. This time includes time spent by me in the following activities:preparing for the visit, reviewing tests, obtaining and/or reviewing a separately obtained history, performing a medically appropriate examination and/or evaluation , counseling and educating the patient/family/caregiver, ordering medications, tests, or procedures, documenting information in the medical record, and independently interpreting results and communicating that information with the patient/family/caregiver    Patient was given instructions and counseling regarding his condition or for health maintenance advice. Please see specific information pulled into the AVS if appropriate.

## 2024-10-18 ENCOUNTER — OFFICE VISIT (OUTPATIENT)
Dept: SLEEP MEDICINE | Facility: HOSPITAL | Age: 64
End: 2024-10-18
Payer: MEDICARE

## 2024-10-18 VITALS
HEIGHT: 71 IN | SYSTOLIC BLOOD PRESSURE: 116 MMHG | WEIGHT: 210.6 LBS | HEART RATE: 64 BPM | DIASTOLIC BLOOD PRESSURE: 61 MMHG | BODY MASS INDEX: 29.48 KG/M2 | OXYGEN SATURATION: 97 %

## 2024-10-18 DIAGNOSIS — G47.31 CENTRAL SLEEP APNEA DUE TO DRUG: ICD-10-CM

## 2024-10-18 DIAGNOSIS — E66.3 OVERWEIGHT (BMI 25.0-29.9): ICD-10-CM

## 2024-10-18 DIAGNOSIS — T50.905A CENTRAL SLEEP APNEA DUE TO DRUG: ICD-10-CM

## 2024-10-18 DIAGNOSIS — G47.33 OBSTRUCTIVE SLEEP APNEA, ADULT: Primary | ICD-10-CM

## 2024-10-18 DIAGNOSIS — Z79.891 LONG TERM PRESCRIPTION OPIATE USE: ICD-10-CM

## 2024-10-18 PROCEDURE — G0463 HOSPITAL OUTPT CLINIC VISIT: HCPCS

## 2024-10-18 RX ORDER — MIRTAZAPINE 15 MG/1
15 TABLET, ORALLY DISINTEGRATING ORAL NIGHTLY
COMMUNITY

## 2024-10-18 NOTE — PROGRESS NOTES
[address] [place] [id number]  Phone: [phone]  Fax: [phone]      SLEEP CLINIC FOLLOW UP PROGRESS NOTE.    Stephon Castellano  [id number]   1960  64 y.o.  male      PCP: Paige Florez MD      Date of visit: 10/18/2024    Chief Complaint   Patient presents with    Sleep Apnea    Daytime Sleepiness       Medications and allergies are reviewed by me and documented in the encounter.     SOCIAL (habits pertaining to sleep medicine)  History tobacco use:No  History of alcohol use: 0 per week  Caffeine use: 4 beverages/d    HPI:  This is a 64 y.o. . PMHx   Anxiety Depression,Substance abuse (Marijuana - last used 10 years), A. Fib, Chronic pain (on dilaudid with pain pump for the past 10 years), Hypogonadism (on TRT for the past 5 years).).  Here for management of severe obstructive sleep apnea/central sleep apnea due to drug (AHI 37.8/h with sleep-related hypoxia did not qualify for split study on night of 3/17/2024; s/p titration study on 7/9/2024 started on bilevel S/T).. Patient is using positive airway pressure therapy and the symptoms of sleep apnea have  NOT improved at all. Normally patient goes to bed at 1030 PM and wakes up at 630 AM .  The patient wakes up 3 time(s) during the night and has no problem going back to sleep.  Sleep remains non-restorative.    Overall patient's Impression of their PAP therapy is: NOT well   Excessive daytime sleepiness persists   Struggling with PAP therapy - denies air pressure issues - denies mask issue      Compliance data as reviewed by me with patient room today:  Date range 7/21/2024 - 10/18/2024  Overall use 51%  4-hour efe 42%  Average days used 6 hours 26 minutes  Device air curve 10S/T  Mode spontaneous time  IPAP 17 cm H2O  EPAP 12 cm H2O  Respiratory rate set at 10 bpm  95th percentile leak is 10.7 cm H2O  AHI 15.4 - VERY POORLY CONTROLLED ON BiLevel S/T CONTINUES ON LONG TERM OPIATE THERAPY  95th percentile respiratory rate  "16 breaths/min (spontaneous breaths  Triggered 52%  Cycled 78%  [facility]  Mask used: uses both nasal mask or FFM no mask issues       Had an echo in the past was told normal no issues   No hx of M.I   No hx of CHF    Continues on dilaudid pain pump for many years     Save what is noted above in HPI, denies any OTHER past known:  cardiopulmonary conditions/neurologic disorders/neuromuscular disorders  Never needed supplemental O2 at home  Denies any opioid therapy  Denies any non-MRI compatible metal/hardware in head/neck/chest  Denies AICD  Denies PPM        -Last seen by me~3 months ago on 4/29/2024 her prior PAP therapy in 2007, was on Dilaudid pain pump for the past 10 years, [facility] after [facility].  In past he had a recalled DreamStation.  Titration study was ordered.  -7/22/2024 documentation by me patient requested to [facility] instead of [facility] for [facility]vel S/T.    REVIEW OF SYSTEMS:   Is negative unless otherwise noted in HPI  [place] Sleepiness Scale :Total score: 13 denies near miss or MVC 2/2 sleepiness    Disclaimer History: The above history is based on this sleep physician's in room encounter with the patient. Pre encounter self administered questionnaires are taken into consideration and discussed with patient for any discordance. The above documentation by this sleep physician is the most accurate clinical information determined by in room sleep physician encounter with patient.     PHYSICAL EXAMINATION:  Vitals:    10/18/24 1100   BP: 116/61   Pulse: 64   SpO2: 97%   Weight: 95.5 kg (210 lb 9.6 oz)   Height: 180.3 cm (70.98\")    Body mass index is 29.39 kg/m².   CONSTITUTIONAL: Well appearing, in no overt pain or respiratory distress   ENT: Mallampati II,, macroglossia  RESP SYSTEM: This is a clear bilateral (no rales/rhonchi/wheezes), no overt respiratory distress, speaks in clear sentences without dyspnea, no accessory muscle use  CARDIOVASULAR: RRR, no rub, no " gallop, no edema noted euvolemic  NEURO: Oriented x 3, no gross focal deficits     Diagnostics reviewed  - 11/15/2023 echocardiogram TTE cardiology's interpretation summary: Interpretation Summary  Normal left ventricular systolic function.  Mild MR and mild T  See full report for further details    Compliance data as reviewed by me with patient room today:  Date range 7/21/2024 - 10/18/2024  Overall use 51%  4-hour efe 42%  Average days used 6 hours 26 minutes  Device air curve 10S/T  Mode spontaneous time  IPAP 17 cm H2O  EPAP 12 cm H2O  Respiratory rate set at 10 bpm  95th percentile leak is 10.7 cm H2O  AHI 15.4 - VERY POORLY CONTROLLED ON BiLevel S/T CONTINUES ON LONG TERM OPIATE THERAPY  95th percentile respiratory rate 16 breaths/min (spontaneous breaths  Triggered 52%  Cycled 78%  [facility]  Mask used: uses both nasal mask or FFM no mask issues     ASSESSMENT AND PLAN:  Obstructive sleep apnea, adult [G47.33]  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  -3 days leading up to ASV titration I'd like for him to discontinue use of current device       Patient's symptoms and examination is consistent with sleep apnea. have talked to the patient about the signs and symptoms of sleep apnea. In addition, I have also discussed pathophysiology of sleep apnea.  I also discussed the complications of untreated sleep apnea including effects on hypertension, diabetes mellitus and nonrestorative sleep with  hypersomnia which can increase risk for motor vehicle accidents.  Untreated sleep apnea is also a risk factor for development of atrial fibrillation, hypertension, insulin resistance and cerebrovascular accident. Counseled no driving or operating heavy machinery while sleepy. Patient was given opportunity to ask questions and all the questions were answered.     -Patient made aware I will not fill out any [employer] / [employer] forms-letters for service connection. This must be done by the patient's [employer] physicians who may refer to my diagnosis and may make the the determination of service connection based on their evaluation and final determination. This is not an appropriate request to a care in the community non[place] [place]. Counseled the patient must follow up with a VA physician, VA compensation and pension physician, an alternative may be an occupational health physician that they must discuss with their VA physician.    Overweight, with BMI is Body mass index is 29.39 kg/m².. Counseled weight loss will be beneficial for reduction in severity of sleep apnea, healthy diet/exercise to achieve same, follow up with primary care physician for serial monitoring and to further guide management.     Follow up after ASV titration . Patient's questions were answered.        EMR Dragon/Transcription disclaimer:   Much of this encounter note is an electronic transcription/translation of spoken language to printed text. The electronic translation of spoken language may permit erroneous, or at times, nonsensical words or phrases to be inadvertently transcribed; Although I have reviewed the note for such errors, some may still exist.       NPI #: [id number]    Lars Wang, DO  Sleep Medicine  [facility]  10/18/24

## 2024-10-21 ENCOUNTER — OFFICE VISIT (OUTPATIENT)
Dept: PULMONOLOGY | Facility: CLINIC | Age: 64
End: 2024-10-21
Payer: MEDICARE

## 2024-10-21 VITALS
OXYGEN SATURATION: 99 % | HEART RATE: 71 BPM | SYSTOLIC BLOOD PRESSURE: 135 MMHG | TEMPERATURE: 97.9 F | HEIGHT: 71 IN | WEIGHT: 210 LBS | DIASTOLIC BLOOD PRESSURE: 67 MMHG | BODY MASS INDEX: 29.4 KG/M2 | RESPIRATION RATE: 16 BRPM

## 2024-10-21 DIAGNOSIS — R06.09 DYSPNEA ON EXERTION: Primary | ICD-10-CM

## 2024-10-21 DIAGNOSIS — G47.33 OBSTRUCTIVE SLEEP APNEA SYNDROME: ICD-10-CM

## 2024-10-21 PROCEDURE — 1160F RVW MEDS BY RX/DR IN RCRD: CPT | Performed by: NURSE PRACTITIONER

## 2024-10-21 PROCEDURE — 1159F MED LIST DOCD IN RCRD: CPT | Performed by: NURSE PRACTITIONER

## 2024-10-21 PROCEDURE — 99213 OFFICE O/P EST LOW 20 MIN: CPT | Performed by: NURSE PRACTITIONER

## 2024-10-21 NOTE — PROGRESS NOTES
Primary Care Provider  Paige Florez MD   Referring Provider  No ref. provider found    Patient Complaint  Follow-up (6 month f/u)      Subjective       History of Presenting Illness  Stephon Castellano is a pleasant 64 y.o. male who presents to [facility] for follow-up appointment.  Mr. Castellano is a current patient of Dr. Shelley and last saw him 4/17/2024.  Patient is here for continued management of dyspnea with exertion.  He is a former smoker quit in 1983.  He has MUNIRA and is under the care of of sleep medicine.  He had a pulmonary function test done 5/29/2024 that showed normal lung volumes, normal spirometry, no airway obstruction noted, and mild reduction in diffusion capacity.  He previously had a CT scan of his chest on 7/30/2024 which revealed interval resolution of previously noted groundglass changes in the left lower lobe no acute pulmonary infiltrates are appreciated.    Patient denies dyspnea, coughing, wheezing, headaches, chest pain, weight loss or hemoptysis. Patient denies fevers, chills and night sweats. Stephon Castellano is able to perform ADLs without difficulties.    I have personally reviewed the review of systems, past family, social, medical and surgical histories; and agree with their findings.      Review of Systems   Constitutional: Negative.    HENT: Negative.     Respiratory: Negative.     Cardiovascular: Negative.    Musculoskeletal: Negative.    Neurological: Negative.    Psychiatric/Behavioral: Negative.           Family History   Problem Relation Age of Onset    Suicide Attempts Mother     Bipolar disorder Mother     Anxiety disorder Mother     Heart disease Mother     Osteoporosis Mother     Alcohol abuse Mother         Mother committed suicide    Depression Mother         She had major depression    Early death Mother         She committed suicide    Thyroid disease Mother         She had Thyroid issues and had surgery     Cancer Father         Prostate    Prostate cancer Father     Hearing loss Father         He has worn a hearing aids since his late 50’s    Depression Sister     Alcohol abuse Sister     Diabetes Sister         Diabetes    Arthritis Sister     Sleep apnea Sister     Obesity Sister     Thyroid disease Sister     Insomnia Sister     Narcolepsy Sister     Diabetes Brother     Stroke Other         AUNT/UNCLE GRANDPARENTS    Diabetes Other         GRANDPARENTS         Social History     Socioeconomic History    Marital status:    Tobacco Use    Smoking status: Former     Current packs/day: 0.00     Average packs/day: 2.0 packs/day for 8.0 years (16.0 ttl pk-yrs)     Types: Cigarettes     Start date: 1975     Quit date: 1983     Years since quittin.8     Passive exposure: Past    Smokeless tobacco: Former     Types: Snuff    Tobacco comments:     Also dipped for several years   Vaping Use    Vaping status: Never Used   Substance and Sexual Activity    Alcohol use: Not Currently     Alcohol/week: 14.0 standard drinks of alcohol     Types: 14 Shots of liquor per week     Comment: Stopped due to Disulfiram 250 mg    Drug use: Not Currently     Frequency: 7.0 times per week     Types: Marijuana    Sexual activity: Not Currently     Partners: Female     Birth control/protection: None, Vasectomy     Comment: Low testosterone        Past Medical History:   Diagnosis Date    Alcohol abuse     Allergic rhinitis     Anemia 2024    My hand started just shaking but got worse until now i have no strength in my left hand    Anxiety     Starting taking mood depression drugs    Arthritis     Asthma About 6 months    Sore Throat and was sent to ENT    Atrial fibrillation     Benign prostatic hyperplasia     Dad had Prostate Cancer and just did Colonoscopy and they said i have a major large Prostate    Cervical neck pain with evidence of disc disease     Chronic pain disorder     Colon polyp     Had  a lot of polyps on the last 2 Colonoscopies    CTS (carpal tunnel syndrome) 2005    Depression     Difficulty walking 2033    Erectile dysfunction 2017    I have been on Testosterone replacements since then    Head injury 1977    Headache May 2023    Started abou 6 months or more    Hemorrhoid     HL (hearing loss) 2004    Have a major tinitis/ringing in my ears especially the left one    Hyperlipidemia 2017    My blood test have stated they were high or elevated    Hypertension 2017    Given Flomax said it will probably go down    Hypogonadism in male     Insomnia     Kidney stone 2020    Went to doctor and got medication for them and still have issues with right side    Low back pain 2010    Stinosis of back & neck    Memory loss 2023    Movement disorder 2023    Sleep apnea 2007    Substance abuse [id number]    On Disulfiram for alcohol abuse-voluntary    Visual impairment 2023    Just recently have been given eye drops        Immunization History   Administered Date(s) Administered    COVID-19 (SHRAVAN) 03/10/2021    Covid-19 (Pfizer) Gray Cap Monovalent 01/28/2022    DTaP 03/29/2019    FluMist 2-49yrs (Nasal) 01/24/2005, 11/09/2005    Fluzone  >6mos 10/04/2011, 10/15/2015    Fluzone (or Fluarix & Flulaval for VFC) >6mos 09/25/2019, 01/25/2022, 03/15/2024    Fluzone High-Dose 65+YRS 09/18/2024    Fluzone Quad >6mos (Multi-dose) 10/01/2019    H1N1 Inj 12/16/2009    Hepatitis A 05/08/1995, 11/08/1999    Influenza Injectable Mdck Pf Quad 10/27/2020, 02/15/2023    Influenza Whole 12/03/2003, 10/12/2011    Influenza, Unspecified 10/01/2018, 10/27/2020, 02/15/2023    MMR 04/25/2003    Measles 05/02/1984    Meningococcal Polysaccharide 04/19/2002    OPV 10/03/1986    Plague 01/27/1986, 03/10/1986, 06/13/1989    Pneumococcal Conjugate 20-Valent (PCV20) 09/01/2022    Pneumococcal, Unspecified 09/01/2022    Rubella 05/02/1984    Shingrix 01/25/2022, 08/02/2022    Td (TDVAX) 11/08/1999    Tdap 09/28/2010, 03/29/2019     "Typhoid, Unspecified 11/13/2001    Yellow Fever 05/13/1994    influenza Split 11/21/2001, 11/01/2002, 10/30/2007, 10/07/2009, 10/20/2009       No Known Allergies       Current Outpatient Medications:     Alpha-Lipoic Acid 600 MG tablet, Take 1 tablet by mouth Daily., Disp: , Rfl:     BD Eclipse Syringe/Needle 23G X 1\" 3 ML misc, USE TO INJECT TESTOSTERONE, Disp: , Rfl:     carboxymethylcellulose (REFRESH PLUS) 0.5 % solution, Administer 2 drops to both eyes 3 (Three) Times a Day As Needed for Dry Eyes., Disp: 30 mL, Rfl: 12    CINNAMON PO, Take  by mouth., Disp: , Rfl:     FLUoxetine (PROzac) 10 MG capsule, Take 1 capsule by mouth Daily., Disp: 90 capsule, Rfl: 1    fluticasone (FLONASE) 50 MCG/ACT nasal spray, 2 sprays into the nostril(s) as directed by provider Daily., Disp: 16 g, Rfl: 11    HYDROmorphone (DILAUDID) 1 MG/ML injection, Infuse 0-2 mg/hr into a venous catheter., Disp: , Rfl:     IRON, FERROUS GLUCONATE, PO, Take 65 mg by mouth Daily., Disp: , Rfl:     Lubricant Eye Drops PF 0.5 % solution, , Disp: , Rfl:     Lysine 1000 MG tablet, Take  by mouth., Disp: , Rfl:     Magnesium 250 MG tablet, Take  by mouth., Disp: , Rfl:     mirtazapine (REMERON SOL-TAB) 15 MG disintegrating tablet, Place 1 tablet on the tongue Every Night., Disp: , Rfl:     multivitamin with minerals tablet tablet, Take 1 tablet by mouth Daily., Disp: , Rfl:     Omega 3-6-9 Fatty Acids (OMEGA 3-6-9 COMPLEX PO), Take  by mouth., Disp: , Rfl:     pain patient supplied pump, by Intrathecal route Continuous. Instructions are in drop boxes, Disp: , Rfl:     pantoprazole (Protonix) 40 MG EC tablet, Take 1 tablet by mouth Daily., Disp: 30 tablet, Rfl: 1    sucralfate (Carafate) 1 g tablet, Take 1 tablet by mouth 3 (Three) Times a Day. Before meals, Disp: 120 tablet, Rfl: 1    tamsulosin (FLOMAX) 0.4 MG capsule 24 hr capsule, Take 2 capsules by mouth Daily for 360 days. (Patient taking differently: Take 2 capsules by mouth As Needed.), Disp: " "180 capsule, Rfl: 3    Testosterone Cypionate (DEPOTESTOTERONE CYPIONATE) 200 MG/ML injection, , Disp: , Rfl:     Turmeric 500 MG capsule, Take  by mouth., Disp: , Rfl:     FLUoxetine (PROzac) 20 MG capsule, Take 1 capsule by mouth Daily. Start prozac 7/17, Disp: 90 capsule, Rfl: 1    QUEtiapine (SEROquel) 25 MG tablet, Take 1 tablet by mouth Every Night. (Patient not taking: Reported on 10/21/2024), Disp: 90 tablet, Rfl: 3    QUEtiapine (SEROquel) 50 MG tablet, Take 0.5 tablets by mouth every night at bedtime. (Patient not taking: Reported on 10/21/2024), Disp: , Rfl:     simethicone (Gas-X) 80 MG chewable tablet, Take 2 tablets PO after completing movi prep and 2 tablets PO 4 hours prior to procedure., Disp: 4 tablet, Rfl: 0         Vital Signs   /67 (BP Location: Right arm, Patient Position: Sitting, Cuff Size: Adult)   Pulse 71   Temp 97.9 °F (36.6 °C)   Resp 16   Ht 180.3 cm (70.98\")   Wt 95.3 kg (210 lb)   SpO2 99% Comment: room air  BMI 29.31 kg/m²       Objective     Physical Exam  Vitals reviewed.   Constitutional:       Appearance: Normal appearance.   HENT:      Head: Normocephalic and atraumatic.      Nose: Nose normal.      Mouth/Throat:      Mouth: Mucous membranes are moist.      Pharynx: Oropharynx is clear.   Eyes:      Extraocular Movements: Extraocular movements intact.      Conjunctiva/sclera: Conjunctivae normal.      Pupils: Pupils are equal, round, and reactive to light.   Cardiovascular:      Rate and Rhythm: Normal rate and regular rhythm.      Pulses: Normal pulses.      Heart sounds: Normal heart sounds.   Pulmonary:      Effort: Pulmonary effort is normal.      Breath sounds: Normal breath sounds.   Abdominal:      General: Bowel sounds are normal.   Musculoskeletal:         General: Normal range of motion.      Cervical back: Normal range of motion and neck supple.   Skin:     General: Skin is warm and dry.   Neurological:      Mental Status: He is alert and oriented to " person, place, and time.   Psychiatric:         Behavior: Behavior normal.         Results Review  I have personally reviewed the prior office notes, hospital records, labs, and diagnostics.  Results  Complete PFT - Pre & Post Bronchodilator (Order [id number])  Order-Level Documents:    Scan on 5/30/2024 1331 by Shaggy Shelley MD: PULMONARY FUNCTION TEST, ARACELI, 05/29/2024         Author: -- Service: -- Author Type: --   Filed: Date of Service: Creation Time:   Status: (Other)   Pulmonary Function Test Interpretation  Stephon Castellano  [id number]     06/03/24  07:41 EDT     Spirometry  PFT shows a forced vital capacity 110% of predicted FEV1 is 112% of predicted FEV1 to FVC ratio is 77 postbronchodilator the FEV1 was 117% of predicted  Total lung capacity is 102% of predicted  Diffusion capacity is 79% of predicted  Assessment  Normal spirometry no airways obstruction noted  Normal lung volumes  Mild reduction in diffusion capacity  Flow-volume loop is normal        Electronically signed by Shaggy Shelley MD, 06/03/24, 7:41 AM EDT.        CT Chest Without Contrast Diagnostic [[id number]] (Order [id number])  Order  Status: Final result     Study Notes     Luis Dsouza on 7/30/2024 12:55 PM EDT   [facility]  CTDI 3.81    PT REFUSED CONTRAST, ABNORMAL PREVIOUS CHEST CT, UNEXPLAINED WEIGHT LOSS, KIDNEY DISEASE     Appointment Information    PACS Images     Radiology Images     CT Chest Without Contrast Diagnostic (Order [id number]) - Reflex for Order [id number]  Study Result    Narrative & Impression   CT CHEST WO CONTRAST DIAGNOSTIC     Date of Exam: 7/30/2024 12:43 PM EDT     Indication: Weight loss, unintended.     Comparison: CT chest March 19, 2024     Technique: Axial CT images were obtained of the chest without contrast administration.  Reconstructed coronal and sagittal images were also obtained. Automated exposure control and iterative construction methods were used.        Findings:  There are groundglass  changes in the left lower lobe on the prior study have resolved. There is no curtis consolidation or obvious pleural effusions. There is no pathologic-appearing mediastinal lymphadenopathy. There is coronary artery calcification.     Lower slices through the upper abdomen reveal no acute findings.     There is a catheter within the thecal sac extending into the thoracic area from the L1-2 level with the tip around T4.     IMPRESSION:  Impression:  1.Interval resolution of previously noted groundglass changes in the left lower lobe. No acute pulmonary infiltrates are appreciated.  2.Coronary artery calcification.           Electronically Signed: Obdulio Tellez MD    7/31/2024 9:38 AM EDT    Workstation ID: [id number]       Assessment         Patient Active Problem List   Diagnosis    Chronic right-sided thoracic back pain    Kidney stone    Screening due    Allergic rhinitis    Arthritis    Cervical neck pain with evidence of disc disease    Depression    Head injury    Hemorrhoid    Hypogonadism in male    Right wrist pain    Weight loss    Abdominal pain    Seizure-like activity    Palpitations    Abnormal EKG    Bradycardia    Brain fog    Fatigue    Acute kidney injury    Tremor    Slow transit constipation    Weakness of both lower extremities    Left hand weakness    Drug induced myoclonus    Insomnia due to other mental disorder    Obstructive sleep apnea syndrome    Cervical radiculopathy    Iron deficiency anemia    Gastroesophageal reflux disease with esophagitis without hemorrhage    History of colonic polyps    Benign prostatic hyperplasia with urinary retention    BPH without obstruction/lower urinary tract symptoms    Benign prostatic hyperplasia with lower urinary tract symptoms        Plan     Diagnoses and all orders for this visit:    1. Dyspnea on exertion (Primary)  Comments:  resolved    2. Obstructive sleep apnea syndrome  Comments:  followup with sleep medicine for management of MUNIRA on CPAP              Smoking status:  reports that he quit smoking about 41 years ago. His smoking use included cigarettes. He started smoking about 49 years ago. He has a 16 pack-year smoking history. He has been exposed to tobacco smoke. He has quit using smokeless tobacco.  His smokeless tobacco use included snuff.    Vaccination status: Reviewed  Immunization History   Administered Date(s) Administered    COVID-19 (SHRAVAN) 03/10/2021    Covid-19 (Pfizer) Gray Cap Monovalent 01/28/2022    DTaP 03/29/2019    FluMist 2-49yrs (Nasal) 01/24/2005, 11/09/2005    Fluzone  >6mos 10/04/2011, 10/15/2015    Fluzone (or Fluarix & Flulaval for VFC) >6mos 09/25/2019, 01/25/2022, 03/15/2024    Fluzone High-Dose 65+YRS 09/18/2024    Fluzone Quad >6mos (Multi-dose) 10/01/2019    H1N1 Inj 12/16/2009    Hepatitis A 05/08/1995, 11/08/1999    Influenza Injectable Mdck Pf Quad 10/27/2020, 02/15/2023    Influenza Whole 12/03/2003, 10/12/2011    Influenza, Unspecified 10/01/2018, 10/27/2020, 02/15/2023    MMR 04/25/2003    Measles 05/02/1984    Meningococcal Polysaccharide 04/19/2002    OPV 10/03/1986    Plague 01/27/1986, 03/10/1986, 06/13/1989    Pneumococcal Conjugate 20-Valent (PCV20) 09/01/2022    Pneumococcal, Unspecified 09/01/2022    Rubella 05/02/1984    Shingrix 01/25/2022, 08/02/2022    Td (TDVAX) 11/08/1999    Tdap 09/28/2010, 03/29/2019    Typhoid, Unspecified 11/13/2001    Yellow Fever 05/13/1994    influenza Split 11/21/2001, 11/01/2002, 10/30/2007, 10/07/2009, 10/20/2009        Medications personally reviewed    Follow Up  Return if symptoms worsen or fail to improve, for Dr. Shelley.    Patient was given instructions and counseling regarding his condition or for health maintenance advice. Please see specific information pulled into the AVS if appropriate.     I spent 15 minutes caring for Stephon Castellano on this date of service. This time includes time spent by me in the following activities:preparing for the visit, reviewing tests,  obtaining and/or reviewing a separately obtained history, performing a medically appropriate examination and/or evaluation, counseling and educating the patient/family/caregiver, ordering medications, tests, or procedures, documenting information in the medical record, independently interpreting results and communicating that information with the patient/family/caregiver and answered questions family members, discuss medications.

## 2024-10-23 ENCOUNTER — OFFICE VISIT (OUTPATIENT)
Dept: GASTROENTEROLOGY | Facility: CLINIC | Age: 64
End: 2024-10-23
Payer: MEDICARE

## 2024-10-23 VITALS
DIASTOLIC BLOOD PRESSURE: 70 MMHG | SYSTOLIC BLOOD PRESSURE: 127 MMHG | HEIGHT: 71 IN | WEIGHT: 209.8 LBS | HEART RATE: 72 BPM | BODY MASS INDEX: 29.37 KG/M2

## 2024-10-23 DIAGNOSIS — D50.9 IRON DEFICIENCY ANEMIA, UNSPECIFIED IRON DEFICIENCY ANEMIA TYPE: Primary | ICD-10-CM

## 2024-10-23 DIAGNOSIS — K25.3 ACUTE GASTRIC ULCER WITHOUT HEMORRHAGE OR PERFORATION: ICD-10-CM

## 2024-10-23 DIAGNOSIS — K20.0 ESOPHAGITIS, EOSINOPHILIC: ICD-10-CM

## 2024-10-23 NOTE — PROGRESS NOTES
Chief Complaint     Constipation    History of Present Illness     Stephon Castellano is a 64 y.o. male who presents to [facility] GASTRO[facility] on referral from NARENDRA Mirza for a gastroenterology evaluation of anemia, gastric ulcer.      He was initially referred in May for chronic diarrhea.  Prior to his appointment here, another referral was placed to general surgery for abdominal pain and colitis.  EGD/colonoscopy per Dr. Fox in August was c/w non-bleeding gastric ulcer with adherent clot, increased eosinophils in the esophagus and one colon polyp.      Prescribed Protonix and Carafate following endoscopy, but has completed these.  Denies symptoms of heartburn.  States he rarely has difficulty with swallowing.     States that he previously had diarrhea daily for about one year, but for the past three months this has resolved.    Followed by hematology for anemia and is scheduled for bone marrow biopsy tomorrow.      History      Past Medical History:   Diagnosis Date    Alcohol abuse     Allergic rhinitis     Anemia 02/06/2024    My hand started just shaking but got worse until now i have no strength in my left hand    Anxiety 2006    Starting taking mood depression drugs    Arthritis     Asthma About 6 months    Sore Throat and was sent to ENT    Atrial fibrillation 2023    Benign prostatic hyperplasia 2015    Dad had Prostate Cancer and just did Colonoscopy and they said i have a major large Prostate    Cervical neck pain with evidence of disc disease     Chronic pain disorder     Colon polyp 2020    Had a lot of polyps on the last 2 Colonoscopies    CTS (carpal tunnel syndrome) 2005    Depression     Difficulty walking 2033    Erectile dysfunction 2017    I have been on Testosterone replacements since then    Head injury 1977    Headache May 2023    Started abou 6 months or more    Hemorrhoid     HL (hearing loss) 2004    Have a major tinitis/ringing in my ears especially the left  one    Hyperlipidemia 2017    My blood test have stated they were high or elevated    Hypertension 2017    Given Flomax said it will probably go down    Hypogonadism in male     Insomnia     Kidney stone 2020    Went to doctor and got medication for them and still have issues with right side    Low back pain 2010    Stinosis of back & neck    Memory loss 2023    Movement disorder 2023    Sleep apnea 2007    Substance abuse [id number]    On Disulfiram for alcohol abuse-voluntary    Visual impairment 2023    Just recently have been given eye drops       Past Surgical History:   Procedure Laterality Date    CERVICAL DISC SURGERY  2008    COLONOSCOPY N/A 08/18/2023    Procedure: COLONOSCOPY WITH POLYPECTOMY/SNARE;  Surgeon: Jose Alejandro Fox MD;  Location: [facility] ENDOSCOPY;  Service: General;  Laterality: N/A;  COLON POLYPS    COLONOSCOPY N/A 8/13/2024    Procedure: COLONOSCOPY WITH POLYPECTOMIES;  Surgeon: Jose Alejandro Fox MD;  Location: [facility] ENDOSCOPY;  Service: General;  Laterality: N/A;  COLON POLYPS    ENDOSCOPY N/A 8/13/2024    Procedure: ESOPHAGOGASTRODUODENOSCOPY WITH BIOPSIES;  Surgeon: Jose Alejandro Fox MD;  Location: [facility] ENDOSCOPY;  Service: General;  Laterality: N/A;  GASTRITIS. SUPERFICIAL GASTRIC ULCERS    HEEL SPUR SURGERY  2005    KNEE ACL RECONSTRUCTION  2004    OTHER SURGICAL HISTORY      METAL IMPLANT    PAIN PUMP INSERTION/REVISION  2010    SPINE SURGERY  2008    Antieror c5-7 discotomy    TONSILLECTOMY  1965    VASECTOMY  2002       Family History   Problem Relation Age of Onset    Suicide Attempts Mother     Bipolar disorder Mother     Anxiety disorder Mother     Heart disease Mother     Osteoporosis Mother     Alcohol abuse Mother         Mother committed suicide    Depression Mother         She had major depression    Early death Mother         She committed suicide    Thyroid disease Mother         She had Thyroid issues and had surgery    Cancer Father         Prostate    Prostate  cancer Father     Hearing loss Father         He has worn a hearing aids since his late 50’s    Depression Sister     Alcohol abuse Sister     Diabetes Sister         Diabetes    Arthritis Sister     Sleep apnea Sister     Obesity Sister     Thyroid disease Sister     Insomnia Sister     Narcolepsy Sister     Diabetes Brother     Stroke Other         AUNT/UNCLE GRANDPARENTS    Diabetes Other         GRANDPARENTS         Current Medications        Current Outpatient Medications:     carboxymethylcellulose (REFRESH PLUS) 0.5 % solution, Administer 2 drops to both eyes 3 (Three) Times a Day As Needed for Dry Eyes., Disp: 30 mL, Rfl: 12    FLUoxetine (PROzac) 10 MG capsule, Take 1 capsule by mouth Daily., Disp: 90 capsule, Rfl: 1    FLUoxetine (PROzac) 20 MG capsule, Take 1 capsule by mouth Daily. Start prozac 7/17, Disp: 90 capsule, Rfl: 1    fluticasone (FLONASE) 50 MCG/ACT nasal spray, 2 sprays into the nostril(s) as directed by provider Daily., Disp: 16 g, Rfl: 11    HYDROmorphone (DILAUDID) 1 MG/ML injection, Infuse 0-2 mg/hr into a venous catheter., Disp: , Rfl:     IRON, FERROUS GLUCONATE, PO, Take 65 mg by mouth Daily., Disp: , Rfl:     Lubricant Eye Drops PF 0.5 % solution, , Disp: , Rfl:     Lysine 1000 MG tablet, Take  by mouth., Disp: , Rfl:     Magnesium 250 MG tablet, Take  by mouth., Disp: , Rfl:     multivitamin with minerals tablet tablet, Take 1 tablet by mouth Daily., Disp: , Rfl:     Omega 3-6-9 Fatty Acids (OMEGA 3-6-9 COMPLEX PO), Take  by mouth., Disp: , Rfl:     pain patient supplied pump, by Intrathecal route Continuous. Instructions are in drop boxes, Disp: , Rfl:     Testosterone Cypionate (DEPOTESTOTERONE CYPIONATE) 200 MG/ML injection, , Disp: , Rfl:     Turmeric 500 MG capsule, Take  by mouth., Disp: , Rfl:      Allergies     No Known Allergies    Social History       Social History     Social History Narrative    Not on file       Immunizations     Immunization:  Immunization History  "  Administered Date(s) Administered    COVID-19 (SHRAVAN) 03/10/2021    Covid-19 (Pfizer) Gray Cap Monovalent 01/28/2022    DTaP 03/29/2019    FluMist 2-49yrs (Nasal) 01/24/2005, 11/09/2005    Fluzone  >6mos 10/04/2011, 10/15/2015    Fluzone (or Fluarix & Flulaval for VFC) >6mos 09/25/2019, 01/25/2022, 03/15/2024    Fluzone High-Dose 65+YRS 09/18/2024    Fluzone Quad >6mos (Multi-dose) 10/01/2019    H1N1 Inj 12/16/2009    Hepatitis A 05/08/1995, 11/08/1999    Influenza Injectable Mdck Pf Quad 10/27/2020, 02/15/2023    Influenza Whole 12/03/2003, 10/12/2011    Influenza, Unspecified 10/01/2018, 10/27/2020, 02/15/2023    MMR 04/25/2003    Measles 05/02/1984    Meningococcal Polysaccharide 04/19/2002    OPV 10/03/1986    Plague 01/27/1986, 03/10/1986, 06/13/1989    Pneumococcal Conjugate 20-Valent (PCV20) 09/01/2022    Pneumococcal, Unspecified 09/01/2022    Rubella 05/02/1984    Shingrix 01/25/2022, 08/02/2022    Td (TDVAX) 11/08/1999    Tdap 09/28/2010, 03/29/2019    Typhoid, Unspecified 11/13/2001    Yellow Fever 05/13/1994    influenza Split 11/21/2001, 11/01/2002, 10/30/2007, 10/07/2009, 10/20/2009          Objective     Objective     Vital Signs:   /70 (BP Location: Left arm, Patient Position: Sitting, Cuff Size: Adult)   Pulse 72   Ht 180.3 cm (70.98\")   Wt 95.2 kg (209 lb 12.8 oz)   BMI 29.27 kg/m²       Physical Exam    Results      Result Review :   The following data was reviewed by: NARENDRA Estrada on 10/23/2024:    CBC w/diff          3/29/2024    09:51 7/26/2024    14:42 8/7/2024    09:51   CBC w/Diff   WBC 3.62  3.99  3.84    RBC 3.69  2.91  3.13    Hemoglobin 11.5  8.8  9.8    Hematocrit 34.7  27.0  31.0    MCV 94.0  92.8  99.0    MCH 31.2  30.2  31.3    MCHC 33.1  32.6  31.6    RDW 12.3  12.4  13.4    Platelets 168  131  140    Neutrophil Rel % 60.1  41.0  49.0    Immature Granulocyte Rel % 0.3  0.3  0.0    Lymphocyte Rel % 27.1  40.9  33.9    Monocyte Rel % 10.5  11.0  11.7  "   Eosinophil Rel % 1.4  6.0  4.9    Basophil Rel % 0.6  0.8  0.5      CMP          5/28/2024    09:36 7/26/2024    14:42 8/7/2024    09:51   CMP   Glucose  91  92    BUN  30  34    Creatinine  2.25  1.99    EGFR  31.8  36.8    Sodium  137  140    Potassium  4.3  4.4    Chloride  98  102    Calcium  9.5  9.4    Total Protein   6.7    Total Protein 7.4     6.7  6.7    Albumin 4.7     4.4  4.4     4.0    Globulin   2.7    Globulin  2.3  2.3    Total Bilirubin  0.3  0.2    Alkaline Phosphatase  69  80    AST (SGOT)  23  24    ALT (SGPT)  25  33    Albumin/Globulin Ratio  1.9  1.9    BUN/Creatinine Ratio  13.3  17.1    Anion Gap  10.0  5.7       Details          This result is from an external source.             8/8/2024 occult blood stool-positive.    8/13/2024 EGD/colonoscopy (Dr. Fox)-scattered mild inflammation characterized by adherent blood, erythema and friability found in the gastric antrum, biopsy-negative for H. pylori and intestinal metaplasia.  1 nonbleeding superficial gastric ulcer with adherent clot was found in the gastric antrum.  GE junction biopsy-mild chronic inflammation.  Midesophagus biopsy-mildly increased eosinophils up to 11 per high-power field.  Duodenum biopsy-mild chronic duodenitis.  Colonoscopy-polyp in the hepatic flexure-tubular adenoma, completely removed.  The exam was otherwise normal.           Assessment and Plan        Assessment and Plan    Diagnoses and all orders for this visit:    1. Iron deficiency anemia, unspecified iron deficiency anemia type (Primary)  -     CBC Auto Differential; Future  -     Iron Profile; Future  -     Case Request; Standing  -     Case Request    2. Acute gastric ulcer without hemorrhage or perforation  -     Case Request; Standing  -     Case Request    3. Esophagitis, eosinophilic    Other orders  -     Follow Anesthesia Guidelines / Protocol; Future  -     Verify NPO; Standing    Recommend EGD to confirm healing of gastric ulcer.  Recheck  labs to assess severity of anemia.     ESOPHAGOGASTRODUODENOSCOPY (N/A)  The risk of the endoscopy were discussed in detail. Possible risks/complications, benefits, and alternatives to surgical or invasive procedure have been explained to patient and/or legal guardian; risks include bleeding, infection, and perforation. Patient has been evaluated and can tolerate anesthesia and/or sedation.       Follow Up        Follow Up   Return in about 6 months (around 4/23/2025) for anemia and f/u ulcer.  Patient was given instructions and counseling regarding his condition or for health maintenance advice. Please see specific information pulled into the AVS if appropriate.

## 2024-10-24 ENCOUNTER — LAB (OUTPATIENT)
Dept: LAB | Facility: HOSPITAL | Age: 64
End: 2024-10-24
Payer: MEDICARE

## 2024-10-24 ENCOUNTER — HOSPITAL ENCOUNTER (OUTPATIENT)
Dept: CT IMAGING | Facility: HOSPITAL | Age: 64
Discharge: HOME | End: 2024-10-24
Payer: MEDICARE

## 2024-10-24 VITALS
HEART RATE: 83 BPM | DIASTOLIC BLOOD PRESSURE: 61 MMHG | OXYGEN SATURATION: 94 % | SYSTOLIC BLOOD PRESSURE: 115 MMHG | RESPIRATION RATE: 18 BRPM

## 2024-10-24 DIAGNOSIS — D50.9 IRON DEFICIENCY ANEMIA, UNSPECIFIED IRON DEFICIENCY ANEMIA TYPE: ICD-10-CM

## 2024-10-24 DIAGNOSIS — N40.1 BENIGN PROSTATIC HYPERPLASIA WITH URINARY RETENTION: ICD-10-CM

## 2024-10-24 DIAGNOSIS — R33.8 BENIGN PROSTATIC HYPERPLASIA WITH URINARY RETENTION: ICD-10-CM

## 2024-10-24 DIAGNOSIS — D64.9 NORMOCYTIC ANEMIA: ICD-10-CM

## 2024-10-24 LAB
ANISOCYTOSIS BLD QL: ABNORMAL
BACTERIA UR QL AUTO: NORMAL /HPF
BASOPHILS # BLD AUTO: 0.03 10*3/MM3 (ref 0–0.2)
BASOPHILS NFR BLD AUTO: 0.7 % (ref 0–1.5)
BILIRUB UR QL STRIP: NEGATIVE
CLARITY UR: CLEAR
COLOR UR: YELLOW
DEPRECATED RDW RBC AUTO: 45 FL (ref 37–54)
EOSINOPHIL # BLD AUTO: 0.13 10*3/MM3 (ref 0–0.4)
EOSINOPHIL # BLD MANUAL: 0.04 10*3/MM3 (ref 0–0.4)
EOSINOPHIL NFR BLD AUTO: 3.2 % (ref 0.3–6.2)
EOSINOPHIL NFR BLD MANUAL: 1 % (ref 0.3–6.2)
ERYTHROCYTE [DISTWIDTH] IN BLOOD BY AUTOMATED COUNT: 12.6 % (ref 12.3–15.4)
GLUCOSE UR STRIP-MCNC: NEGATIVE MG/DL
HCT VFR BLD AUTO: 36.7 % (ref 37.5–51)
HGB BLD-MCNC: 11.7 G/DL (ref 13–17.7)
HGB UR QL STRIP.AUTO: NEGATIVE
HYALINE CASTS UR QL AUTO: NORMAL /LPF
IMM GRANULOCYTES # BLD AUTO: 0.01 10*3/MM3 (ref 0–0.05)
IMM GRANULOCYTES NFR BLD AUTO: 0.2 % (ref 0–0.5)
IRON 24H UR-MRATE: 84 MCG/DL (ref 59–158)
IRON SATN MFR SERPL: 22 % (ref 20–50)
KETONES UR QL STRIP: NEGATIVE
LEUKOCYTE ESTERASE UR QL STRIP.AUTO: NEGATIVE
LYMPHOCYTES # BLD AUTO: 1.79 10*3/MM3 (ref 0.7–3.1)
LYMPHOCYTES # BLD MANUAL: 2.11 10*3/MM3 (ref 0.7–3.1)
LYMPHOCYTES NFR BLD AUTO: 44.2 % (ref 19.6–45.3)
LYMPHOCYTES NFR BLD MANUAL: 10 % (ref 5–12)
MCH RBC QN AUTO: 31 PG (ref 26.6–33)
MCHC RBC AUTO-ENTMCNC: 31.9 G/DL (ref 31.5–35.7)
MCV RBC AUTO: 97.3 FL (ref 79–97)
MONOCYTES # BLD AUTO: 0.53 10*3/MM3 (ref 0.1–0.9)
MONOCYTES # BLD: 0.41 10*3/MM3 (ref 0.1–0.9)
MONOCYTES NFR BLD AUTO: 13.1 % (ref 5–12)
NEUTROPHILS # BLD AUTO: 1.5 10*3/MM3 (ref 1.7–7)
NEUTROPHILS NFR BLD AUTO: 1.56 10*3/MM3 (ref 1.7–7)
NEUTROPHILS NFR BLD AUTO: 38.6 % (ref 42.7–76)
NEUTROPHILS NFR BLD MANUAL: 37 % (ref 42.7–76)
NITRITE UR QL STRIP: NEGATIVE
PH UR STRIP.AUTO: 6 [PH] (ref 5–8)
PLAT MORPH BLD: NORMAL
PLATELET # BLD AUTO: 136 10*3/MM3 (ref 140–450)
PMV BLD AUTO: 9.4 FL (ref 6–12)
PROT UR QL STRIP: NEGATIVE
RBC # BLD AUTO: 3.77 10*6/MM3 (ref 4.14–5.8)
RBC # UR STRIP: NORMAL /HPF
REF LAB TEST METHOD: NORMAL
SP GR UR STRIP: 1.01 (ref 1–1.03)
SQUAMOUS #/AREA URNS HPF: NORMAL /HPF
TIBC SERPL-MCNC: 378 MCG/DL (ref 298–536)
TRANSFERRIN SERPL-MCNC: 254 MG/DL (ref 200–360)
UROBILINOGEN UR QL STRIP: NORMAL
VARIANT LYMPHS NFR BLD MANUAL: 52 % (ref 19.6–45.3)
WBC # UR STRIP: NORMAL /HPF
WBC MORPH BLD: NORMAL
WBC NRBC COR # BLD AUTO: 4.05 10*3/MM3 (ref 3.4–10.8)

## 2024-10-24 PROCEDURE — 25010000002 MIDAZOLAM PER 1MG: Performed by: RADIOLOGY

## 2024-10-24 PROCEDURE — 83540 ASSAY OF IRON: CPT

## 2024-10-24 PROCEDURE — 84466 ASSAY OF TRANSFERRIN: CPT

## 2024-10-24 PROCEDURE — 86335 IMMUNFIX E-PHORSIS/URINE/CSF: CPT | Performed by: PHYSICIAN ASSISTANT

## 2024-10-24 PROCEDURE — 36415 COLL VENOUS BLD VENIPUNCTURE: CPT

## 2024-10-24 PROCEDURE — 81001 URINALYSIS AUTO W/SCOPE: CPT

## 2024-10-24 PROCEDURE — 85025 COMPLETE CBC W/AUTO DIFF WBC: CPT | Performed by: RADIOLOGY

## 2024-10-24 PROCEDURE — 87086 URINE CULTURE/COLONY COUNT: CPT

## 2024-10-24 PROCEDURE — 25010000002 HYDROMORPHONE 1 MG/ML SOLUTION: Performed by: RADIOLOGY

## 2024-10-24 PROCEDURE — 85007 BL SMEAR W/DIFF WBC COUNT: CPT | Performed by: RADIOLOGY

## 2024-10-24 PROCEDURE — 77012 CT SCAN FOR NEEDLE BIOPSY: CPT

## 2024-10-24 RX ORDER — MIDAZOLAM HYDROCHLORIDE 2 MG/2ML
INJECTION, SOLUTION INTRAMUSCULAR; INTRAVENOUS AS NEEDED
Status: COMPLETED | OUTPATIENT
Start: 2024-10-24 | End: 2024-10-24

## 2024-10-24 RX ORDER — LIDOCAINE HYDROCHLORIDE 20 MG/ML
INJECTION, SOLUTION INFILTRATION; PERINEURAL
Status: DISPENSED
Start: 2024-10-24 | End: 2024-10-24

## 2024-10-24 RX ADMIN — HYDROMORPHONE HYDROCHLORIDE 0.5 MG: 1 INJECTION, SOLUTION INTRAMUSCULAR; INTRAVENOUS; SUBCUTANEOUS at 09:17

## 2024-10-24 RX ADMIN — MIDAZOLAM HYDROCHLORIDE 1 MG: 1 INJECTION, SOLUTION INTRAMUSCULAR; INTRAVENOUS at 09:32

## 2024-10-24 RX ADMIN — MIDAZOLAM HYDROCHLORIDE 1 MG: 1 INJECTION, SOLUTION INTRAMUSCULAR; INTRAVENOUS at 09:12

## 2024-10-25 ENCOUNTER — HOSPITAL ENCOUNTER (OUTPATIENT)
Dept: SLEEP MEDICINE | Facility: HOSPITAL | Age: 64
Discharge: HOME | End: 2024-10-25
Admitting: FAMILY MEDICINE
Payer: MEDICARE

## 2024-10-25 DIAGNOSIS — T50.905A CENTRAL SLEEP APNEA DUE TO DRUG: ICD-10-CM

## 2024-10-25 DIAGNOSIS — G47.33 OBSTRUCTIVE SLEEP APNEA, ADULT: ICD-10-CM

## 2024-10-25 DIAGNOSIS — G47.31 CENTRAL SLEEP APNEA DUE TO DRUG: ICD-10-CM

## 2024-10-25 DIAGNOSIS — Z79.891 LONG TERM PRESCRIPTION OPIATE USE: ICD-10-CM

## 2024-10-25 LAB
BACTERIA SPEC AEROBE CULT: NO GROWTH
Lab: NORMAL

## 2024-10-25 PROCEDURE — 95811 POLYSOM 6/>YRS CPAP 4/> PARM: CPT

## 2024-10-28 LAB — INTERPRETATION UR IFE-IMP: NORMAL

## 2024-10-31 ENCOUNTER — TELEMEDICINE (OUTPATIENT)
Dept: PSYCHIATRY | Facility: CLINIC | Age: 64
End: 2024-10-31
Payer: MEDICARE

## 2024-10-31 DIAGNOSIS — F41.1 GENERALIZED ANXIETY DISORDER: Primary | ICD-10-CM

## 2024-10-31 DIAGNOSIS — F33.1 MAJOR DEPRESSIVE DISORDER, RECURRENT EPISODE, MODERATE: ICD-10-CM

## 2024-10-31 LAB — BLOOD OR BONE MARROW RESULT: NORMAL
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
## 2024-11-01 LAB
CYTOGENETICS RESULT: NORMAL
MDS TARGETGENE PANEL RESULT: NORMAL

## 2024-11-04 LAB
CYTO UR: NORMAL
LAB AP CASE REPORT: NORMAL
LAB AP CLINICAL INFORMATION: NORMAL
LAB AP SPECIAL STAINS: NORMAL
PATH REPORT.FINAL DX SPEC: NORMAL
PATH REPORT.GROSS SPEC: NORMAL

## 2024-11-05 ENCOUNTER — TELEPHONE (OUTPATIENT)
Dept: SLEEP MEDICINE | Facility: HOSPITAL | Age: 64
End: 2024-11-05
Payer: MEDICARE

## 2024-11-08 ENCOUNTER — OFFICE VISIT (OUTPATIENT)
Dept: ONCOLOGY | Facility: HOSPITAL | Age: 64
End: 2024-11-08
Payer: MEDICARE

## 2024-11-08 VITALS
HEART RATE: 67 BPM | OXYGEN SATURATION: 97 % | WEIGHT: 214.51 LBS | SYSTOLIC BLOOD PRESSURE: 142 MMHG | BODY MASS INDEX: 30.03 KG/M2 | RESPIRATION RATE: 18 BRPM | TEMPERATURE: 98.2 F | HEIGHT: 71 IN | DIASTOLIC BLOOD PRESSURE: 77 MMHG

## 2024-11-08 DIAGNOSIS — D50.8 OTHER IRON DEFICIENCY ANEMIA: Primary | ICD-10-CM

## 2024-11-08 DIAGNOSIS — D69.6 THROMBOCYTOPENIA: ICD-10-CM

## 2024-11-08 DIAGNOSIS — N18.32 STAGE 3B CHRONIC KIDNEY DISEASE: ICD-10-CM

## 2024-11-08 NOTE — PROGRESS NOTES
Chief Complaint/Reason for Referral:   FOLLOW UP 2    Paige Florez*  Paige Florez MD    Records Obtained:  Records of the patients history including those obtained from [facility] EMR were reviewed and summarized in detail.    Subjective    History of Present Illness    Stephon Castellano presents to [facility] for Normocytic Anemia    Patient is a 65 yo M with PMH of alcohol abuse, substance abuse [id number], CKD stage 3, MUNIRA, anemia,   atrial fibrillation in 2023 presenting for follow up for anemia. Follows with nephrology.  Reports daily fatigue that is significant. Reports lightheadedness. Denies any bleeding.  No fevers or chills or infections.  Recent colonoscopy and EGD with benign findings. Recent bone marrow performed. Tolerated well. Marrow results discussed with patient. They were normal. Patient remains fatigued.       Hematology History  2/13/24: Hgb 10.4  8/7/24: Hgb 9.8, MCV 99, plt 140, WBC 3.84, ferritin 483, iron sat 28, B12 362, folate 14, , hapto normal, copper 76, zinc 75, SPEP without M-spike, kappa/lambda FLC ratio 2 (wnl for his GFR)  8/13/24: EGD and c-scope: H pylori negative, benign findings.  10/24/24: Bone marrow biopsy: Normocellular marrow for age with maturing trilineage hematopoiesis.  Storage iron is present.  Limited marrow tissue for evaluation.  Flow cytometry with normal level of blasts.  No significant immunophenotypic abnormalities detected.  Normal male karyotype.  MDS FISH panel was negative.  [email or web address] myeloid panel NGS testing is pending.  10/24/24: Hgb 11.7, iron sat 22%      Oncology/Hematology History    No history exists.     Review of Systems   Constitutional:  Positive for fatigue, fever (sweats) and unexpected weight loss (50 lbs in one year). Negative for appetite change, diaphoresis and unexpected weight gain.   HENT:  Negative for hearing loss, mouth sores, sore throat, swollen glands, trouble  swallowing and voice change.    Eyes:  Negative for blurred vision.   Respiratory:  Negative for cough, shortness of breath and wheezing.    Cardiovascular:  Negative for chest pain and palpitations.   Gastrointestinal:  Negative for abdominal pain, blood in stool, constipation, diarrhea, nausea and vomiting.   Endocrine: Negative for cold intolerance and heat intolerance.   Genitourinary:  Negative for difficulty urinating, dysuria, frequency, hematuria and urinary incontinence.   Musculoskeletal:  Positive for neck pain. Negative for arthralgias, back pain and myalgias.   Skin:  Negative for rash, skin lesions and wound.   Neurological:  Positive for dizziness and weakness. Negative for seizures, numbness and headache.   Hematological:  Does not bruise/bleed easily.   Psychiatric/Behavioral:  Negative for depressed mood. The patient is not nervous/anxious.    All other systems reviewed and are negative.    Current Outpatient Medications on File Prior to Visit   Medication Sig Dispense Refill    carboxymethylcellulose (REFRESH PLUS) 0.5 % solution Administer 2 drops to both eyes 3 (Three) Times a Day As Needed for Dry Eyes. 30 mL 12    FLUoxetine (PROzac) 10 MG capsule Take 1 capsule by mouth Daily. 90 capsule 1    FLUoxetine (PROzac) 20 MG capsule Take 1 capsule by mouth Daily. Start prozac 7/17 90 capsule 1    fluticasone (FLONASE) 50 MCG/ACT nasal spray 2 sprays into the nostril(s) as directed by provider Daily. 16 g 11    HYDROmorphone (DILAUDID) 1 MG/ML injection Infuse 0-2 mg/hr into a venous catheter.      IRON, FERROUS GLUCONATE, PO Take 65 mg by mouth Daily.      Lubricant Eye Drops PF 0.5 % solution       Lysine 1000 MG tablet Take  by mouth.      Magnesium 250 MG tablet Take  by mouth.      multivitamin with minerals tablet tablet Take 1 tablet by mouth Daily.      Omega 3-6-9 Fatty Acids (OMEGA 3-6-9 COMPLEX PO) Take  by mouth.      pain patient supplied pump by Intrathecal route Continuous.  Instructions are in drop boxes      Testosterone Cypionate (DEPOTESTOTERONE CYPIONATE) 200 MG/ML injection       Turmeric 500 MG capsule Take  by mouth.       No current facility-administered medications on file prior to visit.     No Known Allergies  Past Medical History:   Diagnosis Date    Alcohol abuse     Allergic rhinitis     Anemia 02/06/2024    My hand started just shaking but got worse until now i have no strength in my left hand    Anxiety 2006    Starting taking mood depression drugs    Arthritis     Asthma About 6 months    Sore Throat and was sent to ENT    Atrial fibrillation 2023    Benign prostatic hyperplasia 2015    Dad had Prostate Cancer and just did Colonoscopy and they said i have a major large Prostate    Cervical neck pain with evidence of disc disease     Chronic pain disorder     Colon polyp 2020    Had a lot of polyps on the last 2 Colonoscopies    CTS (carpal tunnel syndrome) 2005    Depression     Difficulty walking 2033    Erectile dysfunction 2017    I have been on Testosterone replacements since then    Head injury 1977    Headache May 2023    Started abou 6 months or more    Hemorrhoid     HL (hearing loss) 2004    Have a major tinitis/ringing in my ears especially the left one    Hyperlipidemia 2017    My blood test have stated they were high or elevated    Hypertension 2017    Given Flomax said it will probably go down    Hypogonadism in male     Insomnia     Kidney stone 2020    Went to doctor and got medication for them and still have issues with right side    Low back pain 2010    Stinosis of back & neck    Memory loss 2023    Movement disorder 2023    Sleep apnea 2007    Substance abuse [id number]    On Disulfiram for alcohol abuse-voluntary    Visual impairment 2023    Just recently have been given eye drops     Past Surgical History:   Procedure Laterality Date    CERVICAL DISC SURGERY  2008    COLONOSCOPY N/A 08/18/2023    Procedure: COLONOSCOPY WITH POLYPECTOMY/SNARE;   Surgeon: Jose Alejandro Fox MD;  Location: [facility] ENDOSCOPY;  Service: General;  Laterality: N/A;  COLON POLYPS    COLONOSCOPY N/A 2024    Procedure: COLONOSCOPY WITH POLYPECTOMIES;  Surgeon: Jose Alejandro Fox MD;  Location: [facility] ENDOSCOPY;  Service: General;  Laterality: N/A;  COLON POLYPS    ENDOSCOPY N/A 2024    Procedure: ESOPHAGOGASTRODUODENOSCOPY WITH BIOPSIES;  Surgeon: Jose Alejandro Fox MD;  Location: [facility] ENDOSCOPY;  Service: General;  Laterality: N/A;  GASTRITIS. SUPERFICIAL GASTRIC ULCERS    HEEL SPUR SURGERY      KNEE ACL RECONSTRUCTION      OTHER SURGICAL HISTORY      METAL IMPLANT    PAIN PUMP INSERTION/REVISION      SPINE SURGERY      Antieror c5-7 discotomy    TONSILLECTOMY  1965    VASECTOMY       Social History     Socioeconomic History    Marital status:    Tobacco Use    Smoking status: Former     Current packs/day: 0.00     Average packs/day: 2.0 packs/day for 8.0 years (16.0 ttl pk-yrs)     Types: Cigarettes     Start date: 1975     Quit date: 1983     Years since quittin.8     Passive exposure: Past    Smokeless tobacco: Former     Types: Snuff    Tobacco comments:     Also dipped for several years   Vaping Use    Vaping status: Never Used   Substance and Sexual Activity    Alcohol use: Not Currently     Alcohol/week: 14.0 standard drinks of alcohol     Types: 14 Shots of liquor per week     Comment: Stopped due to Disulfiram 250 mg    Drug use: Not Currently     Frequency: 7.0 times per week     Types: Marijuana    Sexual activity: Not Currently     Partners: Female     Birth control/protection: None, Vasectomy     Comment: Low testosterone     Family History   Problem Relation Age of Onset    Suicide Attempts Mother     Bipolar disorder Mother     Anxiety disorder Mother     Heart disease Mother     Osteoporosis Mother     Alcohol abuse Mother         Mother committed suicide    Depression Mother         She had major depression     Early death Mother         She committed suicide    Thyroid disease Mother         She had Thyroid issues and had surgery    Cancer Father         Prostate    Prostate cancer Father     Hearing loss Father         He has worn a hearing aids since his late 50’s    Depression Sister     Alcohol abuse Sister     Diabetes Sister         Diabetes    Arthritis Sister     Sleep apnea Sister     Obesity Sister     Thyroid disease Sister     Insomnia Sister     Narcolepsy Sister     Diabetes Brother     Stroke Other         AUNT/UNCLE GRANDPARENTS    Diabetes Other         GRANDPARENTS      Immunization History   Administered Date(s) Administered    COVID-19 (SHRAVAN) 03/10/2021    Covid-19 (Pfizer) Gray Cap Monovalent 01/28/2022    DTaP 03/29/2019    FluMist 2-49yrs (Nasal) 01/24/2005, 11/09/2005    Fluzone  >6mos 10/04/2011, 10/15/2015    Fluzone (or Fluarix & Flulaval for VFC) >6mos 09/25/2019, 01/25/2022, 03/15/2024    Fluzone High-Dose 65+YRS 09/18/2024    Fluzone Quad >6mos (Multi-dose) 10/01/2019    H1N1 Inj 12/16/2009    Hepatitis A 05/08/1995, 11/08/1999    Influenza Injectable Mdck Pf Quad 10/27/2020, 02/15/2023    Influenza Whole 12/03/2003, 10/12/2011    Influenza, Unspecified 10/01/2018, 10/27/2020, 02/15/2023    MMR 04/25/2003    Measles 05/02/1984    Meningococcal Polysaccharide 04/19/2002    OPV 10/03/1986    Plague 01/27/1986, 03/10/1986, 06/13/1989    Pneumococcal Conjugate 20-Valent (PCV20) 09/01/2022    Pneumococcal, Unspecified 09/01/2022    Rubella 05/02/1984    Shingrix 01/25/2022, 08/02/2022    Td (TDVAX) 11/08/1999    Tdap 09/28/2010, 03/29/2019    Typhoid, Unspecified 11/13/2001    Yellow Fever 05/13/1994    influenza Split 11/21/2001, 11/01/2002, 10/30/2007, 10/07/2009, 10/20/2009     Tobacco Use: Medium Risk (11/8/2024)    Patient History     Smoking Tobacco Use: Former     Smokeless Tobacco Use: Former     Passive Exposure: Past     Objective     Physical Exam  Constitutional:       Appearance:  "Normal appearance.   HENT:      Head: Normocephalic and atraumatic.      Nose: Nose normal.   Eyes:      Conjunctiva/sclera: Conjunctivae normal.   Pulmonary:      Effort: Pulmonary effort is normal.   Neurological:      General: No focal deficit present.      Mental Status: He is alert. Mental status is at baseline.   Psychiatric:         Mood and Affect: Mood normal.         Behavior: Behavior normal.         Thought Content: Thought content normal.       Vitals:    11/08/24 1024   BP: 142/77   Pulse: 67   Resp: 18   Temp: 98.2 °F (36.8 °C)   TempSrc: Temporal   SpO2: 97%   Weight: 97.3 kg (214 lb 8.1 oz)   Height: 180.3 cm (70.98\")   PainSc: 0-No pain         Wt Readings from Last 3 Encounters:   10/23/24 95.2 kg (209 lb 12.8 oz)   10/21/24 95.3 kg (210 lb)   10/18/24 95.5 kg (210 lb 9.6 oz)                ECOG: (0) Fully Active - Able to Carry On All Pre-disease Performance Without Restriction  Fall Risk Assessment was completed, and patient is at low risk for falls.  PHQ-9 Total Score:         The patient is  experiencing fatigue. Fatigue score: 9    PT/OT Functional Screening:   Speech Functional Screening:   Rehab to be ordered:         Result Review :  The following data was reviewed by: Ronen Solomon MD on 11/08/24:    RED BLOOD CELLs:  Lab Results   Component Value Date    RBC 3.77 (L) 10/24/2024    HGB 11.7 (L) 10/24/2024    HCT 36.7 (L) 10/24/2024    MCV 97.3 (H) 10/24/2024     (L) 10/24/2024      WHITE BLOOD CELLs:  Lab Results   Component Value Date    WBC 4.05 10/24/2024    NEUTROABS 1.56 (L) 10/24/2024    NEUTROABS 1.50 (L) 10/24/2024    LYMPHSABS 1.79 10/24/2024    MONOABS 0.41 10/24/2024    EOSABS 0.13 10/24/2024    EOSABS 0.04 10/24/2024    BASOSABS 0.03 10/24/2024     RBC EVALUATION (MICROCYTOSIS/NORMOCYTOSIS):  Lab Results   Component Value Date    RETIC 0.0344 08/07/2024    MCV 97.3 (H) 10/24/2024    IRON 84 10/24/2024    FERRITIN 483.70 (H) 08/07/2024    LABIRON 22 10/24/2024 " "   TIBC 378 10/24/2024    TRANSFERRIN 254 10/24/2024     No results found for: \"HEMOCHROMATO\"  HEMOLYSIS EVALUATION:  Lab Results   Component Value Date    MCV 97.3 (H) 10/24/2024     08/07/2024    HAPTOGLOBIN 110 08/07/2024    RETIC 0.0344 08/07/2024     Sed Rate   Date Value Ref Range Status   08/07/2024 1 0 - 20 mm/hr Final     C-Reactive Protein   Date Value Ref Range Status   02/28/2024 <0.30 0.00 - 0.50 mg/dL Final     MACROCYTOSIS EVALUATION:  Lab Results   Component Value Date    RETIC 0.0344 08/07/2024    MCV 97.3 (H) 10/24/2024    [id number] 695 08/07/2024    FOLATE 14.00 08/07/2024     RENAL FUNCTION TESTs:  Lab Results   Component Value Date    EGFR 36.8 (L) 08/07/2024    BUN 34 (H) 08/07/2024     LIVER FUNCTION TESTs:  Lab Results   Component Value Date    ALT 33 08/07/2024    AST 24 08/07/2024    BILITOT 0.2 08/07/2024    BILIDIR <10 09/20/2023    BILIDIR <10 09/20/2023    BILIDIR <10 09/20/2023    ALKPHOS 80 08/07/2024    PROTEINTOT 6.7 08/07/2024    ALBUMIN 4.4 08/07/2024    ALBUMIN 4.0 08/07/2024     GLUCOSE EVALUATION:  Lab Results   Component Value Date    GLUCOSE 92 08/07/2024    HGBA1C 5.40 07/26/2024     SERUM CHEMISTRIES:  Lab Results   Component Value Date     08/07/2024    K 4.4 08/07/2024     08/07/2024    CO2 32.3 (H) 08/07/2024    CALCIUM 9.4 08/07/2024     URINALYSIS:  Lab Results   Component Value Date    RBCUR Negative 01/02/2024    LEUKOCYTESUR Negative 10/24/2024    BILIRUBINUR Negative 10/24/2024    PHUR 6.0 10/24/2024    SPECGRAVUR 1.010 10/24/2024     THYROID FUNCTION TESTs:  TSH   Date Value Ref Range Status   07/26/2024 1.450 0.270 - 4.200 uIU/mL Final     Free T4   Date Value Ref Range Status   11/15/2023 0.90 (L) 0.93 - 1.70 ng/dL Final     T3, Free   Date Value Ref Range Status   09/20/2023 3.05 2.00 - 4.40 pg/mL Final     TUMOR MARKERS:  Lab Results   Component Value Date    PSA 0.6 11/14/2023     Free Lambda Light Chains   Date Value Ref Range Status "   08/07/2024 24.1 5.7 - 26.3 mg/L Final     IgA   Date Value Ref Range Status   08/07/2024 174 61 - 437 mg/dL Final     Psych note personally reviewed    Ortho note personally reviewed    Labs personally reviewed. Hgb better. Iron sat normal. Plts borderline low.     Bone marrow pathology personally reviewed         Assessment and Plan:  Diagnoses and all orders for this visit:    1. Other iron deficiency anemia (Primary)  -     CBC & Differential; Future  -     Ferritin; Future  -     Iron Profile; Future    2. Stage 3b chronic kidney disease    3. Thrombocytopenia          Anemia  Mild thrombocytopenia  CKD IIIB  EGD and colonoscopy on the 17th with benign findings, H. pylori negative.  Patient without any iron deficiency.  Ferritin actually elevated above 400.  Myeloma labs negative.  Hemolysis ruled out.  B12 and folate normal. Hgb as of 10/24/24 actually much improved to 11.7 with normal iron sat. Bone marrow biopsy on 10/24/24 was normal with normal cellularity, maturing trilineage monoparesis, no increase in blasts, normal male karyotype, normal MDS FISH panel.  [employer] NGS testing is pending.    Anemia could be from his chronic kidney disease in which case erythropoietin can be considered, however as hgb is now above 10.0 g/dL, which is threshold above which EPO is held. Plts borderline low, will need to monitor. Discussed that no full explanation can be provided for the drop in hgb but reassuringly it is much better.      Patient Follow Up: 4 months with iron studies      I spent 15 minutes caring for Stephon on this date of service. This time includes time spent by me in the following activities:preparing for the visit, reviewing tests, obtaining and/or reviewing a separately obtained history, performing a medically appropriate examination and/or evaluation , counseling and educating the patient/family/caregiver, ordering medications, tests, or procedures, documenting information in the medical record,  and independently interpreting results and communicating that information with the patient/family/caregiver    Patient was given instructions and counseling regarding his condition or for health maintenance advice. Please see specific information pulled into the AVS if appropriate.

## 2024-11-12 NOTE — PATIENT INSTRUCTIONS
1.  Please return to clinic at your next scheduled visit.  Contact the clinic ([phone]) at least 24 hours prior in the event you need to cancel.  2.  Do no harm to yourself or others.    3.  Avoid alcohol and drugs.    4.  Take all medications as prescribed.  Please contact the clinic with any concerns. If you are in need of medication refills, please call the clinic at [phone].    5. Should you want to get in touch with your provider, Dr. Penelope Davis, please utilize [facility] or contact the office ([phone]), and staff will be able to page Dr. Davis directly.  6.  In the event you have personal crisis, contact the following crisis numbers: Suicide Prevention Hotline 1-269.354.2211; GERMAINE Helpline 4-127-157-GERMAINE; [facility] Emergency Room [phone]; text HELLO to 039424; or 131.

## 2024-11-12 NOTE — PROGRESS NOTES
Subjective   Stephon Castellano is a 64 y.o. male who presents today for initial evaluation     Referring Provider:  No referring provider defined for this encounter.  [facility] endocrinologist    Chief Complaint:  depression    History of Present Illness:     04/30/2024: INITIAL VISIT Chart review:     Juan: Hydromorphone  Care Everywhere: a few non behavioral health notes, sees [facility]    Psychotropic medication chart review:  Present:  Trazodone 200 mg nightly  Cymbalta 60 mg a day    Previously:  Zoloft 50 mg a day    EKG: November 2023: 57, sinus, QTc 417  Procedures: Sleep study ordered for the future  Head imaging: November 2023 CT of the head shows no acute, MRI March 2023 shows no acute, empty sella configuration  Labs: March 2024: CMP shows creatinine 1.47, CK is elevated at 756, reassuring B12, hemoglobin is low at 11.5,  Initial Chart Review Notes: Seen by neurology in February for evaluation for seizures.  Patient notes that he is depressed and cannot sleep, depression began when he was in the [employer], his mother killed himself.  Depression began about 10 years ago.  Last year his problems became so severe that he quit his job.  He has not seen a psychiatrist for years.  Sleep medicine consult also ordered.      Chart Review By Dates:  11/13/2024: hand surg, onc.  10/04/2024: gen surg, hand surg, neur, ortho, unknown, urology,   08/22/2024: admitted for colonoscopy 8/13, gene surg, int med, nephro, onc, reassuring copper, zinc, hepatitis panel.  7/25/24: Sleep: settings recs, retaining per bladder scan.   07/10/24: urology, sleep med, gen surg, bladder scan 280 mL. Mirtazapine led to brain fog, fatigue, poor focus in am.  6/14/2024: Neurology, internal medicine.  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    Planning:  10/04/2024: Improving, still MDD, TRIP, increase prozac further.  08/22/2024: Improving, increase prozac for possible MDD.  07/25/2024: Stop  "wellbutrin, in 2 wks, hold trazodone and start seroquel for irritability, mood stability. Poor energy may be due to anemia. Mood improved, but irritable as well. Mom has hx of what he thinks was bipolar. Avoiding lithium 2/2 CKD.   07/10/2024: Mirtazapine led to brain fog, fatigue, poor focus in am. Stop it. Curry for marriage issues (individual therapy). Pt is not on duloxetine. Pt needs energizing meds. Start wellbutrin for a week, then add prozac.  06/14/2024: No change. Discussed reflective listening with his wife. Stop abilify; start mirtazapine.  4/30/24: R/O ADHD. Start abilify, Therapy? Wgt loss, consider mirtazapine, seroquel. 6w.    VISITS/APPOINTMENTS (BELOW):    \"Stephon\"  Stage 4 CKD (nephro 8/2024)  Tried bupropion long ago (for depression 10 years ago)  Mom may have been bipolar, committed SA  Low energy, motivation  It's been this way for some time.  Anemia, CKD, pain pump (dilaudid)  Mornings that are the worst    11/13/2024:   In person interview:  \"Pretty good. I started the therapy.\"  Still has low energy  Discussed wife's hoarding  Minor arguments  Hard to wear that mask (CPAP)  MDD: improved, possibly stable  TRIP: improving, less irritable  Panic attacks: stable  Energy: down chronically  Concentration: stable  Insomnia: initial, CPAP, 5-6 hours a night  Eating/Weight: 218, 206, 196, 191, 188, 182 lbs (goal is 190)  Refills: y  Substances: def  Therapy: [place], likes  Medication compliant: y  SE: n  No SI HI AVH.      10/04/2024:   In person interview:  \"I'm doing pretty good.\"  Low energy, motivation  It's been this way for some time.  Anemia, CKD, pain pump (dilaudid)  Mornings that are the worst  Wife: no blowouts or fights  MDD: depressed but it's not bothering me so much, mild  TRIP: mild, irritability  Panic attacks: stable  Energy: down chronically  Concentration: stable  Insomnia: initial, now stable on CPAP, 5-6 hours a night  Eating/Weight: 206, 196, 191, 188, 182 lbs (goal is " "190)  Refills: y  Substances: def  Therapy: never set up  Medication compliant: y  SE: n  No SI HI AVH.      08/22/2024:   In person interview:  \"I think the medicine is working good.\"  I thought you wanted me to stop the prozac  Still no energy, no motivation  It's been this way for some time.  Anemia, CKD, pain pump (dilaudid)  Mornings that are the worst  Less irritated  Less fighting with wife, \"we aren't arguing like we used to.\"  MDD: stable, possible anhedonia  TRIP: irritability improved  Panic attacks: stable  Energy: down chronically  Concentration: \"seems to be ok\"  Insomnia: initial, now stable on CPAP  Eating/Weight: 196, 191, 188, 182 lbs (goal is 190)  Refills: y  Substances: def  Therapy: never set up  Medication compliant: y  SE: n  No SI HI AVH.      07/25/2024: In person interview:  \"It made me really tired.\"  I'm wearing a brace on my right hand for CTS, driving a lot.  Wife broke her ankle in 3 places.  I'm more angry  Fatigue, but also \"doing everything\" for his wife  Discussed his marriage.  Fighting, baseline  MDD: now improved to stable, \"I haven't cried in a long time\"  ADHD:   Elementary school:   Grades? poor  Special classes or failures? Reading failed, failed 2nd grade  Got in trouble? no  Referral for ADHD testing? no  FHx: denies  Presently:  Problems with attention to detail, problems with sustained attention, problems listening when spoken to directly, failure to finish tasks, avoids tasks that require sustained mental effort, easily distracted, forgetting things, losing things, hard to organize, talks a lot and cutting people off, drifts off during conversations  TRIP: irritable, worse  Panic attacks: n  Energy: down  Concentration: not at goal intermittently  Insomnia: initial, now stable  Started CPAP, sleeping better  Eating/Weight: 191, 188, 182 lbs (goal is 190)  Refills: y  Substances: def  Therapy: forgot to call them back  Medication compliant: y  SE: n  No SI HI " "AVH.      7/10/24: In person interview:  \"It made me really tired.\"  Exacerbated the fatigue.  I have no energy, I can't go out there to cut the grass.  No medication has ever helped me for depression. \"I've been depressed for the last 10 years.\"  Discussed his marriage.  Tried talking to her  Tried asking her to go   MDD: no change  TRIP: irritable still, no change  Panic attacks: n  Energy: down  Concentration: down  Insomnia: initial, persistent  Eating/Weight: 188, 182 lbs  Refills: y  Substances: def  Therapy: n  Medication compliant: y  SE: n  No SI HI AVH.      06/14/2024: In person interview:  \"I can't tell any difference.\"  I still can't eat, you basically have to be a vegetarian for kidney disease.  Weight loss related not to poor appetite, but to having to maintain a renal diet.  Discussed his marriage.  MDD: no change  TRIP: irritable still, no change  Panic attacks: n  Energy: down  Concentration: down  Insomnia: initial, persistent  Eating/Weight: 182 lbs  Refills: y  Substances: def  Therapy: n  Medication compliant: y  SE: n  No SI HI AVH.      04/30/2024: In person.  Interview:  His/Her Story: \"[facility]  G14, P16  I've always had a little bit of depression.  But when I got injured, it got worse. \"I couldn't do things like I used to.\"  It's a combination of injuries, surgeries  Pain pump helps  Trazodone for 5 years  Cymbalta 5 years  Has never tried combinations, but has tried \"the gamut\" of them  Has lost 50 lbs in 3 mos, unsure why  Trouble focusing recently  Has chronic balance issues, no one knows why (cards, two neurologists, worked him up) x 3-4 mos  Depression/Mood:  Depressed mood, anhedonia, poor energy, poor concentration, weight loss, insomnia.  Seasonal pattern: def  Severity: Moderate  Duration: all his life  Anxiety:  Uncontrolled worrying, muscle tension, fatigue, poor concentration, feeling on edge, irritability, insomnia.  Severity: Moderate  Duration: all of his " life  Panic attacks: n  Psych ROS: Positive for depression, anxiety.  Negative for psychosis and rebecca.  ADHD: def  PTSD: no life threatening trauma  No SI HI AVH.  Medication compliant:      Access to Firearms: yes, locked away    PHQ-9 Depression Screening  PHQ-9 Total Score:      Little interest or pleasure in doing things?     Feeling down, depressed, or hopeless?     Trouble falling or staying asleep, or sleeping too much?     Feeling tired or having little energy?     Poor appetite or overeating?     Feeling bad about yourself - or that you are a failure or have let yourself or your family down?     Trouble concentrating on things, such as reading the newspaper or watching television?     Moving or speaking so slowly that other people could have noticed? Or the opposite - being so fidgety or restless that you have been moving around a lot more than usual?     Thoughts that you would be better off dead, or of hurting yourself in some way?     PHQ-9 Total Score       TRIP-7       Past Surgical History:  Past Surgical History:   Procedure Laterality Date    CERVICAL DISC SURGERY  2008    COLONOSCOPY N/A 08/18/2023    Procedure: COLONOSCOPY WITH POLYPECTOMY/SNARE;  Surgeon: Jose Alejandro Fox MD;  Location: [facility] ENDOSCOPY;  Service: General;  Laterality: N/A;  COLON POLYPS    COLONOSCOPY N/A 8/13/2024    Procedure: COLONOSCOPY WITH POLYPECTOMIES;  Surgeon: Jose Alejandro Fox MD;  Location: [facility] ENDOSCOPY;  Service: General;  Laterality: N/A;  COLON POLYPS    ENDOSCOPY N/A 8/13/2024    Procedure: ESOPHAGOGASTRODUODENOSCOPY WITH BIOPSIES;  Surgeon: Jose Alejandro Fox MD;  Location: [facility] ENDOSCOPY;  Service: General;  Laterality: N/A;  GASTRITIS. SUPERFICIAL GASTRIC ULCERS    HEEL SPUR SURGERY  2005    KNEE ACL RECONSTRUCTION  2004    OTHER SURGICAL HISTORY      METAL IMPLANT    PAIN PUMP INSERTION/REVISION  2010    SPINE SURGERY  2008    Antieror c5-7 discotomy    TONSILLECTOMY  1965    VASECTOMY  2002        Problem List:  Patient Active Problem List   Diagnosis    Chronic right-sided thoracic back pain    Kidney stone    Screening due    Allergic rhinitis    Arthritis    Cervical neck pain with evidence of disc disease    Depression    Head injury    Hemorrhoid    Hypogonadism in male    Right wrist pain    Weight loss    Abdominal pain    Seizure-like activity    Palpitations    Abnormal EKG    Bradycardia    Brain fog    Fatigue    Acute kidney injury    Tremor    Slow transit constipation    Weakness of both lower extremities    Left hand weakness    Drug induced myoclonus    Insomnia due to other mental disorder    Obstructive sleep apnea syndrome    Cervical radiculopathy    Iron deficiency anemia    Gastroesophageal reflux disease with esophagitis without hemorrhage    History of colonic polyps    Benign prostatic hyperplasia with urinary retention    BPH without obstruction/lower urinary tract symptoms    Benign prostatic hyperplasia with lower urinary tract symptoms    Acute gastric ulcer without hemorrhage or perforation       Allergy:   No Known Allergies     Discontinued Medications:  Medications Discontinued During This Encounter   Medication Reason    FLUoxetine (PROzac) 20 MG capsule Reorder    FLUoxetine (PROzac) 10 MG capsule Reorder                 Current Medications:   Current Outpatient Medications   Medication Sig Dispense Refill    carboxymethylcellulose (REFRESH PLUS) 0.5 % solution Administer 2 drops to both eyes 3 (Three) Times a Day As Needed for Dry Eyes. 30 mL 12    FLUoxetine (PROzac) 10 MG capsule Take 1 capsule by mouth Daily. 90 capsule 1    FLUoxetine (PROzac) 20 MG capsule Take 1 capsule by mouth Daily. Start prozac 7/17 90 capsule 1    fluticasone (FLONASE) 50 MCG/ACT nasal spray 2 sprays into the nostril(s) as directed by provider Daily. 16 g 11    HYDROmorphone (DILAUDID) 1 MG/ML injection Infuse 0-2 mg/hr into a venous catheter.      IRON, FERROUS GLUCONATE, PO Take 65 mg by  mouth Daily.      Lubricant Eye Drops PF 0.5 % solution       Lysine 1000 MG tablet Take  by mouth.      Magnesium 250 MG tablet Take  by mouth.      multivitamin with minerals tablet tablet Take 1 tablet by mouth Daily.      Omega 3-6-9 Fatty Acids (OMEGA 3-6-9 COMPLEX PO) Take  by mouth.      pain patient supplied pump by Intrathecal route Continuous. Instructions are in drop boxes      Testosterone Cypionate (DEPOTESTOTERONE CYPIONATE) 200 MG/ML injection       Turmeric 500 MG capsule Take  by mouth.      QUEtiapine (SEROquel) 50 MG tablet Take 1 tablet by mouth Every Night. 90 tablet 1     No current facility-administered medications for this visit.       Past Medical History:  Past Medical History:   Diagnosis Date    Alcohol abuse     Allergic rhinitis     Anemia 02/06/2024    My hand started just shaking but got worse until now i have no strength in my left hand    Anxiety 2006    Starting taking mood depression drugs    Arthritis     Asthma About 6 months    Sore Throat and was sent to ENT    Atrial fibrillation 2023    Benign prostatic hyperplasia 2015    Dad had Prostate Cancer and just did Colonoscopy and they said i have a major large Prostate    Cervical neck pain with evidence of disc disease     Chronic pain disorder     Colon polyp 2020    Had a lot of polyps on the last 2 Colonoscopies    CTS (carpal tunnel syndrome) 2005    Depression     Difficulty walking 2033    Erectile dysfunction 2017    I have been on Testosterone replacements since then    Head injury 1977    Headache May 2023    Started abou 6 months or more    Hemorrhoid     HL (hearing loss) 2004    Have a major tinitis/ringing in my ears especially the left one    Hyperlipidemia 2017    My blood test have stated they were high or elevated    Hypertension 2017    Given Flomax said it will probably go down    Hypogonadism in male     Insomnia     Kidney stone 2020    Went to doctor and got medication for them and still have issues with  "right side    Low back pain 2010    Stinosis of back & neck    Memory loss     Movement disorder     Sleep apnea 2007    Substance abuse [id number]    On Disulfiram for alcohol abuse-voluntary    Visual impairment     Just recently have been given eye drops       Past Psychiatric History:  Began Treatment: a year   Diagnoses: TRIP, insomnia, MDD  Psychiatrist: a year   Therapist: a year   Admission History:Denies  Medication Trials:    multiple    Self Harm: Denies  Suicide Attempts:Denies      Substance Abuse History:   Types: end of  stopped drinking using antabuse, former smoker 2ppd  Withdrawal Symptoms:Denies  Longest Period Sober: 6 mos, recently  AA: Not applicable     Social History:  Martial Status:  Employed: retired 22  Kids:Yes  House:Lives in a house   History: [employer], retired    Social History     Socioeconomic History    Marital status:    Tobacco Use    Smoking status: Former     Current packs/day: 0.00     Average packs/day: 2.0 packs/day for 8.0 years (16.0 ttl pk-yrs)     Types: Cigarettes     Start date: 1975     Quit date: 1983     Years since quittin.8     Passive exposure: Past    Smokeless tobacco: Former     Types: Snuff    Tobacco comments:     Also dipped for several years   Vaping Use    Vaping status: Never Used   Substance and Sexual Activity    Alcohol use: Not Currently     Alcohol/week: 14.0 standard drinks of alcohol     Types: 14 Shots of liquor per week     Comment: Stopped due to Disulfiram 250 mg    Drug use: Not Currently     Frequency: 7.0 times per week     Types: Marijuana    Sexual activity: Not Currently     Partners: Female     Birth control/protection: None, Vasectomy     Comment: Low testosterone       Family History:   Suicide Attempts:  Mom  Suicide Completions: mom  \"I think she had bipolar but I'm not sure\"      Family History   Problem Relation Age of Onset    Suicide Attempts Mother     Bipolar " "disorder Mother     Anxiety disorder Mother     Heart disease Mother     Osteoporosis Mother     Alcohol abuse Mother         Mother committed suicide    Depression Mother         She had major depression    Early death Mother         She committed suicide    Thyroid disease Mother         She had Thyroid issues and had surgery    Cancer Father         Prostate    Prostate cancer Father     Hearing loss Father         He has worn a hearing aids since his late 50’s    Depression Sister     Alcohol abuse Sister     Diabetes Sister         Diabetes    Arthritis Sister     Sleep apnea Sister     Obesity Sister     Thyroid disease Sister     Insomnia Sister     Narcolepsy Sister     Diabetes Brother     Stroke Other         AUNT/UNCLE GRANDPARENTS    Diabetes Other         GRANDPARENTS        Developmental History:       Childhood: saw mom and dad fight a lot, they , brother  when he was 5 yo.   High School:Completed  College: [employer]    Mental Status Exam  Appearance  : groomed, good eye contact, normal street clothes  Behavior  : pleasant and cooperative  Motor  : No abnormal  Speech  : talkative, normal rhythm, rate, volume, tone, not hyperverbal, not pressured, normal prosidy  Mood  : \"Pretty good except my wife's hoarding\"  Affect  : euthymic, mood congruent, good variability  Thought Content  : negative suicidal ideations, negative homicidal ideations, negative obsessions  Perceptions  : negative auditory hallucinations, negative visual hallucinations  Thought Process  : linear  Insight/Judgement  : Fair/fair  Cognition  : grossly intact  Attention   : intact      Review of Systems:  Review of Systems   Constitutional:  Positive for fatigue. Negative for diaphoresis.   HENT:  Negative for drooling.    Eyes:  Negative for visual disturbance.   Respiratory:  Negative for cough, chest tightness and shortness of breath.    Cardiovascular:  Negative for chest pain, palpitations and leg swelling. " "  Gastrointestinal:  Positive for constipation. Negative for abdominal pain, diarrhea, nausea and vomiting.   Endocrine: Positive for cold intolerance and heat intolerance.   Genitourinary:  Negative for difficulty urinating.   Musculoskeletal:  Negative for joint swelling.   Allergic/Immunologic: Negative for immunocompromised state.   Neurological:  Positive for dizziness and light-headedness. Negative for seizures, speech difficulty, numbness and headaches.   Psychiatric/Behavioral:  Positive for agitation and sleep disturbance.        Physical Exam:  Physical Exam    Vital Signs:   /62   Pulse 69   Ht 180.3 cm (70.98\")   Wt 98.9 kg (218 lb)   BMI 30.42 kg/m²      Lab Results:   Hospital Outpatient Visit on 10/24/2024   Component Date Value Ref Range Status    WBC 10/24/2024 4.05  3.40 - 10.80 10*3/mm3 Final    RBC 10/24/2024 3.77 (L)  4.14 - 5.80 10*6/mm3 Final    Hemoglobin 10/24/2024 11.7 (L)  13.0 - 17.7 g/dL Final    Hematocrit 10/24/2024 36.7 (L)  37.5 - 51.0 % Final    MCV 10/24/2024 97.3 (H)  79.0 - 97.0 fL Final    MCH 10/24/2024 31.0  26.6 - 33.0 pg Final    MCHC 10/24/2024 31.9  31.5 - 35.7 g/dL Final    RDW 10/24/2024 12.6  12.3 - 15.4 % Final    RDW-SD 10/24/2024 45.0  37.0 - 54.0 fl Final    MPV 10/24/2024 9.4  6.0 - 12.0 fL Final    Platelets 10/24/2024 136 (L)  140 - 450 10*3/mm3 Final    Neutrophil % 10/24/2024 38.6 (L)  42.7 - 76.0 % Final    Lymphocyte % 10/24/2024 44.2  19.6 - 45.3 % Final    Monocyte % 10/24/2024 13.1 (H)  5.0 - 12.0 % Final    Eosinophil % 10/24/2024 3.2  0.3 - 6.2 % Final    Basophil % 10/24/2024 0.7  0.0 - 1.5 % Final    Immature Grans % 10/24/2024 0.2  0.0 - 0.5 % Final    Neutrophils, Absolute 10/24/2024 1.56 (L)  1.70 - 7.00 10*3/mm3 Final    Lymphocytes, Absolute 10/24/2024 1.79  0.70 - 3.10 10*3/mm3 Final    Monocytes, Absolute 10/24/2024 0.53  0.10 - 0.90 10*3/mm3 Final    Eosinophils, Absolute 10/24/2024 0.13  0.00 - 0.40 10*3/mm3 Final    Basophils, " Absolute 10/24/2024 0.03  0.00 - 0.20 10*3/mm3 Final    Immature Grans, Absolute 10/24/2024 0.01  0.00 - 0.05 10*3/mm3 Final    Neutrophil % 10/24/2024 37.0 (L)  42.7 - 76.0 % Final    Lymphocyte % 10/24/2024 52.0 (H)  19.6 - 45.3 % Final    Monocyte % 10/24/2024 10.0  5.0 - 12.0 % Final    Eosinophil % 10/24/2024 1.0  0.3 - 6.2 % Final    Neutrophils Absolute 10/24/2024 1.50 (L)  1.70 - 7.00 10*3/mm3 Final    Lymphocytes Absolute 10/24/2024 2.11  0.70 - 3.10 10*3/mm3 Final    Monocytes Absolute 10/24/2024 0.41  0.10 - 0.90 10*3/mm3 Final    Eosinophils Absolute 10/24/2024 0.04  0.00 - 0.40 10*3/mm3 Final    Anisocytosis 10/24/2024 Slight/1+  None Seen Final    WBC Morphology 10/24/2024 Normal  Normal Final    Platelet Morphology 10/24/2024 Normal  Normal Final    Case Report 10/24/2024    Preliminary                    Value:Surgical Pathology Report                         Case: [id number]                                  Authorizing Provider:  Ronen Solomon MD Collected:           10/24/2024 09:32 AM          Ordering Location:     [facility]   Received:            10/24/2024 10:27 AM          Pathologist:           Stephy Vallejo MD                                                     Specimens:   1) - Iliac Crest, Left - Aspirate, CT/BONE MARROW ASPIRATION                                        2) - Iliac Crest, Left - Biopsy, CT/BONE MARROW BIOPSY                                              3) - Blood, Venous                                                                         Clinical Information 10/24/2024    Preliminary                    Value:Normocytic anemia      Final Diagnosis 10/24/2024    Preliminary                    Value:1-2:  Bone marrow (left iliac crest), aspirate smears, clot section, and core biopsy.   -  Normocellular marrow for age (30-40%) with maturing trilineage hematopoiesis.  -  Aspicular hemodilute aspirate smear with limited marrow tissue for  "evaluation.  - Storage iron is present.    3:  Peripheral blood (CBC and smear):   - The red cells show mild anisopoikilocytosis   - Normal white cell count with relative neutropenia and relatively increased lymphoid and monocytic cells   - Mild thrombocytopenia    Comment:  The above diagnosis was rendered in expert consultation by Kate Diego MD , [facility] . See separate consultation report for additional information.       Comment:  The following tests were performed at reference laboratory on the bone marrow aspirate. See separate reference laboratory reports for additional information.       Flow cytometry:  1) Myeloid blasts are not relatively increased (1% of analyzed cells) but show partial aberrant CD7 expression. See                           comment.  2) No other significant immunophenotypic abnormalities detected.     Cytogenetics: 46,XY[20]   Normal Male Karyotype     MDS FISH panel: No assay specific abnormalities detected by MDS Panel     IntelliGEN(R) myeloid panel: Pending        Gross Description 10/24/2024    Preliminary                    Value:1. Iliac Crest, Left - Aspirate.  The specimen is received in 1 formalin filled container labeled \" CT/bone marrow aspiration\" and consists of a 1.5 cm aggregate of coagulated blood.  The specimen is entirely submitted in 1 cassette.    2. Iliac Crest, Left - Biopsy.  The specimen is received in 1 formalin filled container labeled \" CT/bone marrow biopsy\" and consists of a 1.0 cm tan, cylindrical, hemorrhagic bone marrow biopsy.  The specimen is entirely submitted in 1 cassette following decalcification.   JULIETTE  3. Blood, Venous.  One (1) Salcido-stained peripheral blood smear is received for evaluation. The smear is accompanied by a CBC analysis report and marked as a physician/advanced practice clinician review.            Special Stains 10/24/2024    Preliminary                    Value:The following immunoperoxidase stains were " performed at [facility] and support the above diagnosis: CD3 and CD20.    All immunohistochemical/cytochemical stains (IHC) are performed on separate slides per different antibody unless otherwise specified in the documentation that a cocktail (multiple stain) was performed.  Controls are appropriate.        Microscopic Description 10/24/2024    Preliminary                    Value:Microscopic examination performed.      Consult Global Result 10/24/2024 Comment^Text^TXT   Final    Bone Marrow, Lt Iliac Crest:  1) Myeloid blasts are not relatively increased (1% of   analyzed cells) but show partial aberrant CD7 expression.   See comment.  2) No other significant immunophenotypic abnormalities   detected.  DISCLAIMER: REFER TO HARDCOPY OR PDF FOR COMPLETE RESULT.   If synopsis provided, clinical decisions should not be   based on this interfaced synopsis alone.  Performed at:  [facility]  [address], [place] [address]  :  Clary Pickett M.D., Ph.D., Phone:  [phone]    Blood or Bone Marrow Result 10/24/2024 Comment^Text^TXT   Final    BONE MARROW, BIOPSY AND ASPIRATION:  1)  Normocellular marrow for age (30-40%) with maturing   trilineage hematopoiesis.  2)  Aspicular hemodilute aspirate smear with limited marrow   tissue for evaluation.  3)  Storage iron is present.  See comments.  DISCLAIMER: REFER TO HARDCOPY OR PDF FOR COMPLETE RESULT.   If synopsis provided, clinical decisions should not be   based on this interfaced synopsis alone.  Performed at:  1 - [facility]  [address], [place] [address]  :  Clary Pickett M.D., Ph.D., Phone:  [phone]    Cytogenetics Result 10/24/2024 Comment^Text^TXT   Final    46,XY[20]  Normal Male Karyotype  DISCLAIMER: REFER TO HARDCOPY OR PDF FOR COMPLETE RESULT.   If synopsis provided, clinical decisions should not be   based on  this interfaced synopsis alone.  Performed at:  1 - [facility]  [address], [place] [id number]  :  Clary Pickett M.D., Ph.D., Phone:  [phone]    MDS TargetGene Panel Result 10/24/2024 Comment^Text^TXT   Final    Result: No assay specific abnormalities detected by MDS   Panel  DISCLAIMER: REFER TO HARDCOPY OR PDF FOR COMPLETE RESULT.   If synopsis provided, clinical decisions should not be   based on this interfaced synopsis alone.  Performed at:  1 - [facility]  [address], [place]  [address]  :  Clary Pickett M.D., Ph.D., Phone:  [phone]   Lab on 10/24/2024   Component Date Value Ref Range Status    Urine Culture 10/24/2024 No growth   Final    Iron 10/24/2024 84  59 - 158 mcg/dL Final    Iron Saturation (TSAT) 10/24/2024 22  20 - 50 % Final    Transferrin 10/24/2024 254  200 - 360 mg/dL Final    TIBC 10/24/2024 378  298 - 536 mcg/dL Final    Color, UA 10/24/2024 Yellow  Yellow, Straw Final    Appearance, UA 10/24/2024 Clear  Clear Final    pH, UA 10/24/2024 6.0  5.0 - 8.0 Final    Specific Gravity, UA 10/24/2024 1.010  1.005 - 1.030 Final    Glucose, UA 10/24/2024 Negative  Negative Final    Ketones, UA 10/24/2024 Negative  Negative Final    Bilirubin, UA 10/24/2024 Negative  Negative Final    Blood, UA 10/24/2024 Negative  Negative Final    Protein, UA 10/24/2024 Negative  Negative Final    Leuk Esterase, UA 10/24/2024 Negative  Negative Final    Nitrite, UA 10/24/2024 Negative  Negative Final    Urobilinogen, UA 10/24/2024 0.2 E.U./dL  0.2 - 1.0 E.U./dL Final    RBC, UA 10/24/2024 0-2  None Seen, 0-2 /HPF Final    WBC, UA 10/24/2024 0-2  None Seen, 0-2 /HPF Final    Bacteria, UA 10/24/2024 None Seen  None Seen /HPF Final    Squamous Epithelial Cells, UA 10/24/2024 0-2  None Seen, 0-2 /HPF Final    Hyaline Casts, UA 10/24/2024 None Seen  None Seen /LPF Final    Methodology  10/24/2024 Automated Microscopy   Final   Admission on 08/13/2024, Discharged on 08/13/2024   Component Date Value Ref Range Status    Case Report 08/13/2024    Final                    Value:Surgical Pathology Report                         Case: [id number]                                  Authorizing Provider:  Jose Alejandro Fox MD      Collected:           08/13/2024 09:56 AM          Ordering Location:     [facility] Received:            08/13/2024 11:06 AM                                 SUITES                                                                       Pathologist:           Stephy Vallejo MD                                                     Specimens:   1) - Large Intestine, Hepatic Flexure, HEPATIC FLEXURE POLYPS                                       2) - Small Intestine, Duodenum, DUODENUM BX                                                         3) - Gastric, Antrum, ANTRUM BX                                                                     4) - GE Junction, GE JUNCTION BX                                                                    5) - Esophagus, Mid, MID ESOPHAGUS BX                                                      Clinical Information 08/13/2024    Final                    Value:This result contains rich text formatting which cannot be displayed here.    Final Diagnosis 08/13/2024    Final                    Value:This result contains rich text formatting which cannot be displayed here.    Gross Description 08/13/2024    Final                    Value:This result contains rich text formatting which cannot be displayed here.    Microscopic Description 08/13/2024    Final                    Value:This result contains rich text formatting which cannot be displayed here.   Consult on 08/07/2024   Component Date Value Ref Range Status    Folate 08/07/2024 14.00  4.78 - 24.20 ng/mL Final    Vitamin B-12 08/07/2024 695  211 - 946 pg/mL Final    Glucose  08/07/2024 92  65 - 99 mg/dL Final    BUN 08/07/2024 34 (H)  8 - 23 mg/dL Final    Creatinine 08/07/2024 1.99 (H)  0.76 - 1.27 mg/dL Final    Sodium 08/07/2024 140  136 - 145 mmol/L Final    Potassium 08/07/2024 4.4  3.5 - 5.2 mmol/L Final    Chloride 08/07/2024 102  98 - 107 mmol/L Final    CO2 08/07/2024 32.3 (H)  22.0 - 29.0 mmol/L Final    Calcium 08/07/2024 9.4  8.6 - 10.5 mg/dL Final    Total Protein 08/07/2024 6.7  6.0 - 8.5 g/dL Final    Albumin 08/07/2024 4.4  3.5 - 5.2 g/dL Final    ALT (SGPT) 08/07/2024 33  1 - 41 U/L Final    AST (SGOT) 08/07/2024 24  1 - 40 U/L Final    Alkaline Phosphatase 08/07/2024 80  39 - 117 U/L Final    Total Bilirubin 08/07/2024 0.2  0.0 - 1.2 mg/dL Final    Globulin 08/07/2024 2.3  gm/dL Final    A/G Ratio 08/07/2024 1.9  g/dL Final    BUN/Creatinine Ratio 08/07/2024 17.1  7.0 - 25.0 Final    Anion Gap 08/07/2024 5.7  5.0 - 15.0 mmol/L Final    eGFR 08/07/2024 36.8 (L)  >60.0 mL/min/1.73 Final    Occult Blood, Fecal by Immunoassay 08/08/2024 Positive (A)  Negative Final    Reticulocyte % 08/07/2024 1.09  0.70 - 1.90 % Final    Reticulocyte Absolute 08/07/2024 0.0344  0.0200 - 0.1300 10*6/mm3 Final    LDH 08/07/2024 173  135 - 225 U/L Final    Haptoglobin 08/07/2024 110  30 - 200 mg/dL Final    Iron 08/07/2024 101  59 - 158 mcg/dL Final    Iron Saturation (TSAT) 08/07/2024 28  20 - 50 % Final    Transferrin 08/07/2024 243  200 - 360 mg/dL Final    TIBC 08/07/2024 362  298 - 536 mcg/dL Final    Ferritin 08/07/2024 483.70 (H)  30.00 - 400.00 ng/mL Final    Pathology Review 08/07/2024 Yes   Final    Sed Rate 08/07/2024 1  0 - 20 mm/hr Final    Hepatitis B Surface Ag 08/07/2024 Non-Reactive  Non-Reactive Final    Hep A IgM 08/07/2024 Non-Reactive  Non-Reactive Final    Hep B C IgM 08/07/2024 Non-Reactive  Non-Reactive Final    Hepatitis C Ab 08/07/2024 Non-Reactive  Non-Reactive Final    Zinc 08/07/2024 75  44 - 115 ug/dL Final                                    Detection Limit = 5     Copper 08/07/2024 76  69 - 132 ug/dL Final                                    Detection Limit = 5    Erythropoietin 08/07/2024 10.2  2.6 - 18.5 mIU/mL Final    [place] [employer] DxI 800 Immunoassay System  Values obtained with different assay methods or kits cannot be used  interchangeably. Results cannot be interpreted as absolute evidence  of the presence or absence of malignant disease.    Free Light Chain, [place] 08/07/2024 48.1 (H)  3.3 - 19.4 mg/L Final    Free Lambda Light Chains 08/07/2024 24.1  5.7 - 26.3 mg/L Final    Kappa/Lambda Ratio 08/07/2024 2.00 (H)  0.26 - 1.65 Final    IgG 08/07/2024 1043  603 - 1613 mg/dL Final    IgA 08/07/2024 174  61 - 437 mg/dL Final    IgM 08/07/2024 82  20 - 172 mg/dL Final    Total Protein 08/07/2024 6.7  6.0 - 8.5 g/dL Final    Albumin 08/07/2024 4.0  2.9 - 4.4 g/dL Final    Alpha-1-Globulin 08/07/2024 0.2  0.0 - 0.4 g/dL Final    Alpha-2-Globulin 08/07/2024 0.6  0.4 - 1.0 g/dL Final    Beta Globulin 08/07/2024 0.9  0.7 - 1.3 g/dL Final    Gamma Globulin 08/07/2024 1.0  0.4 - 1.8 g/dL Final    M-Walter 08/07/2024 Not Observed  Not Observed g/dL Final    Globulin 08/07/2024 2.7  2.2 - 3.9 g/dL Final    A/G Ratio 08/07/2024 1.5  0.7 - 1.7 Final    Immunofixation Reflex, Serum 08/07/2024 Comment   Final    No monoclonality detected.    Please note 08/07/2024 Comment   Final    Protein electrophoresis scan will follow via computer, mail, or   delivery.    Color, UA 08/07/2024 Yellow  Yellow, Straw Final    Appearance, UA 08/07/2024 Clear  Clear Final    pH, UA 08/07/2024 5.5  5.0 - 8.0 Final    Specific Gravity, UA 08/07/2024 1.009  1.005 - 1.030 Final    Glucose, UA 08/07/2024 Negative  Negative Final    Ketones, UA 08/07/2024 Negative  Negative Final    Bilirubin, UA 08/07/2024 Negative  Negative Final    Blood, UA 08/07/2024 Negative  Negative Final    Protein, UA 08/07/2024 Negative  Negative Final    Leuk Esterase, UA 08/07/2024 Negative  Negative Final     Nitrite, UA 08/07/2024 Negative  Negative Final    Urobilinogen, UA 08/07/2024 0.2 E.U./dL  0.2 - 1.0 E.U./dL Final    RBC, UA 08/07/2024 0-2  None Seen, 0-2 /HPF Final    WBC, UA 08/07/2024 0-2  None Seen, 0-2 /HPF Final    Bacteria, UA 08/07/2024 None Seen  None Seen /HPF Final    Squamous Epithelial Cells, UA 08/07/2024 0-2  None Seen, 0-2 /HPF Final    Hyaline Casts, UA 08/07/2024 None Seen  None Seen /LPF Final    Methodology 08/07/2024 Automated Microscopy   Final    WBC 08/07/2024 3.84  3.40 - 10.80 10*3/mm3 Final    RBC 08/07/2024 3.13 (L)  4.14 - 5.80 10*6/mm3 Final    Hemoglobin 08/07/2024 9.8 (L)  13.0 - 17.7 g/dL Final    Hematocrit 08/07/2024 31.0 (L)  37.5 - 51.0 % Final    MCV 08/07/2024 99.0 (H)  79.0 - 97.0 fL Final    MCH 08/07/2024 31.3  26.6 - 33.0 pg Final    MCHC 08/07/2024 31.6  31.5 - 35.7 g/dL Final    RDW 08/07/2024 13.4  12.3 - 15.4 % Final    RDW-SD 08/07/2024 49.2  37.0 - 54.0 fl Final    MPV 08/07/2024 9.8  6.0 - 12.0 fL Final    Platelets 08/07/2024 140  140 - 450 10*3/mm3 Final    Neutrophil % 08/07/2024 49.0  42.7 - 76.0 % Final    Lymphocyte % 08/07/2024 33.9  19.6 - 45.3 % Final    Monocyte % 08/07/2024 11.7  5.0 - 12.0 % Final    Eosinophil % 08/07/2024 4.9  0.3 - 6.2 % Final    Basophil % 08/07/2024 0.5  0.0 - 1.5 % Final    Immature Grans % 08/07/2024 0.0  0.0 - 0.5 % Final    Neutrophils, Absolute 08/07/2024 1.88  1.70 - 7.00 10*3/mm3 Final    Lymphocytes, Absolute 08/07/2024 1.30  0.70 - 3.10 10*3/mm3 Final    Monocytes, Absolute 08/07/2024 0.45  0.10 - 0.90 10*3/mm3 Final    Eosinophils, Absolute 08/07/2024 0.19  0.00 - 0.40 10*3/mm3 Final    Basophils, Absolute 08/07/2024 0.02  0.00 - 0.20 10*3/mm3 Final    Immature Grans, Absolute 08/07/2024 0.00  0.00 - 0.05 10*3/mm3 Final    HIV-1/ HIV-2 Ab 08/07/2024 Non-Reactive  Non-Reactive Final    HIV-1 P24 Ag Screen 08/07/2024 Non-Reactive  Non-Reactive Final    Neutrophil % 08/07/2024 46.0  42.7 - 76.0 % Final    Lymphocyte %  08/07/2024 26.0  19.6 - 45.3 % Final    Monocyte % 08/07/2024 10.0  5.0 - 12.0 % Final    Eosinophil % 08/07/2024 6.0  0.3 - 6.2 % Final    Bands %  08/07/2024 6.0 (H)  0.0 - 5.0 % Final    Metamyelocyte % 08/07/2024 4.0 (H)  0.0 - 0.0 % Final    Atypical Lymphocyte % 08/07/2024 2.0  0.0 - 5.0 % Final    Neutrophils Absolute 08/07/2024 2.00  1.70 - 7.00 10*3/mm3 Final    Lymphocytes Absolute 08/07/2024 1.08  0.70 - 3.10 10*3/mm3 Final    Monocytes Absolute 08/07/2024 0.38  0.10 - 0.90 10*3/mm3 Final    Eosinophils Absolute 08/07/2024 0.23  0.00 - 0.40 10*3/mm3 Final    Smudge Cells 08/07/2024 2.0  /100 WBC Final    Anisocytosis 08/07/2024 Slight/1+  None Seen Final    Macrocytes 08/07/2024 Slight/1+  None Seen Final    WBC Morphology 08/07/2024 Normal  Normal Final    Platelet Estimate 08/07/2024 Decreased  Normal Final    Final Diagnosis 08/07/2024    Final                    Value:This result contains rich text formatting which cannot be displayed here.    Clinical Information 08/07/2024    Final                    Value:This result contains rich text formatting which cannot be displayed here.    Gross Description 08/07/2024    Final                    Value:This result contains rich text formatting which cannot be displayed here.    Microscopic Description 08/07/2024    Final                    Value:This result contains rich text formatting which cannot be displayed here.    Case Report 08/07/2024    Final                    Value:Surgical Pathology Report                         Case: [id number]                                  Authorizing Provider:  Tommy Doshi PA-C        Collected:           08/07/2024 09:51 AM          Ordering Location:     [facility]     Received:            08/08/2024 08:55 AM                                 GROUP HEMATOLOGY &                                                                                  ONCOLOGY                                                                      Pathologist:           Amarilis Jamison DO                                                       Specimen:    Arm, Right                                                                                 Consult Global Result 08/07/2024 Comment^Text^TXT   Final    Peripheral Blood:  1) Mild relative increase in CD8+/CD57+ T cells   immunophenotypically compatible with T-cell large granular   lymphocytes/T-LGLs (5.1% of leukocytes). See comment.  2) No other significant immunophenotypic abnormalities   detected.  DISCLAIMER: REFER TO HARDCOPY OR PDF FOR COMPLETE RESULT.   If synopsis provided, clinical decisions should not be   based on this interfaced synopsis alone.  Performed at:  1 - [facility]  [address], [place] [id number]  :  Clary Pickett M.D., Ph.D., Phone:  [phone]    Total Protein, Urine 08/23/2024 <4.0  Not Estab. mg/dL Final    **Verified by repeat analysis**    Albumin, U 08/23/2024 17.7  % Final    Alpha-1-Globulin, U 08/23/2024 2.9  % Final    Alpha-2-Globulin, U 08/23/2024 11.6  % Final    Beta Globulin, U 08/23/2024 48.8  % Final    Gamma Globulin, Urine 08/23/2024 19.1  % Final    M-Walter 08/23/2024 Not Observed  Not Observed % Final    Please note 08/23/2024 Comment   Final    Protein electrophoresis scan will follow via computer, mail, or   delivery.    LAURA Interpretation:U 10/24/2024 Comment   Final    No monoclonality detected.   Office Visit on 07/26/2024   Component Date Value Ref Range Status    Glucose 07/26/2024 91  65 - 99 mg/dL Final    BUN 07/26/2024 30 (H)  8 - 23 mg/dL Final    Creatinine 07/26/2024 2.25 (H)  0.76 - 1.27 mg/dL Final    Sodium 07/26/2024 137  136 - 145 mmol/L Final    Potassium 07/26/2024 4.3  3.5 - 5.2 mmol/L Final    Chloride 07/26/2024 98  98 - 107 mmol/L Final    CO2 07/26/2024 29.0  22.0 - 29.0 mmol/L Final    Calcium 07/26/2024 9.5  8.6 - 10.5 mg/dL Final    Total Protein  07/26/2024 6.7  6.0 - 8.5 g/dL Final    Albumin 07/26/2024 4.4  3.5 - 5.2 g/dL Final    ALT (SGPT) 07/26/2024 25  1 - 41 U/L Final    AST (SGOT) 07/26/2024 23  1 - 40 U/L Final    Alkaline Phosphatase 07/26/2024 69  39 - 117 U/L Final    Total Bilirubin 07/26/2024 0.3  0.0 - 1.2 mg/dL Final    Globulin 07/26/2024 2.3  gm/dL Final    A/G Ratio 07/26/2024 1.9  g/dL Final    BUN/Creatinine Ratio 07/26/2024 13.3  7.0 - 25.0 Final    Anion Gap 07/26/2024 10.0  5.0 - 15.0 mmol/L Final    eGFR 07/26/2024 31.8 (L)  >60.0 mL/min/1.73 Final    TSH 07/26/2024 1.450  0.270 - 4.200 uIU/mL Final    Total Cholesterol 07/26/2024 178  0 - 200 mg/dL Final    Triglycerides 07/26/2024 71  0 - 150 mg/dL Final    HDL Cholesterol 07/26/2024 54  40 - 60 mg/dL Final    LDL Cholesterol  07/26/2024 111 (H)  0 - 100 mg/dL Final    VLDL Cholesterol 07/26/2024 13  5 - 40 mg/dL Final    LDL/HDL Ratio 07/26/2024 2.03   Final    Iron 07/26/2024 86  59 - 158 mcg/dL Final    Iron Saturation (TSAT) 07/26/2024 23  20 - 50 % Final    Transferrin 07/26/2024 248  200 - 360 mg/dL Final    TIBC 07/26/2024 370  298 - 536 mcg/dL Final    Folate 07/26/2024 16.10  4.78 - 24.20 ng/mL Final    Vitamin B-12 07/26/2024 676  211 - 946 pg/mL Final    25 Hydroxy, Vitamin D 07/26/2024 51.1  30.0 - 100.0 ng/ml Final    Hemoglobin A1C 07/26/2024 5.40  4.80 - 5.60 % Final    WBC 07/26/2024 3.99  3.40 - 10.80 10*3/mm3 Final    RBC 07/26/2024 2.91 (L)  4.14 - 5.80 10*6/mm3 Final    Hemoglobin 07/26/2024 8.8 (L)  13.0 - 17.7 g/dL Final    Hematocrit 07/26/2024 27.0 (L)  37.5 - 51.0 % Final    MCV 07/26/2024 92.8  79.0 - 97.0 fL Final    MCH 07/26/2024 30.2  26.6 - 33.0 pg Final    MCHC 07/26/2024 32.6  31.5 - 35.7 g/dL Final    RDW 07/26/2024 12.4  12.3 - 15.4 % Final    RDW-SD 07/26/2024 42.4  37.0 - 54.0 fl Final    MPV 07/26/2024 10.1  6.0 - 12.0 fL Final    Platelets 07/26/2024 131 (L)  140 - 450 10*3/mm3 Final    Neutrophil % 07/26/2024 41.0 (L)  42.7 - 76.0 % Final     Lymphocyte % 07/26/2024 40.9  19.6 - 45.3 % Final    Monocyte % 07/26/2024 11.0  5.0 - 12.0 % Final    Eosinophil % 07/26/2024 6.0  0.3 - 6.2 % Final    Basophil % 07/26/2024 0.8  0.0 - 1.5 % Final    Immature Grans % 07/26/2024 0.3  0.0 - 0.5 % Final    Neutrophils, Absolute 07/26/2024 1.64 (L)  1.70 - 7.00 10*3/mm3 Final    Lymphocytes, Absolute 07/26/2024 1.63  0.70 - 3.10 10*3/mm3 Final    Monocytes, Absolute 07/26/2024 0.44  0.10 - 0.90 10*3/mm3 Final    Eosinophils, Absolute 07/26/2024 0.24  0.00 - 0.40 10*3/mm3 Final    Basophils, Absolute 07/26/2024 0.03  0.00 - 0.20 10*3/mm3 Final    Immature Grans, Absolute 07/26/2024 0.01  0.00 - 0.05 10*3/mm3 Final    nRBC 07/26/2024 0.0  0.0 - 0.2 /100 WBC Final   Office Visit on 07/10/2024   Component Date Value Ref Range Status    Urine Volume 07/10/2024 280   Final       EKG Results:  No orders to display       Imaging Results:  CT Chest Without Contrast Diagnostic    Result Date: 3/19/2024  Impression: CT scan of the chest without IV contrast demonstrating tree-in-bud airspace disease in the left lower lobe suggesting indolent infection. Follow-up CT scan of the chest in 3 months is recommended.    Electronically Signed By-SOILA TREVIÑO MD On:3/19/2024 3:23 PM          Assessment & Plan   Diagnoses and all orders for this visit:    1. 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  Dispense: 90 tablet; Refill: 1  -     FLUoxetine (PROzac) 20 MG capsule; Take 1 capsule by mouth Daily. Start prozac 7/17  Dispense: 90 capsule; Refill: 1  -     FLUoxetine (PROzac) 10 MG capsule; Take 1 capsule by mouth Daily.  Dispense: 90 capsule; Refill: 1    2. Generalized anxiety disorder  -     QUEtiapine (SEROquel) 50 MG tablet; Take 1 tablet by mouth Every Night.  Dispense: 90 tablet; Refill: 1  -     FLUoxetine (PROzac) 20 MG capsule; Take 1 capsule by mouth Daily. Start prozac 7/17  Dispense: 90  capsule; Refill: 1  -     FLUoxetine (PROzac) 10 MG capsule; Take 1 capsule by mouth Daily.  Dispense: 90 capsule; Refill: 1    3. Major depressive disorder, recurrent episode, moderate  -     QUEtiapine (SEROquel) 50 MG tablet; Take 1 tablet by mouth Every Night.  Dispense: 90 tablet; Refill: 1  -     FLUoxetine (PROzac) 20 MG capsule; Take 1 capsule by mouth Daily. Start prozac 7/17  Dispense: 90 capsule; Refill: 1  -     FLUoxetine (PROzac) 10 MG capsule; Take 1 capsule by mouth Daily.  Dispense: 90 capsule; Refill: 1        Visit Diagnoses:    ICD-10-CM ICD-9-CM   1. Insomnia due to mental condition  F51.05 300.9     327.02   2. Generalized anxiety disorder  F41.1 300.02   3. Major depressive disorder, recurrent episode, moderate  F33.1 296.32     11/13/2024: Insomnia, increase quetiapine. Discussed getting help with organizing the house.    Allowed patient to freely discuss and process issues, such as:  Anxiety and depression regarding medical conditions.  Anxiety and depression regarding relationships.  ... using Rogerian psychotherapeutic techniques including unconditional positive regard, reflective listening, and demonstrating clear empathy, with the goal of ameliorating symptoms and maintaining, restoring, or improving self-esteem, adaptive skills, and ego or psychological functions (Danielle et al. 1991), the long-term goal of which is to develop a better, healthier perspective and help the patient bear their circumstances more easily.  Time (minutes) spent providing supportive psychotherapy: 16  (This time is exclusive to the therapy session and separate from the time spent on activities used to meet the criteria for the E/M service (history, exam, medical decision-making).)  Start: 8:20  Stop: 8:36  Functional status: mild impairment  Treatment plan: Medication management and supportive psychotherapy  Prognosis: good  Progress: improving  6w    10/04/2024: Improving, still MDD, TRIP, increase prozac  further.    08/22/2024: Improving, increase prozac for possible MDD.    07/25/2024: Stop wellbutrin, in 2 wks, hold trazodone and start seroquel for irritability, mood stability. Poor energy may be due to anemia. Mood improved, but irritable as well. Mom has hx of what he thinks was bipolar. Avoiding lithium 2/2 CKD.     07/10/2024: Mirtazapine led to brain fog, fatigue, poor focus in am. Stop it. [facility] for marriage issues (individual therapy). Pt is not on duloxetine. Pt needs energizing meds. Start wellbutrin for a week, then add prozac.    06/14/2024: No change. Discussed reflective listening with his wife. Stop abilify; start mirtazapine.    4/30/24: R/O ADHD. Start abilify, Therapy? Wgt loss, consider mirtazapine, seroquel. 6w.    PLAN:  Safety: No acute safety concerns  Therapy: None  Risk Assessment: Risk of self-harm acutely is moderate.  Risk factors include anxiety disorder, mood disorder, fhx, access to guns, and recent psychosocial stressors (pandemic). Protective factors include no present SI, no history of suicide attempts or self-harm in the past, minimal AODA, healthcare seeking, future orientation, willingness to engage in care.  Risk of self-harm chronically is also moderate, but could be further elevated in the event of treatment noncompliance and/or AODA.  Meds:  CONTINUE prozac 30 mg qam. Risks, benefits, alternatives discussed with patient including GI upset, nausea vomiting diarrhea, theoretical decrease of seizure threshold predisposing the patient to a slightly higher seizure risk, headaches, sexual dysfunction, serotonin syndrome, bleeding risk, increased suicidality in patients 24 years and younger, switching to rebecca/hypomania.  After discussion of these risks and benefits, the patient voiced understanding and agreed to proceed.  INCREASE seroquel 25 to 50 mg qhs. Risks, benefits, alternatives discussed with patient including nausea and vomiting, GI upset, sedation, dizziness, falls,  akathisia, hypotension, increased appetite, lowering of seizure threshold, theoretical risk of tardive dyskinesia, extrapyramidal symptoms, movement issues, restless legs syndrome. Use care when operating vehicle, vessel, or machine. After discussion of these risks and benefits, the patient voiced understanding and agreed to proceed.  S/P:  trazodone 50 mg qhs. 100 mg made him groggy the next day. 7/24.    wellbutrin  mg qam. Irritable 7/24  abilify 2 mg qhs. No change 6/24.   mirtazapine 7.5 mg qhs. Sedation 7/24  Seroquel didn't work in the past 7/24  Labs: none    Patient screened positive for depression based on a PHQ-9 score of 14 on 10/31/2024. Follow-up recommendations include: Prescribed antidepressant medication treatment and Suicide Risk Assessment performed.           TREATMENT PLAN/GOALS: Continue supportive psychotherapy efforts and medications as indicated. Treatment and medication options discussed during today's visit. Patient acknowledged and verbally consented to continue with current treatment plan and was educated on the importance of compliance with treatment and follow-up appointments.    MEDICATION ISSUES:  JIM reviewed as expected.  Discussed medication options and treatment plan of prescribed medication as well as the risks, benefits, and side effects including potential falls, possible impaired driving and metabolic adversities among others. Patient is agreeable to call the office with any worsening of symptoms or onset of side effects. Patient is agreeable to call 911 or go to the nearest ER should he/she begin having SI/HI. No medication side effects or related complaints today.     MEDS ORDERED DURING VISIT:  New Medications Ordered This Visit   Medications    QUEtiapine (SEROquel) 50 MG tablet     Sig: Take 1 tablet by mouth Every Night.     Dispense:  90 tablet     Refill:  1     Replaces 25 mg dose. Thank you for the help. Please call with questions: [phone].     FLUoxetine (PROzac) 20 MG capsule     Sig: Take 1 capsule by mouth Daily. Start prozac 7/17     Dispense:  90 capsule     Refill:  1     Refills. Total dose now 20+10 = 30 mg daily. Thank you for the help. Please call with questions: [phone].    FLUoxetine (PROzac) 10 MG capsule     Sig: Take 1 capsule by mouth Daily.     Dispense:  90 capsule     Refill:  1     Refills. Total dose now 20+10 = 30 mg daily. Thank you for the help. Please call with questions: [phone].       Return in about 6 weeks (around 12/25/2024).         This document has been electronically signed by Penelope Davis MD  November 13, 2024 08:37 EST    Dictated Utilizing Dragon Dictation: Part of this note may be an electronic transcription/translation of spoken language to printed text using the Dragon Dictation System.

## 2024-11-13 ENCOUNTER — OFFICE VISIT (OUTPATIENT)
Dept: PSYCHIATRY | Facility: CLINIC | Age: 64
End: 2024-11-13
Payer: MEDICARE

## 2024-11-13 VITALS
SYSTOLIC BLOOD PRESSURE: 123 MMHG | BODY MASS INDEX: 30.52 KG/M2 | DIASTOLIC BLOOD PRESSURE: 62 MMHG | HEART RATE: 69 BPM | HEIGHT: 71 IN | WEIGHT: 218 LBS

## 2024-11-13 DIAGNOSIS — F41.1 GENERALIZED ANXIETY DISORDER: ICD-10-CM

## 2024-11-13 DIAGNOSIS — F51.05 INSOMNIA DUE TO MENTAL CONDITION: Primary | ICD-10-CM

## 2024-11-13 DIAGNOSIS — F33.1 MAJOR DEPRESSIVE DISORDER, RECURRENT EPISODE, MODERATE: ICD-10-CM

## 2024-11-13 RX ORDER — FLUOXETINE 10 MG/1
10 CAPSULE ORAL DAILY
Qty: 90 CAPSULE | Refills: 1 | Status: SHIPPED | OUTPATIENT
Start: 2024-11-13

## 2024-11-13 RX ORDER — QUETIAPINE FUMARATE 50 MG/1
50 TABLET, FILM COATED ORAL NIGHTLY
Qty: 90 TABLET | Refills: 1 | Status: SHIPPED | OUTPATIENT
Start: 2024-11-13

## 2024-11-15 LAB — INTELLIGEN MYELOID RESULT: NORMAL

## 2024-11-18 NOTE — NURSING NOTE
Pat call attempt, left detailed message with arrival time 0930 for egd, come to [facility] with id and insurance cards. Must have  over age of 18 to take patient home. NPO after 0000 but may take bp medications with small sip of water no later than 0730. NPO after 0730. Hold all blood thinners and diabetic medications morning of procedure. Hold any diabetic or weight loss injections for 7 days.

## 2024-11-20 ENCOUNTER — OFFICE VISIT (OUTPATIENT)
Dept: INTERNAL MEDICINE | Facility: CLINIC | Age: 64
End: 2024-11-20
Payer: MEDICARE

## 2024-11-20 VITALS
OXYGEN SATURATION: 98 % | DIASTOLIC BLOOD PRESSURE: 68 MMHG | RESPIRATION RATE: 16 BRPM | HEART RATE: 70 BPM | HEIGHT: 71 IN | WEIGHT: 226.38 LBS | BODY MASS INDEX: 31.69 KG/M2 | SYSTOLIC BLOOD PRESSURE: 116 MMHG | TEMPERATURE: 98.6 F

## 2024-11-20 DIAGNOSIS — R42 VERTIGO: ICD-10-CM

## 2024-11-20 DIAGNOSIS — Z00.00 WELCOME TO MEDICARE PREVENTIVE VISIT: Primary | ICD-10-CM

## 2024-11-20 PROCEDURE — 1170F FXNL STATUS ASSESSED: CPT | Performed by: INTERNAL MEDICINE

## 2024-11-20 PROCEDURE — 1126F AMNT PAIN NOTED NONE PRSNT: CPT | Performed by: INTERNAL MEDICINE

## 2024-11-20 PROCEDURE — G0402 INITIAL PREVENTIVE EXAM: HCPCS | Performed by: INTERNAL MEDICINE

## 2024-11-20 NOTE — PROGRESS NOTES
Subjective   The ABCs of the Annual Wellness Visit  Medicare Wellness Visit      Stephon Castellano is a 64 y.o. patient who presents for a Medicare Wellness Visit.    The following portions of the patient's history were reviewed and   updated as appropriate: allergies, current medications, past family history, past medical history, past social history, past surgical history, and problem list.    Compared to one year ago, the patient's physical   health is the same.  Compared to one year ago, the patient's mental   health is the same.    Recent Hospitalizations:  He was not admitted to the hospital during the last year.     Current Medical Providers:  Patient Care Team:  Paige Florez MD as PCP - General (Pediatrics)  Libby Williamson APRN as Nurse Practitioner (Nurse Practitioner)  Leela Loyola APRN as Nurse Practitioner (Urology)  Shaggy Shelley MD as Consulting Physician (Pulmonary Disease)  Rebeca Cabral APRN as Nurse Practitioner (Nurse Practitioner)  Antonina Aguirre [facility] as  (Behavioral Health)    Outpatient Medications Prior to Visit   Medication Sig Dispense Refill    carboxymethylcellulose (REFRESH PLUS) 0.5 % solution Administer 2 drops to both eyes 3 (Three) Times a Day As Needed for Dry Eyes. 30 mL 12    FLUoxetine (PROzac) 10 MG capsule Take 1 capsule by mouth Daily. 90 capsule 1    FLUoxetine (PROzac) 20 MG capsule Take 1 capsule by mouth Daily. Start prozac 7/17 90 capsule 1    fluticasone (FLONASE) 50 MCG/ACT nasal spray 2 sprays into the nostril(s) as directed by provider Daily. 16 g 11    HYDROmorphone (DILAUDID) 1 MG/ML injection Infuse 0-2 mg/hr into a venous catheter.      IRON, FERROUS GLUCONATE, PO Take 65 mg by mouth Daily.      Lubricant Eye Drops PF 0.5 % solution       Lysine 1000 MG tablet Take  by mouth.      Magnesium 250 MG tablet Take  by mouth.      multivitamin with minerals tablet tablet Take 1 tablet by mouth Daily.      Omega 3-6-9 Fatty  Acids (OMEGA 3-6-9 COMPLEX PO) Take  by mouth.      pain patient supplied pump by Intrathecal route Continuous. Instructions are in drop boxes      QUEtiapine (SEROquel) 50 MG tablet Take 1 tablet by mouth Every Night. (Patient taking differently: Take 0.5 tablets by mouth Every Night.) 90 tablet 1    Testosterone Cypionate (DEPOTESTOTERONE CYPIONATE) 200 MG/ML injection       Turmeric 500 MG capsule Take  by mouth.       No facility-administered medications prior to visit.     Opioid medication/s are on active medication list.  and I have evaluated his active treatment plan and pain score trends (see table).  There were no vitals filed for this visit.  I have reviewed the chart for potential of high risk medication and harmful drug interactions in the elderly.        Aspirin is not on active medication list.  Aspirin use is not indicated based on review of current medical condition/s. Risk of harm outweighs potential benefits.  .    Patient Active Problem List   Diagnosis    Chronic right-sided thoracic back pain    Kidney stone    Screening due    Allergic rhinitis    Arthritis    Cervical neck pain with evidence of disc disease    Depression    Head injury    Hemorrhoid    Hypogonadism in male    Right wrist pain    Weight loss    Abdominal pain    Seizure-like activity    Palpitations    Abnormal EKG    Bradycardia    Brain fog    Fatigue    Acute kidney injury    Tremor    Slow transit constipation    Weakness of both lower extremities    Left hand weakness    Drug induced myoclonus    Insomnia due to other mental disorder    Obstructive sleep apnea syndrome    Cervical radiculopathy    Iron deficiency anemia    Gastroesophageal reflux disease with esophagitis without hemorrhage    History of colonic polyps    Benign prostatic hyperplasia with urinary retention    BPH without obstruction/lower urinary tract symptoms    Benign prostatic hyperplasia with lower urinary tract symptoms    Acute gastric ulcer without  "hemorrhage or perforation     Advance Care Planning Advance Directive is not on file.  ACP discussion was held with the patient during this visit. Patient does not have an advance directive, information provided.            Objective   Vitals:    24 1022   BP: 116/68   BP Location: Right arm   Patient Position: Sitting   Cuff Size: Adult   Pulse: 70   Resp: 16   Temp: 98.6 °F (37 °C)   TempSrc: Temporal   SpO2: 98%   Weight: 103 kg (226 lb 6 oz)   Height: 180.3 cm (70.98\")       Estimated body mass index is 31.59 kg/m² as calculated from the following:    Height as of this encounter: 180.3 cm (70.98\").    Weight as of this encounter: 103 kg (226 lb 6 oz).              Gait and Balance Evaluation:  Loss of Balance  Does the patient have evidence of cognitive impairment? No                                                                                               Health  Risk Assessment    Smoking Status:  Social History     Tobacco Use   Smoking Status Former    Current packs/day: 0.00    Average packs/day: 2.0 packs/day for 8.0 years (16.0 ttl pk-yrs)    Types: Cigarettes    Start date: 1975    Quit date: 1983    Years since quittin.9    Passive exposure: Past   Smokeless Tobacco Former    Types: Snuff   Tobacco Comments    Also dipped for several years     Alcohol Consumption:  Social History     Substance and Sexual Activity   Alcohol Use Not Currently    Alcohol/week: 14.0 standard drinks of alcohol    Types: 14 Shots of liquor per week    Comment: Stopped due to Disulfiram 250 mg       Fall Risk Screen  STEADI Fall Risk Assessment was completed, and patient is at LOW risk for falls.Assessment completed on:2024    Depression Screening   Little interest or pleasure in doing things? Over half   Feeling down, depressed, or hopeless? Several days   PHQ-2 Total Score 3   Trouble falling or staying asleep, or sleeping too much? Almost all   Feeling tired or having little energy? Almost all "   Poor appetite or overeating? Not at all   Feeling bad about yourself - or that you are a failure or have let yourself or your family down? Not at all   Trouble concentrating on things, such as reading the newspaper or watching television? Several days   Moving or speaking so slowly that other people could have noticed? Or the opposite - being so fidgety or restless that you have been moving around a lot more than usual? Not at all   Thoughts that you would be better off dead, or of hurting yourself in some way? Not at all   PHQ-9 Total Score 10   If you checked off any problems, how difficult have these problems made it for you to do your work, take care of things at home, or get along with other people? Not difficult at all      Health Habits and Functional and Cognitive Screenin/20/2024    10:27 AM   Functional & Cognitive Status   Do you have difficulty preparing food and eating? No   Do you have difficulty bathing yourself, getting dressed or grooming yourself? No   Do you have difficulty using the toilet? No   Do you have difficulty moving around from place to place? No   Do you have trouble with steps or getting out of a bed or a chair? No   Do you need help using the phone?  No   Are you deaf or do you have serious difficulty hearing?  No   Do you need help to go to places out of walking distance? No   Do you need help shopping? No   Do you need help preparing meals?  No   Do you need help with housework?  No   Do you need help with laundry? No   Do you need help taking your medications? No   Do you need help managing money? No   Do you ever drive or ride in a car without wearing a seat belt? No   Have you felt unusual stress, anger or loneliness in the last month? No   Who do you live with? Spouse   If you need help, do you have trouble finding someone available to you? Yes   Do you have difficulty concentrating, remembering or making decisions? No           Visual Acuity:  Vision Screening     Right eye Left eye Both eyes   Without correction 20/25 20/25 20/20   With correction        Age-appropriate Screening Schedule:  Refer to the list below for future screening recommendations based on patient's age, sex and/or medical conditions. Orders for these recommended tests are listed in the plan section. The patient has been provided with a written plan.    Health Maintenance List  Health Maintenance   Topic Date Due    ANNUAL WELLNESS VISIT  Never done    COVID-19 Vaccine (3 - 2024-25 season) 09/01/2024    BMI FOLLOWUP  05/30/2025    LIPID PANEL  07/26/2025    GASTROSCOPY (EGD)  08/13/2025    COLORECTAL CANCER SCREENING  08/13/2027    TDAP/TD VACCINES (4 - Td or Tdap) 03/29/2029    HEPATITIS C SCREENING  Completed    INFLUENZA VACCINE  Completed    Pneumococcal Vaccine 65+  Completed    AAA SCREEN (ONE-TIME)  Completed    ZOSTER VACCINE  Completed                                                                                                                                                CMS Preventative Services Quick Reference  Risk Factors Identified During Encounter  None Identified    The above risks/problems have been discussed with the patient.  Pertinent information has been shared with the patient in the After Visit Summary.  An After Visit Summary and PPPS were made available to the patient.    Follow Up:   Next Medicare Wellness visit to be scheduled in 1 year.     Assessment & Plan  Welcome to Medicare preventive visit  Screening labs reviewed/ordered  Counseling provided regarding age appropriate screenings and immunizations, healthy diet and exercise.        Vertigo    Orders:    Ambulatory Referral to Physical Therapy for Evaluation & Treatment         Follow Up:   Return in about 6 months (around 5/20/2025) for Next scheduled follow up.

## 2024-11-26 ENCOUNTER — ANESTHESIA EVENT (OUTPATIENT)
Dept: GASTROENTEROLOGY | Facility: HOSPITAL | Age: 64
End: 2024-11-26
Payer: MEDICARE

## 2024-11-26 NOTE — ANESTHESIA PREPROCEDURE EVALUATION
Anesthesia Evaluation     Patient summary reviewed and Nursing notes reviewed   NPO Solid Status: > 8 hours  NPO Liquid Status: > 8 hours           Airway   Mallampati: I  TM distance: >3 FB  Neck ROM: limited  No difficulty expected  Comment: C5-7 interior fusion with hardware   Dental - normal exam     Pulmonary - normal exam    breath sounds clear to auscultation  (+) asthma (mild, intermittent),sleep apnea on CPAP  Cardiovascular - normal exam  Exercise tolerance: good (4-7 METS)    ECG reviewed  Rhythm: regular  Rate: normal    (+) hypertension well controlled, valvular problems/murmurs (mild MR, TR) MR, dysrhythmias Atrial Fib, hyperlipidemia      Neuro/Psych  (+) headaches, tremors, numbness, psychiatric history Anxiety and Depression  GI/Hepatic/Renal/Endo    (+) obesity, PUD, GI bleeding resolved, renal disease- stones and ARF    Musculoskeletal     (+) chronic pain, neck pain  Abdominal    Substance History   (+) alcohol use (no longer drinks)     OB/GYN          Other   arthritis,     ROS/Med Hx Other: Iron deficiency anemia,  Acute gastric ulcer     Labs:   10/24/24 08:11  RBC: 3.77 (L)  Hemoglobin: 11.7 (L)  Hematocrit: 36.7 (L)  Platelets: 136 (L)  RDW: 12.6  MCV: 97.3 (H)    ECHO 11/15/23: EF 50%   Normal left ventricular systolic function.  Mild MR and mild TR     EKG 11/14/23: HR 54,   Sinus rhythm  Abnormal R-wave progression, early transition  When compared with ECG of 17-Nov-2016 18:39:36,  Nonspecific significant change    Has a dilaudid pain pump in stomach - continuous                     Anesthesia Plan    ASA 3     general   total IV anesthesia  (Total IV Anesthesia    Patient understands anesthesia not responsible for dental damage.  )  intravenous induction     Anesthetic plan, risks, benefits, and alternatives have been provided, discussed and informed consent has been obtained with: patient.  Pre-procedure education provided  Plan discussed with CRNA.        CODE STATUS:

## 2024-11-27 ENCOUNTER — HOSPITAL ENCOUNTER (OUTPATIENT)
Facility: HOSPITAL | Age: 64
Setting detail: HOSPITAL OUTPATIENT SURGERY
Discharge: HOME | End: 2024-11-27
Attending: INTERNAL MEDICINE | Admitting: INTERNAL MEDICINE
Payer: MEDICARE

## 2024-11-27 ENCOUNTER — ANESTHESIA (OUTPATIENT)
Dept: GASTROENTEROLOGY | Facility: HOSPITAL | Age: 64
End: 2024-11-27
Payer: MEDICARE

## 2024-11-27 VITALS
BODY MASS INDEX: 30.09 KG/M2 | HEART RATE: 64 BPM | SYSTOLIC BLOOD PRESSURE: 109 MMHG | RESPIRATION RATE: 11 BRPM | TEMPERATURE: 98.6 F | WEIGHT: 215.61 LBS | DIASTOLIC BLOOD PRESSURE: 65 MMHG | OXYGEN SATURATION: 95 %

## 2024-11-27 DIAGNOSIS — D50.9 IRON DEFICIENCY ANEMIA, UNSPECIFIED IRON DEFICIENCY ANEMIA TYPE: ICD-10-CM

## 2024-11-27 DIAGNOSIS — K25.3 ACUTE GASTRIC ULCER WITHOUT HEMORRHAGE OR PERFORATION: ICD-10-CM

## 2024-11-27 PROCEDURE — 25010000002 PROPOFOL 10 MG/ML EMULSION: Performed by: NURSE ANESTHETIST, CERTIFIED REGISTERED

## 2024-11-27 PROCEDURE — 43239 EGD BIOPSY SINGLE/MULTIPLE: CPT | Performed by: INTERNAL MEDICINE

## 2024-11-27 PROCEDURE — 25010000002 LIDOCAINE PF 2% 2 % SOLUTION: Performed by: NURSE ANESTHETIST, CERTIFIED REGISTERED

## 2024-11-27 PROCEDURE — 88305 TISSUE EXAM BY PATHOLOGIST: CPT | Performed by: INTERNAL MEDICINE

## 2024-11-27 RX ORDER — SODIUM CHLORIDE, SODIUM LACTATE, POTASSIUM CHLORIDE, CALCIUM CHLORIDE 600; 310; 30; 20 MG/100ML; MG/100ML; MG/100ML; MG/100ML
30 INJECTION, SOLUTION INTRAVENOUS CONTINUOUS
Status: DISCONTINUED | OUTPATIENT
Start: 2024-11-27 | End: 2024-11-27 | Stop reason: HOSPADM

## 2024-11-27 RX ORDER — PROPOFOL 10 MG/ML
VIAL (ML) INTRAVENOUS AS NEEDED
Status: DISCONTINUED | OUTPATIENT
Start: 2024-11-27 | End: 2024-11-27 | Stop reason: SURG

## 2024-11-27 RX ORDER — LIDOCAINE HYDROCHLORIDE 20 MG/ML
INJECTION, SOLUTION EPIDURAL; INFILTRATION; INTRACAUDAL; PERINEURAL AS NEEDED
Status: DISCONTINUED | OUTPATIENT
Start: 2024-11-27 | End: 2024-11-27 | Stop reason: SURG

## 2024-11-27 RX ORDER — PANTOPRAZOLE SODIUM 20 MG/1
20 TABLET, DELAYED RELEASE ORAL DAILY
Qty: 30 TABLET | Refills: 1 | Status: SHIPPED | OUTPATIENT
Start: 2024-11-27

## 2024-11-27 RX ADMIN — PROPOFOL 100 MG: 10 INJECTION, EMULSION INTRAVENOUS at 10:45

## 2024-11-27 RX ADMIN — PROPOFOL 225 MCG/KG/MIN: 10 INJECTION, EMULSION INTRAVENOUS at 10:46

## 2024-11-27 RX ADMIN — LIDOCAINE HYDROCHLORIDE 100 MG: 20 INJECTION, SOLUTION EPIDURAL; INFILTRATION; INTRACAUDAL; PERINEURAL at 10:45

## 2024-11-27 NOTE — H&P
Pre Procedure History & Physical    Chief Complaint:   Iron deficiency anemia, f/u of gastric ulcer    Subjective     HPI:   63 yo M here for eval of iron deficiency anemia, f/u of gastric ulcer.    Past Medical History:   Past Medical History:   Diagnosis Date    Alcohol abuse     Allergic rhinitis     Anemia 02/06/2024    My hand started just shaking but got worse until now i have no strength in my left hand    Anxiety 2006    Starting taking mood depression drugs    Arthritis     Asthma About 6 months    Sore Throat and was sent to ENT    Atrial fibrillation 2023    Benign prostatic hyperplasia 2015    Dad had Prostate Cancer and just did Colonoscopy and they said i have a major large Prostate    Cervical neck pain with evidence of disc disease     Chronic pain disorder     Colon polyp 2020    Had a lot of polyps on the last 2 Colonoscopies    CTS (carpal tunnel syndrome) 2005    Depression     Difficulty walking 2033    Erectile dysfunction 2017    I have been on Testosterone replacements since then    Head injury 1977    Headache May 2023    Started abou 6 months or more    Hemorrhoid     HL (hearing loss) 2004    Have a major tinitis/ringing in my ears especially the left one    Hyperlipidemia 2017    My blood test have stated they were high or elevated    Hypertension 2017    Given Flomax said it will probably go down    Hypogonadism in male     Insomnia     Kidney stone 2020    Went to doctor and got medication for them and still have issues with right side    Low back pain 2010    Stinosis of back & neck    Memory loss 2023    Movement disorder 2023    Sleep apnea 2007    Substance abuse [id number]    On Disulfiram for alcohol abuse-voluntary    Visual impairment 2023    Just recently have been given eye drops       Past Surgical History:  Past Surgical History:   Procedure Laterality Date    CERVICAL DISC SURGERY  2008    COLONOSCOPY N/A 08/18/2023    Procedure: COLONOSCOPY WITH POLYPECTOMY/SNARE;  Surgeon:  Jose Alejandro Fox MD;  Location: [facility] ENDOSCOPY;  Service: General;  Laterality: N/A;  COLON POLYPS    COLONOSCOPY N/A 8/13/2024    Procedure: COLONOSCOPY WITH POLYPECTOMIES;  Surgeon: Jose Alejandro Fox MD;  Location: [facility] ENDOSCOPY;  Service: General;  Laterality: N/A;  COLON POLYPS    ENDOSCOPY N/A 8/13/2024    Procedure: ESOPHAGOGASTRODUODENOSCOPY WITH BIOPSIES;  Surgeon: Jose Alejandro Fox MD;  Location: [facility] ENDOSCOPY;  Service: General;  Laterality: N/A;  GASTRITIS. SUPERFICIAL GASTRIC ULCERS    HEEL SPUR SURGERY  2005    KNEE ACL RECONSTRUCTION  2004    OTHER SURGICAL HISTORY      METAL IMPLANT    PAIN PUMP INSERTION/REVISION  2010    SPINE SURGERY  2008    Antieror c5-7 discotomy    TONSILLECTOMY  1965    VASECTOMY  2002       Family History:  Family History   Problem Relation Age of Onset    Suicide Attempts Mother     Bipolar disorder Mother     Anxiety disorder Mother     Heart disease Mother     Osteoporosis Mother     Alcohol abuse Mother         Mother committed suicide    Depression Mother         She had major depression    Early death Mother         She committed suicide    Thyroid disease Mother         She had Thyroid issues and had surgery    Cancer Father         Prostate    Prostate cancer Father     Hearing loss Father         He has worn a hearing aids since his late 50’s    Depression Sister     Alcohol abuse Sister     Diabetes Sister         Diabetes    Arthritis Sister     Sleep apnea Sister     Obesity Sister     Thyroid disease Sister     Insomnia Sister     Narcolepsy Sister     Diabetes Brother     Stroke Other         AUNT/UNCLE GRANDPARENTS    Diabetes Other         GRANDPARENTS        Social History:   reports that he quit smoking about 41 years ago. His smoking use included cigarettes. He started smoking about 49 years ago. He has a 16 pack-year smoking history. He has been exposed to tobacco smoke. He has quit using smokeless tobacco.  His smokeless tobacco use included  snuff. He reports that he does not currently use alcohol after a past usage of about 14.0 standard drinks of alcohol per week. He reports that he does not currently use drugs after having used the following drugs: Marijuana. Frequency: 7.00 times per week.    Medications:   Medications Prior to Admission   Medication Sig Dispense Refill Last Dose/Taking    carboxymethylcellulose (REFRESH PLUS) 0.5 % solution Administer 2 drops to both eyes 3 (Three) Times a Day As Needed for Dry Eyes. 30 mL 12 11/26/2024    FLUoxetine (PROzac) 10 MG capsule Take 1 capsule by mouth Daily. 90 capsule 1 11/26/2024    fluticasone (FLONASE) 50 MCG/ACT nasal spray 2 sprays into the nostril(s) as directed by provider Daily. 16 g 11 Past Week    HYDROmorphone (DILAUDID) 1 MG/ML injection Infuse 0-2 mg/hr into a venous catheter.   11/27/2024 Morning    IRON, FERROUS GLUCONATE, PO Take 65 mg by mouth Daily.   11/26/2024    Lubricant Eye Drops PF 0.5 % solution    11/26/2024    Lysine 1000 MG tablet Take  by mouth.   11/26/2024    Magnesium 250 MG tablet Take  by mouth.   11/26/2024    multivitamin with minerals tablet tablet Take 1 tablet by mouth Daily.   11/26/2024    Omega 3-6-9 Fatty Acids (OMEGA 3-6-9 COMPLEX PO) Take  by mouth.   11/26/2024    pain patient supplied pump by Intrathecal route Continuous. Instructions are in drop boxes   11/27/2024 Morning    QUEtiapine (SEROquel) 50 MG tablet Take 1 tablet by mouth Every Night. (Patient taking differently: Take 0.5 tablets by mouth Every Night.) 90 tablet 1 11/26/2024    Testosterone Cypionate (DEPOTESTOTERONE CYPIONATE) 200 MG/ML injection    Past Week    Turmeric 500 MG capsule Take  by mouth.   11/26/2024    FLUoxetine (PROzac) 20 MG capsule Take 1 capsule by mouth Daily. Start prozac 7/17 90 capsule 1        Allergies:  Patient has no known allergies.    ROS:    Pertinent items are noted in HPI     Objective     Blood pressure 132/70, pulse 68, temperature 98.8 °F (37.1 °C),  temperature source Temporal, resp. rate 16, weight 97.8 kg (215 lb 9.8 oz), SpO2 96%.    Physical Exam   Constitutional: Pt is oriented to person, place, and time and well-developed, well-nourished, and in no distress.   Mouth/Throat: Oropharynx is clear and moist.   Neck: Normal range of motion.   Cardiovascular: Normal rate, regular rhythm and normal heart sounds.    Pulmonary/Chest: Effort normal and breath sounds normal.   Abdominal: Soft. Nontender  Skin: Skin is warm and dry.   Psychiatric: Mood, memory, affect and judgment normal.     Assessment & Plan     Diagnosis:  Iron deficiency anemia, f/u of gastric ulcer    Anticipated Surgical Procedure:  EGD    The risks, benefits, and alternatives of this procedure have been discussed with the patient or the responsible party- the patient understands and agrees to proceed.

## 2024-11-27 NOTE — ANESTHESIA POSTPROCEDURE EVALUATION
Patient: Stephon Castellano    Procedure Summary       Date: 11/27/24 Room / Location: [facility] ENDOSCOPY 3 / [facility]COPY    Anesthesia Start: 1041 Anesthesia Stop: 1058    Procedure: ESOPHAGOGASTRODUODENOSCOPY WITH BIOPSIES Diagnosis:       Iron deficiency anemia, unspecified iron deficiency anemia type      Acute gastric ulcer without hemorrhage or perforation      (Iron deficiency anemia, unspecified iron deficiency anemia type [D50.9])      (Acute gastric ulcer without hemorrhage or perforation [K25.3])    Surgeons: Tamara Molina MD Provider: Cammy Hicks CRNA    Anesthesia Type: general ASA Status: 3            Anesthesia Type: general    Vitals  Vitals Value Taken Time   /70 11/27/24 1117   Temp 37 °C (98.6 °F) 11/27/24 1055   Pulse 68 11/27/24 1118   Resp 11 11/27/24 1105   SpO2 95 % 11/27/24 1118   Vitals shown include unfiled device data.        Post Anesthesia Care and Evaluation    Post-procedure mental status: acceptable.  Pain management: satisfactory to patient    Airway patency: patent  Anesthetic complications: No anesthetic complications    Cardiovascular status: acceptable  Respiratory status: acceptable    Comments: Per chart review

## 2024-11-30 DIAGNOSIS — D50.9 IRON DEFICIENCY ANEMIA, UNSPECIFIED IRON DEFICIENCY ANEMIA TYPE: Primary | ICD-10-CM

## 2024-12-02 ENCOUNTER — TELEPHONE (OUTPATIENT)
Dept: GASTROENTEROLOGY | Facility: CLINIC | Age: 64
End: 2024-12-02
Payer: MEDICARE

## 2024-12-02 NOTE — TELEPHONE ENCOUNTER
----- Message from Tamara Molina sent at 11/30/2024  5:52 PM EST -----  Mild chronic inflammation noted in esophagus.  Normal stomach biopsies.  No H.pylori.  Recommend to continue with pantoprazole as prescribed.    Non-specific findings noted on duodenal biopsies.  Recommend further lab testing for Celiac disease.  Order placed.    Please arrange f/u appointment.

## 2024-12-03 ENCOUNTER — TELEMEDICINE (OUTPATIENT)
Dept: PSYCHIATRY | Facility: CLINIC | Age: 64
End: 2024-12-03
Payer: MEDICARE

## 2024-12-03 DIAGNOSIS — F33.1 MAJOR DEPRESSIVE DISORDER, RECURRENT EPISODE, MODERATE: ICD-10-CM

## 2024-12-03 DIAGNOSIS — F41.1 GENERALIZED ANXIETY DISORDER: Primary | ICD-10-CM

## 2024-12-03 PROCEDURE — 90834 PSYTX W PT 45 MINUTES: CPT | Performed by: COUNSELOR

## 2024-12-03 NOTE — PROGRESS NOTES
Date: December 3, 2024  Time In: 08:15 EST  Time Out: 09:00 EST    This provider is located at the [facility]), [address], [place] [address] using a secure [employer] Video Visit through [employer]. Patient is being seen remotely via telehealth at home address in [place] and stated they are in a secure environment for this session. The patient's condition being diagnosed/treated is appropriate for telemedicine. The provider identified herself as well as her credentials. The patient, and/or patients guardian, consent to be seen remotely, and when consent is given they understand that the consent allows for patient identifiable information to be sent to a third party as needed. They may refuse to be seen remotely at any time. The electronic data is encrypted and password protected, and the patient and/or guardian has been advised of the potential risks to privacy not withstanding such measures.     You have chosen to receive care through a telehealth visit.  Do you consent to use a video/audio connection for your medical care today? Yes    Subjective   Stephon Castellano is a 64 y.o. male who presents today for follow up    Chief Complaint:   Chief Complaint   Patient presents with    Anxiety    Depression           Clinical Maneuvering/Intervention:      Assisted patient in processing above session content; acknowledged and normalized patient’s thoughts, feelings, and concerns.  Rationalized patient thought process regarding concerns presented at session.  Discussed triggers associated with patient's  anxiety  and depression  Also discussed coping skills for patient to implement such as mindfulness , increasing activity , self care , and positive self talk       Allowed patient to freely discuss issues without interruption or judgment. Provided safe, confidential environment to facilitate the development of positive therapeutic relationship and encourage  open, honest communication. Assisted patient in identifying risk factors which would indicate the need for higher level of care including thoughts to harm self or others and/or self-harming behavior and encouraged patient to contact this office, call 911, or present to the nearest emergency room should any of these events occur. Discussed crisis intervention services and means to access. Patient adamantly and convincingly denies current suicidal or homicidal ideation or perceptual disturbance.    Assessment:     Patient appears to maintain relative stability as compared to their baseline.  However, patient continues to struggle with anxiety and depression which continues to cause impairment in important areas of functioning.  A result, they can be reasonably expected to continue to benefit from treatment and would likely be at increased risk for decompensation otherwise.      PHQ-Score Total:  PHQ-9 Total Score: 14     TRIP-7 Score Total:  Over the last two weeks, how often have you been bothered by the following problems?  Feeling nervous, anxious or on edge: More than half the days  Not being able to stop or control worrying: Several days  Worrying too much about different things: Several days  Trouble Relaxing: More than half the days  Being so restless that it is hard to sit still: Several days  Becoming easily annoyed or irritable: Several days  Feeling afraid as if something awful might happen: Not at all  TRIP 7 Total Score: 8  If you checked any problems, how difficult have these problems made it for you to do your work, take care of things at home, or get along with other people: Somewhat difficult      Mental Status Exam:   Hygiene:   good  Cooperation:  Cooperative  Eye Contact:  Good  Psychomotor Behavior:  Appropriate  Affect:  Appropriate  Mood: normal  Speech:  Normal  Thought Process:  Goal directed  Thought Content:  Normal  Suicidal:  None  Homicidal:  None  Hallucinations:  None  Delusion:   None  Memory:  Intact  Orientation:  Person, Place, Time, and Situation  Reliability:  good  Insight:  Good  Judgement:  Good  Impulse Control:  Good  Physical/Medical Issues:  Yes Kidney disease, had neck surgery and has chronic pain which requires a pain pump        Patient's Support Network Includes:  wife and daughter    Functional Status: Moderate impairment     Progress toward goal: Not at goal    Prognosis: Guarded with Ongoing Treatment      Plan:    Patient will continue in individual outpatient therapy with focus on improved functioning and coping skills, maintaining stability, and avoiding decompensation and the need for higher level of care.    Patient will adhere to medication regimen as prescribed and report any side effects. Patient will contact this office, call 911 or present to the nearest emergency room should suicidal or homicidal ideations occur. Provide Cognitive Behavioral Therapy and Solution Focused Therapy to improve functioning, maintain stability, and avoid decompensation and the need for higher level of care.         VISIT DIAGNOSIS:     ICD-10-CM ICD-9-CM   1. Generalized anxiety disorder  F41.1 300.02   2. Major depressive disorder, recurrent episode, moderate  F33.1 296.32        Return in about 6 weeks (around 1/14/2025).    This document has been electronically signed by Antonina Aguirre [facility]  December 3, 2024 09:12 EST     Part of this note may be an electronic transcription/translation of spoken language to printed text using the Dragon Dictation System.

## 2024-12-03 NOTE — LETTER
December 3, 2024     Penelope Davis MD  [address]   [address]  [place] [address]    Patient: Stephon Castellano   YOB: 1960   Date of Visit: 12/3/2024     Dear Penelope Davis MD:       Thank you for referring Stephon Castellano to me for evaluation. Below are the relevant portions of my assessment and plan of care.    Stephon has made progress toward reducing his anxiety however his depression remains level.  We hope to reduce his stress through therapy and that he will maintain improved health as we move along.    He is interested in discussing medical marijuana for his anxiety the next time you meet together.     If you have questions, please do not hesitate to call me. I look forward to following Stephon along with you.         Sincerely,        Antonina Aguirre, [facility]        CC: Stephon Castellano

## 2024-12-05 LAB — CCV RESULT: NORMAL

## 2024-12-10 ENCOUNTER — TREATMENT (OUTPATIENT)
Dept: PHYSICAL THERAPY | Facility: CLINIC | Age: 64
End: 2024-12-10
Payer: MEDICARE

## 2024-12-10 DIAGNOSIS — R42 DIZZINESS: Primary | ICD-10-CM

## 2024-12-10 DIAGNOSIS — H83.2X3 VESTIBULAR HYPOFUNCTION OF BOTH EARS: ICD-10-CM

## 2024-12-10 PROCEDURE — 97110 THERAPEUTIC EXERCISES: CPT

## 2024-12-10 PROCEDURE — 97112 NEUROMUSCULAR REEDUCATION: CPT

## 2024-12-10 PROCEDURE — 97162 PT EVAL MOD COMPLEX 30 MIN: CPT

## 2024-12-10 NOTE — PROGRESS NOTES
"  Physical Therapy Initial Evaluation and Plan of Care                       Patient: Stephon Castellano   : 1960  Diagnosis/ICD-10 Code:  Dizziness [R42]  Referring practitioner: Paige Vargas*  Date of Initial Visit: 12/10/2024  Today's Date: 12/10/2024  Patient seen for 1 sessions           Subjective Questionnaire: DHI: 31      Subjective Evaluation    History of Present Illness  Mechanism of injury: Patient reports 6 months to year of dizziness when he tilts and moves his head certain ways. Increased dizziness noted with bending down/looking down, looking up, as well as turning his head side to side. He denies nausea or room spinning. He states that he notes his balance has decreased over the time he began having symptoms. He states symptoms began when he got up to move to his TV and he \"blacked out\" that caused him to fall down. He recalls another instance where about a year ago he felt \"electrical sensation\". And shaking over his entire body. He had extensive medical workup including neurological where he did not receive a diagnosis. He also reports new onset bilateral foot numbness in the past year.              Objective   See Exercise, Manual, and Modality Logs for complete treatment.   Vestibular Assessment:    Oculomotor Exam  Gaze stabilization: -  Convergence: -  Smooth pursuits horizontal: -  Smooth pursuits vertical: -  VOR horizontal: -  VOR vertical: -  Saccades horizontal: -  Saccades vertical: -  Head thrust test: -  Head shaking nystagmus test: -    Positional Vertigo Testing  L Ney-Hallpike: -  R Ney-Hallpike: -  L horizontal canal test: -  R horizontal canal test: -    CTSIB - M  Romberg on floor: 30s  Romberg on floor/eyes closed: 5s  Romberg on foam: 30s  Romberg on foam/eyes closed: 3s    Additional Postural Balance Testing:  Sharpened romberg on floor: 15s  Sharpened romberg on floor/eyes closed: 3s  Sharpened romberg on foam: not attempted  Sharpened romberg on foam/eyes " closed: not attempted     Assessment & Plan       Assessment  Impairments: abnormal coordination, abnormal gait, abnormal muscle firing, abnormal muscle tone, abnormal or restricted ROM, activity intolerance, impaired balance, lacks appropriate home exercise program and safety issue   Functional limitations: carrying objects, lifting, walking, moving in bed, standing, stooping, reaching overhead and unable to perform repetitive tasks   Assessment details: The patient presents to physical therapy with complaints of dizziness. Signs and symptoms are consistent with vestibular system hypofunction. He would benefit from skilled physical therapy as described below to address these limitations and allow the patient to return to his prior level of function.    Prognosis: good    Goals  Plan Goals: Plan Goals: Vestibular Goals:     1. Mobility: Walking/Moving Around Functional Limitation                    LTG 1: 12 weeks:  The patient will demonstrate 10% limitation by achieving a score of 10 on the Dizziness Handicap Inventory.   STATUS:  New   STG 1a: 6 weeks:  The patient will demonstrate 20% limitation by achieving a score of 20 on the Dizziness Handicap Inventory.     STATUS:  New      2. The patient has dizziness.   LTG 2: 12 weeks: The patient will report a 75% improvement in their dizziness symptoms in order to improve tolerance to functional activities.  STATUS: New   STG 2: 6 weeks: The patient will report a 50% improvement in their dizziness symptoms in order to improve tolerance to functional activities.  STATUS: New      Plan  Therapy options: will be seen for skilled therapy services  Planned modality interventions: cryotherapy, dry needling, electrical stimulation/Russian stimulation, traction and TENS  Planned therapy interventions: abdominal trunk stabilization, ADL retraining, balance/weight-bearing training, body mechanics training, fine motor coordination training, flexibility, functional ROM exercises,  gait training, home exercise program, IADL retraining, transfer training, stretching, strengthening, therapeutic activities, spinal/joint mobilization, soft tissue mobilization, postural training, neuromuscular re-education, motor coordination training, manual therapy and joint mobilization  Frequency: 3x week  Duration in weeks: 12  Treatment plan discussed with: patient        Visit Diagnoses:    ICD-10-CM ICD-9-CM   1. Dizziness  R42 780.4   2. Vestibular hypofunction of both ears  H83.2X3 386.50       History # of Personal Factors and/or Comorbidities: MODERATE (1-2)  Examination of Body System(s): # of elements: MODERATE (3)  Clinical Presentation: EVOLVING  Clinical Decision Making: MODERATE      Timed:         Manual Therapy:    0     mins  27192;     Therapeutic Exercise:    10     mins  07300;     Neuromuscular Yuliana:    18    mins  43879;    Therapeutic Activity:     0     mins  08125;     Gait Trainin     mins  16168;     Ultrasound:     0     mins  97947;    Ionto                               0    mins   84613  Self Care                       0     mins   34409  Canalith Repos    0     mins 00918      Un-Timed:  Electrical Stimulation:    0     mins  56928 ( );  Dry Needling     0     mins self-pay  Traction     0     mins 08830  Low Eval     0     Mins  39322  Mod Eval     30     Mins  48001  High Eval                       0     Mins  19168  Re-Eval                           0    mins  53488    Timed Treatment:   28   mins   Total Treatment:     58   mins    PT SIGNATURE: Adrián Jiménez PT    Electronically signed 12/10/2024    [place] License: PT - [id number]      Initial Certification  Certification Period: 12/10/2024 thru 3/9/2025  I certify that the therapy services are furnished while this patient is under my care.  The services outlined above are required by this patient, and will be reviewed every 90 days.     PHYSICIAN: Paige Florez MD  NPI: [id number]      DATE:     Please  sign and return via fax to [phone]. Thank you, [facility].

## 2024-12-19 ENCOUNTER — TREATMENT (OUTPATIENT)
Dept: PHYSICAL THERAPY | Facility: CLINIC | Age: 64
End: 2024-12-19
Payer: MEDICARE

## 2024-12-19 DIAGNOSIS — R42 DIZZINESS: ICD-10-CM

## 2024-12-19 DIAGNOSIS — H83.2X3 VESTIBULAR HYPOFUNCTION OF BOTH EARS: Primary | ICD-10-CM

## 2024-12-19 NOTE — PROGRESS NOTES
Physical Therapy Daily Treatment Note                          Patient: Stephon Castellano   : 1960  Diagnosis/ICD-10 Code:  No primary diagnosis found.  Referring practitioner: Paige Vargas*  Date of Initial Visit: No linked episodes  Today's Date: 2024  Patient seen for Visit count could not be calculated. Make sure you are using a visit which is associated with an episode. sessions           Subjective   The patient reported his HEP went well, he reports no new symptoms    Objective   See Exercise, Manual, and Modality Logs for complete treatment.     Assessment/Plan     Patient tolerated today's treatment well. Improvements noted in performance of balance tasks with repetitions. Patient particularly challenged with all eyes closed positions. Strength, ROM, and increased pain with activity deficits still limits patient's ability to perform ADLs safely. Further skilled care indicated at this time.       Timed:  Manual Therapy:    0     mins  30711;  Therapeutic Exercise:    0     mins  93310;     Neuromuscular Yuliana:   38    mins  91112;    Therapeutic Activity:     0     mins  45164;     Gait Trainin     mins  87652;     Aquatics                         0      mins  36675    Un-timed:  Mechanical Traction      0     mins  27772  Dry Needling     0     mins self-pay  Electrical Stimulation:    0     mins  31916 ( );      Timed Treatment:   38   mins   Total Treatment:     38   mins    Adrián Jiménez PT  Physical Therapist    Electronically signed 2024    [place] License: PT - [id number]

## 2024-12-20 ENCOUNTER — TELEPHONE (OUTPATIENT)
Dept: GASTROENTEROLOGY | Facility: CLINIC | Age: 64
End: 2024-12-20
Payer: MEDICARE

## 2024-12-26 ENCOUNTER — TREATMENT (OUTPATIENT)
Dept: PHYSICAL THERAPY | Facility: CLINIC | Age: 64
End: 2024-12-26
Payer: MEDICARE

## 2024-12-26 DIAGNOSIS — H83.2X3 VESTIBULAR HYPOFUNCTION OF BOTH EARS: Primary | ICD-10-CM

## 2024-12-26 DIAGNOSIS — R42 DIZZINESS: ICD-10-CM

## 2024-12-26 NOTE — PROGRESS NOTES
Physical Therapy Daily Treatment Note                          Patient: Stephon Castellano   : 1960  Diagnosis/ICD-10 Code:  Vestibular hypofunction of both ears [H83.2X3]  Referring practitioner: Paige Vargas*  Date of Initial Visit: Type: THERAPY  Noted: 12/10/2024  Today's Date: 2024  Patient seen for 3 sessions           Subjective   The patient reported some mild improvement overall    Objective   See Exercise, Manual, and Modality Logs for complete treatment.     Assessment/Plan     Patient tolerated today's treatment without complaints. Continued hypofunction training with focus on eyes closed habituation. Decreased balance with activity deficits still limits patient's ability to perform ADLs. Further skilled care indicated at this time.       Timed:  Manual Therapy:    0     mins  61860;  Therapeutic Exercise:    0     mins  56750;     Neuromuscular Yuliana:   30    mins  17259;    Therapeutic Activity:     15     mins  29189;     Gait Trainin     mins  44847;     Aquatics                         0      mins  47811    Un-timed:  Mechanical Traction      0     mins  15563  Dry Needling     0     mins self-pay  Electrical Stimulation:    0     mins  46789 ( );      Timed Treatment:   45   mins   Total Treatment:     45   mins    Adrián Jiménez PT  Physical Therapist    Electronically signed 2024    [place] License: PT - [id number]

## 2025-01-14 ENCOUNTER — TELEMEDICINE (OUTPATIENT)
Dept: PSYCHIATRY | Facility: CLINIC | Age: 65
End: 2025-01-14
Payer: MEDICARE

## 2025-01-14 DIAGNOSIS — F41.1 GENERALIZED ANXIETY DISORDER: Primary | ICD-10-CM

## 2025-01-14 DIAGNOSIS — F33.1 MAJOR DEPRESSIVE DISORDER, RECURRENT EPISODE, MODERATE: ICD-10-CM

## 2025-01-14 PROCEDURE — 90832 PSYTX W PT 30 MINUTES: CPT | Performed by: COUNSELOR

## 2025-01-14 NOTE — PLAN OF CARE
Patient described how his chronic pain limits his ability to function which adds to his anxiety and depression.

## 2025-01-14 NOTE — TREATMENT PLAN
Multi-Disciplinary Problems (from Behavioral Health Treatment Plan)      Active Problems       Problem: Anxiety  Start Date: 01/14/25      Problem Details: The patient self-scales this problem as a 10 with 10 being the worst.          Goal Priority Start Date Expected End Date End Date    Patient will develop and implement behavioral and cognitive strategies to reduce anxiety and irrational fears. High 01/14/25 07/15/25 --    Goal Details: Progress toward goal:  The patient self-scales their progress related to this goal as a 10 with 10 being the worst.          Goal Intervention Frequency Start Date End Date    Help patient explore past emotional issues in relation to present anxiety. Monthly 01/14/25 --    Intervention Details: Duration of treatment until remission of symptoms.          Goal Intervention Frequency Start Date End Date    Help patient develop an awareness of their cognitive and physical responses to anxiety. Monthly 01/14/25 --    Intervention Details: Duration of treatment until remission of symptoms.                  Problem: Depression  Start Date: 01/14/25      Problem Details: The patient self-scales this problem as a 8 with 10 being the worst.          Goal Priority Start Date Expected End Date End Date    Patient will demonstrate the ability to initiate new constructive life skills outside of sessions on a consistent basis. High 01/14/25 07/15/25 --    Goal Details: Progress toward goal:  The patient self-scales their progress related to this goal as a 8 with 10 being the worst.          Goal Intervention Frequency Start Date End Date    Assist patient in setting attainable activities of daily living goals. Monthly 01/14/25 --      Goal Intervention Frequency Start Date End Date    Provide education about depression Monthly 01/14/25 --    Intervention Details: Duration of treatment until remission of symptoms.          Goal Intervention Frequency Start Date End Date    Assist patient in  developing healthy coping strategies. Monthly 01/14/25 --    Intervention Details: Duration of treatment until remission of symptoms.                          Reviewed By       Antonina Aguirre, [facility] 01/14/25 3900                     I have discussed and reviewed this treatment plan with the patient.

## 2025-01-14 NOTE — PROGRESS NOTES
Date: January 14, 2025  Time In: 16:58 EST  Time Out: 13:21 EST    This provider is located at the [facility] ([facility]), [address], [place] [address] using a secure [employer] Video Visit through [employer]. Patient is being seen remotely via telehealth at home address in [place] and stated they are in a secure environment for this session. The patient's condition being diagnosed/treated is appropriate for telemedicine. The provider identified herself as well as her credentials. The patient, and/or patients guardian, consent to be seen remotely, and when consent is given they understand that the consent allows for patient identifiable information to be sent to a third party as needed. They may refuse to be seen remotely at any time. The electronic data is encrypted and password protected, and the patient and/or guardian has been advised of the potential risks to privacy not withstanding such measures.     You have chosen to receive care through a telehealth visit.  Do you consent to use a video/audio connection for your medical care today? Yes    Subjective   Stephon Castellano is a 64 y.o. male who presents today for follow up    Chief Complaint:   Chief Complaint   Patient presents with    Anxiety    Depression           Clinical Maneuvering/Intervention:      Assisted patient in processing above session content; acknowledged and normalized patient’s thoughts, feelings, and concerns.  Rationalized patient thought process regarding concerns presented at session.  Discussed triggers associated with patient's  anxiety  and depression  Also discussed coping skills for patient to implement such as mindfulness , increasing activity , self care , and positive self talk       Allowed patient to freely discuss issues without interruption or judgment. Provided safe, confidential environment to facilitate the development of positive therapeutic relationship and encourage  open, honest communication. Assisted patient in identifying risk factors which would indicate the need for higher level of care including thoughts to harm self or others and/or self-harming behavior and encouraged patient to contact this office, call 911, or present to the nearest emergency room should any of these events occur. Discussed crisis intervention services and means to access. Patient adamantly and convincingly denies current suicidal or homicidal ideation or perceptual disturbance.    Assessment:     Patient appears to maintain relative stability as compared to their baseline.  However, patient continues to struggle with anxiety and depression which continues to cause impairment in important areas of functioning.  A result, they can be reasonably expected to continue to benefit from treatment and would likely be at increased risk for decompensation otherwise.      PHQ-Score Total:  PHQ-9 Total Score: 8     TRIP-7 Score Total:  Over the last two weeks, how often have you been bothered by the following problems?  Feeling nervous, anxious or on edge: More than half the days  Not being able to stop or control worrying: More than half the days  Worrying too much about different things: More than half the days  Trouble Relaxing: Several days  Being so restless that it is hard to sit still: Several days  Becoming easily annoyed or irritable: Several days  Feeling afraid as if something awful might happen: Several days  TRIP 7 Total Score: 10  If you checked any problems, how difficult have these problems made it for you to do your work, take care of things at home, or get along with other people: Somewhat difficult      Mental Status Exam:   Hygiene:   good  Cooperation:  Cooperative  Eye Contact:  Good  Psychomotor Behavior:  Appropriate  Affect:  Appropriate  Mood: normal  Speech:  Normal  Thought Process:  Goal directed  Thought Content:  Normal  Suicidal:  None  Homicidal:  None  Hallucinations:   None  Delusion:  None  Memory:  Intact  Orientation:  Person, Place, Time, and Situation  Reliability:  good  Insight:  Good  Judgement:  Good  Impulse Control:  Good  Physical/Medical Issues:  Yes chronic pain        Patient's Support Network Includes:  wife and extended family    Functional Status: Moderate impairment     Progress toward goal: Not at goal    Prognosis: Guarded with Ongoing Treatment      Plan:    Patient will continue in individual outpatient therapy with focus on improved functioning and coping skills, maintaining stability, and avoiding decompensation and the need for higher level of care.    Patient will adhere to medication regimen as prescribed and report any side effects. Patient will contact this office, call 911 or present to the nearest emergency room should suicidal or homicidal ideations occur. Provide Cognitive Behavioral Therapy and Solution Focused Therapy to improve functioning, maintain stability, and avoid decompensation and the need for higher level of care.         VISIT DIAGNOSIS:     ICD-10-CM ICD-9-CM   1. Generalized anxiety disorder  F41.1 300.02   2. Major depressive disorder, recurrent episode, moderate  F33.1 296.32        Return in about 4 weeks (around 2/11/2025).    This document has been electronically signed by Antonina Aguirre [facility]  January 14, 2025 16:58 EST     Part of this note may be an electronic transcription/translation of spoken language to printed text using the Dragon Dictation System.

## 2025-01-15 ENCOUNTER — TELEPHONE (OUTPATIENT)
Dept: UROLOGY | Age: 65
End: 2025-01-15
Payer: MEDICARE

## 2025-01-15 NOTE — TELEPHONE ENCOUNTER
Spoke to patient, I informed patient that we are able to schedule aquablations now. Patient stated everything is going good and he is not interested in this at this time. He will call back if he changes his mind.

## 2025-01-17 NOTE — PROGRESS NOTES
Chief Complaint: Urinary Retention    Subjective         History of Present Illness  Stephon Castellano is a 64 y.o. male presents to [facility] to be seen for follow-up.    Last visit on 8/26/2024 TRUS and cystoscopy BPH.  At that visit he was told that he would have to wait to schedule for aquablation due to IV fluid shortage.  When he was called back to ask about scheduling for surgery he said he did not want to do surgery at that time.  I did advise that he needed to be seen since he was previously in retention when I saw him.    PVR 7/10/2024 280 mL    He reports he does have to urinate frequently.       Previous 8/26/2024:  ---------------Transrectal Ultrasound of the Prostate-----------     The procedure is done for the indication of BPH.   No enema prior to the procedure. Timeout was undertaken documenting the correct patient,site and procedure. The patient has also taken antibiotics prior to the procedure.      He was placed in the left lateral decubitis position.  A transrectal ultrasound probe was the lubricated, covered with a condom, and placed into the rectum. The ultrasound machine at 7.5 Mhz was used to perform the ultrasound exam. The prostate was scanned in both transverse and longitudinal fashion. Multiple images were obtained. L        The prostate height is 2.6 mm.      The prostate width is 5.3 mm.      The prostate length is 4.0 mm.      The total prostate volume is 31 gms.      The appearance of the prostate is normal.         The patient tolerated the procedure well. There was minimal bleeding at the end of the procedure.         Procedures         Urinalysis was checked today and was negative for signs of infection        Cytoscopy Procedure:      Procedure: Flexible cytoscope was passed per urethra into the bladder without difficulty after proper consent. The bladder was inspected in a systematic meridian fashion.         3 cm prostate, no median lobe     Moderate  trabeculation.  No pathology        There were no tumors, lesions, stones, or other abnormalities noted within the bladder. Of note, there was no increased vascularity as well. Both ureteral orifices were identified and were normal in appearance. The flexible cytoscope was removed. The patient tolerated the procedure well.            This document has been electronically signed by Charles Phan MD  August 24, 2024 05:44 EDT        History of Present Illness  Stephon Castellano is a 63 y.o. male            BPH        Flomax 0.8 nightly and finasteride  Nocturia x 4  Frequency every 30 minutes, urgency, no incontinence, weak stream     1/24 urinary retention -start finasteride     7/24 Q-Clark 8.9,    7/24 IPSS 27     Still gets erections, moderately worried about this.     Nephrolithiasis x 2.  No  surgeries  Father with prostate cancer at 60     8/24 UA - 0-2 RBCs, no bacteria, 1.9, GFR 36     PVR     4/24   080  1/24    250     11/23 CT abdomen/pelvis without - 1 mm nonobstructing stone right kidney.  Negative otherwise  11/23 renal ultrasound - small left renal cyst.  No hydronephrosis     History of bradycardia  No anticoagulation  Non-smoker           PSA     11/23 0.6          Objective     Past Medical History:   Diagnosis Date    Alcohol abuse     Allergic rhinitis     Anemia 02/06/2024    My hand started just shaking but got worse until now i have no strength in my left hand    Anxiety 2006    Starting taking mood depression drugs    Arthritis     Asthma About 6 months    Sore Throat and was sent to ENT    Atrial fibrillation 2023    Benign prostatic hyperplasia 2015    Dad had Prostate Cancer and just did Colonoscopy and they said i have a major large Prostate    Cervical neck pain with evidence of disc disease     Chronic pain disorder     Colon polyp 2020    Had a lot of polyps on the last 2 Colonoscopies    CTS (carpal tunnel syndrome) 2005    Depression     Difficulty walking 2033    Erectile  dysfunction 2017    I have been on Testosterone replacements since then    Head injury 1977    Headache May 2023    Started abou 6 months or more    Hemorrhoid     HL (hearing loss) 2004    Have a major tinitis/ringing in my ears especially the left one    Hyperlipidemia 2017    My blood test have stated they were high or elevated    Hypertension 2017    Given Flomax said it will probably go down    Hypogonadism in male     Insomnia     Kidney stone 2020    Went to doctor and got medication for them and still have issues with right side    Low back pain 2010    Stinosis of back & neck    Memory loss 2023    Movement disorder 2023    Sleep apnea 2007    Substance abuse [id number]    On Disulfiram for alcohol abuse-voluntary    Visual impairment 2023    Just recently have been given eye drops       Past Surgical History:   Procedure Laterality Date    CERVICAL DISC SURGERY  2008    COLONOSCOPY N/A 08/18/2023    Procedure: COLONOSCOPY WITH POLYPECTOMY/SNARE;  Surgeon: Jose Alejandro Fox MD;  Location: [facility] ENDOSCOPY;  Service: General;  Laterality: N/A;  COLON POLYPS    COLONOSCOPY N/A 8/13/2024    Procedure: COLONOSCOPY WITH POLYPECTOMIES;  Surgeon: Jose Alejandro Fox MD;  Location: [facility] ENDOSCOPY;  Service: General;  Laterality: N/A;  COLON POLYPS    ENDOSCOPY N/A 8/13/2024    Procedure: ESOPHAGOGASTRODUODENOSCOPY WITH BIOPSIES;  Surgeon: Jose Alejandro Fox MD;  Location: [facility] ENDOSCOPY;  Service: General;  Laterality: N/A;  GASTRITIS. SUPERFICIAL GASTRIC ULCERS    ENDOSCOPY N/A 11/27/2024    Procedure: ESOPHAGOGASTRODUODENOSCOPY WITH BIOPSIES;  Surgeon: Tamara Molina MD;  Location: [facility] ENDOSCOPY;  Service: Gastroenterology;  Laterality: N/A;  GASTRITIS    HEEL SPUR SURGERY  2005    KNEE ACL RECONSTRUCTION  2004    OTHER SURGICAL HISTORY      METAL IMPLANT    PAIN PUMP INSERTION/REVISION  2010    SPINE SURGERY  2008    Antieror c5-7 discotomy    TONSILLECTOMY  1965    VASECTOMY  2002         Current  Outpatient Medications:     carboxymethylcellulose (REFRESH PLUS) 0.5 % solution, Administer 2 drops to both eyes 3 (Three) Times a Day As Needed for Dry Eyes., Disp: 30 mL, Rfl: 12    FLUoxetine (PROzac) 10 MG capsule, Take 1 capsule by mouth Daily., Disp: 90 capsule, Rfl: 1    FLUoxetine (PROzac) 20 MG capsule, Take 1 capsule by mouth Daily. Start prozac 7/17, Disp: 90 capsule, Rfl: 1    fluticasone (FLONASE) 50 MCG/ACT nasal spray, 2 sprays into the nostril(s) as directed by provider Daily., Disp: 16 g, Rfl: 11    HYDROmorphone (DILAUDID) 1 MG/ML injection, Infuse 0-2 mg/hr into a venous catheter., Disp: , Rfl:     IRON, FERROUS GLUCONATE, PO, Take 65 mg by mouth Daily., Disp: , Rfl:     Lubricant Eye Drops PF 0.5 % solution, , Disp: , Rfl:     Lysine 1000 MG tablet, Take  by mouth., Disp: , Rfl:     Magnesium 250 MG tablet, Take  by mouth., Disp: , Rfl:     multivitamin with minerals tablet tablet, Take 1 tablet by mouth Daily., Disp: , Rfl:     Omega 3-6-9 Fatty Acids (OMEGA 3-6-9 COMPLEX PO), Take  by mouth., Disp: , Rfl:     pain patient supplied pump, by Intrathecal route Continuous. Instructions are in drop boxes, Disp: , Rfl:     QUEtiapine (SEROquel) 50 MG tablet, Take 1 tablet by mouth Every Night. (Patient taking differently: Take 0.5 tablets by mouth Every Night.), Disp: 90 tablet, Rfl: 1    Testosterone Cypionate (DEPOTESTOTERONE CYPIONATE) 200 MG/ML injection, , Disp: , Rfl:     Turmeric 500 MG capsule, Take  by mouth., Disp: , Rfl:     finasteride (PROSCAR) 5 MG tablet, Take 1 tablet by mouth Daily for 360 days., Disp: 90 tablet, Rfl: 3    pantoprazole (PROTONIX) 20 MG EC tablet, Take 1 tablet by mouth Daily. (Patient not taking: Reported on 1/21/2025), Disp: 30 tablet, Rfl: 1    tamsulosin (FLOMAX) 0.4 MG capsule 24 hr capsule, Take 2 capsules by mouth Daily for 360 days., Disp: 180 capsule, Rfl: 3    No Known Allergies     Family History   Problem Relation Age of Onset    Suicide Attempts Mother   "   Bipolar disorder Mother     Anxiety disorder Mother     Heart disease Mother     Osteoporosis Mother     Alcohol abuse Mother         Mother committed suicide    Depression Mother         She had major depression    Early death Mother         She committed suicide    Thyroid disease Mother         She had Thyroid issues and had surgery    Cancer Father         Prostate    Prostate cancer Father     Hearing loss Father         He has worn a hearing aids since his late 50’s    Depression Sister     Alcohol abuse Sister     Diabetes Sister         Diabetes    Arthritis Sister     Sleep apnea Sister     Obesity Sister     Thyroid disease Sister     Insomnia Sister     Narcolepsy Sister     Diabetes Brother     Stroke Other         AUNT/UNCLE GRANDPARENTS    Diabetes Other         GRANDPARENTS        Social History     Socioeconomic History    Marital status:    Tobacco Use    Smoking status: Former     Current packs/day: 0.00     Average packs/day: 2.0 packs/day for 8.0 years (16.0 ttl pk-yrs)     Types: Cigarettes     Start date: 1975     Quit date: 1983     Years since quittin.0     Passive exposure: Past    Smokeless tobacco: Former     Types: Snuff    Tobacco comments:     Also dipped for several years   Vaping Use    Vaping status: Never Used   Substance and Sexual Activity    Alcohol use: Not Currently     Alcohol/week: 14.0 standard drinks of alcohol     Types: 14 Shots of liquor per week     Comment: Stopped due to Disulfiram 250 mg    Drug use: Not Currently     Frequency: 7.0 times per week     Types: Marijuana    Sexual activity: Not Currently     Partners: Female     Birth control/protection: None, Vasectomy     Comment: Low testosterone       Vital Signs:   Resp 14   Ht 180.3 cm (70.98\")   Wt 99.8 kg (220 lb)   BMI 30.70 kg/m²      Physical Exam  Vitals reviewed.   Constitutional:       Appearance: Normal appearance.   Neurological:      General: No focal deficit present.      " Mental Status: He is alert and oriented to person, place, and time.   Psychiatric:         Mood and Affect: Mood normal.         Behavior: Behavior normal.          Result Review :   The following data was reviewed by: NARENDRA Villafana on 01/21/2025:     Bladder Scan interpretation 01/21/2025    Estimation of residual urine via BVI 3000 Verathon Bladder Scan  MA/nurse performing: Becca CANNON MA  Residual Urine: 148 ml  Indication: Urinary retention    Benign prostatic hyperplasia with urinary retention   Position: Supine  Examination: Incremental scanning of the suprapubic area using 2.0 MHz transducer using copious amounts of acoustic gel.   Findings: An anechoic area was demonstrated which represented the bladder, with measurement of residual urine as noted. I inspected this myself. In that the residual urine was stable or insignificant, refer to plan for treatment and plan necessary at this time.           Procedures        Assessment and Plan    Diagnoses and all orders for this visit:    1. Urinary retention (Primary)  -     Bladder Scan  -     tamsulosin (FLOMAX) 0.4 MG capsule 24 hr capsule; Take 2 capsules by mouth Daily for 360 days.  Dispense: 180 capsule; Refill: 3  -     finasteride (PROSCAR) 5 MG tablet; Take 1 tablet by mouth Daily for 360 days.  Dispense: 90 tablet; Refill: 3    2. Benign prostatic hyperplasia with urinary retention  -     tamsulosin (FLOMAX) 0.4 MG capsule 24 hr capsule; Take 2 capsules by mouth Daily for 360 days.  Dispense: 180 capsule; Refill: 3  -     finasteride (PROSCAR) 5 MG tablet; Take 1 tablet by mouth Daily for 360 days.  Dispense: 90 tablet; Refill: 3    Given that he does feel like his symptoms improved from where they were previously he would like to continue on tamsulosin and add finasteride.  His PVR is better than at his previous visit.    I will plan to see him back in 6 months or sooner for new concerns.    Follow Up   Return in about 6 months (around  7/21/2025).  Patient was given instructions and counseling regarding his condition or for health maintenance advice. Please see specific information pulled into the AVS if appropriate.         This document has been electronically signed by NARENDRA Villafana  January 21, 2025 13:19 EST

## 2025-01-21 ENCOUNTER — OFFICE VISIT (OUTPATIENT)
Dept: UROLOGY | Age: 65
End: 2025-01-21
Payer: MEDICARE

## 2025-01-21 VITALS — WEIGHT: 220 LBS | BODY MASS INDEX: 30.8 KG/M2 | RESPIRATION RATE: 14 BRPM | HEIGHT: 71 IN

## 2025-01-21 DIAGNOSIS — R33.8 BENIGN PROSTATIC HYPERPLASIA WITH URINARY RETENTION: ICD-10-CM

## 2025-01-21 DIAGNOSIS — N40.1 BENIGN PROSTATIC HYPERPLASIA WITH URINARY RETENTION: ICD-10-CM

## 2025-01-21 DIAGNOSIS — R33.9 URINARY RETENTION: Primary | ICD-10-CM

## 2025-01-21 LAB — URINE VOLUME: 148

## 2025-01-21 RX ORDER — TAMSULOSIN HYDROCHLORIDE 0.4 MG/1
2 CAPSULE ORAL DAILY
Qty: 180 CAPSULE | Refills: 3 | Status: SHIPPED | OUTPATIENT
Start: 2025-01-21 | End: 2026-01-16

## 2025-01-21 RX ORDER — FINASTERIDE 5 MG/1
5 TABLET, FILM COATED ORAL DAILY
Qty: 90 TABLET | Refills: 3 | Status: SHIPPED | OUTPATIENT
Start: 2025-01-21 | End: 2026-01-16

## 2025-01-28 ENCOUNTER — TELEPHONE (OUTPATIENT)
Dept: GASTROENTEROLOGY | Facility: CLINIC | Age: 65
End: 2025-01-28
Payer: MEDICARE

## 2025-01-28 ENCOUNTER — LAB (OUTPATIENT)
Facility: HOSPITAL | Age: 65
End: 2025-01-28
Payer: MEDICARE

## 2025-01-28 DIAGNOSIS — D50.9 IRON DEFICIENCY ANEMIA, UNSPECIFIED IRON DEFICIENCY ANEMIA TYPE: ICD-10-CM

## 2025-01-28 PROCEDURE — 36415 COLL VENOUS BLD VENIPUNCTURE: CPT

## 2025-01-28 PROCEDURE — 86364 TISS TRNSGLTMNASE EA IG CLAS: CPT

## 2025-01-28 PROCEDURE — 82784 ASSAY IGA/IGD/IGG/IGM EACH: CPT

## 2025-01-28 PROCEDURE — 86258 DGP ANTIBODY EACH IG CLASS: CPT

## 2025-01-28 NOTE — TELEPHONE ENCOUNTER
Hub staff attempted to follow warm transfer process and was unsuccessful     Caller: Stephon Castellano    Relationship to patient: Self    Best call back number: [phone]    Patient is needing: PT RCVD LETTER TO CONTACT THE OFFICE ABOUT RECOMMENDED TREATMENT BY DR. MELVIN.  PLEASE CALL BACK

## 2025-01-29 ENCOUNTER — OFFICE VISIT (OUTPATIENT)
Dept: PSYCHIATRY | Facility: CLINIC | Age: 65
End: 2025-01-29
Payer: MEDICARE

## 2025-01-29 VITALS
WEIGHT: 222.4 LBS | BODY MASS INDEX: 31.14 KG/M2 | DIASTOLIC BLOOD PRESSURE: 69 MMHG | SYSTOLIC BLOOD PRESSURE: 132 MMHG | HEIGHT: 71 IN | HEART RATE: 64 BPM

## 2025-01-29 DIAGNOSIS — F41.1 GENERALIZED ANXIETY DISORDER: Primary | ICD-10-CM

## 2025-01-29 DIAGNOSIS — F51.05 INSOMNIA DUE TO MENTAL CONDITION: ICD-10-CM

## 2025-01-29 DIAGNOSIS — F33.1 MAJOR DEPRESSIVE DISORDER, RECURRENT EPISODE, MODERATE: ICD-10-CM

## 2025-01-29 LAB
GLIADIN PEPTIDE IGA SER-ACNC: 3 UNITS (ref 0–19)
IGA SERPL-MCNC: 176 MG/DL (ref 61–437)
TTG IGA SER-ACNC: <2 U/ML (ref 0–3)

## 2025-01-29 RX ORDER — BUSPIRONE HYDROCHLORIDE 5 MG/1
5 TABLET ORAL 3 TIMES DAILY
Qty: 90 TABLET | Refills: 2 | Status: SHIPPED | OUTPATIENT
Start: 2025-01-29

## 2025-01-29 RX ORDER — TRAZODONE HYDROCHLORIDE 50 MG/1
50 TABLET, FILM COATED ORAL NIGHTLY
Qty: 90 TABLET | Refills: 1 | Status: SHIPPED | OUTPATIENT
Start: 2025-01-29

## 2025-01-29 NOTE — PROGRESS NOTES
Subjective   Stephon Castellano is a 64 y.o. male who presents today for initial evaluation     Referring Provider:  No referring provider defined for this encounter.  Grahami endocrinologist    Chief Complaint:  depression    History of Present Illness:     2024: INITIAL VISIT Chart review:     Juan: Hydromorphone  Care Everywhere: a few non behavioral health notes, sees [facility]    Psychotropic medication chart review:  Present:  Trazodone 200 mg nightly  Cymbalta 60 mg a day    Previously:  Zoloft 50 mg a day    EKG: 2023: 57, sinus, QTc 417  Procedures: Sleep study ordered for the future  Head imaging: 2023 CT of the head shows no acute, MRI 2023 shows no acute, empty sella configuration  Labs: 2024: CMP shows creatinine 1.47, CK is elevated at 756, reassuring B12, hemoglobin is low at 11.5,  Initial Chart Review Notes: Seen by neurology in February for evaluation for seizures.  Patient notes that he is depressed and cannot sleep, depression began when he was in the [employer], his mother killed himself.  Depression began about 10 years ago.  Last year his problems became so severe that he quit his job.  He has not seen a psychiatrist for years.  Sleep medicine consult also ordered.      Chart Review By Dates:  2025: EGD for CANDIDA, PT, int med, Byble x2, urology; benign bx's.  2024: hand surg, onc.  10/04/2024: gen surg, hand surg, neur, ortho, unknown, urology,   2024: admitted for colonoscopy , gene surg, int med, nephro, onc, reassuring copper, zinc, hepatitis panel.  24: Sleep: settings recs, retaining per bladder scan.   07/10/24: urology, sleep med, gen surg, bladder scan 280 mL. Mirtazapine led to brain fog, fatigue, poor focus in am.  2024: Neurology, internal medicine.  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    Plannin2024: Insomnia, increase quetiapine. Discussed getting help  "with organizing the house.  10/04/2024: Improving, still MDD, TRIP, increase prozac further.  08/22/2024: Improving, increase prozac for possible MDD.    VISITS/APPOINTMENTS (BELOW):    \"Stephon\"  Stage 4 CKD (nephro 8/2024)  Tried bupropion long ago (for depression 10 years ago)  Mom may have been bipolar, committed SA  Very sensitive to low doses of meds  Low energy, motivation  It's been this way for some time.  Anemia, CKD, pain pump (dilaudid)  Mornings that are the worst    01/29/2025:   In person interview:  \"Pretty good. Actually about the same.\"  Drowsy and dizzy in the mornings.  Anemia has gotten better  Still has low energy  Wife situation hasn't changed  hoarding  Less arguments  Therapy helping  MDD: mild depressed mood  TRIP: worrying, on edge, irritable, mild  Panic attacks: stable  Energy: down chronically  Concentration: stable  Insomnia: initial, CPAP, 5-6 hours a night  Eating/Weight: 222, 218, 206, 196, 191, 188, 182 lbs (goal is 190)  Refills: y  Substances: def  Therapy: Byble, likes  Medication compliant: y  SE: n  No SI HI AVH.      11/13/2024:   In person interview:  \"Pretty good. I started the therapy.\"  Still has low energy  Discussed wife's hoarding  Minor arguments  Hard to wear that mask (CPAP)  MDD: improved, possibly stable  TRIP: improving, less irritable  Panic attacks: stable  Energy: down chronically  Concentration: stable  Insomnia: initial, CPAP, 5-6 hours a night  Eating/Weight: 218, 206, 196, 191, 188, 182 lbs (goal is 190)  Refills: y  Substances: def  Therapy: Byble, likes  Medication compliant: y  SE: n  No SI HI AVH.      10/04/2024:   In person interview:  \"I'm doing pretty good.\"  Low energy, motivation  It's been this way for some time.  Anemia, CKD, pain pump (dilaudid)  Mornings that are the worst  Wife: no blowouts or fights  MDD: depressed but it's not bothering me so much, mild  TRIP: mild, irritability  Panic attacks: stable  Energy: down chronically  Concentration: " "stable  Insomnia: initial, now stable on CPAP, 5-6 hours a night  Eating/Weight: 206, 196, 191, 188, 182 lbs (goal is 190)  Refills: y  Substances: def  Therapy: never set up  Medication compliant: y  SE: n  No AMANDEEP ARGUELLES AV.      08/22/2024:   In person interview:  \"I think the medicine is working good.\"  I thought you wanted me to stop the prozac  Still no energy, no motivation  It's been this way for some time.  Anemia, CKD, pain pump (dilaudid)  Mornings that are the worst  Less irritated  Less fighting with wife, \"we aren't arguing like we used to.\"  MDD: stable, possible anhedonia  TRIP: irritability improved  Panic attacks: stable  Energy: down chronically  Concentration: \"seems to be ok\"  Insomnia: initial, now stable on CPAP  Eating/Weight: 196, 191, 188, 182 lbs (goal is 190)  Refills: y  Substances: def  Therapy: never set up  Medication compliant: y  SE: n  No [facility].      07/25/2024: In person interview:  \"It made me really tired.\"  I'm wearing a brace on my right hand for CTS, driving a lot.  Wife broke her ankle in 3 places.  I'm more angry  Fatigue, but also \"doing everything\" for his wife  Discussed his marriage.  Fighting, baseline  MDD: now improved to stable, \"I haven't cried in a long time\"  ADHD:   Elementary school:   Grades? poor  Special classes or failures? Reading failed, failed 2nd grade  Got in trouble? no  Referral for ADHD testing? no  FHx: denies  Presently:  Problems with attention to detail, problems with sustained attention, problems listening when spoken to directly, failure to finish tasks, avoids tasks that require sustained mental effort, easily distracted, forgetting things, losing things, hard to organize, talks a lot and cutting people off, drifts off during conversations  TRIP: irritable, worse  Panic attacks: n  Energy: down  Concentration: not at goal intermittently  Insomnia: initial, now stable  Started CPAP, sleeping better  Eating/Weight: 191, 188, 182 lbs (goal is " "190)  Refills: y  Substances: def  Therapy: forgot to call them back  Medication compliant: y  SE: n  No SI HI AVH.    ...      04/30/2024: In person.  Interview:  His/Her Story: \"[facility].\"  G14, P16  I've always had a little bit of depression.  But when I got injured, it got worse. \"I couldn't do things like I used to.\"  It's a combination of injuries, surgeries  Pain pump helps  Trazodone for 5 years  Cymbalta 5 years  Has never tried combinations, but has tried \"the gamut\" of them  Has lost 50 lbs in 3 mos, unsure why  Trouble focusing recently  Has chronic balance issues, no one knows why (cards, two neurologists, worked him up) x 3-4 mos  Depression/Mood:  Depressed mood, anhedonia, poor energy, poor concentration, weight loss, insomnia.  Seasonal pattern: def  Severity: Moderate  Duration: all his life  Anxiety:  Uncontrolled worrying, muscle tension, fatigue, poor concentration, feeling on edge, irritability, insomnia.  Severity: Moderate  Duration: all of his life  Panic attacks: n  Psych ROS: Positive for depression, anxiety.  Negative for psychosis and rebecca.  ADHD: def  PTSD: no life threatening trauma  No SI HI AVH.  Medication compliant:      Access to Firearms: yes, locked away    PHQ-9 Depression Screening  PHQ-9 Total Score:      Little interest or pleasure in doing things?     Feeling down, depressed, or hopeless?     Trouble falling or staying asleep, or sleeping too much?     Feeling tired or having little energy?     Poor appetite or overeating?     Feeling bad about yourself - or that you are a failure or have let yourself or your family down?     Trouble concentrating on things, such as reading the newspaper or watching television?     Moving or speaking so slowly that other people could have noticed? Or the opposite - being so fidgety or restless that you have been moving around a lot more than usual?     Thoughts that you would be better off dead, or of hurting yourself in " some way?     PHQ-9 Total Score       TRIP-7  Feeling nervous, anxious or on edge: More than half the days  Not being able to stop or control worrying: Several days  Worrying too much about different things: More than half the days  Trouble Relaxing: More than half the days  Being so restless that it is hard to sit still: Several days  Feeling afraid as if something awful might happen: Several days  Becoming easily annoyed or irritable: More than half the days  TRIP 7 Total Score: 11  If you checked any problems, how difficult have these problems made it for you to do your work, take care of things at home, or get along with other people: Very difficult    Past Surgical History:  Past Surgical History:   Procedure Laterality Date    CERVICAL DISC SURGERY  2008    COLONOSCOPY N/A 08/18/2023    Procedure: COLONOSCOPY WITH POLYPECTOMY/SNARE;  Surgeon: Jose Alejandro Fox MD;  Location: [facility] ENDOSCOPY;  Service: General;  Laterality: N/A;  COLON POLYPS    COLONOSCOPY N/A 8/13/2024    Procedure: COLONOSCOPY WITH POLYPECTOMIES;  Surgeon: Jose Alejandro Fox MD;  Location: [facility] ENDOSCOPY;  Service: General;  Laterality: N/A;  COLON POLYPS    ENDOSCOPY N/A 8/13/2024    Procedure: ESOPHAGOGASTRODUODENOSCOPY WITH BIOPSIES;  Surgeon: Jose Alejandro Fox MD;  Location: [facility] ENDOSCOPY;  Service: General;  Laterality: N/A;  GASTRITIS. SUPERFICIAL GASTRIC ULCERS    ENDOSCOPY N/A 11/27/2024    Procedure: ESOPHAGOGASTRODUODENOSCOPY WITH BIOPSIES;  Surgeon: Tamara Molina MD;  Location: [facility] ENDOSCOPY;  Service: Gastroenterology;  Laterality: N/A;  GASTRITIS    HEEL SPUR SURGERY  2005    KNEE ACL RECONSTRUCTION  2004    OTHER SURGICAL HISTORY      METAL IMPLANT    PAIN PUMP INSERTION/REVISION  2010    SPINE SURGERY  2008    Antieror c5-7 discotomy    TONSILLECTOMY  1965    VASECTOMY  2002       Problem List:  Patient Active Problem List   Diagnosis    Chronic right-sided thoracic back pain    Kidney stone    Screening due     Allergic rhinitis    Arthritis    Cervical neck pain with evidence of disc disease    Depression    Head injury    Hemorrhoid    Hypogonadism in male    Right wrist pain    Weight loss    Abdominal pain    Seizure-like activity    Palpitations    Abnormal EKG    Bradycardia    Brain fog    Fatigue    Acute kidney injury    Tremor    Slow transit constipation    Weakness of both lower extremities    Left hand weakness    Drug induced myoclonus    Insomnia due to other mental disorder    Obstructive sleep apnea syndrome    Cervical radiculopathy    Iron deficiency anemia    Gastroesophageal reflux disease with esophagitis without hemorrhage    History of colonic polyps    Benign prostatic hyperplasia with urinary retention    BPH without obstruction/lower urinary tract symptoms    Benign prostatic hyperplasia with lower urinary tract symptoms    Acute gastric ulcer without hemorrhage or perforation       Allergy:   No Known Allergies     Discontinued Medications:  Medications Discontinued During This Encounter   Medication Reason    pantoprazole (PROTONIX) 20 MG EC tablet     QUEtiapine (SEROquel) 50 MG tablet Side effects                   Current Medications:   Current Outpatient Medications   Medication Sig Dispense Refill    carboxymethylcellulose (REFRESH PLUS) 0.5 % solution Administer 2 drops to both eyes 3 (Three) Times a Day As Needed for Dry Eyes. 30 mL 12    finasteride (PROSCAR) 5 MG tablet Take 1 tablet by mouth Daily for 360 days. 90 tablet 3    FLUoxetine (PROzac) 10 MG capsule Take 1 capsule by mouth Daily. 90 capsule 1    FLUoxetine (PROzac) 20 MG capsule Take 1 capsule by mouth Daily. Start prozac 7/17 90 capsule 1    fluticasone (FLONASE) 50 MCG/ACT nasal spray 2 sprays into the nostril(s) as directed by provider Daily. 16 g 11    HYDROmorphone (DILAUDID) 1 MG/ML injection Infuse 0-2 mg/hr into a venous catheter.      IRON, FERROUS GLUCONATE, PO Take 65 mg by mouth Daily.      Lubricant Eye Drops  PF 0.5 % solution       Lysine 1000 MG tablet Take  by mouth.      Magnesium 250 MG tablet Take  by mouth.      multivitamin with minerals tablet tablet Take 1 tablet by mouth Daily.      Omega 3-6-9 Fatty Acids (OMEGA 3-6-9 COMPLEX PO) Take  by mouth.      pain patient supplied pump by Intrathecal route Continuous. Instructions are in drop boxes      tamsulosin (FLOMAX) 0.4 MG capsule 24 hr capsule Take 2 capsules by mouth Daily for 360 days. 180 capsule 3    Testosterone Cypionate (DEPOTESTOTERONE CYPIONATE) 200 MG/ML injection       Turmeric 500 MG capsule Take  by mouth.      busPIRone (BUSPAR) 5 MG tablet Take 1 tablet by mouth 3 (Three) Times a Day. 90 tablet 2    traZODone (DESYREL) 50 MG tablet Take 1 tablet by mouth Every Night. 90 tablet 1     No current facility-administered medications for this visit.       Past Medical History:  Past Medical History:   Diagnosis Date    Alcohol abuse     Allergic rhinitis     Anemia 02/06/2024    My hand started just shaking but got worse until now i have no strength in my left hand    Anxiety 2006    Starting taking mood depression drugs    Arthritis     Asthma About 6 months    Sore Throat and was sent to ENT    Atrial fibrillation 2023    Benign prostatic hyperplasia 2015    Dad had Prostate Cancer and just did Colonoscopy and they said i have a major large Prostate    Cervical neck pain with evidence of disc disease     Chronic pain disorder     Colon polyp 2020    Had a lot of polyps on the last 2 Colonoscopies    CTS (carpal tunnel syndrome) 2005    Depression     Difficulty walking 2033    Erectile dysfunction 2017    I have been on Testosterone replacements since then    Head injury 1977    Headache May 2023    Started abou 6 months or more    Hemorrhoid     HL (hearing loss) 2004    Have a major tinitis/ringing in my ears especially the left one    Hyperlipidemia 2017    My blood test have stated they were high or elevated    Hypertension 2017    Given Flomax  said it will probably go down    Hypogonadism in male     Insomnia     Kidney stone     Went to doctor and got medication for them and still have issues with right side    Low back pain     Stinosis of back & neck    Memory loss     Movement disorder     Sleep apnea 2007    Substance abuse [id number]    On Disulfiram for alcohol abuse-voluntary    Visual impairment     Just recently have been given eye drops       Past Psychiatric History:  Began Treatment: a year   Diagnoses: TRIP, insomnia, MDD  Psychiatrist: a 2019  Therapist: a year   Admission History:Denies  Medication Trials:    multiple    Self Harm: Denies  Suicide Attempts:Denies      Substance Abuse History:   Types: end of  stopped drinking using antabuse, former smoker 2ppd  Withdrawal Symptoms:Denies  Longest Period Sober: 6 mos, recently  AA: Not applicable     Social History:  Martial Status:  Employed: retired 22  Kids:Yes  House:Lives in a house   History: [employer], retired    Social History     Socioeconomic History    Marital status:    Tobacco Use    Smoking status: Former     Current packs/day: 0.00     Average packs/day: 2.0 packs/day for 8.0 years (16.0 ttl pk-yrs)     Types: Cigarettes     Start date: 1975     Quit date: 1983     Years since quittin.1     Passive exposure: Past    Smokeless tobacco: Former     Types: Snuff    Tobacco comments:     Also dipped for several years   Vaping Use    Vaping status: Never Used   Substance and Sexual Activity    Alcohol use: Not Currently     Alcohol/week: 14.0 standard drinks of alcohol     Types: 14 Shots of liquor per week     Comment: Stopped due to Disulfiram 250 mg    Drug use: Not Currently     Frequency: 7.0 times per week     Types: Marijuana    Sexual activity: Not Currently     Partners: Female     Birth control/protection: None, Vasectomy     Comment: Low testosterone       Family History:   Suicide Attempts:   "Mom  Suicide Completions: mom  \"I think she had bipolar but I'm not sure\"      Family History   Problem Relation Age of Onset    Suicide Attempts Mother     Bipolar disorder Mother     Anxiety disorder Mother     Heart disease Mother     Osteoporosis Mother     Alcohol abuse Mother         Mother committed suicide    Depression Mother         She had major depression    Early death Mother         She committed suicide    Thyroid disease Mother         She had Thyroid issues and had surgery    Cancer Father         Prostate    Prostate cancer Father     Hearing loss Father         He has worn a hearing aids since his late 50’s    Depression Sister     Alcohol abuse Sister     Diabetes Sister         Diabetes    Arthritis Sister     Sleep apnea Sister     Obesity Sister     Thyroid disease Sister     Insomnia Sister     Narcolepsy Sister     Diabetes Brother     Stroke Other         AUNT/UNCLE GRANDPARENTS    Diabetes Other         GRANDPARENTS        Developmental History:       Childhood: saw mom and dad fight a lot, they , brother  when he was 5 yo.   High School:Completed  College: [employer]    Mental Status Exam  Appearance  : groomed, good eye contact, normal street clothes  Behavior  : pleasant and cooperative  Motor  : No abnormal  Speech  : talkative, normal rhythm, rate, volume, tone, not hyperverbal, not pressured, normal prosidy  Mood  : \"It's mostly my wife, but I'm dizzy\"  Affect  : mild constriction, mood congruent, good variability  Thought Content  : negative suicidal ideations, negative homicidal ideations, negative obsessions  Perceptions  : negative auditory hallucinations, negative visual hallucinations  Thought Process  : linear  Insight/Judgement  : Fair/fair  Cognition  : grossly intact  Attention   : intact      Review of Systems:  Review of Systems   Constitutional:  Positive for fatigue. Negative for diaphoresis.   HENT:  Negative for drooling.    Eyes:  Negative for visual disturbance. " "  Respiratory:  Negative for cough, chest tightness and shortness of breath.    Cardiovascular:  Negative for chest pain, palpitations and leg swelling.   Gastrointestinal:  Negative for abdominal pain, constipation, diarrhea, nausea and vomiting.   Endocrine: Positive for cold intolerance and heat intolerance.   Genitourinary:  Negative for difficulty urinating.   Musculoskeletal:  Negative for joint swelling.   Allergic/Immunologic: Negative for immunocompromised state.   Neurological:  Positive for dizziness and light-headedness. Negative for seizures, speech difficulty, numbness and headaches.   Psychiatric/Behavioral:  Positive for sleep disturbance. Negative for agitation.        Physical Exam:  Physical Exam    Vital Signs:   /69   Pulse 64   Ht 180.3 cm (71\")   Wt 101 kg (222 lb 6.4 oz)   BMI 31.02 kg/m²      Lab Results:   Office Visit on 01/21/2025   Component Date Value Ref Range Status    Urine Volume 01/21/2025 148   Final   Admission on 11/27/2024, Discharged on 11/27/2024   Component Date Value Ref Range Status    Case Report 11/27/2024    Final                    Value:Surgical Pathology Report                         Case: [id number]                                  Authorizing Provider:  Tamara Molina MD Collected:           11/27/2024 10:46 AM          Ordering Location:     [facility] Received:            11/27/2024 11:38 AM                                 SUITES                                                                       Pathologist:           Stephy Vallejo MD                                                     Specimens:   1) - Small Intestine, Duodenum, DUODENUM BIOPSIES                                                   2) - Gastric, Antrum, ANTRUM BIOPSIES                                                               3) - GE Junction, GE JUNCTION BIOPSIES                                                              4) - Esophagus, Mid, MID " "ESOPHAGUS BIOPSIES                                                Clinical Information 11/27/2024    Final                    Value:Iron deficiency anemia, unspecified iron deficiency anemia type  Acute gastric ulcer without hemorrhage or perforation      Final Diagnosis 11/27/2024    Final                    Value:1.  Duodenum, biopsy:   -Chronic duodenitis with focal partial villous blunting      2. Stomach, antrum, biopsy:   - Gastric antrum mucosa with no significant pathologic change   - Negative for Helicobacter pylori on routine H&E stain   - Negative for intestinal metaplasia, dysplasia and malignancy      3. Gastroesophageal junction, biopsy:   - Squamocolumnar mucosa with mild chronic inflammation   - Negative for intestinal metaplasia, dysplasia and malignancy      4. Mid esophagus, biopsy:   - Squamous mucosa with no significant pathologic change   - Negative for eosinophilic esophagitis          Gross Description 11/27/2024    Final                    Value:1. Small Intestine, Duodenum.  Received in formalin and labeled \" duodenum\" are three fragments of tan soft tissue measuring 0.2-0.3 cm in greatest dimension. The specimen is entirely submitted in one cassette.    2. Gastric, Antrum.  Received in formalin and labeled \" antrum\" are two fragments of tan soft tissue measuring 0.2-0.3 cm in greatest dimension. The specimen is entirely submitted in one cassette.  3. GE Junction.  Received in formalin and labeled \" GE junction\" is a 0.4 cm fragment of tan soft tissue. The specimen is entirely submitted in one cassette.    4. Esophagus, Mid.  Received in formalin and labeled \" midesophagus\" are two fragments of tan soft tissue measuring 0.2-0.3 cm in greatest dimension. The specimen is entirely submitted in one cassette.   JULIETTE      Microscopic Description 11/27/2024    Final                    Value:Microscopic examination performed.     Hospital Outpatient Visit on 10/24/2024   Component Date Value Ref " Range Status    WBC 10/24/2024 4.05  3.40 - 10.80 10*3/mm3 Final    RBC 10/24/2024 3.77 (L)  4.14 - 5.80 10*6/mm3 Final    Hemoglobin 10/24/2024 11.7 (L)  13.0 - 17.7 g/dL Final    Hematocrit 10/24/2024 36.7 (L)  37.5 - 51.0 % Final    MCV 10/24/2024 97.3 (H)  79.0 - 97.0 fL Final    MCH 10/24/2024 31.0  26.6 - 33.0 pg Final    MCHC 10/24/2024 31.9  31.5 - 35.7 g/dL Final    RDW 10/24/2024 12.6  12.3 - 15.4 % Final    RDW-SD 10/24/2024 45.0  37.0 - 54.0 fl Final    MPV 10/24/2024 9.4  6.0 - 12.0 fL Final    Platelets 10/24/2024 136 (L)  140 - 450 10*3/mm3 Final    Neutrophil % 10/24/2024 38.6 (L)  42.7 - 76.0 % Final    Lymphocyte % 10/24/2024 44.2  19.6 - 45.3 % Final    Monocyte % 10/24/2024 13.1 (H)  5.0 - 12.0 % Final    Eosinophil % 10/24/2024 3.2  0.3 - 6.2 % Final    Basophil % 10/24/2024 0.7  0.0 - 1.5 % Final    Immature Grans % 10/24/2024 0.2  0.0 - 0.5 % Final    Neutrophils, Absolute 10/24/2024 1.56 (L)  1.70 - 7.00 10*3/mm3 Final    Lymphocytes, Absolute 10/24/2024 1.79  0.70 - 3.10 10*3/mm3 Final    Monocytes, Absolute 10/24/2024 0.53  0.10 - 0.90 10*3/mm3 Final    Eosinophils, Absolute 10/24/2024 0.13  0.00 - 0.40 10*3/mm3 Final    Basophils, Absolute 10/24/2024 0.03  0.00 - 0.20 10*3/mm3 Final    Immature Grans, Absolute 10/24/2024 0.01  0.00 - 0.05 10*3/mm3 Final    Neutrophil % 10/24/2024 37.0 (L)  42.7 - 76.0 % Final    Lymphocyte % 10/24/2024 52.0 (H)  19.6 - 45.3 % Final    Monocyte % 10/24/2024 10.0  5.0 - 12.0 % Final    Eosinophil % 10/24/2024 1.0  0.3 - 6.2 % Final    Neutrophils Absolute 10/24/2024 1.50 (L)  1.70 - 7.00 10*3/mm3 Final    Lymphocytes Absolute 10/24/2024 2.11  0.70 - 3.10 10*3/mm3 Final    Monocytes Absolute 10/24/2024 0.41  0.10 - 0.90 10*3/mm3 Final    Eosinophils Absolute 10/24/2024 0.04  0.00 - 0.40 10*3/mm3 Final    Anisocytosis 10/24/2024 Slight/1+  None Seen Final    WBC Morphology 10/24/2024 Normal  Normal Final    Platelet Morphology 10/24/2024 Normal  Normal Final     Case Report 10/24/2024    Final                    Value:Surgical Pathology Report                         Case: [id number]                                  Authorizing Provider:  Ronen Solomon MD Collected:           10/24/2024 09:32 AM          Ordering Location:     [facility]   Received:            10/24/2024 10:27 AM          Pathologist:           Stephy Vallejo MD                                                     Specimens:   1) - Iliac Crest, Left - Aspirate, CT/BONE MARROW ASPIRATION                                        2) - Iliac Crest, Left - Biopsy, CT/BONE MARROW BIOPSY                                              3) - Blood, Venous                                                                         Clinical Information 10/24/2024    Final                    Value:Normocytic anemia      Final Diagnosis 10/24/2024    Final                    Value:1-2:  Bone marrow (left iliac crest), aspirate smears, clot section, and core biopsy.   - Normocellular marrow for age (30-40%) with maturing trilineage hematopoiesis.  - Aspicular hemodilute aspirate smear with limited marrow tissue for evaluation.  - Storage iron is present.    3:  Peripheral blood (CBC and smear):   - The red cells show mild anisopoikilocytosis   - Normal white cell count with relative neutropenia and relatively increased lymphoid and monocytic cells   - Mild thrombocytopenia    Comment:  The above diagnosis was rendered in expert consultation by Kate Diego MD , [facility] . See separate consultation report for additional information.           The following tests were performed at reference laboratory on the bone marrow aspirate. See separate reference laboratory reports for additional information.       Flow cytometry:  1) Myeloid blasts are not relatively increased (1% of analyzed cells) but show partial aberrant CD7 expression. See                           comment.  2) No  "other significant immunophenotypic abnormalities detected.     Cytogenetics: 46,XY[20]   Normal Male Karyotype     MDS FISH panel: No assay specific abnormalities detected by MDS Panel     IntelliGEN(R) myeloid panel: 1 variant of unknown clinical significance (tier III) was detected, see scanned report        Gross Description 10/24/2024    Final                    Value:1. Iliac Crest, Left - Aspirate.  The specimen is received in 1 formalin filled container labeled \" CT/bone marrow aspiration\" and consists of a 1.5 cm aggregate of coagulated blood.  The specimen is entirely submitted in 1 cassette.    2. Iliac Crest, Left - Biopsy.  The specimen is received in 1 formalin filled container labeled \" CT/bone marrow biopsy\" and consists of a 1.0 cm tan, cylindrical, hemorrhagic bone marrow biopsy.  The specimen is entirely submitted in 1 cassette following decalcification.   JULIETTE  3. Blood, Venous.  One (1) Salcido-stained peripheral blood smear is received for evaluation. The smear is accompanied by a CBC analysis report and marked as a physician/advanced practice clinician review.            Special Stains 10/24/2024    Final                    Value:The following immunoperoxidase stains were performed at [facility] and support the above diagnosis: CD3 and CD20.    All immunohistochemical/cytochemical stains (IHC) are performed on separate slides per different antibody unless otherwise specified in the documentation that a cocktail (multiple stain) was performed.  Controls are appropriate.        Microscopic Description 10/24/2024    Final                    Value:Microscopic examination performed.      Consult Global Result 10/24/2024 Comment^Text^TXT   Final    Bone Marrow, Lt Iliac Crest:  1) Myeloid blasts are not relatively increased (1% of   analyzed cells) but show partial aberrant CD7 expression.   See comment.  2) No other significant immunophenotypic abnormalities   detected.  DISCLAIMER: REFER " TO HARDCOPY OR PDF FOR COMPLETE RESULT.   If synopsis provided, clinical decisions should not be   based on this interfaced synopsis alone.  Performed at:  [facility] [employer].  [address] [place] [address]  :  Clary Pickett M.D., Ph.D., Phone:  [phone]    Blood or Bone Marrow Result 10/24/2024 Comment^Text^TXT   Final    BONE MARROW, BIOPSY AND ASPIRATION:  1)  Normocellular marrow for age (30-40%) with maturing   trilineage hematopoiesis.  2)  Aspicular hemodilute aspirate smear with limited marrow   tissue for evaluation.  3)  Storage iron is present.  See comments.  DISCLAIMER: REFER TO HARDCOPY OR PDF FOR COMPLETE RESULT.   If synopsis provided, clinical decisions should not be   based on this interfaced synopsis alone.  Performed at:   - [employer].  [address] [place] [address]  :  Clary Pickett M.D., Ph.D., Phone:  [phone]    Cytogenetics Result 10/24/2024 Comment^Text^TXT   Final    46,XY[20]  Normal Male Karyotype  DISCLAIMER: REFER TO HARDCOPY OR PDF FOR COMPLETE RESULT.   If synopsis provided, clinical decisions should not be   based on this interfaced synopsis alone.  Performed at:   - [employer].  [address] [place] [address]  :  Clary Pickett M.D., Ph.D., Phone:  [phone]    MDS TargetGene Panel Result 10/24/2024 Comment^Text^TXT   Final    Result: No assay specific abnormalities detected by MDS   Panel  DISCLAIMER: REFER TO HARDCOPY OR PDF FOR COMPLETE RESULT.   If synopsis provided, clinical decisions should not be   based on this interfaced synopsis alone.  Performed at:  [employer].  [address] [place], [place] [address] [address]  :  Clary Pickett M.D., Ph.D., Phone:  [phone]    Intelligen Myeloid Result 10/24/2024 Comment^Text^TXT    Final    See separate report.  DISCLAIMER: REFER TO HARDCOPY OR PDF FOR COMPLETE RESULT.   If synopsis provided, clinical decisions should not be   based on this interfaced synopsis alone.  Performed at:  1 - [employer].  [address] [address]  :  Clary Pickett M.D., Ph.D., Phone:  [phone]    CCV RESULT 10/24/2024 Comment^Text^TXT   Final    Nonspecific morphologic and immunophenotypic findings with   no diagnostic evidence of hematopoietic neoplasia   identified.  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   If synopsis provided, clinical decisions should not be   based on this interfaced synopsis alone.  Performed at:  1 - [employer]  [address] [id number]  :  Clary Pickett M.D., Ph.D., Phone:  [phone]   Lab on 10/24/2024   Component Date Value Ref Range Status    Urine Culture 10/24/2024 No growth   Final    Iron 10/24/2024 84  59 - 158 mcg/dL Final    Iron Saturation (TSAT) 10/24/2024 22  20 - 50 % Final    Transferrin 10/24/2024 254  200 - 360 mg/dL Final    TIBC 10/24/2024 378  298 - 536 mcg/dL Final    Color, UA 10/24/2024 Yellow  Yellow, Straw Final    Appearance, UA 10/24/2024 Clear  Clear Final    pH, UA 10/24/2024 6.0  5.0 - 8.0 Final    Specific Gravity, UA 10/24/2024 1.010  1.005 - 1.030 Final    Glucose, UA 10/24/2024 Negative  Negative Final    Ketones, UA 10/24/2024 Negative  Negative Final    Bilirubin, UA 10/24/2024 Negative  Negative Final    Blood, UA 10/24/2024 Negative  Negative Final    Protein, UA 10/24/2024 Negative  Negative Final    Leuk Esterase, UA 10/24/2024 Negative  Negative Final    Nitrite, UA 10/24/2024 Negative  Negative Final    Urobilinogen, UA 10/24/2024 0.2 E.U./dL  0.2 - 1.0 E.U./dL Final    RBC,  UA 10/24/2024 0-2  None Seen, 0-2 /HPF Final    WBC, UA 10/24/2024 0-2  None Seen, 0-2 /HPF Final    Bacteria, UA 10/24/2024 None Seen  None Seen /HPF Final    Squamous Epithelial Cells, UA 10/24/2024 0-2  None Seen, 0-2 /HPF Final    Hyaline Casts, UA 10/24/2024 None Seen  None Seen /LPF Final    Methodology 10/24/2024 Automated Microscopy   Final   Admission on 08/13/2024, Discharged on 08/13/2024   Component Date Value Ref Range Status    Case Report 08/13/2024    Final                    Value:Surgical Pathology Report                         Case: [id number]                                  Authorizing Provider:  Jose Alejandro Fox MD      Collected:           08/13/2024 09:56 AM          Ordering Location:     [facility] Received:            08/13/2024 11:06 AM                                 SUITES                                                                       Pathologist:           Stephy Vallejo MD                                                     Specimens:   1) - Large Intestine, Hepatic Flexure, HEPATIC FLEXURE POLYPS                                       2) - Small Intestine, Duodenum, DUODENUM BX                                                         3) - Gastric, Antrum, ANTRUM BX                                                                     4) - GE Junction, GE JUNCTION BX                                                                    5) - Esophagus, Mid, MID ESOPHAGUS BX                                                      Clinical Information 08/13/2024    Final                    Value:Iron deficiency anemia, unspecified iron deficiency anemia type                          Gastroesophageal reflux disease with esophagitis without hemorrhage                          History of colonic polyps                          Fatigue                          Weight loss    Final Diagnosis 08/13/2024    Final                    Value:1. Hepatic flexure colon polyps,  "biopsy:                           - Fragments of tubular adenoma                                                                              2.  Duodenum, biopsy:                           - Mild chronic duodenitis                                                                               3. Stomach, antrum, biopsy:                           - Gastric antrum mucosa with no significant pathologic change                           - Negative for Helicobacter pylori on routine H&E stain                           - Negative for intestinal metaplasia, dysplasia and malignancy                                                                              4. Gastroesophageal junction, biopsy:                           - Squamocolumnar mucosa with mild chronic inflammation                           - Negative for intestinal metaplasia, dysplasia and malignancy                                                                              5. Mid esophagus, biopsy:                           -Mildly increased intraepithelial eosinophils (up to 11 eosinophils per                           high power field)                                                        Gross Description 08/13/2024    Final                    Value:1. Large Intestine, Hepatic Flexure.                          Received in formalin and labeled \" hepatic flexure polyps\" are three                           fragments of tan soft tissue measuring 0.3-0.4 cm in greatest dimension.                           The specimen is entirely submitted in one cassette.                                                    2. Small Intestine, Duodenum.                          Received in formalin and labeled \" duodenum\" are two fragments of tan soft                           tissue measuring 0.3-0.4 cm in greatest dimension. The specimen is                           entirely submitted in one cassette.                                                    3. Gastric, Antrum.       " "                   Received in formalin and labeled \" antrum\" is a 0.7 cm aggregate of tan                           soft tissue fragments. The specimen is entirely submitted in one cassette.                                                    4. GE Junction.                          Received in formalin and labeled \" GE junction\" is a 0.7 cm aggregate of                           tan soft tissue fragments. The specimen is entirely submitted in one                           cassette.                                                    5. Esophagus, Mid.                          Received in formalin and labeled \" \"midesophagus\" are two fragments of tan                           soft tissue measuring 0.3-0.4 cm in greatest dimension. The specimen is                           entirely submitted in one cassette.                           JULIETTE    Microscopic Description 08/13/2024    Final                    Value:Microscopic examination performed.   Consult on 08/07/2024   Component Date Value Ref Range Status    Folate 08/07/2024 14.00  4.78 - 24.20 ng/mL Final    Vitamin B-12 08/07/2024 695  211 - 946 pg/mL Final    Glucose 08/07/2024 92  65 - 99 mg/dL Final    BUN 08/07/2024 34 (H)  8 - 23 mg/dL Final    Creatinine 08/07/2024 1.99 (H)  0.76 - 1.27 mg/dL Final    Sodium 08/07/2024 140  136 - 145 mmol/L Final    Potassium 08/07/2024 4.4  3.5 - 5.2 mmol/L Final    Chloride 08/07/2024 102  98 - 107 mmol/L Final    CO2 08/07/2024 32.3 (H)  22.0 - 29.0 mmol/L Final    Calcium 08/07/2024 9.4  8.6 - 10.5 mg/dL Final    Total Protein 08/07/2024 6.7  6.0 - 8.5 g/dL Final    Albumin 08/07/2024 4.4  3.5 - 5.2 g/dL Final    ALT (SGPT) 08/07/2024 33  1 - 41 U/L Final    AST (SGOT) 08/07/2024 24  1 - 40 U/L Final    Alkaline Phosphatase 08/07/2024 80  39 - 117 U/L Final    Total Bilirubin 08/07/2024 0.2  0.0 - 1.2 mg/dL Final    Globulin 08/07/2024 2.3  gm/dL Final    A/G Ratio 08/07/2024 1.9  g/dL Final    BUN/Creatinine Ratio " 08/07/2024 17.1  7.0 - 25.0 Final    Anion Gap 08/07/2024 5.7  5.0 - 15.0 mmol/L Final    eGFR 08/07/2024 36.8 (L)  >60.0 mL/min/1.73 Final    Occult Blood, Fecal by Immunoassay 08/08/2024 Positive (A)  Negative Final    Reticulocyte % 08/07/2024 1.09  0.70 - 1.90 % Final    Reticulocyte Absolute 08/07/2024 0.0344  0.0200 - 0.1300 10*6/mm3 Final    LDH 08/07/2024 173  135 - 225 U/L Final    Haptoglobin 08/07/2024 110  30 - 200 mg/dL Final    Iron 08/07/2024 101  59 - 158 mcg/dL Final    Iron Saturation (TSAT) 08/07/2024 28  20 - 50 % Final    Transferrin 08/07/2024 243  200 - 360 mg/dL Final    TIBC 08/07/2024 362  298 - 536 mcg/dL Final    Ferritin 08/07/2024 483.70 (H)  30.00 - 400.00 ng/mL Final    Pathology Review 08/07/2024 Yes   Final    Sed Rate 08/07/2024 1  0 - 20 mm/hr Final    Hepatitis B Surface Ag 08/07/2024 Non-Reactive  Non-Reactive Final    Hep A IgM 08/07/2024 Non-Reactive  Non-Reactive Final    Hep B C IgM 08/07/2024 Non-Reactive  Non-Reactive Final    Hepatitis C Ab 08/07/2024 Non-Reactive  Non-Reactive Final    Zinc 08/07/2024 75  44 - 115 ug/dL Final                                    Detection Limit = 5    Copper 08/07/2024 76  69 - 132 ug/dL Final                                    Detection Limit = 5    Erythropoietin 08/07/2024 10.2  2.6 - 18.5 mIU/mL Final    judo DxI 800 Immunoassay System  Values obtained with different assay methods or kits cannot be used  interchangeably. Results cannot be interpreted as absolute evidence  of the presence or absence of malignant disease.    Free Light Chain, [place] 08/07/2024 48.1 (H)  3.3 - 19.4 mg/L Final    Free Lambda Light Chains 08/07/2024 24.1  5.7 - 26.3 mg/L Final    Kappa/Lambda Ratio 08/07/2024 2.00 (H)  0.26 - 1.65 Final    IgG 08/07/2024 1043  603 - 1613 mg/dL Final    IgA 08/07/2024 174  61 - 437 mg/dL Final    IgM 08/07/2024 82  20 - 172 mg/dL Final    Total Protein 08/07/2024 6.7  6.0 - 8.5 g/dL Final    Albumin  08/07/2024 4.0  2.9 - 4.4 g/dL Final    Alpha-1-Globulin 08/07/2024 0.2  0.0 - 0.4 g/dL Final    Alpha-2-Globulin 08/07/2024 0.6  0.4 - 1.0 g/dL Final    Beta Globulin 08/07/2024 0.9  0.7 - 1.3 g/dL Final    Gamma Globulin 08/07/2024 1.0  0.4 - 1.8 g/dL Final    M-Walter 08/07/2024 Not Observed  Not Observed g/dL Final    Globulin 08/07/2024 2.7  2.2 - 3.9 g/dL Final    A/G Ratio 08/07/2024 1.5  0.7 - 1.7 Final    Immunofixation Reflex, Serum 08/07/2024 Comment   Final    No monoclonality detected.    Please note 08/07/2024 Comment   Final    Protein electrophoresis scan will follow via computer, mail, or   delivery.    Color, UA 08/07/2024 Yellow  Yellow, Straw Final    Appearance, UA 08/07/2024 Clear  Clear Final    pH, UA 08/07/2024 5.5  5.0 - 8.0 Final    Specific Gravity, UA 08/07/2024 1.009  1.005 - 1.030 Final    Glucose, UA 08/07/2024 Negative  Negative Final    Ketones, UA 08/07/2024 Negative  Negative Final    Bilirubin, UA 08/07/2024 Negative  Negative Final    Blood, UA 08/07/2024 Negative  Negative Final    Protein, UA 08/07/2024 Negative  Negative Final    Leuk Esterase, UA 08/07/2024 Negative  Negative Final    Nitrite, UA 08/07/2024 Negative  Negative Final    Urobilinogen, UA 08/07/2024 0.2 E.U./dL  0.2 - 1.0 E.U./dL Final    RBC, UA 08/07/2024 0-2  None Seen, 0-2 /HPF Final    WBC, UA 08/07/2024 0-2  None Seen, 0-2 /HPF Final    Bacteria, UA 08/07/2024 None Seen  None Seen /HPF Final    Squamous Epithelial Cells, UA 08/07/2024 0-2  None Seen, 0-2 /HPF Final    Hyaline Casts, UA 08/07/2024 None Seen  None Seen /LPF Final    Methodology 08/07/2024 Automated Microscopy   Final    WBC 08/07/2024 3.84  3.40 - 10.80 10*3/mm3 Final    RBC 08/07/2024 3.13 (L)  4.14 - 5.80 10*6/mm3 Final    Hemoglobin 08/07/2024 9.8 (L)  13.0 - 17.7 g/dL Final    Hematocrit 08/07/2024 31.0 (L)  37.5 - 51.0 % Final    MCV 08/07/2024 99.0 (H)  79.0 - 97.0 fL Final    MCH 08/07/2024 31.3  26.6 - 33.0 pg Final    MCHC  08/07/2024 31.6  31.5 - 35.7 g/dL Final    RDW 08/07/2024 13.4  12.3 - 15.4 % Final    RDW-SD 08/07/2024 49.2  37.0 - 54.0 fl Final    MPV 08/07/2024 9.8  6.0 - 12.0 fL Final    Platelets 08/07/2024 140  140 - 450 10*3/mm3 Final    Neutrophil % 08/07/2024 49.0  42.7 - 76.0 % Final    Lymphocyte % 08/07/2024 33.9  19.6 - 45.3 % Final    Monocyte % 08/07/2024 11.7  5.0 - 12.0 % Final    Eosinophil % 08/07/2024 4.9  0.3 - 6.2 % Final    Basophil % 08/07/2024 0.5  0.0 - 1.5 % Final    Immature Grans % 08/07/2024 0.0  0.0 - 0.5 % Final    Neutrophils, Absolute 08/07/2024 1.88  1.70 - 7.00 10*3/mm3 Final    Lymphocytes, Absolute 08/07/2024 1.30  0.70 - 3.10 10*3/mm3 Final    Monocytes, Absolute 08/07/2024 0.45  0.10 - 0.90 10*3/mm3 Final    Eosinophils, Absolute 08/07/2024 0.19  0.00 - 0.40 10*3/mm3 Final    Basophils, Absolute 08/07/2024 0.02  0.00 - 0.20 10*3/mm3 Final    Immature Grans, Absolute 08/07/2024 0.00  0.00 - 0.05 10*3/mm3 Final    HIV-1/ HIV-2 Ab 08/07/2024 Non-Reactive  Non-Reactive Final    HIV-1 P24 Ag Screen 08/07/2024 Non-Reactive  Non-Reactive Final    Neutrophil % 08/07/2024 46.0  42.7 - 76.0 % Final    Lymphocyte % 08/07/2024 26.0  19.6 - 45.3 % Final    Monocyte % 08/07/2024 10.0  5.0 - 12.0 % Final    Eosinophil % 08/07/2024 6.0  0.3 - 6.2 % Final    Bands %  08/07/2024 6.0 (H)  0.0 - 5.0 % Final    Metamyelocyte % 08/07/2024 4.0 (H)  0.0 - 0.0 % Final    Atypical Lymphocyte % 08/07/2024 2.0  0.0 - 5.0 % Final    Neutrophils Absolute 08/07/2024 2.00  1.70 - 7.00 10*3/mm3 Final    Lymphocytes Absolute 08/07/2024 1.08  0.70 - 3.10 10*3/mm3 Final    Monocytes Absolute 08/07/2024 0.38  0.10 - 0.90 10*3/mm3 Final    Eosinophils Absolute 08/07/2024 0.23  0.00 - 0.40 10*3/mm3 Final    Smudge Cells 08/07/2024 2.0  /100 WBC Final    Anisocytosis 08/07/2024 Slight/1+  None Seen Final    Macrocytes 08/07/2024 Slight/1+  None Seen Final    WBC Morphology 08/07/2024 Normal  Normal Final    Platelet Estimate  08/07/2024 Decreased  Normal Final    Final Diagnosis 08/07/2024    Final                    Value:Peripheral blood (CBC and smear):                           - WBC:  Adequate, normal morphology                           - RBC:  Macrocytic anemia with anisocytosis                           - Platelets:  Normal platelet count and morphology    Clinical Information 08/07/2024    Final                    Value:Normocytic anemia                          Obstructive sleep apnea syndrome                          CKD stage 3b, GFR 30-44 ml/min                          Weight loss, unintentional    Gross Description 08/07/2024    Final                    Value:1. Arm, Right.                          One (1) Salcido-stained peripheral blood smear is received for evaluation.                           The smear is accompanied by a CBC analysis report and marked as a                           physician/advanced practice clinician review.                              Microscopic Description 08/07/2024    Final                    Value:Microscopic examination performed.    Case Report 08/07/2024    Final                    Value:Surgical Pathology Report                         Case: [id number]                                  Authorizing Provider:  Tommy Doshi PA-C        Collected:           08/07/2024 09:51 AM          Ordering Location[facility]     [facility]     Received:            08/08/2024 08:55 AM                                 GROUP HEMATOLOGY &                                                                                  ONCOLOGY                                                                     Pathologist:           Amarilis Jamison DO                                                       Specimen:    Arm, Right                                                                                 Consult Global Result 08/07/2024 Comment^Text^TXT   Final    Peripheral Blood:  1) Mild relative increase in  CD8+/CD57+ T cells   immunophenotypically compatible with T-cell large granular   lymphocytes/T-LGLs (5.1% of leukocytes). See comment.  2) No other significant immunophenotypic abnormalities   detected.  DISCLAIMER: REFER TO HARDCOPY OR PDF FOR COMPLETE RESULT.   If synopsis provided, clinical decisions should not be   based on this interfaced synopsis alone.  Performed at:  1 - [facility] [employer]  [address] [place] [address] Drive [address], [place]  [address]  :  Clary Pickett M.D., Ph.D., Phone:  [phone]    Total Protein, Urine 08/23/2024 <4.0  Not Estab. mg/dL Final    **Verified by repeat analysis**    Albumin, U 08/23/2024 17.7  % Final    Alpha-1-Globulin, U 08/23/2024 2.9  % Final    Alpha-2-Globulin, U 08/23/2024 11.6  % Final    Beta Globulin, U 08/23/2024 48.8  % Final    Gamma Globulin, Urine 08/23/2024 19.1  % Final    M-Walter 08/23/2024 Not Observed  Not Observed % Final    Please note 08/23/2024 Comment   Final    Protein electrophoresis scan will follow via computer, mail, or   delivery.    LAURA Interpretation:U 10/24/2024 Comment   Final    No monoclonality detected.       EKG Results:  No orders to display       Imaging Results:  CT Chest Without Contrast Diagnostic    Result Date: 3/19/2024  Impression: CT scan of the chest without IV contrast demonstrating tree-in-bud airspace disease in the left lower lobe suggesting indolent infection. Follow-up CT scan of the chest in 3 months is recommended.    Electronically Signed By-SOILA TREVIÑO MD On:3/19/2024 3:23 PM          Assessment & Plan   Diagnoses and all orders for this visit:    1. Generalized anxiety disorder (Primary)  -     busPIRone (BUSPAR) 5 MG tablet; Take 1 tablet by mouth 3 (Three) Times a Day.  Dispense: 90 tablet; Refill: 2    2. Major depressive disorder, recurrent episode, moderate  -     busPIRone (BUSPAR) 5 MG tablet; Take 1 tablet by mouth 3 (Three) Times a Day.  Dispense: 90 tablet;  Refill: 2    3. Insomnia due to mental condition  -     traZODone (DESYREL) 50 MG tablet; Take 1 tablet by mouth Every Night.  Dispense: 90 tablet; Refill: 1  -     busPIRone (BUSPAR) 5 MG tablet; Take 1 tablet by mouth 3 (Three) Times a Day.  Dispense: 90 tablet; Refill: 2        Visit Diagnoses:    ICD-10-CM ICD-9-CM   1. Generalized anxiety disorder  F41.1 300.02   2. Major depressive disorder, recurrent episode, moderate  F33.1 296.32   3. Insomnia due to mental condition  F51.05 300.9     327.02     01/29/2025: mild TRIP, MDD, start buspar. Stop seroquel: dizzy plus weight gain. Much of this is situational -- there is only so much we can do about that. Possibly seasonal, start light box, which he has. Discussed hiring someone to clean, wife's resistance to changes.    Acknowledged and normalized patient's thoughts, feelings, and concerns. Allowed patient to freely discuss and process issues, such as:  Anxiety and depression regarding medical conditions.  Anxiety and depression regarding relationships.  ... using Rogerian psychotherapeutic techniques including unconditional positive regard, reflective listening, and demonstrating clear empathy, with the goal of ameliorating symptoms and maintaining, restoring, or improving self-esteem, adaptive skills, and ego or psychological functions (Danielle et al. 1991), the long-term goal of which is to develop a better, healthier perspective and help the patient bear their circumstances more easily.  Time (minutes) spent providing supportive psychotherapy: 15  (This time is exclusive to the therapy session and separate from the time spent on activities used to meet the criteria for the E/M service (history, exam, medical decision-making).)  Start: 1053  Stop: 1109  Functional status: mild impairment  Treatment plan: Medication management and supportive psychotherapy  Prognosis: good  Progress: MDD, TRIP, mild  6w    11/13/2024: Insomnia, increase quetiapine. Discussed  getting help with organizing the house.    10/04/2024: Improving, still MDD, TRIP, increase prozac further.    08/22/2024: Improving, increase prozac for possible MDD.    07/25/2024: Stop wellbutrin, in 2 wks, hold trazodone and start seroquel for irritability, mood stability. Poor energy may be due to anemia. Mood improved, but irritable as well. Mom has hx of what he thinks was bipolar. Avoiding lithium 2/2 CKD.     07/10/2024: Mirtazapine led to brain fog, fatigue, poor focus in am. Stop it. Freedom for marriage issues (individual therapy). Pt is not on duloxetine. Pt needs energizing meds. Start wellbutrin for a week, then add prozac.    06/14/2024: No change. Discussed reflective listening with his wife. Stop abilify; start mirtazapine.    4/30/24: R/O ADHD. Start abilify, Therapy? Wgt loss, consider mirtazapine, seroquel. 6w.    PLAN:  Safety: No acute safety concerns  Therapy: None  Risk Assessment: Risk of self-harm acutely is moderate.  Risk factors include anxiety disorder, mood disorder, fhx, access to guns, and recent psychosocial stressors (pandemic). Protective factors include no present SI, no history of suicide attempts or self-harm in the past, minimal AODA, healthcare seeking, future orientation, willingness to engage in care.  Risk of self-harm chronically is also moderate, but could be further elevated in the event of treatment noncompliance and/or AODA.  Meds:  CONTINUE prozac 30 mg qam. Risks, benefits, alternatives discussed with patient including GI upset, nausea vomiting diarrhea, theoretical decrease of seizure threshold predisposing the patient to a slightly higher seizure risk, headaches, sexual dysfunction, serotonin syndrome, bleeding risk, increased suicidality in patients 24 years and younger, switching to rebecca/hypomania.  After discussion of these risks and benefits, the patient voiced understanding and agreed to proceed.  START buspar 5 mg TID. Risks, benefits, alternatives discussed  with patient including nausea, GI upset, mild sedation, falls risk.  Use care when operating vehicle, vessel, or machine. After discussion of these risks and benefits, the patient voiced understanding and agreed to proceed.  RESTART trazodone 50 mg qhs. Risks, benefits, side effects discussed with patient including GI upset, sedation, dizziness/falls risk, grogginess the following day, prolongation of the QTc interval.  Do not use before operating vehicle, vessel, or machine. After discussion of these risks and benefits, the patient voiced understanding and agreed to proceed.    STOP seroquel 25 mg qhs. Dizzy, sedated 1/25  S/P:  trazodone 50 mg qhs. 100 mg made him groggy the next day. 7/24.    wellbutrin  mg qam. Irritable 7/24  abilify 2 mg qhs. No change 6/24.   mirtazapine 7.5 mg qhs. Sedation 7/24  Seroquel didn't work in the past 7/24  Labs: none    Patient screened positive for depression based on a PHQ-9 score of 17 on 1/29/2025. Follow-up recommendations include: Prescribed antidepressant medication treatment and Suicide Risk Assessment performed.           TREATMENT PLAN/GOALS: Continue supportive psychotherapy efforts and medications as indicated. Treatment and medication options discussed during today's visit. Patient acknowledged and verbally consented to continue with current treatment plan and was educated on the importance of compliance with treatment and follow-up appointments.    MEDICATION ISSUES:  JIM reviewed as expected.  Discussed medication options and treatment plan of prescribed medication as well as the risks, benefits, and side effects including potential falls, possible impaired driving and metabolic adversities among others. Patient is agreeable to call the office with any worsening of symptoms or onset of side effects. Patient is agreeable to call 911 or go to the nearest ER should he/she begin having SI/HI. No medication side effects or related complaints today.     MEDS  ORDERED DURING VISIT:  New Medications Ordered This Visit   Medications    traZODone (DESYREL) 50 MG tablet     Sig: Take 1 tablet by mouth Every Night.     Dispense:  90 tablet     Refill:  1    busPIRone (BUSPAR) 5 MG tablet     Sig: Take 1 tablet by mouth 3 (Three) Times a Day.     Dispense:  90 tablet     Refill:  2       Return in about 6 weeks (around 3/12/2025).         This document has been electronically signed by Penelope Davis MD  January 29, 2025 11:13 EST    Dictated Utilizing Dragon Dictation: Part of this note may be an electronic transcription/translation of spoken language to printed text using the Dragon Dictation System.

## 2025-01-29 NOTE — TREATMENT PLAN
Anxiety:  8/10 progressing    Goals:  Patient will develop and implement behavioral and cognitive strategies to reduce anxiety and irrational fears, monthly, using Rogerian psychotherapy and CBT where appropriate.  Help patient explore past emotional issues in relation to present anxiety, monthly, until remission of symptoms, using Rogerian psychotherapy and CBT where appropriate.  Help patient develop an awareness of their cognitive and physical responses to anxiety, monthly, until remission of symptoms, using Rogerian psychotherapy and CBT where appropriate.          Depression:  4/10 progressing    Goals:  Patient will demonstrate the ability to initiate new constructive life skills outside of sessions on a consistent basis, monthly, using Rogerian psychotherapy and CBT where appropriate.  Assist patient in setting attainable activities of daily living goals, monthly, using Rogerian psychotherapy and CBT where appropriate.  Provide education about depression, monthly, using Rogerian psychotherapy and CBT where appropriate.  Assist patient in developing healthy coping strategies, monthly, using Rogerian psychotherapy and CBT where appropriate.    Rogerian psychotherapy and CBT will be used to help accomplish the above goals and manage depression and anxiety related to medical conditions, family conflict       I have discussed and reviewed this treatment plan with the patient.      Reviewed by Penelope Davis MD   01/29/2025

## 2025-01-29 NOTE — PATIENT INSTRUCTIONS
1.  Please return to clinic at your next scheduled visit.  Contact the clinic ([phone]) at least 24 hours prior in the event you need to cancel.  2.  Do no harm to yourself or others.    3.  Avoid alcohol and drugs.    4.  Take all medications as prescribed.  Please contact the clinic with any concerns. If you are in need of medication refills, please call the clinic at [phone].    5. Should you want to get in touch with your provider, Dr. Penelope Davis, please utilize [employer] or contact the office ([phone]), and staff will be able to page Dr. Davis directly.  6.  In the event you have personal crisis, contact the following crisis numbers: Suicide Prevention Hotline 1-900.201.3370; GERMAINE Helpline 1-680-747-LPPU; [facility] Emergency Room [phone]; text HELLO to 594469; or 440.     Light box/Happy Light/Light Therapy: Obtain a light box, 10,000 lux, put the box about 1 foot away from you, facing you at an angle (not directly), use it daily, right after waking up, for 1 hour daily. Don't stare directly at it. Read, eat, watch tv, etc. Use from September through March. Call your insurance company to see if they can reimburse you for the cost. Available at big box retailers. I used [employer] to get a 1-800-DENTIST one.

## 2025-02-05 ENCOUNTER — TELEPHONE (OUTPATIENT)
Dept: GASTROENTEROLOGY | Facility: CLINIC | Age: 65
End: 2025-02-05
Payer: MEDICARE

## 2025-02-05 NOTE — TELEPHONE ENCOUNTER
----- Message from Tamara Molina sent at 2/4/2025  1:01 PM EST -----  No signs of Celiac disease based on labs.  Follow-up in office as planned.   Cibinqo Pregnancy And Lactation Text: It is unknown if this medication will adversely affect pregnancy or breast feeding.  You should not take this medication if you are currently pregnant or planning a pregnancy or while breastfeeding.

## 2025-02-14 ENCOUNTER — TELEMEDICINE (OUTPATIENT)
Dept: PSYCHIATRY | Facility: CLINIC | Age: 65
End: 2025-02-14
Payer: MEDICARE

## 2025-02-14 DIAGNOSIS — F41.1 GENERALIZED ANXIETY DISORDER: Primary | ICD-10-CM

## 2025-02-14 DIAGNOSIS — F33.1 MAJOR DEPRESSIVE DISORDER, RECURRENT EPISODE, MODERATE: ICD-10-CM

## 2025-02-14 NOTE — PROGRESS NOTES
Date: February 14, 2025  Time In: 10:04 EST  Time Out: 10:40 EST    This provider is located at the [facility] [address], [place] [address] using a secure [employer] Video Visit through [employer]. Patient is being seen remotely via telehealth at home address in [place] and stated they are in a secure environment for this session. The patient's condition being diagnosed/treated is appropriate for telemedicine. The provider identified herself as well as her credentials. The patient, and/or patients guardian, consent to be seen remotely, and when consent is given they understand that the consent allows for patient identifiable information to be sent to a third party as needed. They may refuse to be seen remotely at any time. The electronic data is encrypted and password protected, and the patient and/or guardian has been advised of the potential risks to privacy not withstanding such measures.     You have chosen to receive care through a telehealth visit.  Do you consent to use a video/audio connection for your medical care today? Yes    Subjective   Stephon Castellano is a 64 y.o. male who presents today for follow up    Chief Complaint:   Chief Complaint   Patient presents with    Anxiety    Depression           Clinical Maneuvering/Intervention:      Assisted patient in processing above session content; acknowledged and normalized patient’s thoughts, feelings, and concerns.  Rationalized patient thought process regarding concerns presented at session.  Discussed triggers associated with patient's  anxiety  and depression  Also discussed coping skills for patient to implement such as mindfulness , increasing activity , self care , and positive self talk       Allowed patient to freely discuss issues without interruption or judgment. Provided safe, confidential environment to facilitate the development of positive therapeutic relationship and encourage  open, honest communication. Assisted patient in identifying risk factors which would indicate the need for higher level of care including thoughts to harm self or others and/or self-harming behavior and encouraged patient to contact this office, call 911, or present to the nearest emergency room should any of these events occur. Discussed crisis intervention services and means to access. Patient adamantly and convincingly denies current suicidal or homicidal ideation or perceptual disturbance.    Assessment:     Patient appears to maintain relative stability as compared to their baseline.  However, patient continues to struggle with anxiety and depression which continues to cause impairment in important areas of functioning.  A result, they can be reasonably expected to continue to benefit from treatment and would likely be at increased risk for decompensation otherwise.    TX Plan update due April 2025      PHQ-Score Total:  PHQ-9 Total Score: 14     TRIP-7 Score Total:  Over the last two weeks, how often have you been bothered by the following problems?  Feeling nervous, anxious or on edge: More than half the days  Not being able to stop or control worrying: Several days  Worrying too much about different things: Several days  Trouble Relaxing: More than half the days  Being so restless that it is hard to sit still: Several days  Becoming easily annoyed or irritable: Several days  Feeling afraid as if something awful might happen: Several days  TRIP 7 Total Score: 9  If you checked any problems, how difficult have these problems made it for you to do your work, take care of things at home, or get along with other people: Somewhat difficult      Mental Status Exam:   Hygiene:   good  Cooperation:  Cooperative  Eye Contact:  Good  Psychomotor Behavior:  Appropriate  Affect:  Appropriate  Mood: normal, sad, depressed, and fluctates  Speech:  Normal  Thought Process:  Linear  Thought Content:  Normal  Suicidal:   None  Homicidal:  None  Hallucinations:  None  Delusion:  None  Memory:  Intact  Orientation:  Person, Place, Time, and Situation  Reliability:  good  Insight:  Good  Judgement:  Good  Impulse Control:  Good  Physical/Medical Issues:  Yes chronic pain in neck and legs        Patient's Support Network Includes:  daughter    Functional Status: Moderate impairment     Progress toward goal: Not at goal    Prognosis: Guarded with Ongoing Treatment      Plan:    Patient will continue in individual outpatient therapy with focus on improved functioning and coping skills, maintaining stability, and avoiding decompensation and the need for higher level of care.    Patient will adhere to medication regimen as prescribed and report any side effects. Patient will contact this office, call 911 or present to the nearest emergency room should suicidal or homicidal ideations occur. Provide Cognitive Behavioral Therapy and Solution Focused Therapy to improve functioning, maintain stability, and avoid decompensation and the need for higher level of care.         VISIT DIAGNOSIS:     ICD-10-CM ICD-9-CM   1. Generalized anxiety disorder  F41.1 300.02   2. Major depressive disorder, recurrent episode, moderate  F33.1 296.32        Return in about 6 weeks (around 3/28/2025).    This document has been electronically signed by Antonina Aguirre [facility]  February 14, 2025 10:44 EST     Part of this note may be an electronic transcription/translation of spoken language to printed text using the Dragon Dictation System.

## 2025-03-05 ENCOUNTER — TELEPHONE (OUTPATIENT)
Dept: ONCOLOGY | Facility: HOSPITAL | Age: 65
End: 2025-03-05
Payer: MEDICARE

## 2025-03-06 ENCOUNTER — LAB (OUTPATIENT)
Facility: HOSPITAL | Age: 65
End: 2025-03-06
Payer: MEDICARE

## 2025-03-06 DIAGNOSIS — D50.8 OTHER IRON DEFICIENCY ANEMIA: ICD-10-CM

## 2025-03-06 LAB
BASOPHILS # BLD AUTO: 0.02 10*3/MM3 (ref 0–0.2)
BASOPHILS NFR BLD AUTO: 0.5 % (ref 0–1.5)
DEPRECATED RDW RBC AUTO: 45.5 FL (ref 37–54)
EOSINOPHIL # BLD AUTO: 0.11 10*3/MM3 (ref 0–0.4)
EOSINOPHIL NFR BLD AUTO: 2.7 % (ref 0.3–6.2)
ERYTHROCYTE [DISTWIDTH] IN BLOOD BY AUTOMATED COUNT: 12.8 % (ref 12.3–15.4)
FERRITIN SERPL-MCNC: 314.9 NG/ML (ref 30–400)
HCT VFR BLD AUTO: 37.9 % (ref 37.5–51)
HGB BLD-MCNC: 12.1 G/DL (ref 13–17.7)
IMM GRANULOCYTES # BLD AUTO: 0 10*3/MM3 (ref 0–0.05)
IMM GRANULOCYTES NFR BLD AUTO: 0 % (ref 0–0.5)
IRON 24H UR-MRATE: 72 MCG/DL (ref 59–158)
IRON SATN MFR SERPL: 19 % (ref 20–50)
LYMPHOCYTES # BLD AUTO: 1.96 10*3/MM3 (ref 0.7–3.1)
LYMPHOCYTES NFR BLD AUTO: 47.5 % (ref 19.6–45.3)
MCH RBC QN AUTO: 30.9 PG (ref 26.6–33)
MCHC RBC AUTO-ENTMCNC: 31.9 G/DL (ref 31.5–35.7)
MCV RBC AUTO: 96.9 FL (ref 79–97)
MONOCYTES # BLD AUTO: 0.48 10*3/MM3 (ref 0.1–0.9)
MONOCYTES NFR BLD AUTO: 11.6 % (ref 5–12)
NEUTROPHILS NFR BLD AUTO: 1.56 10*3/MM3 (ref 1.7–7)
NEUTROPHILS NFR BLD AUTO: 37.7 % (ref 42.7–76)
NRBC BLD AUTO-RTO: 0 /100 WBC (ref 0–0.2)
PLATELET # BLD AUTO: 164 10*3/MM3 (ref 140–450)
PMV BLD AUTO: 9.9 FL (ref 6–12)
RBC # BLD AUTO: 3.91 10*6/MM3 (ref 4.14–5.8)
TIBC SERPL-MCNC: 386 MCG/DL (ref 298–536)
TRANSFERRIN SERPL-MCNC: 259 MG/DL (ref 200–360)
WBC NRBC COR # BLD AUTO: 4.13 10*3/MM3 (ref 3.4–10.8)

## 2025-03-06 PROCEDURE — 84466 ASSAY OF TRANSFERRIN: CPT

## 2025-03-06 PROCEDURE — 83540 ASSAY OF IRON: CPT

## 2025-03-06 PROCEDURE — 85025 COMPLETE CBC W/AUTO DIFF WBC: CPT

## 2025-03-06 PROCEDURE — 36415 COLL VENOUS BLD VENIPUNCTURE: CPT

## 2025-03-06 PROCEDURE — 82728 ASSAY OF FERRITIN: CPT

## 2025-03-07 ENCOUNTER — LAB (OUTPATIENT)
Dept: LAB | Facility: HOSPITAL | Age: 65
End: 2025-03-07
Payer: MEDICARE

## 2025-03-07 PROCEDURE — 83521 IG LIGHT CHAINS FREE EACH: CPT | Performed by: PHYSICIAN ASSISTANT

## 2025-03-07 PROCEDURE — 81050 URINALYSIS VOLUME MEASURE: CPT | Performed by: PHYSICIAN ASSISTANT

## 2025-03-10 ENCOUNTER — OFFICE VISIT (OUTPATIENT)
Dept: INTERNAL MEDICINE | Facility: CLINIC | Age: 65
End: 2025-03-10
Payer: MEDICARE

## 2025-03-10 VITALS
TEMPERATURE: 98.7 F | HEIGHT: 71 IN | HEART RATE: 74 BPM | DIASTOLIC BLOOD PRESSURE: 60 MMHG | BODY MASS INDEX: 31.39 KG/M2 | OXYGEN SATURATION: 96 % | WEIGHT: 224.2 LBS | SYSTOLIC BLOOD PRESSURE: 112 MMHG

## 2025-03-10 DIAGNOSIS — H69.93 DYSFUNCTION OF BOTH EUSTACHIAN TUBES: Primary | ICD-10-CM

## 2025-03-10 PROCEDURE — 1126F AMNT PAIN NOTED NONE PRSNT: CPT | Performed by: PHYSICIAN ASSISTANT

## 2025-03-10 PROCEDURE — 99213 OFFICE O/P EST LOW 20 MIN: CPT | Performed by: PHYSICIAN ASSISTANT

## 2025-03-10 PROCEDURE — 1160F RVW MEDS BY RX/DR IN RCRD: CPT | Performed by: PHYSICIAN ASSISTANT

## 2025-03-10 PROCEDURE — 1159F MED LIST DOCD IN RCRD: CPT | Performed by: PHYSICIAN ASSISTANT

## 2025-03-10 RX ORDER — CETIRIZINE HYDROCHLORIDE 10 MG/1
10 TABLET ORAL DAILY
Qty: 90 TABLET | Refills: 1 | Status: SHIPPED | OUTPATIENT
Start: 2025-03-10

## 2025-03-10 RX ORDER — FLUTICASONE PROPIONATE 50 MCG
2 SPRAY, SUSPENSION (ML) NASAL DAILY
Qty: 16 G | Refills: 11 | Status: SHIPPED | OUTPATIENT
Start: 2025-03-10 | End: 2025-03-10

## 2025-03-10 RX ORDER — FLUTICASONE PROPIONATE 50 MCG
2 SPRAY, SUSPENSION (ML) NASAL DAILY
Qty: 16 G | Refills: 11 | Status: SHIPPED | OUTPATIENT
Start: 2025-03-10

## 2025-03-10 RX ORDER — CETIRIZINE HYDROCHLORIDE 10 MG/1
10 TABLET ORAL DAILY
Qty: 90 TABLET | Refills: 1 | Status: SHIPPED | OUTPATIENT
Start: 2025-03-10 | End: 2025-03-10

## 2025-03-10 NOTE — ASSESSMENT & PLAN NOTE
Will treat with flonase/cetirizine at this time.  Would expect symptoms to be self limiting within the next couple weeks.  If symptoms persist or worsen patient needs to return.

## 2025-03-10 NOTE — PROGRESS NOTES
"Chief Complaint  Earache (Patient states that both ears feel like they are clogged )    Subjective          Stephon Castellano presents to [facility]    Ear fullness- symptoms began initially about 3 weeks ago with a URI.  He has been using ear wax removal kit without improvement in symptoms.  No other symptoms at this time.     Objective   Vital Signs:   /60   Pulse 74   Temp 98.7 °F (37.1 °C)   Ht 180.3 cm (70.98\")   Wt 102 kg (224 lb 3.2 oz)   SpO2 96%   BMI 31.28 kg/m²     Physical Exam  Vitals reviewed.   Constitutional:       Appearance: Normal appearance. He is well-developed.   HENT:      Head: Normocephalic and atraumatic.      Right Ear: Tympanic membrane, ear canal and external ear normal.      Left Ear: Tympanic membrane, ear canal and external ear normal.      Ears:      Comments: Mucoid effusions bilateral R > L     Nose: Nose normal.      Mouth/Throat:      Mouth: Mucous membranes are moist.      Pharynx: No posterior oropharyngeal erythema.   Eyes:      Conjunctiva/sclera: Conjunctivae normal.      Pupils: Pupils are equal, round, and reactive to light.   Cardiovascular:      Rate and Rhythm: Normal rate and regular rhythm.      Heart sounds: No murmur heard.     No friction rub. No gallop.   Pulmonary:      Effort: Pulmonary effort is normal.      Breath sounds: Normal breath sounds. No wheezing or rhonchi.   Skin:     General: Skin is warm and dry.   Neurological:      Mental Status: He is alert and oriented to person, place, and time.      Cranial Nerves: No cranial nerve deficit.   Psychiatric:         Mood and Affect: Mood and affect normal.         Behavior: Behavior normal.         Thought Content: Thought content normal.         Judgment: Judgment normal.        Result Review :          Procedures      Assessment and Plan    Diagnoses and all orders for this visit:    1. Dysfunction of both eustachian tubes (Primary)  Assessment & " Plan:  Will treat with flonase/cetirizine at this time.  Would expect symptoms to be self limiting within the next couple weeks.  If symptoms persist or worsen patient needs to return.       Other orders  -     Discontinue: cetirizine (zyrTEC) 10 MG tablet; Take 1 tablet by mouth Daily.  Dispense: 90 tablet; Refill: 1  -     Discontinue: fluticasone (FLONASE) 50 MCG/ACT nasal spray; Administer 2 sprays into the nostril(s) as directed by provider Daily.  Dispense: 16 g; Refill: 11  -     cetirizine (zyrTEC) 10 MG tablet; Take 1 tablet by mouth Daily.  Dispense: 90 tablet; Refill: 1  -     fluticasone (FLONASE) 50 MCG/ACT nasal spray; Administer 2 sprays into the nostril(s) as directed by provider Daily.  Dispense: 16 g; Refill: 11              Follow Up   No follow-ups on file.  Patient was given instructions and counseling regarding his condition or for health maintenance advice. Please see specific information pulled into the AVS if appropriate.

## 2025-03-11 ENCOUNTER — OFFICE VISIT (OUTPATIENT)
Dept: ONCOLOGY | Facility: HOSPITAL | Age: 65
End: 2025-03-11
Payer: MEDICARE

## 2025-03-11 VITALS
BODY MASS INDEX: 31.33 KG/M2 | HEIGHT: 71 IN | HEART RATE: 61 BPM | SYSTOLIC BLOOD PRESSURE: 116 MMHG | TEMPERATURE: 98.9 F | WEIGHT: 223.77 LBS | RESPIRATION RATE: 18 BRPM | DIASTOLIC BLOOD PRESSURE: 71 MMHG | OXYGEN SATURATION: 98 %

## 2025-03-11 DIAGNOSIS — D89.89 KAPPA LIGHT CHAIN DISEASE: ICD-10-CM

## 2025-03-11 DIAGNOSIS — D69.6 THROMBOCYTOPENIA: ICD-10-CM

## 2025-03-11 DIAGNOSIS — N18.32 STAGE 3B CHRONIC KIDNEY DISEASE: ICD-10-CM

## 2025-03-11 DIAGNOSIS — D50.8 OTHER IRON DEFICIENCY ANEMIA: Primary | ICD-10-CM

## 2025-03-11 DIAGNOSIS — D64.9 NORMOCYTIC ANEMIA: ICD-10-CM

## 2025-03-11 PROCEDURE — G0463 HOSPITAL OUTPT CLINIC VISIT: HCPCS | Performed by: PHYSICIAN ASSISTANT

## 2025-03-11 NOTE — PROGRESS NOTES
Chief Complaint/Reason for Referral:   FOLLOW UP 2     Paige Florez*  Paige Florez MD    Records Obtained:  Records of the patients history including those obtained from [facility] EMR were reviewed and summarized in detail.    Subjective    History of Present Illness    Stephon Castellano presents to [facility]EM[facility]Y for Normocytic Anemia    Patient is a 65 yo M with PMH of alcohol abuse, substance abuse [id number], CKD stage 3, MUNIRA, anemia,   atrial fibrillation in 2023 presenting for follow up for anemia. Follows with nephrology.  Reports daily fatigue that is significant. Reports lightheadedness. Denies any bleeding.  No fevers or chills or infections.  Recent colonoscopy and EGD with benign findings. Recent bone marrow performed. Tolerated well. Marrow results discussed with patient. They were normal. Patient remains fatigued.     History of Present Illness  The patient is a 64-year-old male who presents to the hematology department for a follow-up on his anemia.    He reports overall good health but experiences persistent fatigue, which he attributes to his medication regimen. He plans to discuss potential dosage adjustments with his primary care physician during their upcoming appointment. He has not experienced any night sweats or unexplained weight loss. His weight has been stable, with a previous unintentional weight loss of 37 pounds. He recalls a period of significant illness characterized by severe anemia and other symptoms, but he has since recovered. He does not consume sushi and maintains a diet consisting of hamburgers, pizza, peanut butter, jelly sandwiches, and toast. He resides in a house built in the early 1980s. His wife is in good health and did not fall ill when he was previously unwell.    He also reports chronic pain in his neck and left knee, along with neuropathy in his feet, which he describes as numbness rather than pain. He experiences  tingling sensations in his feet and suspects a circulatory issue due to his fingers turning white in cold weather. He has not sought neurological or vascular consultation for these symptoms. He reports balance issues, including unexplained falls and lower leg weakness after walking approximately 100 meters. These symptoms began in January 2024 and persisted for 6 months. He has undergone an EMG nerve conduction test but has not had a heavy metal screen.    Hematology History  2/13/24: Hgb 10.4  8/7/24: Hgb 9.8, MCV 99, plt 140, WBC 3.84, ferritin 483, iron sat 28, B12 362, folate 14, , hapto normal, copper 76, zinc 75, SPEP without M-spike, kappa/lambda FLC ratio 2 (wnl for his GFR)  8/13/24: EGD and c-scope: H pylori negative, benign findings.  10/24/24: Bone marrow biopsy: Normocellular marrow for age with maturing trilineage hematopoiesis.  Storage iron is present.  Limited marrow tissue for evaluation.  Flow cytometry with normal level of blasts.  No significant immunophenotypic abnormalities detected.  Normal male karyotype.  MDS FISH panel was negative.  [place] myeloid panel NGS with variant of unknown significance found in PML gene.   10/24/24: Hgb 11.7, iron sat 22%    Oncology/Hematology History    No history exists.     Review of Systems   HENT:  Positive for ear pain.    Musculoskeletal:  Positive for arthralgias and neck pain.   Neurological:         Numbness and tingling in bilat UE/LE   All other systems reviewed and are negative.    Current Outpatient Medications on File Prior to Visit   Medication Sig Dispense Refill    busPIRone (BUSPAR) 5 MG tablet Take 1 tablet by mouth 3 (Three) Times a Day. 90 tablet 2    carboxymethylcellulose (REFRESH PLUS) 0.5 % solution Administer 2 drops to both eyes 3 (Three) Times a Day As Needed for Dry Eyes. 30 mL 12    cetirizine (zyrTEC) 10 MG tablet Take 1 tablet by mouth Daily. 90 tablet 1    finasteride (PROSCAR) 5 MG tablet Take 1 tablet by mouth Daily  for 360 days. 90 tablet 3    FLUoxetine (PROzac) 10 MG capsule Take 1 capsule by mouth Daily. 90 capsule 1    FLUoxetine (PROzac) 20 MG capsule Take 1 capsule by mouth Daily. Start prozac 7/17 90 capsule 1    fluticasone (FLONASE) 50 MCG/ACT nasal spray Administer 2 sprays into the nostril(s) as directed by provider Daily. 16 g 11    HYDROmorphone (DILAUDID) 1 MG/ML injection Infuse 0-2 mg/hr into a venous catheter.      IRON, FERROUS GLUCONATE, PO Take 65 mg by mouth Daily.      Lubricant Eye Drops PF 0.5 % solution       Lysine 1000 MG tablet Take  by mouth.      Magnesium 250 MG tablet Take  by mouth.      pain patient supplied pump by Intrathecal route Continuous. Instructions are in drop boxes      tamsulosin (FLOMAX) 0.4 MG capsule 24 hr capsule Take 2 capsules by mouth Daily for 360 days. 180 capsule 3    Testosterone Cypionate (DEPOTESTOTERONE CYPIONATE) 200 MG/ML injection       traZODone (DESYREL) 50 MG tablet Take 1 tablet by mouth Every Night. 90 tablet 1    Turmeric 500 MG capsule Take  by mouth.      [DISCONTINUED] multivitamin with minerals tablet tablet Take 1 tablet by mouth Daily. (Patient not taking: Reported on 3/11/2025)      [DISCONTINUED] Omega 3-6-9 Fatty Acids (OMEGA 3-6-9 COMPLEX PO) Take  by mouth. (Patient not taking: Reported on 3/11/2025)       No current facility-administered medications on file prior to visit.     No Known Allergies  Past Medical History:   Diagnosis Date    Alcohol abuse     Allergic rhinitis     Anemia 02/06/2024    My hand started just shaking but got worse until now i have no strength in my left hand    Anxiety 2006    Starting taking mood depression drugs    Arthritis     Asthma About 6 months    Sore Throat and was sent to ENT    Atrial fibrillation 2023    Benign prostatic hyperplasia 2015    Dad had Prostate Cancer and just did Colonoscopy and they said i have a major large Prostate    Cervical neck pain with evidence of disc disease     Chronic pain disorder      Colon polyp 2020    Had a lot of polyps on the last 2 Colonoscopies    CTS (carpal tunnel syndrome) 2005    Depression     Difficulty walking 2033    Erectile dysfunction 2017    I have been on Testosterone replacements since then    Head injury 1977    Headache May 2023    Started abou 6 months or more    Hemorrhoid     HL (hearing loss) 2004    Have a major tinitis/ringing in my ears especially the left one    Hyperlipidemia 2017    My blood test have stated they were high or elevated    Hypertension 2017    Given Flomax said it will probably go down    Hypogonadism in male     Insomnia     Kidney stone 2020    Went to doctor and got medication for them and still have issues with right side    Low back pain 2010    Stinosis of back & neck    Memory loss 2023    Movement disorder 2023    Sleep apnea 2007    Substance abuse [id number]    On Disulfiram for alcohol abuse-voluntary    Visual impairment 2023    Just recently have been given eye drops     Past Surgical History:   Procedure Laterality Date    CERVICAL DISC SURGERY  2008    COLONOSCOPY N/A 08/18/2023    Procedure: COLONOSCOPY WITH POLYPECTOMY/SNARE;  Surgeon: Jose Alejandro Fox MD;  Location: [facility] ENDOSCOPY;  Service: General;  Laterality: N/A;  COLON POLYPS    COLONOSCOPY N/A 8/13/2024    Procedure: COLONOSCOPY WITH POLYPECTOMIES;  Surgeon: Jose Alejandro Fox MD;  Location: [facility] ENDOSCOPY;  Service: General;  Laterality: N/A;  COLON POLYPS    ENDOSCOPY N/A 8/13/2024    Procedure: ESOPHAGOGASTRODUODENOSCOPY WITH BIOPSIES;  Surgeon: Jose Alejandro Fox MD;  Location: [facility] ENDOSCOPY;  Service: General;  Laterality: N/A;  GASTRITIS. SUPERFICIAL GASTRIC ULCERS    ENDOSCOPY N/A 11/27/2024    Procedure: ESOPHAGOGASTRODUODENOSCOPY WITH BIOPSIES;  Surgeon: Tamara Molina MD;  Location: [facility] ENDOSCOPY;  Service: Gastroenterology;  Laterality: N/A;  GASTRITIS    HEEL SPUR SURGERY  2005    KNEE ACL RECONSTRUCTION  2004    OTHER SURGICAL HISTORY       METAL IMPLANT    PAIN PUMP INSERTION/REVISION      SPINE SURGERY  2008    Antieror c5-7 discotomy    TONSILLECTOMY  1965    VASECTOMY  2002     Social History     Socioeconomic History    Marital status:    Tobacco Use    Smoking status: Former     Current packs/day: 0.00     Average packs/day: 2.0 packs/day for 8.0 years (16.0 ttl pk-yrs)     Types: Cigarettes     Start date: 1975     Quit date: 1983     Years since quittin.2     Passive exposure: Past    Smokeless tobacco: Former     Types: Snuff    Tobacco comments:     Also dipped for several years   Vaping Use    Vaping status: Never Used   Substance and Sexual Activity    Alcohol use: Not Currently     Alcohol/week: 14.0 standard drinks of alcohol     Types: 14 Shots of liquor per week     Comment: Stopped due to Disulfiram 250 mg    Drug use: Not Currently     Frequency: 7.0 times per week     Types: Marijuana    Sexual activity: Not Currently     Partners: Female     Birth control/protection: None, Vasectomy     Comment: Low testosterone     Family History   Problem Relation Age of Onset    Suicide Attempts Mother     Bipolar disorder Mother     Anxiety disorder Mother     Heart disease Mother     Osteoporosis Mother     Alcohol abuse Mother         Mother committed suicide    Depression Mother         She had major depression    Early death Mother         She committed suicide    Thyroid disease Mother         She had Thyroid issues and had surgery    Cancer Father         Prostate    Prostate cancer Father     Hearing loss Father         He has worn a hearing aids since his late 50’s    Depression Sister     Alcohol abuse Sister     Diabetes Sister         Diabetes    Arthritis Sister     Sleep apnea Sister     Obesity Sister     Thyroid disease Sister     Insomnia Sister     Narcolepsy Sister     Diabetes Brother     Stroke Other         AUNT/UNCLE GRANDPARENTS    Diabetes Other         GRANDPARENTS      Immunization History    Administered Date(s) Administered    COVID-19 (SHRAVAN) 03/10/2021    Covid-19 (Pfizer) Gray Cap Monovalent 01/28/2022    DTaP 03/29/2019    FluMist 2-49yrs (Nasal) 01/24/2005, 11/09/2005    Fluzone  >6mos 10/04/2011, 10/15/2015    Fluzone (or Fluarix & Flulaval for VFC) >6mos 09/25/2019, 01/25/2022, 03/15/2024    Fluzone High-Dose 65+YRS 09/18/2024    Fluzone Quad >6mos (Multi-dose) 10/01/2019    H1N1 Inj 12/16/2009    Hepatitis A 05/08/1995, 11/08/1999    Influenza Injectable Mdck Pf Quad 10/27/2020, 02/15/2023    Influenza Whole 12/03/2003, 10/12/2011    Influenza, Unspecified 10/01/2018, 10/27/2020, 02/15/2023    MMR 04/25/2003    Measles 05/02/1984    Meningococcal Polysaccharide 04/19/2002    OPV 10/03/1986    Plague 01/27/1986, 03/10/1986, 06/13/1989    Pneumococcal Conjugate 20-Valent (PCV20) 09/01/2022    Pneumococcal, Unspecified 09/01/2022    Rubella 05/02/1984    Shingrix 01/25/2022, 08/02/2022    Td (TDVAX) 11/08/1999    Tdap 09/28/2010, 03/29/2019    Typhoid, Unspecified 11/13/2001    Yellow Fever 05/13/1994    influenza Split 11/21/2001, 11/01/2002, 10/30/2007, 10/07/2009, 10/20/2009     Tobacco Use: Medium Risk (3/11/2025)    Patient History     Smoking Tobacco Use: Former     Smokeless Tobacco Use: Former     Passive Exposure: Past     Objective     Physical Exam  Vitals and nursing note reviewed.   Constitutional:       General: He is not in acute distress.     Appearance: Normal appearance. He is not ill-appearing.   HENT:      Head: Normocephalic.   Eyes:      Conjunctiva/sclera: Conjunctivae normal.   Cardiovascular:      Rate and Rhythm: Normal rate and regular rhythm.      Heart sounds: Normal heart sounds.   Pulmonary:      Effort: Pulmonary effort is normal.      Breath sounds: Normal breath sounds.   Abdominal:      Palpations: Abdomen is soft.   Lymphadenopathy:      Cervical: No cervical adenopathy.      Right cervical: No superficial cervical adenopathy.     Left cervical: No  "superficial cervical adenopathy.      Upper Body:      Right upper body: No axillary adenopathy.      Left upper body: No axillary adenopathy.   Skin:     Coloration: Skin is not jaundiced.   Neurological:      Mental Status: He is alert.   Psychiatric:         Mood and Affect: Mood normal.         Behavior: Behavior normal. Behavior is cooperative.         Thought Content: Thought content normal.         Judgment: Judgment normal.       Vitals:    03/11/25 1105   BP: 116/71   Pulse: 61   Resp: 18   Temp: 98.9 °F (37.2 °C)   TempSrc: Temporal   SpO2: 98%   Weight: 101 kg (223 lb 12.3 oz)   Height: 180.3 cm (70.98\")   PainSc: 0-No pain       Wt Readings from Last 3 Encounters:   03/11/25 101 kg (223 lb 12.3 oz)   03/10/25 102 kg (224 lb 3.2 oz)   01/29/25 101 kg (222 lb 6.4 oz)      ECOG score: 0         ECOG: (0) Fully Active - Able to Carry On All Pre-disease Performance Without Restriction  Fall Risk Assessment was completed, and patient is at low risk for falls.  PHQ-9 Total Score:         The patient is  experiencing fatigue. Fatigue score: 9    PT/OT Functional Screening:   Speech Functional Screening:   Rehab to be ordered:     Result Review :  The following data was reviewed by: Tommy Doshi PA-C on 03/11/25:    RED BLOOD CELLs:  Lab Results   Component Value Date    RBC 3.91 (L) 03/06/2025    HGB 12.1 (L) 03/06/2025    HCT 37.9 03/06/2025    MCV 96.9 03/06/2025     03/06/2025      WHITE BLOOD CELLs:  Lab Results   Component Value Date    WBC 4.13 03/06/2025    NEUTROABS 1.56 (L) 03/06/2025    LYMPHSABS 1.96 03/06/2025    MONOABS 0.41 10/24/2024    EOSABS 0.11 03/06/2025    BASOSABS 0.02 03/06/2025     RBC EVALUATION (MICROCYTOSIS/NORMOCYTOSIS):  Lab Results   Component Value Date    RETIC 0.0344 08/07/2024    MCV 96.9 03/06/2025    IRON 72 03/06/2025    FERRITIN 314.90 03/06/2025    LABIRON 19 (L) 03/06/2025    TIBC 386 03/06/2025    TRANSFERRIN 259 03/06/2025     HEMOLYSIS EVALUATION:  Lab Results "   Component Value Date    MCV 96.9 03/06/2025     08/07/2024    HAPTOGLOBIN 110 08/07/2024    RETIC 0.0344 08/07/2024     Sed Rate   Date Value Ref Range Status   08/07/2024 1 0 - 20 mm/hr Final     C-Reactive Protein   Date Value Ref Range Status   02/28/2024 <0.30 0.00 - 0.50 mg/dL Final     MACROCYTOSIS EVALUATION:  Lab Results   Component Value Date    RETIC 0.0344 08/07/2024    MCV 96.9 03/06/2025    [id number] 695 08/07/2024    FOLATE 14.00 08/07/2024     RENAL FUNCTION TESTs:  Lab Results   Component Value Date    EGFR 36.8 (L) 08/07/2024    BUN 34 (H) 08/07/2024     LIVER FUNCTION TESTs:  Lab Results   Component Value Date    ALT 33 08/07/2024    AST 24 08/07/2024    BILITOT 0.2 08/07/2024    BILIDIR <10 09/20/2023    BILIDIR <10 09/20/2023    BILIDIR <10 09/20/2023    ALKPHOS 80 08/07/2024    PROTEINTOT 6.7 08/07/2024    PROTEINTOT 6.7 08/07/2024    ALBUMIN 4.4 08/07/2024    ALBUMIN 4.0 08/07/2024     GLUCOSE EVALUATION:  Lab Results   Component Value Date    GLUCOSE 92 08/07/2024    HGBA1C 5.40 07/26/2024     SERUM CHEMISTRIES:  Lab Results   Component Value Date     08/07/2024    K 4.4 08/07/2024     08/07/2024    CO2 32.3 (H) 08/07/2024    CALCIUM 9.4 08/07/2024     URINALYSIS:  Lab Results   Component Value Date    RBCUR Negative 01/02/2024    LEUKOCYTESUR Negative 10/24/2024    BILIRUBINUR Negative 10/24/2024    PHUR 6.0 10/24/2024    SPECGRAVUR 1.010 10/24/2024     THYROID FUNCTION TESTs:  TSH   Date Value Ref Range Status   07/26/2024 1.450 0.270 - 4.200 uIU/mL Final     Free T4   Date Value Ref Range Status   11/15/2023 0.90 (L) 0.93 - 1.70 ng/dL Final     T3, Free   Date Value Ref Range Status   09/20/2023 3.05 2.00 - 4.40 pg/mL Final     TUMOR MARKERS:  Lab Results   Component Value Date    PSA 0.6 11/14/2023     Free Lambda Light Chains   Date Value Ref Range Status   08/07/2024 24.1 5.7 - 26.3 mg/L Final     IgA   Date Value Ref Range Status   01/28/2025 176 61 - 437 mg/dL  Final     Results  Laboratory Studies  Ferritin improved from 483 to 314. White blood cell count is 4.1. Hemoglobin improved from 8.8 to 12.1. Platelet count improved from 136 to 164. Iron is 72, iron saturation is 19, transferrin and TIBC are normal. B12 and folate levels are normal. Copper and zinc levels are normal.       Assessment and Plan:  Diagnoses and all orders for this visit:    1. Other iron deficiency anemia (Primary)  -     CBC & Differential; Future  -     Comprehensive Metabolic Panel; Future  -     Free K+L Left Chains,Qn,Ur - Urine, Clean Catch; Future  -     Immunofixation (LAURA), Protein Electrophoresis (PE), and Quantitative Free Kappa and Lambda Light Chains (FLC), Serum; Future  -     LAURA & PE, Random Urine - Urine, Clean Catch; Future  -     Cancel: Vitamin B1, Whole Blood; Future  -     Vitamin B1, Whole Blood; Future  -     Heavy Metals Screen, Urine - Urine, Clean Catch; Future    2. Thrombocytopenia  -     CBC & Differential; Future  -     Comprehensive Metabolic Panel; Future  -     Free K+L Left Chains,Qn,Ur - Urine, Clean Catch; Future  -     Immunofixation (LAURA), Protein Electrophoresis (PE), and Quantitative Free Kappa and Lambda Light Chains (FLC), Serum; Future  -     LAURA & PE, Random Urine - Urine, Clean Catch; Future  -     Cancel: Vitamin B1, Whole Blood; Future  -     Vitamin B1, Whole Blood; Future  -     Heavy Metals Screen, Urine - Urine, Clean Catch; Future    3. Normocytic anemia  -     CBC & Differential; Future  -     Comprehensive Metabolic Panel; Future  -     Free K+L Left Chains,Qn,Ur - Urine, Clean Catch; Future  -     Immunofixation (LAURA), Protein Electrophoresis (PE), and Quantitative Free Kappa and Lambda Light Chains (FLC), Serum; Future  -     LAURA & PE, Random Urine - Urine, Clean Catch; Future  -     Cancel: Vitamin B1, Whole Blood; Future  -     Vitamin B1, Whole Blood; Future  -     Heavy Metals Screen, Urine - Urine, Clean Catch; Future    4. Stage 3b  "chronic kidney disease  -     CBC & Differential; Future  -     Comprehensive Metabolic Panel; Future  -     Free K+L Left Chains,Qn,Ur - Urine, Clean Catch; Future  -     Immunofixation (LAURA), Protein Electrophoresis (PE), and Quantitative Free Kappa and Lambda Light Chains (FLC), Serum; Future  -     LAURA & PE, Random Urine - Urine, Clean Catch; Future  -     Cancel: Vitamin B1, Whole Blood; Future  -     Vitamin B1, Whole Blood; Future  -     Heavy Metals Screen, Urine - Urine, Clean Catch; Future    5. Kappa light chain disease  -     CBC & Differential; Future  -     Comprehensive Metabolic Panel; Future  -     Free K+L Left Chains,Qn,Ur - Urine, Clean Catch; Future  -     Immunofixation (LAURA), Protein Electrophoresis (PE), and Quantitative Free Kappa and Lambda Light Chains (FLC), Serum; Future  -     LAURA & PE, Random Urine - Urine, Clean Catch; Future      Assessment & Plan  Anemia  - Ferritin levels normalized, CBC showed improvement  - White blood cell count within normal range  - Hemoglobin increased from 8.8 to 12.1 over 7 months  - Platelet count within normal range  - Satisfactory iron profile: iron level 72, slightly low iron saturation 19  - Transferrin and TIBC within acceptable limits  - Excellent B12 and folate levels  - Copper and zinc levels within normal range  - Advised against taking iron supplements  - Encouraged to discuss medication regimen with primary care physician, Dr. Florez  - Recheck labs in 3 months to monitor progress  - Heavy metal screen to rule out arsenic, lead, and mercury poisoning  - Complete blood work approximately one week prior to next appointment    Neuropathy  - Reports numbness and tingling in feet, described as feeling \"dead\"  - Similar symptoms in hands, turning white in cold temperatures (possible Raynaud's phenomenon)  - Advised to consult with neurologist or vascular specialist for further evaluation and management  - Heavy metal screen to rule out arsenic, lead, " and mercury poisoning    Chronic Pain  - Reports chronic pain in neck and left knee  - Advised to continue managing pain with current regimen  - Consult with primary care physician for any necessary adjustments    Follow-up  - Patient to follow up in 3 months    PROCEDURE  The patient underwent an EGD and colonoscopy in the past.    Anemia  Mild thrombocytopenia  CKD IIIB  EGD and colonoscopy on the 17th with benign findings, H. pylori negative.  Patient without any iron deficiency.  Ferritin actually elevated above 400.  Myeloma labs negative.  Hemolysis ruled out.  B12 and folate normal. Hgb as of 10/24/24 actually much improved to 11.7 with normal iron sat. Bone marrow biopsy on 10/24/24 was normal with normal cellularity, maturing trilineage monoparesis, no increase in blasts, normal male karyotype, normal MDS FISH panel.  Intelligen NGS testing with VUS in PML gene.    Anemia could be from his chronic kidney disease in which case erythropoietin can be considered, however as hgb is now above 10.0 g/dL, which is threshold above which EPO is held. Plts borderline low, will need to monitor. Discussed that no full explanation can be provided for the drop in hgb but reassuringly it is much better.    Patient Follow Up: 3-4 months with PA/NARENDRA - obtain labs one week prior to visit    I spent 30 minutes caring for Stephon on this date of service. This time includes time spent by me in the following activities:preparing for the visit, reviewing tests, obtaining and/or reviewing a separately obtained history, performing a medically appropriate examination and/or evaluation , counseling and educating the patient/family/caregiver, ordering medications, tests, or procedures, documenting information in the medical record, and independently interpreting results and communicating that information with the patient/family/caregiver    Patient was given instructions and counseling regarding his condition or for health maintenance  advice. Please see specific information pulled into the AVS if appropriate.     Patient or patient representative verbalized consent for the use of Ambient Listening during the visit with  Tommy Doshi PA-C for chart documentation. 3/11/2025  11:19 EDT

## 2025-03-12 ENCOUNTER — OFFICE VISIT (OUTPATIENT)
Dept: PSYCHIATRY | Facility: CLINIC | Age: 65
End: 2025-03-12
Payer: MEDICARE

## 2025-03-12 VITALS
HEIGHT: 71 IN | HEART RATE: 71 BPM | SYSTOLIC BLOOD PRESSURE: 124 MMHG | BODY MASS INDEX: 31.33 KG/M2 | WEIGHT: 223.8 LBS | DIASTOLIC BLOOD PRESSURE: 69 MMHG

## 2025-03-12 DIAGNOSIS — F51.05 INSOMNIA DUE TO MENTAL CONDITION: ICD-10-CM

## 2025-03-12 DIAGNOSIS — F33.1 MAJOR DEPRESSIVE DISORDER, RECURRENT EPISODE, MODERATE: ICD-10-CM

## 2025-03-12 DIAGNOSIS — F41.1 GENERALIZED ANXIETY DISORDER: Primary | ICD-10-CM

## 2025-03-12 LAB
KAPPA LC FREE 24H UR-MRATE: 52.61 MG/24 HR
KAPPA LC FREE UR-MCNC: 16.44 MG/L (ref 1.17–86.46)
KAPPA LC FREE/LAMBDA FREE UR: 6.97 (ref 1.83–14.26)
LAMBDA LC FREE 24H UR-MRATE: 7.55 MG/24 HR
LAMBDA LC FREE UR-MCNC: 2.36 MG/L (ref 0.27–15.21)

## 2025-03-12 NOTE — PATIENT INSTRUCTIONS
1.  Please return to clinic at your next scheduled visit.  Contact the clinic ([phone]) at least 24 hours prior in the event you need to cancel.  2.  Do no harm to yourself or others.    3.  Avoid alcohol and drugs.    4.  Take all medications as prescribed.  Please contact the clinic with any concerns. If you are in need of medication refills, please call the clinic at [phone].    5. Should you want to get in touch with your provider, Dr. Penelope Davis, please utilize [employer] or contact the office ([phone]), and staff will be able to page Dr. Davis directly.  6.  In the event you have personal crisis, contact the following crisis numbers: Suicide Prevention Hotline 1-264.613.5509; GERMAINE Helpline 9-104-123-LILD; Psychiatric Emergency Room [phone]; text HELLO to 935985; or 733.     Stop quetiapine, continue trazodone, continue buspar 5 mg 3x/day (as needed), continue prozac 30 mg qday    Reflective listening

## 2025-03-12 NOTE — PROGRESS NOTES
Subjective   Stephon Castellano is a 64 y.o. male who presents today for initial evaluation     Referring Provider:  No referring provider defined for this encounter.  [facility]i endocrinologist    Chief Complaint:  depression    History of Present Illness:     2024: INITIAL VISIT Chart review:     Juan: Hydromorphone  Care Everywhere: a few non behavioral health notes, sees [facility]    Psychotropic medication chart review:  Present:  Trazodone 200 mg nightly  Cymbalta 60 mg a day    Previously:  Zoloft 50 mg a day    EKG: 2023: 57, sinus, QTc 417  Procedures: Sleep study ordered for the future  Head imaging: 2023 CT of the head shows no acute, MRI 2023 shows no acute, empty sella configuration  Labs: 2024: CMP shows creatinine 1.47, CK is elevated at 756, reassuring B12, hemoglobin is low at 11.5,  Initial Chart Review Notes: Seen by neurology in February for evaluation for seizures.  Patient notes that he is depressed and cannot sleep, depression began when he was in the [employer], his mother killed himself.  Depression began about 10 years ago.  Last year his problems became so severe that he quit his job.  He has not seen a psychiatrist for years.  Sleep medicine consult also ordered.      Chart Review By Dates:  2025: int med, nephro, onc, Byble x1.  2025: EGD for CANDIDA, PT, int med, Byble x2, urology; benign bx's.  2024: hand surg, onc.  10/04/2024: gen surg, hand surg, neur, ortho, unknown, urology,   2024: admitted for colonoscopy , gene surg, int med, nephro, onc, reassuring copper, zinc, hepatitis panel.  24: Sleep: settings recs, retaining per bladder scan.   07/10/24: urology, sleep med, gen surg, bladder scan 280 mL. Mirtazapine led to brain fog, fatigue, poor focus in am.  2024: Neurology, internal medicine.  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    Plannin2025: mild TRIP,  "MDD, start buspar. Stop seroquel: dizzy plus weight gain. Much of this is situational -- there is only so much we can do about that. Possibly seasonal, start light box, which he has. Discussed hiring someone to clean, wife's resistance to changes.  11/13/2024: Insomnia, increase quetiapine. Discussed getting help with organizing the house.  10/04/2024: Improving, still MDD, TRIP, increase prozac further.    VISITS/APPOINTMENTS (BELOW):    \"Stephon\"  Stage 4 CKD (nephro 8/2024)  Tried bupropion long ago (for depression 10 years ago)  Mom may have been bipolar, committed SA  Very sensitive to low doses of meds  Low energy, motivation  It's been this way for some time.  Anemia, CKD, pain pump (dilaudid)  Mornings that are the worst    03/12/2025:   In person interview:  \"Terrible.\"  Pt never stopped seroquel nightly.  Some confusion regarding meds  No major changes  Therapy continues to help  \"Everything else is pretty good\"  Situation still the same at the house  Doing more work to clean the house, which helps his mental health  MDD: mild depressed mood -- improving  TRIP: worrying, on edge, irritable, mild -- improving  Panic attacks: stable  Energy: down chronically  Concentration: stable  Insomnia: initial, CPAP, 5-6 hours a night  Eating/Weight: 223, 222, 218, 206, 196, 191, 188, 182 lbs (goal is 190)  Refills: y  Substances: def  Therapy: Byble, likes  Medication compliant: y  SE: n  No SI HI [facility].      01/29/2025:   In person interview:  \"Pretty good. Actually about the same.\"  Drowsy and dizzy in the mornings.  Anemia has gotten better  Still has low energy  Wife situation hasn't changed  hoarding  Less arguments  Therapy helping  MDD: mild depressed mood  TRIP: worrying, on edge, irritable, mild  Panic attacks: stable  Energy: down chronically  Concentration: stable  Insomnia: initial, CPAP, 5-6 hours a night  Eating/Weight: 222, 218, 206, 196, 191, 188, 182 lbs (goal is 190)  Refills: y  Substances: def  Therapy: " "Byble, likes  Medication compliant: y  SE: n  No SI HI [facility].      11/13/2024:   In person interview:  \"Pretty good. I started the therapy.\"  Still has low energy  Discussed wife's hoarding  Minor arguments  Hard to wear that mask (CPAP)  MDD: improved, possibly stable  TRIP: improving, less irritable  Panic attacks: stable  Energy: down chronically  Concentration: stable  Insomnia: initial, CPAP, 5-6 hours a night  Eating/Weight: 218, 206, 196, 191, 188, 182 lbs (goal is 190)  Refills: y  Substances: def  Therapy: Byble, likes  Medication compliant: y  SE: n  No SI HI [facility].      10/04/2024:   In person interview:  \"I'm doing pretty good.\"  Low energy, motivation  It's been this way for some time.  Anemia, CKD, pain pump (dilaudid)  Mornings that are the worst  Wife: no blowouts or fights  MDD: depressed but it's not bothering me so much, mild  TRIP: mild, irritability  Panic attacks: stable  Energy: down chronically  Concentration: stable  Insomnia: initial, now stable on CPAP, 5-6 hours a night  Eating/Weight: 206, 196, 191, 188, 182 lbs (goal is 190)  Refills: y  Substances: def  Therapy: never set up  Medication compliant: y  SE: n  No SI HI [facility].    ...      04/30/2024: In person.  Interview:  His/Her Story: \"[facility].\"  G14, P16  I've always had a little bit of depression.  But when I got injured, it got worse. \"I couldn't do things like I used to.\"  It's a combination of injuries, surgeries  Pain pump helps  Trazodone for 5 years  Cymbalta 5 years  Has never tried combinations, but has tried \"the gamut\" of them  Has lost 50 lbs in 3 mos, unsure why  Trouble focusing recently  Has chronic balance issues, no one knows why (cards, two neurologists, worked him up) x 3-4 mos  Depression/Mood:  Depressed mood, anhedonia, poor energy, poor concentration, weight loss, insomnia.  Seasonal pattern: def  Severity: Moderate  Duration: all his life  Anxiety:  Uncontrolled worrying, muscle tension, fatigue, poor " concentration, feeling on edge, irritability, insomnia.  Severity: Moderate  Duration: all of his life  Panic attacks: n  Psych ROS: Positive for depression, anxiety.  Negative for psychosis and rebecca.  ADHD: def  PTSD: no life threatening trauma  No SI HI [facility].  Medication compliant:      Access to Firearms: yes, locked away    PHQ-9 Depression Screening  PHQ-9 Total Score:      Little interest or pleasure in doing things?     Feeling down, depressed, or hopeless?     Trouble falling or staying asleep, or sleeping too much?     Feeling tired or having little energy?     Poor appetite or overeating?     Feeling bad about yourself - or that you are a failure or have let yourself or your family down?     Trouble concentrating on things, such as reading the newspaper or watching television?     Moving or speaking so slowly that other people could have noticed? Or the opposite - being so fidgety or restless that you have been moving around a lot more than usual?     Thoughts that you would be better off dead, or of hurting yourself in some way?     PHQ-9 Total Score       TRIP-7       Past Surgical History:  Past Surgical History:   Procedure Laterality Date    CERVICAL DISC SURGERY  2008    COLONOSCOPY N/A 08/18/2023    Procedure: COLONOSCOPY WITH POLYPECTOMY/SNARE;  Surgeon: Jose Alejandro Fox MD;  Location: [facility] ENDOSCOPY;  Service: General;  Laterality: N/A;  COLON POLYPS    COLONOSCOPY N/A 8/13/2024    Procedure: COLONOSCOPY WITH POLYPECTOMIES;  Surgeon: Jose Alejandro Fox MD;  Location: [facility] ENDOSCOPY;  Service: General;  Laterality: N/A;  COLON POLYPS    ENDOSCOPY N/A 8/13/2024    Procedure: ESOPHAGOGASTRODUODENOSCOPY WITH BIOPSIES;  Surgeon: Jose Alejandro Fox MD;  Location: [facility] ENDOSCOPY;  Service: General;  Laterality: N/A;  GASTRITIS. SUPERFICIAL GASTRIC ULCERS    ENDOSCOPY N/A 11/27/2024    Procedure: ESOPHAGOGASTRODUODENOSCOPY WITH BIOPSIES;  Surgeon: Tamara Molina MD;  Location: [facility]  ENDOSCOPY;  Service: Gastroenterology;  Laterality: N/A;  GASTRITIS    HEEL SPUR SURGERY  2005    KNEE ACL RECONSTRUCTION  2004    OTHER SURGICAL HISTORY      METAL IMPLANT    PAIN PUMP INSERTION/REVISION  2010    SPINE SURGERY  2008    Antieror c5-7 discotomy    TONSILLECTOMY  1965    VASECTOMY  2002       Problem List:  Patient Active Problem List   Diagnosis    Chronic right-sided thoracic back pain    Kidney stone    Screening due    Allergic rhinitis    Arthritis    Cervical neck pain with evidence of disc disease    Depression    Head injury    Hemorrhoid    Hypogonadism in male    Right wrist pain    Weight loss    Abdominal pain    Seizure-like activity    Palpitations    Abnormal EKG    Bradycardia    Brain fog    Fatigue    Acute kidney injury    Tremor    Slow transit constipation    Weakness of both lower extremities    Left hand weakness    Drug induced myoclonus    Insomnia due to other mental disorder    Obstructive sleep apnea syndrome    Cervical radiculopathy    Iron deficiency anemia    Gastroesophageal reflux disease with esophagitis without hemorrhage    History of colonic polyps    Benign prostatic hyperplasia with urinary retention    BPH without obstruction/lower urinary tract symptoms    Benign prostatic hyperplasia with lower urinary tract symptoms    Acute gastric ulcer without hemorrhage or perforation    Dysfunction of both eustachian tubes       Allergy:   No Known Allergies     Discontinued Medications:  There are no discontinued medications.                  Current Medications:   Current Outpatient Medications   Medication Sig Dispense Refill    busPIRone (BUSPAR) 5 MG tablet Take 1 tablet by mouth 3 (Three) Times a Day. 90 tablet 2    carboxymethylcellulose (REFRESH PLUS) 0.5 % solution Administer 2 drops to both eyes 3 (Three) Times a Day As Needed for Dry Eyes. 30 mL 12    cetirizine (zyrTEC) 10 MG tablet Take 1 tablet by mouth Daily. 90 tablet 1    finasteride (PROSCAR) 5 MG  tablet Take 1 tablet by mouth Daily for 360 days. 90 tablet 3    FLUoxetine (PROzac) 10 MG capsule Take 1 capsule by mouth Daily. 90 capsule 1    FLUoxetine (PROzac) 20 MG capsule Take 1 capsule by mouth Daily. Start prozac 7/17 90 capsule 1    fluticasone (FLONASE) 50 MCG/ACT nasal spray Administer 2 sprays into the nostril(s) as directed by provider Daily. 16 g 11    HYDROmorphone (DILAUDID) 1 MG/ML injection Infuse 0-2 mg/hr into a venous catheter.      IRON, FERROUS GLUCONATE, PO Take 65 mg by mouth Daily.      Lubricant Eye Drops PF 0.5 % solution       Lysine 1000 MG tablet Take  by mouth.      Magnesium 250 MG tablet Take  by mouth.      pain patient supplied pump by Intrathecal route Continuous. Instructions are in drop boxes      tamsulosin (FLOMAX) 0.4 MG capsule 24 hr capsule Take 2 capsules by mouth Daily for 360 days. 180 capsule 3    Testosterone Cypionate (DEPOTESTOTERONE CYPIONATE) 200 MG/ML injection       traZODone (DESYREL) 50 MG tablet Take 1 tablet by mouth Every Night. 90 tablet 1    Turmeric 500 MG capsule Take  by mouth.       No current facility-administered medications for this visit.       Past Medical History:  Past Medical History:   Diagnosis Date    Alcohol abuse     Allergic rhinitis     Anemia 02/06/2024    My hand started just shaking but got worse until now i have no strength in my left hand    Anxiety 2006    Starting taking mood depression drugs    Arthritis     Asthma About 6 months    Sore Throat and was sent to ENT    Atrial fibrillation 2023    Benign prostatic hyperplasia 2015    Dad had Prostate Cancer and just did Colonoscopy and they said i have a major large Prostate    Cervical neck pain with evidence of disc disease     Chronic pain disorder     Colon polyp 2020    Had a lot of polyps on the last 2 Colonoscopies    CTS (carpal tunnel syndrome) 2005    Depression     Difficulty walking 2033    Erectile dysfunction 2017    I have been on Testosterone replacements since  then    Head injury 1977    Headache May 2023    Started abou 6 months or more    Hemorrhoid     HL (hearing loss)     Have a major tinitis/ringing in my ears especially the left one    Hyperlipidemia     My blood test have stated they were high or elevated    Hypertension 2017    Given Flomax said it will probably go down    Hypogonadism in male     Insomnia     Kidney stone     Went to doctor and got medication for them and still have issues with right side    Low back pain     Stinosis of back & neck    Memory loss     Movement disorder     Sleep apnea 2007    Substance abuse [id number]    On Disulfiram for alcohol abuse-voluntary    Visual impairment     Just recently have been given eye drops       Past Psychiatric History:  Began Treatment: a year   Diagnoses: TRIP, insomnia, MDD  Psychiatrist: a year   Therapist: a year   Admission History:Denies  Medication Trials:    multiple    Self Harm: Denies  Suicide Attempts:Denies      Substance Abuse History:   Types: end of  stopped drinking using antabuse, former smoker 2ppd  Withdrawal Symptoms:Denies  Longest Period Sober: 6 mos, recently  AA: Not applicable     Social History:  Martial Status:  Employed: retired 22  Kids:Yes  House:Lives in a house   History: [employer], retired    Social History     Socioeconomic History    Marital status:    Tobacco Use    Smoking status: Former     Current packs/day: 0.00     Average packs/day: 2.0 packs/day for 8.0 years (16.0 ttl pk-yrs)     Types: Cigarettes     Start date: 1975     Quit date: 1983     Years since quittin.2     Passive exposure: Past    Smokeless tobacco: Former     Types: Snuff    Tobacco comments:     Also dipped for several years   Vaping Use    Vaping status: Never Used   Substance and Sexual Activity    Alcohol use: Not Currently     Alcohol/week: 14.0 standard drinks of alcohol     Types: 14 Shots of liquor per week      "Comment: Stopped due to Disulfiram 250 mg    Drug use: Not Currently     Frequency: 7.0 times per week     Types: Marijuana    Sexual activity: Not Currently     Partners: Female     Birth control/protection: None, Vasectomy     Comment: Low testosterone       Family History:   Suicide Attempts:  Mom  Suicide Completions: mom  \"I think she had bipolar but I'm not sure\"      Family History   Problem Relation Age of Onset    Suicide Attempts Mother     Bipolar disorder Mother     Anxiety disorder Mother     Heart disease Mother     Osteoporosis Mother     Alcohol abuse Mother         Mother committed suicide    Depression Mother         She had major depression    Early death Mother         She committed suicide    Thyroid disease Mother         She had Thyroid issues and had surgery    Cancer Father         Prostate    Prostate cancer Father     Hearing loss Father         He has worn a hearing aids since his late 50’s    Depression Sister     Alcohol abuse Sister     Diabetes Sister         Diabetes    Arthritis Sister     Sleep apnea Sister     Obesity Sister     Thyroid disease Sister     Insomnia Sister     Narcolepsy Sister     Diabetes Brother     Stroke Other         AUNT/UNCLE GRANDPARENTS    Diabetes Other         GRANDPARENTS        Developmental History:       Childhood: saw mom and dad fight a lot, they , brother  when he was 5 yo.   High School:Completed  College: [employer]    Mental Status Exam  Appearance  : groomed, good eye contact, normal street clothes  Behavior  : pleasant and cooperative  Motor  : No abnormal  Speech  : talkative, normal rhythm, rate, volume, tone, not hyperverbal, not pressured, normal prosidy  Mood  : \"Everything is pretty good\"  Affect  : euthymic, mood congruent, good variability  Thought Content  : negative suicidal ideations, negative homicidal ideations, negative obsessions  Perceptions  : negative auditory hallucinations, negative visual hallucinations  Thought " "Process  : linear  Insight/Judgement  : Fair/fair  Cognition  : grossly intact  Attention   : intact      Review of Systems:  Review of Systems   Constitutional:  Positive for fatigue. Negative for diaphoresis.   HENT:  Negative for drooling.    Eyes:  Negative for visual disturbance.   Respiratory:  Negative for cough, chest tightness and shortness of breath.    Cardiovascular:  Negative for chest pain, palpitations and leg swelling.   Gastrointestinal:  Positive for constipation. Negative for abdominal pain, diarrhea, nausea and vomiting.   Endocrine: Positive for cold intolerance and heat intolerance.   Genitourinary:  Negative for difficulty urinating.   Musculoskeletal:  Negative for joint swelling.   Allergic/Immunologic: Negative for immunocompromised state.   Neurological:  Positive for dizziness, light-headedness and numbness. Negative for seizures, speech difficulty and headaches.   Psychiatric/Behavioral:  Positive for agitation and sleep disturbance.        Physical Exam:  Physical Exam    Vital Signs:   /69   Pulse 71   Ht 180.3 cm (70.98\")   Wt 102 kg (223 lb 12.8 oz)   BMI 31.23 kg/m²      Lab Results:   Lab on 03/06/2025   Component Date Value Ref Range Status    Ferritin 03/06/2025 314.90  30.00 - 400.00 ng/mL Final    Iron 03/06/2025 72  59 - 158 mcg/dL Final    Iron Saturation (TSAT) 03/06/2025 19 (L)  20 - 50 % Final    Transferrin 03/06/2025 259  200 - 360 mg/dL Final    TIBC 03/06/2025 386  298 - 536 mcg/dL Final    WBC 03/06/2025 4.13  3.40 - 10.80 10*3/mm3 Final    RBC 03/06/2025 3.91 (L)  4.14 - 5.80 10*6/mm3 Final    Hemoglobin 03/06/2025 12.1 (L)  13.0 - 17.7 g/dL Final    Hematocrit 03/06/2025 37.9  37.5 - 51.0 % Final    MCV 03/06/2025 96.9  79.0 - 97.0 fL Final    MCH 03/06/2025 30.9  26.6 - 33.0 pg Final    MCHC 03/06/2025 31.9  31.5 - 35.7 g/dL Final    RDW 03/06/2025 12.8  12.3 - 15.4 % Final    RDW-SD 03/06/2025 45.5  37.0 - 54.0 fl Final    MPV 03/06/2025 9.9  6.0 - 12.0 " fL Final    Platelets 03/06/2025 164  140 - 450 10*3/mm3 Final    Neutrophil % 03/06/2025 37.7 (L)  42.7 - 76.0 % Final    Lymphocyte % 03/06/2025 47.5 (H)  19.6 - 45.3 % Final    Monocyte % 03/06/2025 11.6  5.0 - 12.0 % Final    Eosinophil % 03/06/2025 2.7  0.3 - 6.2 % Final    Basophil % 03/06/2025 0.5  0.0 - 1.5 % Final    Immature Grans % 03/06/2025 0.0  0.0 - 0.5 % Final    Neutrophils, Absolute 03/06/2025 1.56 (L)  1.70 - 7.00 10*3/mm3 Final    Lymphocytes, Absolute 03/06/2025 1.96  0.70 - 3.10 10*3/mm3 Final    Monocytes, Absolute 03/06/2025 0.48  0.10 - 0.90 10*3/mm3 Final    Eosinophils, Absolute 03/06/2025 0.11  0.00 - 0.40 10*3/mm3 Final    Basophils, Absolute 03/06/2025 0.02  0.00 - 0.20 10*3/mm3 Final    Immature Grans, Absolute 03/06/2025 0.00  0.00 - 0.05 10*3/mm3 Final    nRBC 03/06/2025 0.0  0.0 - 0.2 /100 WBC Final   Lab on 01/28/2025   Component Date Value Ref Range Status    Gliadin Deamidated Peptide Ab, IgA 01/28/2025 3  0 - 19 units Final                       Negative                   0 - 19                     Weak Positive             20 - 30                     Moderate to Strong Positive   >30    Tissue Transglutaminase IgA 01/28/2025 <2  0 - 3 U/mL Final                                  Negative        0 -  3                                Weak Positive   4 - 10                                Positive           >10   Tissue Transglutaminase (tTG) has been identified   as the endomysial antigen.  Studies have demonstr-   ated that endomysial IgA antibodies have over 99%   specificity for gluten sensitive enteropathy.    IgA 01/28/2025 176  61 - 437 mg/dL Final   Office Visit on 01/21/2025   Component Date Value Ref Range Status    Urine Volume 01/21/2025 148   Final   Admission on 11/27/2024, Discharged on 11/27/2024   Component Date Value Ref Range Status    Case Report 11/27/2024    Final                    Value:Surgical Pathology Report                         Case: [id number]         "                          Authorizing Provider:  Tamara Molina MD Collected:           11/27/2024 10:46 AM          Ordering Location:     [facility] Received:            11/27/2024 11:38 AM                                 SUITES                                                                       Pathologist:           Stephy Vallejo MD                                                     Specimens:   1) - Small Intestine, Duodenum, DUODENUM BIOPSIES                                                   2) - Gastric, Antrum, ANTRUM BIOPSIES                                                               3) - GE Junction, GE JUNCTION BIOPSIES                                                              4) - Esophagus, Mid, MID ESOPHAGUS BIOPSIES                                                Clinical Information 11/27/2024    Final                    Value:Iron deficiency anemia, unspecified iron deficiency anemia type  Acute gastric ulcer without hemorrhage or perforation      Final Diagnosis 11/27/2024    Final                    Value:1.  Duodenum, biopsy:   -Chronic duodenitis with focal partial villous blunting      2. Stomach, antrum, biopsy:   - Gastric antrum mucosa with no significant pathologic change   - Negative for Helicobacter pylori on routine H&E stain   - Negative for intestinal metaplasia, dysplasia and malignancy      3. Gastroesophageal junction, biopsy:   - Squamocolumnar mucosa with mild chronic inflammation   - Negative for intestinal metaplasia, dysplasia and malignancy      4. Mid esophagus, biopsy:   - Squamous mucosa with no significant pathologic change   - Negative for eosinophilic esophagitis          Gross Description 11/27/2024    Final                    Value:1. Small Intestine, Duodenum.  Received in formalin and labeled \" duodenum\" are three fragments of tan soft tissue measuring 0.2-0.3 cm in greatest dimension. The specimen is entirely submitted in one " "cassette.    2. Gastric, Antrum.  Received in formalin and labeled \" antrum\" are two fragments of tan soft tissue measuring 0.2-0.3 cm in greatest dimension. The specimen is entirely submitted in one cassette.  3. GE Junction.  Received in formalin and labeled \" GE junction\" is a 0.4 cm fragment of tan soft tissue. The specimen is entirely submitted in one cassette.    4. Esophagus, Mid.  Received in formalin and labeled \" midesophagus\" are two fragments of tan soft tissue measuring 0.2-0.3 cm in greatest dimension. The specimen is entirely submitted in one cassette.   JULIETTE      Microscopic Description 11/27/2024    Final                    Value:Microscopic examination performed.     Hospital Outpatient Visit on 10/24/2024   Component Date Value Ref Range Status    WBC 10/24/2024 4.05  3.40 - 10.80 10*3/mm3 Final    RBC 10/24/2024 3.77 (L)  4.14 - 5.80 10*6/mm3 Final    Hemoglobin 10/24/2024 11.7 (L)  13.0 - 17.7 g/dL Final    Hematocrit 10/24/2024 36.7 (L)  37.5 - 51.0 % Final    MCV 10/24/2024 97.3 (H)  79.0 - 97.0 fL Final    MCH 10/24/2024 31.0  26.6 - 33.0 pg Final    MCHC 10/24/2024 31.9  31.5 - 35.7 g/dL Final    RDW 10/24/2024 12.6  12.3 - 15.4 % Final    RDW-SD 10/24/2024 45.0  37.0 - 54.0 fl Final    MPV 10/24/2024 9.4  6.0 - 12.0 fL Final    Platelets 10/24/2024 136 (L)  140 - 450 10*3/mm3 Final    Neutrophil % 10/24/2024 38.6 (L)  42.7 - 76.0 % Final    Lymphocyte % 10/24/2024 44.2  19.6 - 45.3 % Final    Monocyte % 10/24/2024 13.1 (H)  5.0 - 12.0 % Final    Eosinophil % 10/24/2024 3.2  0.3 - 6.2 % Final    Basophil % 10/24/2024 0.7  0.0 - 1.5 % Final    Immature Grans % 10/24/2024 0.2  0.0 - 0.5 % Final    Neutrophils, Absolute 10/24/2024 1.56 (L)  1.70 - 7.00 10*3/mm3 Final    Lymphocytes, Absolute 10/24/2024 1.79  0.70 - 3.10 10*3/mm3 Final    Monocytes, Absolute 10/24/2024 0.53  0.10 - 0.90 10*3/mm3 Final    Eosinophils, Absolute 10/24/2024 0.13  0.00 - 0.40 10*3/mm3 Final    Basophils, Absolute " 10/24/2024 0.03  0.00 - 0.20 10*3/mm3 Final    Immature Grans, Absolute 10/24/2024 0.01  0.00 - 0.05 10*3/mm3 Final    Neutrophil % 10/24/2024 37.0 (L)  42.7 - 76.0 % Final    Lymphocyte % 10/24/2024 52.0 (H)  19.6 - 45.3 % Final    Monocyte % 10/24/2024 10.0  5.0 - 12.0 % Final    Eosinophil % 10/24/2024 1.0  0.3 - 6.2 % Final    Neutrophils Absolute 10/24/2024 1.50 (L)  1.70 - 7.00 10*3/mm3 Final    Lymphocytes Absolute 10/24/2024 2.11  0.70 - 3.10 10*3/mm3 Final    Monocytes Absolute 10/24/2024 0.41  0.10 - 0.90 10*3/mm3 Final    Eosinophils Absolute 10/24/2024 0.04  0.00 - 0.40 10*3/mm3 Final    Anisocytosis 10/24/2024 Slight/1+  None Seen Final    WBC Morphology 10/24/2024 Normal  Normal Final    Platelet Morphology 10/24/2024 Normal  Normal Final    Case Report 10/24/2024    Final                    Value:Surgical Pathology Report                         Case: [id number]                                  Authorizing Provider:  Ronen Solomon MD Collected:           10/24/2024 09:32 AM          Ordering Location:     [facility]   Received:            10/24/2024 10:27 AM          Pathologist:           Stephy Vallejo MD                                                     Specimens:   1) - Iliac Crest, Left - Aspirate, CT/BONE MARROW ASPIRATION                                        2) - Iliac Crest, Left - Biopsy, CT/BONE MARROW BIOPSY                                              3) - Blood, Venous                                                                         Clinical Information 10/24/2024    Final                    Value:Normocytic anemia      Final Diagnosis 10/24/2024    Final                    Value:1-2:  Bone marrow (left iliac crest), aspirate smears, clot section, and core biopsy.   - Normocellular marrow for age (30-40%) with maturing trilineage hematopoiesis.  - Aspicular hemodilute aspirate smear with limited marrow tissue for evaluation.  - Storage iron is  "present.    3:  Peripheral blood (CBC and smear):   - The red cells show mild anisopoikilocytosis   - Normal white cell count with relative neutropenia and relatively increased lymphoid and monocytic cells   - Mild thrombocytopenia    Comment:  The above diagnosis was rendered in expert consultation by Kate Diego MD , [facility] . See separate consultation report for additional information.           The following tests were performed at reference laboratory on the bone marrow aspirate. See separate reference laboratory reports for additional information.       Flow cytometry:  1) Myeloid blasts are not relatively increased (1% of analyzed cells) but show partial aberrant CD7 expression. See                           comment.  2) No other significant immunophenotypic abnormalities detected.     Cytogenetics: 46,XY[20]   Normal Male Karyotype     MDS FISH panel: No assay specific abnormalities detected by MDS Panel     IntelliGEN(R) myeloid panel: 1 variant of unknown clinical significance (tier III) was detected, see scanned report        Gross Description 10/24/2024    Final                    Value:1. Iliac Crest, Left - Aspirate.  The specimen is received in 1 formalin filled container labeled \" CT/bone marrow aspiration\" and consists of a 1.5 cm aggregate of coagulated blood.  The specimen is entirely submitted in 1 cassette.    2. Iliac Crest, Left - Biopsy.  The specimen is received in 1 formalin filled container labeled \" CT/bone marrow biopsy\" and consists of a 1.0 cm tan, cylindrical, hemorrhagic bone marrow biopsy.  The specimen is entirely submitted in 1 cassette following decalcification.   JULIETTE  3. Blood, Venous.  One (1) Salcido-stained peripheral blood smear is received for evaluation. The smear is accompanied by a CBC analysis report and marked as a physician/advanced practice clinician review.            Special Stains 10/24/2024    Final                    Value:The following " immunoperoxidase stains were performed at [facility] [place] [facility] and support the above diagnosis: CD3 and CD20.    All immunohistochemical/cytochemical stains (IHC) are performed on separate slides per different antibody unless otherwise specified in the documentation that a cocktail (multiple stain) was performed.  Controls are appropriate.        Microscopic Description 10/24/2024    Final                    Value:Microscopic examination performed.      Consult Global Result 10/24/2024 Comment^Text^TXT   Final    Bone Marrow, Lt Iliac Crest:  1) Myeloid blasts are not relatively increased (1% of   analyzed cells) but show partial aberrant CD7 expression.   See comment.  2) No other significant immunophenotypic abnormalities   detected.  DISCLAIMER: REFER TO HARDCOPY OR PDF FOR COMPLETE RESULT.   If synopsis provided, clinical decisions should not be   based on this interfaced synopsis alone.  Performed at:  1 - [facility]  [address], [place] [address]  :  Clary Pickett M.D., Ph.D., Phone:  [phone]    Blood or Bone Marrow Result 10/24/2024 Comment^Text^TXT   Final    BONE MARROW, BIOPSY AND ASPIRATION:  1)  Normocellular marrow for age (30-40%) with maturing   trilineage hematopoiesis.  2)  Aspicular hemodilute aspirate smear with limited marrow   tissue for evaluation.  3)  Storage iron is present.  See comments.  DISCLAIMER: REFER TO HARDCOPY OR PDF FOR COMPLETE RESULT.   If synopsis provided, clinical decisions should not be   based on this interfaced synopsis alone.  Performed at:  1 - [facility]  [address], [place] [address]  :  Clary Pickett M.D., Ph.D., Phone:  [phone]    Cytogenetics Result 10/24/2024 Comment^Text^TXT   Final    46,XY[20]  Normal Male Karyotype  DISCLAIMER: REFER TO HARDCOPY OR PDF FOR COMPLETE RESULT.   If synopsis provided, clinical decisions  should not be   based on this interfaced synopsis alone.  Performed at:  [facility]  [address] [address]  :  Clary Pickett M.D., Ph.D., Phone:  [phone]    MDS TargetGene Panel Result 10/24/2024 Comment^Text^TXT   Final    Result: No assay specific abnormalities detected by MDS   Panel  DISCLAIMER: REFER TO HARDCOPY OR PDF FOR COMPLETE RESULT.   If synopsis provided, clinical decisions should not be   based on this interfaced synopsis alone.  Performed at:  [facility]  [address] [address]  :  Clary Pickett M.D., Ph.D., Phone:  [phone]    Intelligen Myeloid Result 10/24/2024 Comment^Text^TXT   Final    See separate report.  DISCLAIMER: REFER TO HARDCOPY OR PDF FOR COMPLETE RESULT.   If synopsis provided, clinical decisions should not be   based on this interfaced synopsis alone.  Performed at:  [facility]  [address] [address]  :  Clary Pickett M.D., Ph.D., Phone:  [phone]    CCV RESULT 10/24/2024 Comment^Text^TXT   Final    Nonspecific morphologic and immunophenotypic findings with   no diagnostic evidence of hematopoietic neoplasia   identified.  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   If synopsis provided, clinical decisions should not be   based on this interfaced synopsis alone.  Performed at:  [facility]  [address] [address]  :  Clary Pickett M.D., Ph.D., Phone:  [phone]   Lab on 10/24/2024   Component Date Value Ref Range Status    Urine Culture 10/24/2024 No growth   Final    Iron 10/24/2024 84  59 - 158 mcg/dL Final    Iron Saturation (TSAT)  10/24/2024 22  20 - 50 % Final    Transferrin 10/24/2024 254  200 - 360 mg/dL Final    TIBC 10/24/2024 378  298 - 536 mcg/dL Final    Color, UA 10/24/2024 Yellow  Yellow, Straw Final    Appearance, UA 10/24/2024 Clear  Clear Final    pH, UA 10/24/2024 6.0  5.0 - 8.0 Final    Specific Gravity, UA 10/24/2024 1.010  1.005 - 1.030 Final    Glucose, UA 10/24/2024 Negative  Negative Final    Ketones, UA 10/24/2024 Negative  Negative Final    Bilirubin, UA 10/24/2024 Negative  Negative Final    Blood, UA 10/24/2024 Negative  Negative Final    Protein, UA 10/24/2024 Negative  Negative Final    Leuk Esterase, UA 10/24/2024 Negative  Negative Final    Nitrite, UA 10/24/2024 Negative  Negative Final    Urobilinogen, UA 10/24/2024 0.2 E.U./dL  0.2 - 1.0 E.U./dL Final    RBC, UA 10/24/2024 0-2  None Seen, 0-2 /HPF Final    WBC, UA 10/24/2024 0-2  None Seen, 0-2 /HPF Final    Bacteria, UA 10/24/2024 None Seen  None Seen /HPF Final    Squamous Epithelial Cells, UA 10/24/2024 0-2  None Seen, 0-2 /HPF Final    Hyaline Casts, UA 10/24/2024 None Seen  None Seen /LPF Final    Methodology 10/24/2024 Automated Microscopy   Final       EKG Results:  No orders to display       Imaging Results:  CT Chest Without Contrast Diagnostic    Result Date: 3/19/2024  Impression: CT scan of the chest without IV contrast demonstrating tree-in-bud airspace disease in the left lower lobe suggesting indolent infection. Follow-up CT scan of the chest in 3 months is recommended.    Electronically Signed By-SOILA TREVIÑO MD On:3/19/2024 3:23 PM          Assessment & Plan   Diagnoses and all orders for this visit:    1. Generalized anxiety disorder (Primary)    2. Major depressive disorder, recurrent episode, moderate    3. Insomnia due to mental condition        Visit Diagnoses:    ICD-10-CM ICD-9-CM   1. Generalized anxiety disorder  F41.1 300.02   2. Major depressive disorder, recurrent episode, moderate  F33.1 296.32   3. Insomnia due to mental  condition  F51.05 300.9     327.02     03/12/2025: Major stressor is wife, discussed. Pt never stopped seroquel but is better owing to his own efforts keeping the house clean (and likely also psychotherapy). Stop seroquel and trial buspar up to 3x/day. Now only has one dog (they did have 3). Discussed strategies to improve communication with wife.    Acknowledged and normalized patient's thoughts, feelings, and concerns. Allowed patient to freely discuss and process issues, such as:  Anxiety and depression regarding medical conditions.  Anxiety and depression regarding relationships.  ... using Rogerian psychotherapeutic techniques including unconditional positive regard, reflective listening, and demonstrating clear empathy, with the goal of ameliorating symptoms and maintaining, restoring, or improving self-esteem, adaptive skills, and ego or psychological functions (Danielle et al. 1991), the long-term goal of which is to develop a better, healthier perspective and help the patient bear their circumstances more easily.  Time (minutes) spent providing supportive psychotherapy: 16  (This time is exclusive to the therapy session and separate from the time spent on activities used to meet the criteria for the E/M service (history, exam, medical decision-making).)  Start: 10:42  Stop: 10:58  Functional status: mild impairment  Treatment plan: Medication management and supportive psychotherapy  Prognosis: good  Progress: depression, anxiety -- improving  6w    01/29/2025: mild TRIP, MDD, start buspar. Stop seroquel: dizzy plus weight gain. Much of this is situational -- there is only so much we can do about that. Possibly seasonal, start light box, which he has. Discussed hiring someone to clean, wife's resistance to changes.    11/13/2024: Insomnia, increase quetiapine. Discussed getting help with organizing the house.    10/04/2024: Improving, still MDD, TRIP, increase prozac further.    08/22/2024: Improving, increase  prozac for possible MDD.    07/25/2024: Stop wellbutrin, in 2 wks, hold trazodone and start seroquel for irritability, mood stability. Poor energy may be due to anemia. Mood improved, but irritable as well. Mom has hx of what he thinks was bipolar. Avoiding lithium 2/2 CKD.     07/10/2024: Mirtazapine led to brain fog, fatigue, poor focus in am. Stop it. [facility] for marriage issues (individual therapy). Pt is not on duloxetine. Pt needs energizing meds. Start wellbutrin for a week, then add prozac.    06/14/2024: No change. Discussed reflective listening with his wife. Stop abilify; start mirtazapine.    4/30/24: R/O ADHD. Start abilify, Therapy? Wgt loss, consider mirtazapine, seroquel. 6w.    PLAN:  Safety: No acute safety concerns  Therapy: None  Risk Assessment: Risk of self-harm acutely is moderate.  Risk factors include anxiety disorder, mood disorder, fhx, access to guns, and recent psychosocial stressors (pandemic). Protective factors include no present SI, no history of suicide attempts or self-harm in the past, minimal AODA, healthcare seeking, future orientation, willingness to engage in care.  Risk of self-harm chronically is also moderate, but could be further elevated in the event of treatment noncompliance and/or AODA.  Meds:  CONTINUE prozac 30 mg qam. Risks, benefits, alternatives discussed with patient including GI upset, nausea vomiting diarrhea, theoretical decrease of seizure threshold predisposing the patient to a slightly higher seizure risk, headaches, sexual dysfunction, serotonin syndrome, bleeding risk, increased suicidality in patients 24 years and younger, switching to rebecca/hypomania.  After discussion of these risks and benefits, the patient voiced understanding and agreed to proceed.  START buspar 5 mg TID. Risks, benefits, alternatives discussed with patient including nausea, GI upset, mild sedation, falls risk.  Use care when operating vehicle, vessel, or machine. After discussion of  these risks and benefits, the patient voiced understanding and agreed to proceed.  CONTINUE trazodone 50 mg qhs. Risks, benefits, side effects discussed with patient including GI upset, sedation, dizziness/falls risk, grogginess the following day, prolongation of the QTc interval.  Do not use before operating vehicle, vessel, or machine. After discussion of these risks and benefits, the patient voiced understanding and agreed to proceed.    STOP seroquel 25 mg qhs. Dizzy, sedated 1/25  S/P:  trazodone 50 mg qhs. 100 mg made him groggy the next day. 7/24.    wellbutrin  mg qam. Irritable 7/24  abilify 2 mg qhs. No change 6/24.   mirtazapine 7.5 mg qhs. Sedation 7/24  Seroquel didn't work in the past 7/24  Labs: none    Patient screened positive for depression based on a PHQ-9 score of 14 on 2/14/2025. Follow-up recommendations include: Prescribed antidepressant medication treatment and Suicide Risk Assessment performed.           TREATMENT PLAN/GOALS: Continue supportive psychotherapy efforts and medications as indicated. Treatment and medication options discussed during today's visit. Patient acknowledged and verbally consented to continue with current treatment plan and was educated on the importance of compliance with treatment and follow-up appointments.    MEDICATION ISSUES:  JIM reviewed as expected.  Discussed medication options and treatment plan of prescribed medication as well as the risks, benefits, and side effects including potential falls, possible impaired driving and metabolic adversities among others. Patient is agreeable to call the office with any worsening of symptoms or onset of side effects. Patient is agreeable to call 911 or go to the nearest ER should he/she begin having SI/HI. No medication side effects or related complaints today.     MEDS ORDERED DURING VISIT:  No orders of the defined types were placed in this encounter.      Return in about 6 weeks (around 4/23/2025).          This document has been electronically signed by Penelope Davis MD  March 12, 2025 11:00 EDT    Dictated Utilizing Dragon Dictation: Part of this note may be an electronic transcription/translation of spoken language to printed text using the Dragon Dictation System.

## 2025-03-28 ENCOUNTER — TELEMEDICINE (OUTPATIENT)
Dept: PSYCHIATRY | Facility: CLINIC | Age: 65
End: 2025-03-28
Payer: MEDICARE

## 2025-03-28 DIAGNOSIS — F33.1 MAJOR DEPRESSIVE DISORDER, RECURRENT EPISODE, MODERATE: ICD-10-CM

## 2025-03-28 DIAGNOSIS — F41.1 GENERALIZED ANXIETY DISORDER: Primary | ICD-10-CM

## 2025-03-28 PROCEDURE — 90837 PSYTX W PT 60 MINUTES: CPT | Performed by: COUNSELOR

## 2025-03-28 NOTE — PROGRESS NOTES
Date: March 28, 2025  Time In: 11:04 ET  Time Out: 11:59 ET    This provider is located at the [facility] [address], [place] [address] using a secure [employer] Video Visit through [employer]. Patient is being seen remotely via telehealth at home address in [place] and stated they are in a secure environment for this session. The patient's condition being diagnosed/treated is appropriate for telemedicine. The provider identified herself as well as her credentials. The patient, and/or patients guardian, consent to be seen remotely, and when consent is given they understand that the consent allows for patient identifiable information to be sent to a third party as needed. They may refuse to be seen remotely at any time. The electronic data is encrypted and password protected, and the patient and/or guardian has been advised of the potential risks to privacy not withstanding such measures.     You have chosen to receive care through a telehealth visit.  Do you consent to use a video/audio connection for your medical care today? Yes    Subjective   Stephon Castellano is a 64 y.o. male who presents today for follow up    Chief Complaint:   Chief Complaint   Patient presents with    Anxiety    Depression           Clinical Maneuvering/Intervention:      Assisted patient in processing above session content; acknowledged and normalized patient’s thoughts, feelings, and concerns.  Rationalized patient thought process regarding concerns presented at session.  Discussed triggers associated with patient's  anxiety  and depression  Also discussed coping skills for patient to implement such as grounding , mindfulness , increasing activity , self care , and positive self talk       Allowed patient to freely discuss issues without interruption or judgment. Provided safe, confidential environment to facilitate the development of positive therapeutic relationship and  encourage open, honest communication. Assisted patient in identifying risk factors which would indicate the need for higher level of care including thoughts to harm self or others and/or self-harming behavior and encouraged patient to contact this office, call 911, or present to the nearest emergency room should any of these events occur. Discussed crisis intervention services and means to access. Patient adamantly and convincingly denies current suicidal or homicidal ideation or perceptual disturbance.    Assessment:     Patient appears to maintain relative stability as compared to their baseline.  However, patient continues to struggle with anxiety and depression which continues to cause impairment in important areas of functioning.  A result, they can be reasonably expected to continue to benefit from treatment and would likely be at increased risk for decompensation otherwise.      PHQ-Score Total:  PHQ-9 Total Score: 13     TRIP-7 Score Total:  Over the last two weeks, how often have you been bothered by the following problems?  Feeling nervous, anxious or on edge: More than half the days  Not being able to stop or control worrying: Several days  Worrying too much about different things: Several days  Trouble Relaxing: Not at all  Being so restless that it is hard to sit still: Not at all  Becoming easily annoyed or irritable: Several days  Feeling afraid as if something awful might happen: Several days  TRIP 7 Total Score: 6  If you checked any problems, how difficult have these problems made it for you to do your work, take care of things at home, or get along with other people: Somewhat difficult      Mental Status Exam:   Hygiene:   good  Cooperation:  Cooperative  Eye Contact:  Good  Psychomotor Behavior:  Appropriate  Affect:  Appropriate  Mood: normal, irritable, and fluctates  Speech:  Normal  Thought Process:  Goal directed  Thought Content:  Normal  Suicidal:  None  Homicidal:  None  Hallucinations:   None  Delusion:  None  Memory:  Intact  Orientation:  Person, Place, Time, and Situation  Reliability:  good  Insight:  Good  Judgement:  Good  Impulse Control:  Good  Physical/Medical Issues:  Yes chronic pain        Patient's Support Network Includes:  wife and children    Functional Status: Moderate impairment     Progress toward goal: Not at goal    Prognosis: Guarded with Ongoing Treatment      Plan:    Patient will continue in individual outpatient therapy with focus on improved functioning and coping skills, maintaining stability, and avoiding decompensation and the need for higher level of care.    Patient will adhere to medication regimen as prescribed and report any side effects. Patient will contact this office, call 911 or present to the nearest emergency room should suicidal or homicidal ideations occur. Provide Cognitive Behavioral Therapy and Solution Focused Therapy to improve functioning, maintain stability, and avoid decompensation and the need for higher level of care.         VISIT DIAGNOSIS:     ICD-10-CM ICD-9-CM   1. Generalized anxiety disorder  F41.1 300.02   2. Major depressive disorder, recurrent episode, moderate  F33.1 296.32        Return in about 4 weeks (around 4/25/2025).    This document has been electronically signed by Antonina Aguirre [facility]  March 31, 2025 20:20 EDT     Part of this note may be an electronic transcription/translation of spoken language to printed text using the Dragon Dictation System.

## 2025-04-14 DIAGNOSIS — F51.05 INSOMNIA DUE TO MENTAL CONDITION: ICD-10-CM

## 2025-04-14 DIAGNOSIS — F41.1 GENERALIZED ANXIETY DISORDER: ICD-10-CM

## 2025-04-14 DIAGNOSIS — F33.1 MAJOR DEPRESSIVE DISORDER, RECURRENT EPISODE, MODERATE: ICD-10-CM

## 2025-04-14 RX ORDER — FLUOXETINE 10 MG/1
10 CAPSULE ORAL DAILY
Qty: 90 CAPSULE | Refills: 1 | Status: SHIPPED | OUTPATIENT
Start: 2025-04-14

## 2025-04-14 NOTE — TELEPHONE ENCOUNTER
"Appointment with Penelope Davis MD (04/23/2025)     Patient needs a refill.  Order pended      I called the pharmacy for verification.  His medications have been filled in incomplete fills and irregularly for the past several fills.  They asked that we send over a new prescription for each to get things \"evened out\"  "

## 2025-04-15 ENCOUNTER — OFFICE VISIT (OUTPATIENT)
Dept: INTERNAL MEDICINE | Facility: CLINIC | Age: 65
End: 2025-04-15
Payer: MEDICARE

## 2025-04-15 VITALS
HEART RATE: 74 BPM | RESPIRATION RATE: 18 BRPM | DIASTOLIC BLOOD PRESSURE: 62 MMHG | OXYGEN SATURATION: 97 % | BODY MASS INDEX: 32.06 KG/M2 | TEMPERATURE: 98.7 F | WEIGHT: 229 LBS | HEIGHT: 71 IN | SYSTOLIC BLOOD PRESSURE: 106 MMHG

## 2025-04-15 DIAGNOSIS — R42 DIZZINESS: ICD-10-CM

## 2025-04-15 DIAGNOSIS — H65.01 NON-RECURRENT ACUTE SEROUS OTITIS MEDIA OF RIGHT EAR: ICD-10-CM

## 2025-04-15 DIAGNOSIS — H92.01 RIGHT EAR PAIN: Primary | ICD-10-CM

## 2025-04-15 PROCEDURE — 1126F AMNT PAIN NOTED NONE PRSNT: CPT | Performed by: NURSE PRACTITIONER

## 2025-04-15 PROCEDURE — 99213 OFFICE O/P EST LOW 20 MIN: CPT | Performed by: NURSE PRACTITIONER

## 2025-04-15 RX ORDER — AMOXICILLIN 500 MG/1
500 CAPSULE ORAL
COMMUNITY
Start: 2025-03-18

## 2025-04-15 RX ORDER — ATORVASTATIN CALCIUM 10 MG/1
10 TABLET, FILM COATED ORAL NIGHTLY
COMMUNITY
Start: 2025-03-18

## 2025-04-15 RX ORDER — LISINOPRIL 5 MG/1
2.5 TABLET ORAL DAILY
COMMUNITY
Start: 2025-03-17

## 2025-04-15 NOTE — PROGRESS NOTES
"Chief Complaint  Earache (Right right- clogged feeling- seen by Anny Jo 3/10/2025 prescribed 3/10/2025. Then [facility] Am[facility]. Still having clogged feeling in right ear.) and Dizziness      Subjective      History of Present Illness  The patient, a 64-year-old male, presents with aural obstruction and recent tick exposure.    The patient reports a sensation of blockage in the right ear, which has not responded to antihistamines or topical corticosteroid nasal spray prescribed by Vandana. Additionally, a course of [facility] was [facility]. He describes the sensation as akin to being underwater, accompanied by hearing loss necessitating his wife to repeat herself, and impaired balance. He denies experiencing otalgia, systemic symptoms, or nasal symptoms. He continues to use Zyrtec and occasionally Flonase. The patient notes that a head cold approximately one month ago led to the onset of ear clogging after three weeks. He has no history of similar ear issues or consultations with an otolaryngologist.    The patient also reports a recent tick bite, with the tick attached for less than 24 hours. He has not experienced any subsequent flu-like symptoms.          Objective   Vital Signs:   Vitals:    04/15/25 1249   BP: 106/62   BP Location: Left arm   Patient Position: Sitting   Cuff Size: Adult   Pulse: 74   Resp: 18   Temp: 98.7 °F (37.1 °C)   TempSrc: Temporal   SpO2: 97%   Weight: 104 kg (229 lb)   Height: 180.3 cm (70.98\")     Body mass index is 31.96 kg/m².    Wt Readings from Last 3 Encounters:   04/15/25 104 kg (229 lb)   03/12/25 102 kg (223 lb 12.8 oz)   03/11/25 101 kg (223 lb 12.3 oz)     BP Readings from Last 3 Encounters:   04/15/25 106/62   03/12/25 124/69   03/11/25 116/71       Health Maintenance   Topic Date Due    COVID-19 Vaccine (3 - 2024-25 season) 09/01/2024    INFLUENZA VACCINE  07/01/2025    ANNUAL WELLNESS VISIT  11/20/2025    GASTROSCOPY " (EGD)  11/27/2025    COLORECTAL CANCER SCREENING  08/13/2027    TDAP/TD VACCINES (4 - Td or Tdap) 03/29/2029    HEPATITIS C SCREENING  Completed    Pneumococcal Vaccine 50+  Completed    AAA SCREEN ONCE  Completed    ZOSTER VACCINE  Completed       Physical Exam  Vitals and nursing note reviewed.   Constitutional:       General: He is not in acute distress.     Appearance: Normal appearance.   HENT:      Head: Normocephalic and atraumatic.      Right Ear: External ear normal.      Left Ear: External ear normal.      Nose: Nose normal.      Mouth/Throat:      Mouth: Mucous membranes are moist.   Eyes:      Conjunctiva/sclera: Conjunctivae normal.   Cardiovascular:      Rate and Rhythm: Normal rate and regular rhythm.      Pulses: Normal pulses.      Heart sounds: Normal heart sounds. No murmur heard.     No friction rub. No gallop.   Pulmonary:      Effort: Pulmonary effort is normal. No respiratory distress.      Breath sounds: No wheezing, rhonchi or rales.   Musculoskeletal:      Cervical back: Neck supple.   Skin:     General: Skin is warm and dry.   Neurological:      General: No focal deficit present.      Mental Status: He is alert and oriented to person, place, and time.   Psychiatric:         Mood and Affect: Mood normal.         Behavior: Behavior normal.        Physical Exam  Ears: Fluid behind the right ear. Left ear normal. No cerumen impaction. Right eardrum dull but not erythematous  Respiratory: Clear to auscultation, no wheezing, rales, or rhonchi.      Result Review :  The following data was reviewed by: NARENDRA Melchor on 04/15/2025:         Results              Procedures            Assessment & Plan  Right ear pain    Orders:    Ambulatory Referral to ENT (Otolaryngology)    Non-recurrent acute serous otitis media of right ear    Orders:    Ambulatory Referral to ENT (Otolaryngology)    Dizziness              Assessment & Plan  1. Right ear effusion  - Reports clogged ear sensation, hearing  loss, and balance issues  - Exam: Fluid behind right eardrum, dull but no infection  - Tried antihistamines, topical steroid nasal spray, and amoxicillin without improvement  - Referral to Dr. Tran, ENT specialist, initiated. Contact us if no call within 2 weeks    2. Tick bite  - Recent tick bite attached for less than 36 hours  - Localized reaction common from tick saliva irritation  - Apply topical hydrocortisone or Benadryl cream if bothersome. Monitor site for changes  - Expected improvement over weeks. Inform us if condition worsens    Patient or patient representative verbalized consent for the use of Ambient Listening during the visit with  NARENDRA Melchor for chart documentation. 4/15/2025  13:15 EDT      FOLLOW UP  Return if symptoms worsen or fail to improve.  Patient was given instructions and counseling regarding his condition or for health maintenance advice. Please see specific information pulled into the AVS if appropriate.     NARENDRA Melchor  04/15/25  13:25 EDT    CURRENT & DISCONTINUED MEDICATIONS  Current Outpatient Medications   Medication Instructions    amoxicillin (AMOXIL) 500 mg    atorvastatin (LIPITOR) 10 mg, Nightly    busPIRone (BUSPAR) 5 mg, Oral, 3 Times Daily    carboxymethylcellulose (REFRESH PLUS) 0.5 % solution 2 drops, Both Eyes, 3 Times Daily PRN    cetirizine (ZYRTEC) 10 mg, Oral, Daily    finasteride (PROSCAR) 5 mg, Oral, Daily    FLUoxetine (PROZAC) 10 mg, Oral, Daily    FLUoxetine (PROZAC) 20 mg, Oral, Daily, Start prozac 7/17    fluticasone (FLONASE) 50 MCG/ACT nasal spray 2 sprays, Nasal, Daily    HYDROmorphone (DILAUDID) 0-2 mg/hr    IRON, FERROUS GLUCONATE, PO 65 mg, Daily    lisinopril (PRINIVIL,ZESTRIL) 2.5 mg, Daily    Lubricant Eye Drops PF 0.5 % solution     Lysine 1000 MG tablet Take  by mouth.    Magnesium 250 MG tablet Take  by mouth.    pain patient supplied pump Continuous    tamsulosin (FLOMAX) 0.8 mg, Oral, Daily    Testosterone Cypionate  (DEPOTESTOTERONE CYPIONATE) 200 mg, Every 14 Days    traZODone (DESYREL) 50 mg, Oral, Nightly    Turmeric 500 MG capsule Take  by mouth.       There are no discontinued medications.

## 2025-04-22 NOTE — PROGRESS NOTES
Subjective   Stephon Castellano is a 64 y.o. male who presents today for initial evaluation     Referring Provider:  No referring provider defined for this encounter.  [facility] endocrinologist    Chief Complaint:  depression    History of Present Illness:     04/30/2024: INITIAL VISIT Chart review:     Juan: Hydromorphone  Care Everywhere: a few non behavioral health notes, sees [facility]    Psychotropic medication chart review:  Present:  Trazodone 200 mg nightly  Cymbalta 60 mg a day    Previously:  Zoloft 50 mg a day    EKG: November 2023: 57, sinus, QTc 417  Procedures: Sleep study ordered for the future  Head imaging: November 2023 CT of the head shows no acute, MRI March 2023 shows no acute, empty sella configuration  Labs: March 2024: CMP shows creatinine 1.47, CK is elevated at 756, reassuring B12, hemoglobin is low at 11.5,  Initial Chart Review Notes: Seen by neurology in February for evaluation for seizures.  Patient notes that he is depressed and cannot sleep, depression began when he was in the [employer], his mother killed himself.  Depression began about 10 years ago.  Last year his problems became so severe that he quit his job.  He has not seen a psychiatrist for years.  Sleep medicine consult also ordered.      Chart Review By Dates:  04/23/2025: int med, Byble x1, onc  03/12/2025: int med, nephro, onc, Byble x1.  01/29/2025: EGD for CANDIDA, PT, int med, Byble x2, urology; benign bx's.  11/13/2024: hand surg, onc.  10/04/2024: gen surg, hand surg, neur, ortho, unknown, urology,   08/22/2024: admitted for colonoscopy 8/13, gene surg, int med, nephro, onc, reassuring copper, zinc, hepatitis panel.  7/25/24: Sleep: settings recs, retaining per bladder scan.   07/10/24: urology, sleep med, gen surg, bladder scan 280 mL. Mirtazapine led to brain fog, fatigue, poor focus in am.  6/14/2024: Neurology, internal medicine.  EMG performed, confirmed severe distal probably neuronal, axonal sensorimotor  "loss..    Plannin2025: Major stressor is wife, discussed. Pt never stopped seroquel but is better owing to his own efforts keeping the house clean (and likely also psychotherapy). Stop seroquel and trial buspar up to 3x/day. Now only has one dog (they did have 3). Discussed strategies to improve communication with wife.  2025: mild TRIP, MDD, start buspar. Stop seroquel: dizzy plus weight gain. Much of this is situational -- there is only so much we can do about that. Possibly seasonal, start light box, which he has. Discussed hiring someone to clean, wife's resistance to changes.  2024: Insomnia, increase quetiapine. Discussed getting help with organizing the house.    VISITS/APPOINTMENTS (BELOW):    \"Stephon\"  Stage 4 CKD (nephro 2024)  Tried bupropion long ago (for depression 10 years ago)  Mom may have been bipolar, committed SA, \"she had everything\"  Very sensitive to low doses of meds  Low energy, motivation, chronically  It's been this way for some time.  Anemia, CKD, pain pump (dilaudid)  Mornings that are the worst    2025:   In person interview:  \"Sleeping fine, but just tired all the time.\"  No longer on seroquel, confirmed  Therapy helps  Discussed weight gain. He is snacking at night.  Just not motivated the last month  I'm still sedated and dizzy -- I don't think stopping seroquel helped  Discussed wife, who recently got a puppy. \"Guess who has to take care of him.\"  R/O ADHD:   Elementary school:   Grades? Not good  Special classes or failures? Failed 2nd grade for reading  Got in trouble? denies  Referral for ADHD testing? denies  FHx: denies  Presently:  Problems with attention to detail  MDD: mild depressed mood    TRIP: worrying, on edge, irritable, mild    Panic attacks: stable  Energy: down chronically  Concentration: worse recently  Insomnia: initial, CPAP, 5-6 hours a night  Eating/Weight: 227, 223, 222, 218, 206, 196, 191, 188, 182 lbs (goal is 190)  Refills: " "y  Substances: def  Therapy: Byble, likes  Medication compliant: y  SE: n  No SI HI AVH.      03/12/2025:   In person interview:  \"Terrible.\"  Pt never stopped seroquel nightly.  Some confusion regarding meds  No major changes  Therapy continues to help  \"Everything else is pretty good\"  Situation still the same at the house  Doing more work to clean the house, which helps his mental health  MDD: mild depressed mood -- improving  TRIP: worrying, on edge, irritable, mild -- improving  Panic attacks: stable  Energy: down chronically  Concentration: stable  Insomnia: initial, CPAP, 5-6 hours a night  Eating/Weight: 223, 222, 218, 206, 196, 191, 188, 182 lbs (goal is 190)  Refills: y  Substances: def  Therapy: Byble, likes  Medication compliant: y  SE: n  No SI HI AVH.      01/29/2025:   In person interview:  \"Pretty good. Actually about the same.\"  Drowsy and dizzy in the mornings.  Anemia has gotten better  Still has low energy  Wife situation hasn't changed  hoarding  Less arguments  Therapy helping  MDD: mild depressed mood  TRIP: worrying, on edge, irritable, mild  Panic attacks: stable  Energy: down chronically  Concentration: stable  Insomnia: initial, CPAP, 5-6 hours a night  Eating/Weight: 222, 218, 206, 196, 191, 188, 182 lbs (goal is 190)  Refills: y  Substances: def  Therapy: Byble, likes  Medication compliant: y  SE: n  No SI HI AVH.      11/13/2024:   In person interview:  \"Pretty good. I started the therapy.\"  Still has low energy  Discussed wife's hoarding  Minor arguments  Hard to wear that mask (CPAP)  MDD: improved, possibly stable  TRIP: improving, less irritable  Panic attacks: stable  Energy: down chronically  Concentration: stable  Insomnia: initial, CPAP, 5-6 hours a night  Eating/Weight: 218, 206, 196, 191, 188, 182 lbs (goal is 190)  Refills: y  Substances: def  Therapy: Byble, likes  Medication compliant: y  SE: n  No SI HI AVH.      10/04/2024:   In person interview:  \"I'm doing pretty " "good.\"  Low energy, motivation  It's been this way for some time.  Anemia, CKD, pain pump (dilaudid)  Mornings that are the worst  Wife: no blowouts or fights  MDD: depressed but it's not bothering me so much, mild  TRIP: mild, irritability  Panic attacks: stable  Energy: down chronically  Concentration: stable  Insomnia: initial, now stable on CPAP, 5-6 hours a night  Eating/Weight: 206, 196, 191, 188, 182 lbs (goal is 190)  Refills: y  Substances: def  Therapy: never set up  Medication compliant: y  SE: n  No SI HI AVH.    ...      04/30/2024: In person.  Interview:  His/Her Story: \"[facility].\"  G14, P16  I've always had a little bit of depression.  But when I got injured, it got worse. \"I couldn't do things like I used to.\"  It's a combination of injuries, surgeries  Pain pump helps  Trazodone for 5 years  Cymbalta 5 years  Has never tried combinations, but has tried \"the gamut\" of them  Has lost 50 lbs in 3 mos, unsure why  Trouble focusing recently  Has chronic balance issues, no one knows why (cards, two neurologists, worked him up) x 3-4 mos  Depression/Mood:  Depressed mood, anhedonia, poor energy, poor concentration, weight loss, insomnia.  Seasonal pattern: def  Severity: Moderate  Duration: all his life  Anxiety:  Uncontrolled worrying, muscle tension, fatigue, poor concentration, feeling on edge, irritability, insomnia.  Severity: Moderate  Duration: all of his life  Panic attacks: n  Psych ROS: Positive for depression, anxiety.  Negative for psychosis and rebecca.  ADHD: def  PTSD: no life threatening trauma  No SI HI AVH.  Medication compliant:      Access to Firearms: yes, locked away    PHQ-9 Depression Screening  PHQ-9 Total Score:      Little interest or pleasure in doing things?     Feeling down, depressed, or hopeless?     Trouble falling or staying asleep, or sleeping too much?     Feeling tired or having little energy?     Poor appetite or overeating?     Feeling bad about yourself " - or that you are a failure or have let yourself or your family down?     Trouble concentrating on things, such as reading the newspaper or watching television?     Moving or speaking so slowly that other people could have noticed? Or the opposite - being so fidgety or restless that you have been moving around a lot more than usual?     Thoughts that you would be better off dead, or of hurting yourself in some way?     PHQ-9 Total Score       TRIP-7       Past Surgical History:  Past Surgical History:   Procedure Laterality Date    CERVICAL DISC SURGERY  2008    COLONOSCOPY N/A 08/18/2023    Procedure: COLONOSCOPY WITH POLYPECTOMY/SNARE;  Surgeon: Jose Alejandro Fox MD;  Location: [facility] END[facility]COPY;  Service: General;  Laterality: N/A;  COLON POLYPS    COLONOSCOPY N/A 8/13/2024    Procedure: COLONOSCOPY WITH POLYPECTOMIES;  Surgeon: Jose Alejandro Fox MD;  Location: [facility] ENDOSCOPY;  Service: General;  Laterality: N/A;  COLON POLYPS    ENDOSCOPY N/A 8/13/2024    Procedure: ESOPHAGOGASTRODUODENOSCOPY WITH BIOPSIES;  Surgeon: Jose Alejandro Fox MD;  Location: [facility] ENDOSCOPY;  Service: General;  Laterality: N/A;  GASTRITIS. SUPERFICIAL GASTRIC ULCERS    ENDOSCOPY N/A 11/27/2024    Procedure: ESOPHAGOGASTRODUODENOSCOPY WITH BIOPSIES;  Surgeon: Tamara Molina MD;  Location: [facility] ENDOSCOPY;  Service: Gastroenterology;  Laterality: N/A;  GASTRITIS    HEEL SPUR SURGERY  2005    KNEE ACL RECONSTRUCTION  2004    OTHER SURGICAL HISTORY      METAL IMPLANT    PAIN PUMP INSERTION/REVISION  2010    SPINE SURGERY  2008    Antieror c5-7 discotomy    TONSILLECTOMY  1965    VASECTOMY  2002       Problem List:  Patient Active Problem List   Diagnosis    Chronic right-sided thoracic back pain    Kidney stone    Screening due    Allergic rhinitis    Arthritis    Cervical neck pain with evidence of disc disease    Depression    Head injury    Hemorrhoid    Hypogonadism in male    Right wrist pain    Weight loss    Abdominal  pain    Seizure-like activity    Palpitations    Abnormal EKG    Bradycardia    Brain fog    Fatigue    Acute kidney injury    Tremor    Slow transit constipation    Weakness of both lower extremities    Left hand weakness    Drug induced myoclonus    Insomnia due to other mental disorder    Obstructive sleep apnea syndrome    Cervical radiculopathy    Iron deficiency anemia    Gastroesophageal reflux disease with esophagitis without hemorrhage    History of colonic polyps    Benign prostatic hyperplasia with urinary retention    BPH without obstruction/lower urinary tract symptoms    Benign prostatic hyperplasia with lower urinary tract symptoms    Acute gastric ulcer without hemorrhage or perforation    Dysfunction of both eustachian tubes       Allergy:   No Known Allergies     Discontinued Medications:  There are no discontinued medications.                  Current Medications:   Current Outpatient Medications   Medication Sig Dispense Refill    amoxicillin (AMOXIL) 500 MG capsule Take 1 capsule by mouth.      atorvastatin (LIPITOR) 10 MG tablet Take 1 tablet by mouth Every Night.      busPIRone (BUSPAR) 5 MG tablet Take 1 tablet by mouth 3 (Three) Times a Day. (Patient taking differently: Take 1 tablet by mouth Daily.) 90 tablet 2    carboxymethylcellulose (REFRESH PLUS) 0.5 % solution Administer 2 drops to both eyes 3 (Three) Times a Day As Needed for Dry Eyes. 30 mL 12    cetirizine (zyrTEC) 10 MG tablet Take 1 tablet by mouth Daily. 90 tablet 1    finasteride (PROSCAR) 5 MG tablet Take 1 tablet by mouth Daily for 360 days. 90 tablet 3    FLUoxetine (PROzac) 10 MG capsule Take 1 capsule by mouth Daily. 90 capsule 1    FLUoxetine (PROzac) 20 MG capsule Take 1 capsule by mouth Daily. Start prozac 7/17 90 capsule 1    fluticasone (FLONASE) 50 MCG/ACT nasal spray Administer 2 sprays into the nostril(s) as directed by provider Daily. 16 g 11    HYDROmorphone (DILAUDID) 1 MG/ML injection Infuse 0-2 mg/hr into a  venous catheter.      IRON, FERROUS GLUCONATE, PO Take 65 mg by mouth Daily.      lisinopril (PRINIVIL,ZESTRIL) 5 MG tablet Take 0.5 tablets by mouth Daily.      Lubricant Eye Drops PF 0.5 % solution       Lysine 1000 MG tablet Take  by mouth.      Magnesium 250 MG tablet Take  by mouth.      pain patient supplied pump by Intrathecal route Continuous. Instructions are in drop boxes      tamsulosin (FLOMAX) 0.4 MG capsule 24 hr capsule Take 2 capsules by mouth Daily for 360 days. 180 capsule 3    Testosterone Cypionate (DEPOTESTOTERONE CYPIONATE) 200 MG/ML injection Inject 1 mL into the appropriate muscle as directed by prescriber Every 14 (Fourteen) Days.      traZODone (DESYREL) 50 MG tablet Take 1 tablet by mouth Every Night. 90 tablet 1    Turmeric 500 MG capsule Take  by mouth.      QUEtiapine (SEROquel) 25 MG tablet Take 1 tablet by mouth Every Night. 90 tablet 1     No current facility-administered medications for this visit.       Past Medical History:  Past Medical History:   Diagnosis Date    Alcohol abuse     Allergic rhinitis     Anemia 02/06/2024    My hand started just shaking but got worse until now i have no strength in my left hand    Anxiety 2006    Starting taking mood depression drugs    Arthritis     Asthma About 6 months    Sore Throat and was sent to ENT    Atrial fibrillation 2023    Benign prostatic hyperplasia 2015    Dad had Prostate Cancer and just did Colonoscopy and they said i have a major large Prostate    Cervical neck pain with evidence of disc disease     Chronic pain disorder     Colon polyp 2020    Had a lot of polyps on the last 2 Colonoscopies    CTS (carpal tunnel syndrome) 2005    Depression     Difficulty walking 2033    Erectile dysfunction 2017    I have been on Testosterone replacements since then    Head injury 1977    Headache May 2023    Started abou 6 months or more    Hemorrhoid     HL (hearing loss) 2004    Have a major tinitis/ringing in my ears especially the left  one    Hyperlipidemia 2017    My blood test have stated they were high or elevated    Hypertension 2017    Given Flomax said it will probably go down    Hypogonadism in male     Insomnia     Kidney stone     Went to doctor and got medication for them and still have issues with right side    Low back pain     Stinosis of back & neck    Memory loss     Movement disorder     Sleep apnea 2007    Substance abuse [id number]    On Disulfiram for alcohol abuse-voluntary    Visual impairment     Just recently have been given eye drops       Past Psychiatric History:  Began Treatment: a 2019  Diagnoses: TRIP, insomnia, MDD  Psychiatrist: a year   Therapist: a year   Admission History:Denies  Medication Trials:    multiple    Self Harm: Denies  Suicide Attempts:Denies      Substance Abuse History:   Types: end of  stopped drinking using antabuse, former smoker 2ppd  Withdrawal Symptoms:Denies  Longest Period Sober: 6 mos, recently  AA: Not applicable     Social History:  Martial Status:  Employed: retired 22  Kids:Yes  House:Lives in a house   History: [employer], retired    Social History     Socioeconomic History    Marital status:    Tobacco Use    Smoking status: Former     Current packs/day: 0.00     Average packs/day: 2.0 packs/day for 8.0 years (16.0 ttl pk-yrs)     Types: Cigarettes     Start date: 1975     Quit date: 1983     Years since quittin.3     Passive exposure: Past    Smokeless tobacco: Former     Types: Snuff    Tobacco comments:     Also dipped for several years   Vaping Use    Vaping status: Never Used   Substance and Sexual Activity    Alcohol use: Not Currently     Alcohol/week: 14.0 standard drinks of alcohol     Types: 14 Shots of liquor per week     Comment: Stopped due to Disulfiram 250 mg    Drug use: Not Currently     Frequency: 7.0 times per week     Types: Marijuana    Sexual activity: Not Currently     Partners: Female      "Birth control/protection: None, Vasectomy     Comment: Low testosterone       Family History:   Suicide Attempts:  Mom  Suicide Completions: mom  \"I think she had bipolar but I'm not sure\"      Family History   Problem Relation Age of Onset    Suicide Attempts Mother     Bipolar disorder Mother     Anxiety disorder Mother     Heart disease Mother     Osteoporosis Mother     Alcohol abuse Mother         Mother committed suicide    Depression Mother         She had major depression    Early death Mother         She committed suicide    Thyroid disease Mother         She had Thyroid issues and had surgery    Cancer Father         Prostate    Prostate cancer Father     Hearing loss Father         He has worn a hearing aids since his late 50’s    Depression Sister     Alcohol abuse Sister     Diabetes Sister         Diabetes    Arthritis Sister     Sleep apnea Sister     Obesity Sister     Thyroid disease Sister     Insomnia Sister     Narcolepsy Sister     Diabetes Brother     Stroke Other         AUNT/UNCLE GRANDPARENTS    Diabetes Other         GRANDPARENTS        Developmental History:       Childhood: saw mom and dad fight a lot, they , brother  when he was 5 yo.   High School:Completed  College: [employer]    Mental Status Exam  Appearance  : groomed, good eye contact, normal street clothes  Behavior  : pleasant and cooperative  Motor  : No abnormal  Speech  : talkative, normal rhythm, rate, volume, tone, not hyperverbal, not pressured, normal prosidy  Mood  : \"More depressed again\"  Affect  : euthymic, mood incongruent, good variability  Thought Content  : negative suicidal ideations, negative homicidal ideations, negative obsessions  Perceptions  : negative auditory hallucinations, negative visual hallucinations  Thought Process  : linear  Insight/Judgement  : Fair/fair  Cognition  : grossly intact  Attention   : intact      Review of Systems:  Review of Systems   Constitutional:  Positive for fatigue. " "Negative for diaphoresis.   HENT:  Negative for drooling.    Eyes:  Negative for visual disturbance.   Respiratory:  Negative for cough, chest tightness and shortness of breath.    Cardiovascular:  Negative for chest pain, palpitations and leg swelling.   Gastrointestinal:  Positive for constipation. Negative for abdominal pain, diarrhea, nausea and vomiting.   Endocrine: Positive for cold intolerance and heat intolerance.   Genitourinary:  Negative for difficulty urinating.   Musculoskeletal:  Negative for joint swelling.   Allergic/Immunologic: Negative for immunocompromised state.   Neurological:  Positive for dizziness, light-headedness and numbness. Negative for seizures, speech difficulty and headaches.   Psychiatric/Behavioral:  Positive for agitation and sleep disturbance.        Physical Exam:  Physical Exam    Vital Signs:   /60   Pulse 63   Ht 180.3 cm (70.98\")   Wt 103 kg (227 lb 12.8 oz)   SpO2 95%   BMI 31.79 kg/m²      Lab Results:   Lab on 03/06/2025   Component Date Value Ref Range Status    Ferritin 03/06/2025 314.90  30.00 - 400.00 ng/mL Final    Iron 03/06/2025 72  59 - 158 mcg/dL Final    Iron Saturation (TSAT) 03/06/2025 19 (L)  20 - 50 % Final    Transferrin 03/06/2025 259  200 - 360 mg/dL Final    TIBC 03/06/2025 386  298 - 536 mcg/dL Final    WBC 03/06/2025 4.13  3.40 - 10.80 10*3/mm3 Final    RBC 03/06/2025 3.91 (L)  4.14 - 5.80 10*6/mm3 Final    Hemoglobin 03/06/2025 12.1 (L)  13.0 - 17.7 g/dL Final    Hematocrit 03/06/2025 37.9  37.5 - 51.0 % Final    MCV 03/06/2025 96.9  79.0 - 97.0 fL Final    MCH 03/06/2025 30.9  26.6 - 33.0 pg Final    MCHC 03/06/2025 31.9  31.5 - 35.7 g/dL Final    RDW 03/06/2025 12.8  12.3 - 15.4 % Final    RDW-SD 03/06/2025 45.5  37.0 - 54.0 fl Final    MPV 03/06/2025 9.9  6.0 - 12.0 fL Final    Platelets 03/06/2025 164  140 - 450 10*3/mm3 Final    Neutrophil % 03/06/2025 37.7 (L)  42.7 - 76.0 % Final    Lymphocyte % 03/06/2025 47.5 (H)  19.6 - 45.3 % " Final    Monocyte % 03/06/2025 11.6  5.0 - 12.0 % Final    Eosinophil % 03/06/2025 2.7  0.3 - 6.2 % Final    Basophil % 03/06/2025 0.5  0.0 - 1.5 % Final    Immature Grans % 03/06/2025 0.0  0.0 - 0.5 % Final    Neutrophils, Absolute 03/06/2025 1.56 (L)  1.70 - 7.00 10*3/mm3 Final    Lymphocytes, Absolute 03/06/2025 1.96  0.70 - 3.10 10*3/mm3 Final    Monocytes, Absolute 03/06/2025 0.48  0.10 - 0.90 10*3/mm3 Final    Eosinophils, Absolute 03/06/2025 0.11  0.00 - 0.40 10*3/mm3 Final    Basophils, Absolute 03/06/2025 0.02  0.00 - 0.20 10*3/mm3 Final    Immature Grans, Absolute 03/06/2025 0.00  0.00 - 0.05 10*3/mm3 Final    nRBC 03/06/2025 0.0  0.0 - 0.2 /100 WBC Final   Lab on 01/28/2025   Component Date Value Ref Range Status    Gliadin Deamidated Peptide Ab, IgA 01/28/2025 3  0 - 19 units Final                       Negative                   0 - 19                     Weak Positive             20 - 30                     Moderate to Strong Positive   >30    Tissue Transglutaminase IgA 01/28/2025 <2  0 - 3 U/mL Final                                  Negative        0 -  3                                Weak Positive   4 - 10                                Positive           >10   Tissue Transglutaminase (tTG) has been identified   as the endomysial antigen.  Studies have demonstr-   ated that endomysial IgA antibodies have over 99%   specificity for gluten sensitive enteropathy.    IgA 01/28/2025 176  61 - 437 mg/dL Final   Office Visit on 01/21/2025   Component Date Value Ref Range Status    Urine Volume 01/21/2025 148   Final   Admission on 11/27/2024, Discharged on 11/27/2024   Component Date Value Ref Range Status    Case Report 11/27/2024    Final                    Value:Surgical Pathology Report                         Case: [id number]                                  Authorizing Provider:  Tamara Molina MD Collected:           11/27/2024 10:46 AM          Ordering Location:     [facility]  "ENDO Received:            11/27/2024 11:38 AM                                 SUITES                                                                       Pathologist:           Stephy Vallejo MD                                                     Specimens:   1) - Small Intestine, Duodenum, DUODENUM BIOPSIES                                                   2) - Gastric, Antrum, ANTRUM BIOPSIES                                                               3) - GE Junction, GE JUNCTION BIOPSIES                                                              4) - Esophagus, Mid, MID ESOPHAGUS BIOPSIES                                                Clinical Information 11/27/2024    Final                    Value:Iron deficiency anemia, unspecified iron deficiency anemia type  Acute gastric ulcer without hemorrhage or perforation      Final Diagnosis 11/27/2024    Final                    Value:1.  Duodenum, biopsy:   -Chronic duodenitis with focal partial villous blunting      2. Stomach, antrum, biopsy:   - Gastric antrum mucosa with no significant pathologic change   - Negative for Helicobacter pylori on routine H&E stain   - Negative for intestinal metaplasia, dysplasia and malignancy      3. Gastroesophageal junction, biopsy:   - Squamocolumnar mucosa with mild chronic inflammation   - Negative for intestinal metaplasia, dysplasia and malignancy      4. Mid esophagus, biopsy:   - Squamous mucosa with no significant pathologic change   - Negative for eosinophilic esophagitis          Gross Description 11/27/2024    Final                    Value:1. Small Intestine, Duodenum.  Received in formalin and labeled \" duodenum\" are three fragments of tan soft tissue measuring 0.2-0.3 cm in greatest dimension. The specimen is entirely submitted in one cassette.    2. Gastric, Antrum.  Received in formalin and labeled \" antrum\" are two fragments of tan soft tissue measuring 0.2-0.3 cm in greatest dimension. The specimen " "is entirely submitted in one cassette.  3. GE Junction.  Received in formalin and labeled \" GE junction\" is a 0.4 cm fragment of tan soft tissue. The specimen is entirely submitted in one cassette.    4. Esophagus, Mid.  Received in formalin and labeled \" midesophagus\" are two fragments of tan soft tissue measuring 0.2-0.3 cm in greatest dimension. The specimen is entirely submitted in one cassette.   JULIETTE      Microscopic Description 11/27/2024    Final                    Value:Microscopic examination performed.     Hospital Outpatient Visit on 10/24/2024   Component Date Value Ref Range Status    WBC 10/24/2024 4.05  3.40 - 10.80 10*3/mm3 Final    RBC 10/24/2024 3.77 (L)  4.14 - 5.80 10*6/mm3 Final    Hemoglobin 10/24/2024 11.7 (L)  13.0 - 17.7 g/dL Final    Hematocrit 10/24/2024 36.7 (L)  37.5 - 51.0 % Final    MCV 10/24/2024 97.3 (H)  79.0 - 97.0 fL Final    MCH 10/24/2024 31.0  26.6 - 33.0 pg Final    MCHC 10/24/2024 31.9  31.5 - 35.7 g/dL Final    RDW 10/24/2024 12.6  12.3 - 15.4 % Final    RDW-SD 10/24/2024 45.0  37.0 - 54.0 fl Final    MPV 10/24/2024 9.4  6.0 - 12.0 fL Final    Platelets 10/24/2024 136 (L)  140 - 450 10*3/mm3 Final    Neutrophil % 10/24/2024 38.6 (L)  42.7 - 76.0 % Final    Lymphocyte % 10/24/2024 44.2  19.6 - 45.3 % Final    Monocyte % 10/24/2024 13.1 (H)  5.0 - 12.0 % Final    Eosinophil % 10/24/2024 3.2  0.3 - 6.2 % Final    Basophil % 10/24/2024 0.7  0.0 - 1.5 % Final    Immature Grans % 10/24/2024 0.2  0.0 - 0.5 % Final    Neutrophils, Absolute 10/24/2024 1.56 (L)  1.70 - 7.00 10*3/mm3 Final    Lymphocytes, Absolute 10/24/2024 1.79  0.70 - 3.10 10*3/mm3 Final    Monocytes, Absolute 10/24/2024 0.53  0.10 - 0.90 10*3/mm3 Final    Eosinophils, Absolute 10/24/2024 0.13  0.00 - 0.40 10*3/mm3 Final    Basophils, Absolute 10/24/2024 0.03  0.00 - 0.20 10*3/mm3 Final    Immature Grans, Absolute 10/24/2024 0.01  0.00 - 0.05 10*3/mm3 Final    Neutrophil % 10/24/2024 37.0 (L)  42.7 - 76.0 % Final    " Lymphocyte % 10/24/2024 52.0 (H)  19.6 - 45.3 % Final    Monocyte % 10/24/2024 10.0  5.0 - 12.0 % Final    Eosinophil % 10/24/2024 1.0  0.3 - 6.2 % Final    Neutrophils Absolute 10/24/2024 1.50 (L)  1.70 - 7.00 10*3/mm3 Final    Lymphocytes Absolute 10/24/2024 2.11  0.70 - 3.10 10*3/mm3 Final    Monocytes Absolute 10/24/2024 0.41  0.10 - 0.90 10*3/mm3 Final    Eosinophils Absolute 10/24/2024 0.04  0.00 - 0.40 10*3/mm3 Final    Anisocytosis 10/24/2024 Slight/1+  None Seen Final    WBC Morphology 10/24/2024 Normal  Normal Final    Platelet Morphology 10/24/2024 Normal  Normal Final    Case Report 10/24/2024    Final                    Value:Surgical Pathology Report                         Case: [id number]                                  Authorizing Provider:  Ronen Solomon MD Collected:           10/24/2024 09:32 AM          Ordering Location:     [facility] CT   Received:            10/24/2024 10:27 AM          Pathologist:           Stephy Vallejo MD                                                     Specimens:   1) - Iliac Crest, Left - Aspirate, CT/BONE MARROW ASPIRATION                                        2) - Iliac Crest, Left - Biopsy, CT/BONE MARROW BIOPSY                                              3) - Blood, Venous                                                                         Clinical Information 10/24/2024    Final                    Value:Normocytic anemia      Final Diagnosis 10/24/2024    Final                    Value:1-2:  Bone marrow (left iliac crest), aspirate smears, clot section, and core biopsy.   - Normocellular marrow for age (30-40%) with maturing trilineage hematopoiesis.  - Aspicular hemodilute aspirate smear with limited marrow tissue for evaluation.  - Storage iron is present.    3:  Peripheral blood (CBC and smear):   - The red cells show mild anisopoikilocytosis   - Normal white cell count with relative neutropenia and relatively increased  "lymphoid and monocytic cells   - Mild thrombocytopenia    Comment:  The above diagnosis was rendered in expert consultation by Kate Diego MD , [facility] . See separate consultation report for additional information.           The following tests were performed at reference laboratory on the bone marrow aspirate. See separate reference laboratory reports for additional information.       Flow cytometry:  1) Myeloid blasts are not relatively increased (1% of analyzed cells) but show partial aberrant CD7 expression. See                           comment.  2) No other significant immunophenotypic abnormalities detected.     Cytogenetics: 46,XY[20]   Normal Male Karyotype     MDS FISH panel: No assay specific abnormalities detected by MDS Panel     IntelliGEN(R) myeloid panel: 1 variant of unknown clinical significance (tier III) was detected, see scanned report        Gross Description 10/24/2024    Final                    Value:1. Iliac Crest, Left - Aspirate.  The specimen is received in 1 formalin filled container labeled \" CT/bone marrow aspiration\" and consists of a 1.5 cm aggregate of coagulated blood.  The specimen is entirely submitted in 1 cassette.    2. Iliac Crest, Left - Biopsy.  The specimen is received in 1 formalin filled container labeled \" CT/bone marrow biopsy\" and consists of a 1.0 cm tan, cylindrical, hemorrhagic bone marrow biopsy.  The specimen is entirely submitted in 1 cassette following decalcification.   JULIETTE  3. Blood, Venous.  One (1) Salcido-stained peripheral blood smear is received for evaluation. The smear is accompanied by a CBC analysis report and marked as a physician/advanced practice clinician review.            Special Stains 10/24/2024    Final                    Value:The following immunoperoxidase stains were performed at [facility] and support the above diagnosis: CD3 and CD20.    All immunohistochemical/cytochemical stains (IHC) are performed on " separate slides per different antibody unless otherwise specified in the documentation that a cocktail (multiple stain) was performed.  Controls are appropriate.        Microscopic Description 10/24/2024    Final                    Value:Microscopic examination performed.      Consult Global Result 10/24/2024 Comment^Text^TXT   Final    Bone Marrow, Lt Iliac Crest:  1) Myeloid blasts are not relatively increased (1% of   analyzed cells) but show partial aberrant CD7 expression.   See comment.  2) No other significant immunophenotypic abnormalities   detected.  DISCLAIMER: REFER TO HARDCOPY OR PDF FOR COMPLETE RESULT.   If synopsis provided, clinical decisions should not be   based on this interfaced synopsis alone.  Performed at:  [facility]  [address] [facility] [address] [place] [address]  :  Clary Pickett M.D., Ph.D., Phone:  [phone]    Blood or Bone Marrow Result 10/24/2024 Comment^Text^TXT   Final    BONE MARROW, BIOPSY AND ASPIRATION:  1)  Normocellular marrow for age (30-40%) with maturing   trilineage hematopoiesis.  2)  Aspicular hemodilute aspirate smear with limited marrow   tissue for evaluation.  3)  Storage iron is present.  See comments.  DISCLAIMER: REFER TO HARDCOPY OR PDF FOR COMPLETE RESULT.   If synopsis provided, clinical decisions should not be   based on this interfaced synopsis alone.  Performed at:  [facility]  [address] [facility] [address] [place] [address]  :  Clary Pickett M.D., Ph.D., Phone:  [phone]    Cytogenetics Result 10/24/2024 Comment^Text^TXT   Final    46,XY[20]  Normal Male Karyotype  DISCLAIMER: REFER TO HARDCOPY OR PDF FOR COMPLETE RESULT.   If synopsis provided, clinical decisions should not be   based on this interfaced synopsis alone.  Performed at:  [facility]  [address] [facility] [address] [place] [address]  Lab  Director:  Clary Pickett M.D., Ph.D., Phone:  [phone]    MDS TargetGene Panel Result 10/24/2024 Comment^Text^TXT   Final    Result: No assay specific abnormalities detected by MDS   Panel  DISCLAIMER: REFER TO HARDCOPY OR PDF FOR COMPLETE RESULT.   If synopsis provided, clinical decisions should not be   based on this interfaced synopsis alone.  Performed at:  [facility]  [address], [place] [address]  :  Clary Pickett M.D., Ph.D., Phone:  [phone]    Intelligen Myeloid Result 10/24/2024 Comment^Text^TXT   Final    See separate report.  DISCLAIMER: REFER TO HARDCOPY OR PDF FOR COMPLETE RESULT.   If synopsis provided, clinical decisions should not be   based on this interfaced synopsis alone.  Performed at:  [facility]  [address], [place] [address]  :  Clary Pickett M.D., Ph.D., Phone:  [phone]    CCV RESULT 10/24/2024 Comment^Text^TXT   Final    Nonspecific morphologic and immunophenotypic findings with   no diagnostic evidence of hematopoietic neoplasia   identified.  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   If synopsis provided, clinical decisions should not be   based on this interfaced synopsis alone.  Performed at:  [facility]  [address] [id number]  :  Clary Pickett M.D., Ph.D., Phone:  [phone]   Lab on 10/24/2024   Component Date Value Ref Range Status    Urine Culture 10/24/2024 No growth   Final    Iron 10/24/2024 84  59 - 158 mcg/dL Final    Iron Saturation (TSAT) 10/24/2024 22  20 - 50 % Final    Transferrin 10/24/2024 254  200 - 360 mg/dL Final    TIBC 10/24/2024 378  298 - 536 mcg/dL Final    Color, UA 10/24/2024 Yellow  Yellow, Straw  Final    Appearance, UA 10/24/2024 Clear  Clear Final    pH, UA 10/24/2024 6.0  5.0 - 8.0 Final    Specific Gravity, UA 10/24/2024 1.010  1.005 - 1.030 Final    Glucose, UA 10/24/2024 Negative  Negative Final    Ketones, UA 10/24/2024 Negative  Negative Final    Bilirubin, UA 10/24/2024 Negative  Negative Final    Blood, UA 10/24/2024 Negative  Negative Final    Protein, UA 10/24/2024 Negative  Negative Final    Leuk Esterase, UA 10/24/2024 Negative  Negative Final    Nitrite, UA 10/24/2024 Negative  Negative Final    Urobilinogen, UA 10/24/2024 0.2 E.U./dL  0.2 - 1.0 E.U./dL Final    RBC, UA 10/24/2024 0-2  None Seen, 0-2 /HPF Final    WBC, UA 10/24/2024 0-2  None Seen, 0-2 /HPF Final    Bacteria, UA 10/24/2024 None Seen  None Seen /HPF Final    Squamous Epithelial Cells, UA 10/24/2024 0-2  None Seen, 0-2 /HPF Final    Hyaline Casts, UA 10/24/2024 None Seen  None Seen /LPF Final    Methodology 10/24/2024 Automated Microscopy   Final       EKG Results:  No orders to display       Imaging Results:  CT Chest Without Contrast Diagnostic    Result Date: 3/19/2024  Impression: CT scan of the chest without IV contrast demonstrating tree-in-bud airspace disease in the left lower lobe suggesting indolent infection. Follow-up CT scan of the chest in 3 months is recommended.    Electronically Signed By-SOILA TREVIÑO MD On:3/19/2024 3:23 PM          Assessment & Plan   Diagnoses and all orders for this visit:    1. Generalized anxiety disorder (Primary)  -     QUEtiapine (SEROquel) 25 MG tablet; Take 1 tablet by mouth Every Night.  Dispense: 90 tablet; Refill: 1    2. Major depressive disorder, recurrent episode, moderate  -     QUEtiapine (SEROquel) 25 MG tablet; Take 1 tablet by mouth Every Night.  Dispense: 90 tablet; Refill: 1    3. Insomnia due to mental condition  -     QUEtiapine (SEROquel) 25 MG tablet; Take 1 tablet by mouth Every Night.  Dispense: 90 tablet; Refill: 1        Visit Diagnoses:    ICD-10-CM ICD-9-CM    1. Generalized anxiety disorder  F41.1 300.02   2. Major depressive disorder, recurrent episode, moderate  F33.1 296.32   3. Insomnia due to mental condition  F51.05 300.9     327.02     04/23/2025: Was better, less depressed on seroquel (Mom dx'd with bipolar). Restart.     Acknowledged and normalized patient's thoughts, feelings, and concerns. Allowed patient to freely discuss and process issues, such as:  Anxiety and depression regarding medical conditions.  Anxiety and depression regarding relationships.  ... using Rogerian psychotherapeutic techniques including unconditional positive regard, reflective listening, and demonstrating clear empathy, with the goal of ameliorating symptoms and maintaining, restoring, or improving self-esteem, adaptive skills, and ego or psychological functions (Danielle et al. 1991), the long-term goal of which is to develop a better, healthier perspective and help the patient bear their circumstances more easily.  Time (minutes) spent providing supportive psychotherapy: 16  (This time is exclusive to the therapy session and separate from the time spent on activities used to meet the criteria for the E/M service (history, exam, medical decision-making).)  Start: 11:17  Stop: 11:33  Functional status: mild impairment  Treatment plan: Medication management and supportive psychotherapy  Prognosis: good  Progress: depressed  2m    03/12/2025: Major stressor is wife, discussed. Pt never stopped seroquel but is better owing to his own efforts keeping the house clean (and likely also psychotherapy). Stop seroquel and trial buspar up to 3x/day. Now only has one dog (they did have 3). Discussed strategies to improve communication with wife.    01/29/2025: mild TRIP, MDD, start buspar. Stop seroquel: dizzy plus weight gain. Much of this is situational -- there is only so much we can do about that. Possibly seasonal, start light box, which he has. Discussed hiring someone to clean, wife's  resistance to changes.    11/13/2024: Insomnia, increase quetiapine. Discussed getting help with organizing the house.    10/04/2024: Improving, still MDD, TRIP, increase prozac further.    08/22/2024: Improving, increase prozac for possible MDD.    07/25/2024: Stop wellbutrin, in 2 wks, hold trazodone and start seroquel for irritability, mood stability. Poor energy may be due to anemia. Mood improved, but irritable as well. Mom has hx of what he thinks was bipolar. Avoiding lithium 2/2 CKD.     07/10/2024: Mirtazapine led to brain fog, fatigue, poor focus in am. Stop it. [facility] for marriage issues (individual therapy). Pt is not on duloxetine. Pt needs energizing meds. Start wellbutrin for a week, then add prozac.    06/14/2024: No change. Discussed reflective listening with his wife. Stop abilify; start mirtazapine.    4/30/24: R/O ADHD. Start abilify, Therapy? Wgt loss, consider mirtazapine, seroquel. 6w.    PLAN:  Safety: No acute safety concerns  Therapy: None  Risk Assessment: Risk of self-harm acutely is moderate.  Risk factors include anxiety disorder, mood disorder, fhx, access to guns, and recent psychosocial stressors (pandemic). Protective factors include no present SI, no history of suicide attempts or self-harm in the past, minimal AODA, healthcare seeking, future orientation, willingness to engage in care.  Risk of self-harm chronically is also moderate, but could be further elevated in the event of treatment noncompliance and/or AODA.  Meds:  CONTINUE prozac 30 mg qam. Risks, benefits, alternatives discussed with patient including GI upset, nausea vomiting diarrhea, theoretical decrease of seizure threshold predisposing the patient to a slightly higher seizure risk, headaches, sexual dysfunction, serotonin syndrome, bleeding risk, increased suicidality in patients 24 years and younger, switching to rebecca/hypomania.  After discussion of these risks and benefits, the patient voiced understanding and  agreed to proceed.  CONTINUE buspar 5 mg TID. Risks, benefits, alternatives discussed with patient including nausea, GI upset, mild sedation, falls risk.  Use care when operating vehicle, vessel, or machine. After discussion of these risks and benefits, the patient voiced understanding and agreed to proceed.  CONTINUE trazodone 50 mg qhs. Risks, benefits, side effects discussed with patient including GI upset, sedation, dizziness/falls risk, grogginess the following day, prolongation of the QTc interval.  Do not use before operating vehicle, vessel, or machine. After discussion of these risks and benefits, the patient voiced understanding and agreed to proceed.    RESTART seroquel 25 mg qhs. (Dizzy, sedated 1/25) Risks, benefits, alternatives discussed with patient including nausea and vomiting, GI upset, sedation, dizziness, falls, akathisia, hypotension, increased appetite, lowering of seizure threshold, theoretical risk of tardive dyskinesia, extrapyramidal symptoms, movement issues, restless legs syndrome. Use care when operating vehicle, vessel, or machine. After discussion of these risks and benefits, the patient voiced understanding and agreed to proceed.  S/P:  trazodone 50 mg qhs. 100 mg made him groggy the next day. 7/24.    wellbutrin  mg qam. Irritable 7/24  abilify 2 mg qhs. No change 6/24.   mirtazapine 7.5 mg qhs. Sedation 7/24  Seroquel didn't work in the past 7/24  Labs: none    Patient screened positive for depression based on a PHQ-9 score of 13 on 3/28/2025. Follow-up recommendations include: Prescribed antidepressant medication treatment and Suicide Risk Assessment performed.           TREATMENT PLAN/GOALS: Continue supportive psychotherapy efforts and medications as indicated. Treatment and medication options discussed during today's visit. Patient acknowledged and verbally consented to continue with current treatment plan and was educated on the importance of compliance with treatment  and follow-up appointments.    MEDICATION ISSUES:  JIM reviewed as expected.  Discussed medication options and treatment plan of prescribed medication as well as the risks, benefits, and side effects including potential falls, possible impaired driving and metabolic adversities among others. Patient is agreeable to call the office with any worsening of symptoms or onset of side effects. Patient is agreeable to call 911 or go to the nearest ER should he/she begin having SI/HI. No medication side effects or related complaints today.     MEDS ORDERED DURING VISIT:  New Medications Ordered This Visit   Medications    QUEtiapine (SEROquel) 25 MG tablet     Sig: Take 1 tablet by mouth Every Night.     Dispense:  90 tablet     Refill:  1       Return in about 2 months (around 6/23/2025).         This document has been electronically signed by Penelope Davis MD  April 23, 2025 11:39 EDT    Dictated Utilizing Dragon Dictation: Part of this note may be an electronic transcription/translation of spoken language to printed text using the Dragon Dictation System.

## 2025-04-23 ENCOUNTER — OFFICE VISIT (OUTPATIENT)
Dept: PSYCHIATRY | Facility: CLINIC | Age: 65
End: 2025-04-23
Payer: MEDICARE

## 2025-04-23 VITALS
SYSTOLIC BLOOD PRESSURE: 115 MMHG | BODY MASS INDEX: 31.89 KG/M2 | WEIGHT: 227.8 LBS | OXYGEN SATURATION: 95 % | DIASTOLIC BLOOD PRESSURE: 60 MMHG | HEART RATE: 63 BPM | HEIGHT: 71 IN

## 2025-04-23 DIAGNOSIS — F33.1 MAJOR DEPRESSIVE DISORDER, RECURRENT EPISODE, MODERATE: ICD-10-CM

## 2025-04-23 DIAGNOSIS — F41.1 GENERALIZED ANXIETY DISORDER: Primary | ICD-10-CM

## 2025-04-23 DIAGNOSIS — F51.05 INSOMNIA DUE TO MENTAL CONDITION: ICD-10-CM

## 2025-04-23 RX ORDER — QUETIAPINE FUMARATE 25 MG/1
25 TABLET, FILM COATED ORAL NIGHTLY
Qty: 90 TABLET | Refills: 1 | Status: SHIPPED | OUTPATIENT
Start: 2025-04-23

## 2025-04-23 NOTE — TREATMENT PLAN
Anxiety:  7/10 progressing    Goals:  Patient will develop and implement behavioral and cognitive strategies to reduce anxiety and irrational fears, monthly, using Rogerian psychotherapy and CBT where appropriate.  Help patient explore past emotional issues in relation to present anxiety, monthly, until remission of symptoms, using Rogerian psychotherapy and CBT where appropriate.  Help patient develop an awareness of their cognitive and physical responses to anxiety, monthly, until remission of symptoms, using Rogerian psychotherapy and CBT where appropriate.          Depression:  3/10 progressing    Goals:  Patient will demonstrate the ability to initiate new constructive life skills outside of sessions on a consistent basis, monthly, using Rogerian psychotherapy and CBT where appropriate.  Assist patient in setting attainable activities of daily living goals, monthly, using Rogerian psychotherapy and CBT where appropriate.  Provide education about depression, monthly, using Rogerian psychotherapy and CBT where appropriate.  Assist patient in developing healthy coping strategies, monthly, using Rogerian psychotherapy and CBT where appropriate.    Rogerian psychotherapy and CBT will be used to help accomplish the above goals and manage depression and anxiety related to medical conditions, family conflict       I have discussed and reviewed this treatment plan with the patient.      Reviewed by Penelope Davis MD   04/23/2025

## 2025-04-29 ENCOUNTER — TELEMEDICINE (OUTPATIENT)
Dept: PSYCHIATRY | Facility: CLINIC | Age: 65
End: 2025-04-29
Payer: MEDICARE

## 2025-04-29 DIAGNOSIS — F33.1 MAJOR DEPRESSIVE DISORDER, RECURRENT EPISODE, MODERATE: ICD-10-CM

## 2025-04-29 DIAGNOSIS — F41.1 GENERALIZED ANXIETY DISORDER: Primary | ICD-10-CM

## 2025-04-29 NOTE — PROGRESS NOTES
Date: April 29, 2025  Time In: 11:00 ET  Time Out: 11:59 ET    This provider is located at the [facility]), [address], [place] [address] using a secure [employer] Video Visit through [employer]. Patient is being seen remotely via telehealth at home address in [place] and stated they are in a secure environment for this session. The patient's condition being diagnosed/treated is appropriate for telemedicine. The provider identified herself as well as her credentials. The patient, and/or patients guardian, consent to be seen remotely, and when consent is given they understand that the consent allows for patient identifiable information to be sent to a third party as needed. They may refuse to be seen remotely at any time. The electronic data is encrypted and password protected, and the patient and/or guardian has been advised of the potential risks to privacy not withstanding such measures.     You have chosen to receive care through a telehealth visit.  Do you consent to use a video/audio connection for your medical care today? Yes    Subjective   Stephon Castellano is a 64 y.o. male who presents today for follow up    Chief Complaint:   Chief Complaint   Patient presents with    Anxiety    Depression           Clinical Maneuvering/Intervention:      Assisted patient in processing above session content; acknowledged and normalized patient’s thoughts, feelings, and concerns.  Rationalized patient thought process regarding concerns presented at session.  Discussed triggers associated with patient's  anxiety  and depression  Also discussed coping skills for patient to implement such as mindfulness , increasing activity , self care , and positive self talk       Allowed patient to freely discuss issues without interruption or judgment. Provided safe, confidential environment to facilitate the development of positive therapeutic relationship and encourage open,  honest communication. Assisted patient in identifying risk factors which would indicate the need for higher level of care including thoughts to harm self or others and/or self-harming behavior and encouraged patient to contact this office, call 911, or present to the nearest emergency room should any of these events occur. Discussed crisis intervention services and means to access. Patient adamantly and convincingly denies current suicidal or homicidal ideation or perceptual disturbance.    Assessment:     Patient appears to maintain relative stability as compared to their baseline.  However, patient continues to struggle with anxiety and depression which continues to cause impairment in important areas of functioning.  A result, they can be reasonably expected to continue to benefit from treatment and would likely be at increased risk for decompensation otherwise.      PHQ-Score Total:  PHQ-9 Total Score: 16     TRIP-7 Score Total:  Over the last two weeks, how often have you been bothered by the following problems?  Feeling nervous, anxious or on edge: More than half the days  Not being able to stop or control worrying: Several days  Worrying too much about different things: Several days  Trouble Relaxing: Several days  Being so restless that it is hard to sit still: More than half the days  Becoming easily annoyed or irritable: Several days  Feeling afraid as if something awful might happen: Nearly every day  TRIP 7 Total Score: 11  If you checked any problems, how difficult have these problems made it for you to do your work, take care of things at home, or get along with other people: Very difficult      Mental Status Exam:   Hygiene:   good  Cooperation:  Cooperative  Eye Contact:  Good  Psychomotor Behavior:  Appropriate  Affect:  Appropriate  Mood: normal, depressed, and fluctates  Speech:  Normal  Thought Process:  Goal directed  Thought Content:  Normal  Suicidal:  None  Homicidal:  None  Hallucinations:   None  Delusion:  None  Memory:  Intact  Orientation:  Person, Place, Time, and Situation  Reliability:  good  Insight:  Good  Judgement:  Good  Impulse Control:  Good  Physical/Medical Issues:  Yes Chronic Pain        Patient's Support Network Includes:  wife, daughter, and extended family    Functional Status: Moderate impairment     Progress toward goal: Not at goal    Prognosis: Guarded with Ongoing Treatment      Plan:    Patient will continue in individual outpatient therapy with focus on improved functioning and coping skills, maintaining stability, and avoiding decompensation and the need for higher level of care.    Patient will adhere to medication regimen as prescribed and report any side effects. Patient will contact this office, call 911 or present to the nearest emergency room should suicidal or homicidal ideations occur. Provide Cognitive Behavioral Therapy and Solution Focused Therapy to improve functioning, maintain stability, and avoid decompensation and the need for higher level of care.         VISIT DIAGNOSIS:     ICD-10-CM ICD-9-CM   1. Generalized anxiety disorder  F41.1 300.02   2. Major depressive disorder, recurrent episode, moderate  F33.1 296.32        Return in about 4 weeks (around 5/27/2025).    This document has been electronically signed by Antonina Aguirre [facility]  May 1, 2025 08:11 EDT     Part of this note may be an electronic transcription/translation of spoken language to printed text using the Dragon Dictation System.

## 2025-05-01 NOTE — TREATMENT PLAN
Multi-Disciplinary Problems (from Behavioral Health Treatment Plan)      Active Problems       Problem: Anxiety  Start Date: 01/14/25      Problem Details: The patient self-scales this problem as a 10 with 10 being the worst.          Goal Priority Start Date Expected End Date End Date    Patient will develop and implement behavioral and cognitive strategies to reduce anxiety and irrational fears. High 01/14/25 10/30/25 --    Goal Details: Progress toward goal:  The patient self-scales their progress related to this goal as a 10 with 10 being the worst.        Goal Intervention Frequency Start Date End Date    Help patient explore past emotional issues in relation to present anxiety. Monthly 01/14/25 --    Intervention Details: Duration of treatment until remission of symptoms.        Goal Intervention Frequency Start Date End Date    Help patient develop an awareness of their cognitive and physical responses to anxiety. Monthly 01/14/25 --    Intervention Details: Duration of treatment until remission of symptoms.                Problem: Depression  Start Date: 01/14/25      Problem Details: The patient self-scales this problem as a 10 with 10 being the worst.          Goal Priority Start Date Expected End Date End Date    Patient will demonstrate the ability to initiate new constructive life skills outside of sessions on a consistent basis. High 01/14/25 10/30/25 --    Goal Details: Progress toward goal:  The patient self-scales their progress related to this goal as a 10 with 10 being the worst.        Goal Intervention Frequency Start Date End Date    Assist patient in setting attainable activities of daily living goals. Monthly 01/14/25 --      Goal Intervention Frequency Start Date End Date    Provide education about depression Monthly 01/14/25 --    Intervention Details: Duration of treatment until remission of symptoms.        Goal Intervention Frequency Start Date End Date    Assist patient in developing  healthy coping strategies. Monthly 01/14/25 --    Intervention Details: Duration of treatment until remission of symptoms.                        Reviewed By       Antonina Aguirre, [facility] 05/01/25 2560                     I have discussed and reviewed this treatment plan with the patient.

## 2025-05-21 ENCOUNTER — OFFICE VISIT (OUTPATIENT)
Dept: INTERNAL MEDICINE | Facility: CLINIC | Age: 65
End: 2025-05-21
Payer: MEDICARE

## 2025-05-21 VITALS
HEART RATE: 64 BPM | WEIGHT: 225 LBS | DIASTOLIC BLOOD PRESSURE: 68 MMHG | SYSTOLIC BLOOD PRESSURE: 110 MMHG | RESPIRATION RATE: 16 BRPM | OXYGEN SATURATION: 95 % | HEIGHT: 71 IN | BODY MASS INDEX: 31.5 KG/M2 | TEMPERATURE: 98.2 F

## 2025-05-21 DIAGNOSIS — M25.562 CHRONIC PAIN OF LEFT KNEE: ICD-10-CM

## 2025-05-21 DIAGNOSIS — F51.9 SEVERE MORNING SLEEP INERTIA: ICD-10-CM

## 2025-05-21 DIAGNOSIS — M25.531 RIGHT WRIST PAIN: ICD-10-CM

## 2025-05-21 DIAGNOSIS — H69.93 DYSFUNCTION OF BOTH EUSTACHIAN TUBES: Primary | ICD-10-CM

## 2025-05-21 DIAGNOSIS — G89.29 CHRONIC PAIN OF LEFT KNEE: ICD-10-CM

## 2025-05-21 PROCEDURE — 99214 OFFICE O/P EST MOD 30 MIN: CPT | Performed by: INTERNAL MEDICINE

## 2025-05-21 PROCEDURE — 1126F AMNT PAIN NOTED NONE PRSNT: CPT | Performed by: INTERNAL MEDICINE

## 2025-05-21 NOTE — PROGRESS NOTES
"Chief Complaint  Follow-up (6 month Stage 3b chronic kidney disease, right ear still feels clogged at times, fatigue in the morning really bad, unsure if it is a medication, can not usually get up and moving until about 11am, left knee pain still /)    Subjective      Stephon Castellano is a 64 y.o. male who presents to [facility]     Presenting for follow up    Still having clogged feeling in his ears that comes and goes. Taking his Zyrtec and Flonase.     Feeling very fatigued in the mornings. Wondering if there is a medication that could be causing her symptoms.     Still having burning over his left anterior knee. Hx of ACL repair many years ago. No known new injury. Burning comes and goes.     Having right wrist pain. Has seen ortho in October and imaging was essentially normal. Had joint injection which he states helped for a while.     Objective   Vital Signs:   Vitals:    05/21/25 1058   BP: 110/68   BP Location: Left arm   Patient Position: Sitting   Cuff Size: Adult   Pulse: 64   Resp: 16   Temp: 98.2 °F (36.8 °C)   TempSrc: Temporal   SpO2: 95%   Weight: 102 kg (225 lb)   Height: 180.3 cm (70.98\")     Body mass index is 31.4 kg/m².    Wt Readings from Last 3 Encounters:   05/21/25 102 kg (225 lb)   04/23/25 103 kg (227 lb 12.8 oz)   04/15/25 104 kg (229 lb)     BP Readings from Last 3 Encounters:   05/21/25 110/68   04/23/25 115/60   04/15/25 106/62       Health Maintenance   Topic Date Due    COVID-19 Vaccine (3 - 2024-25 season) 09/01/2024    INFLUENZA VACCINE  07/01/2025    ANNUAL WELLNESS VISIT  11/20/2025    GASTROSCOPY (EGD)  11/27/2025    COLORECTAL CANCER SCREENING  08/13/2027    TDAP/TD VACCINES (4 - Td or Tdap) 03/29/2029    HEPATITIS C SCREENING  Completed    Pneumococcal Vaccine 50+  Completed    AAA SCREEN ONCE  Completed    ZOSTER VACCINE  Completed       Physical Exam  Vitals reviewed.   Constitutional:       Appearance: Normal appearance. He is " well-developed.   HENT:      Head: Normocephalic and atraumatic.      Mouth/Throat:      Pharynx: No oropharyngeal exudate.   Eyes:      Conjunctiva/sclera: Conjunctivae normal.      Pupils: Pupils are equal, round, and reactive to light.   Neck:      Thyroid: No thyromegaly or thyroid tenderness.   Cardiovascular:      Rate and Rhythm: Normal rate and regular rhythm.      Heart sounds: No murmur heard.     No friction rub. No gallop.   Pulmonary:      Effort: Pulmonary effort is normal.      Breath sounds: Normal breath sounds. No wheezing or rhonchi.   Lymphadenopathy:      Cervical: No cervical adenopathy.   Skin:     General: Skin is warm and dry.   Neurological:      Mental Status: He is alert and oriented to person, place, and time.   Psychiatric:         Mood and Affect: Affect normal.          Result Review :  The following data was reviewed by: Paige Florez MD on 05/21/2025:  CMP          5/28/2024    09:36 7/26/2024    14:42 8/7/2024    09:51   CMP   Glucose  91  92    BUN  30  34    Creatinine  2.25  1.99    EGFR  31.8  36.8    Sodium  137  140    Potassium  4.3  4.4    Chloride  98  102    Calcium  9.5  9.4    Total Protein 7.4     6.7  6.7     6.7    Albumin 4.7     4.4  4.4     4.0    Globulin  2.3  2.3     2.7    Total Bilirubin  0.3  0.2    Alkaline Phosphatase  69  80    AST (SGOT)  23  24    ALT (SGPT)  25  33    Albumin/Globulin Ratio  1.9  1.9     1.5    BUN/Creatinine Ratio  13.3  17.1    Anion Gap  10.0  5.7       Details          This result is from an external source.             CBC w/diff          8/7/2024    09:51 10/24/2024    08:11 3/6/2025    07:31   CBC w/Diff   WBC 3.84  4.05  4.13    RBC 3.13  3.77  3.91    Hemoglobin 9.8  11.7  12.1    Hematocrit 31.0  36.7  37.9    MCV 99.0  97.3  96.9    MCH 31.3  31.0  30.9    MCHC 31.6  31.9  31.9    RDW 13.4  12.6  12.8    Platelets 140  136  164    Neutrophil Rel % 49.0  38.6  37.7    Immature Granulocyte Rel % 0.0  0.2  0.0     Lymphocyte Rel % 33.9  44.2  47.5    Monocyte Rel % 11.7  13.1  11.6    Eosinophil Rel % 4.9  3.2  2.7    Basophil Rel % 0.5  0.7  0.5      Lipid Panel          7/26/2024    14:42   Lipid Panel   Total Cholesterol 178    Triglycerides 71    HDL Cholesterol 54    VLDL Cholesterol 13    LDL Cholesterol  111    LDL/HDL Ratio 2.03      TSH          7/26/2024    14:42   TSH   TSH 1.450      A1C Last 3 Results          7/26/2024    14:42   HGBA1C Last 3 Results   Hemoglobin A1C 5.40             Procedures          Assessment & Plan  Dysfunction of both eustachian tubes  Advised to call and reschedule his ENT appointment that he had cancelled previously.        Right wrist pain  Followed by ortho       Chronic pain of left knee  Burning in nature  No known cause  Discussed potential management options.        Severe morning sleep inertia  Reviewed patient's med list. He is on trazodone and Seroquel prescribed by psychiatry. Advised patient to discuss his symptoms with Dr. Davis to see if medications can be adjusted.                 FOLLOW UP  Return in about 6 months (around 11/21/2025) for Next scheduled follow up.  Patient was given instructions and counseling regarding his condition or for health maintenance advice. Please see specific information pulled into the AVS if appropriate.       Paige Florez MD  05/21/25  14:19 EDT    CURRENT & DISCONTINUED MEDICATIONS  Current Outpatient Medications   Medication Instructions    amoxicillin (AMOXIL) 500 mg    atorvastatin (LIPITOR) 10 mg, Nightly    busPIRone (BUSPAR) 5 mg, Oral, 3 Times Daily    carboxymethylcellulose (REFRESH PLUS) 0.5 % solution 2 drops, Both Eyes, 3 Times Daily PRN    cetirizine (ZYRTEC) 10 mg, Oral, Daily    finasteride (PROSCAR) 5 mg, Oral, Daily    FLUoxetine (PROZAC) 10 mg, Oral, Daily    FLUoxetine (PROZAC) 20 mg, Oral, Daily, Start prozac 7/17    fluticasone (FLONASE) 50 MCG/ACT nasal spray 2 sprays, Nasal, Daily    HYDROmorphone  (DILAUDID) 0-2 mg/hr    IRON, FERROUS GLUCONATE, PO 65 mg, Daily    lisinopril (PRINIVIL,ZESTRIL) 2.5 mg, Daily    Lubricant Eye Drops PF 0.5 % solution     Lysine 1000 MG tablet Take  by mouth.    Magnesium 250 MG tablet Take  by mouth.    pain patient supplied pump Continuous    QUEtiapine (SEROQUEL) 25 mg, Oral, Nightly    tamsulosin (FLOMAX) 0.8 mg, Oral, Daily    Testosterone Cypionate (DEPOTESTOTERONE CYPIONATE) 200 mg, Every 14 Days    traZODone (DESYREL) 50 mg, Oral, Nightly    Turmeric 500 MG capsule Take  by mouth.       There are no discontinued medications.

## 2025-05-29 ENCOUNTER — TELEMEDICINE (OUTPATIENT)
Dept: PSYCHIATRY | Facility: CLINIC | Age: 65
End: 2025-05-29
Payer: MEDICARE

## 2025-05-29 DIAGNOSIS — F41.1 GENERALIZED ANXIETY DISORDER: Primary | ICD-10-CM

## 2025-05-29 DIAGNOSIS — F33.1 MAJOR DEPRESSIVE DISORDER, RECURRENT EPISODE, MODERATE: ICD-10-CM

## 2025-05-29 NOTE — PROGRESS NOTES
Date: May 29, 2025  Time In: 15:00 ET  Time Out: 15:49 ET    This provider is located at the [facility]), [address], [place] [address] using a secure [employer] Video Visit through [employer]. Patient is being seen remotely via telehealth at home address in [place] and stated they are in a secure environment for this session. The patient's condition being diagnosed/treated is appropriate for telemedicine. The provider identified herself as well as her credentials. The patient, and/or patients guardian, consent to be seen remotely, and when consent is given they understand that the consent allows for patient identifiable information to be sent to a third party as needed. They may refuse to be seen remotely at any time. The electronic data is encrypted and password protected, and the patient and/or guardian has been advised of the potential risks to privacy not withstanding such measures.     You have chosen to receive care through a telehealth visit.  Do you consent to use a video/audio connection for your medical care today? Yes    Subjective   Stephon Castellano is a 64 y.o. male who presents today for follow up    Chief Complaint:   Chief Complaint   Patient presents with    Anxiety    Depression           Clinical Maneuvering/Intervention:      Assisted patient in processing above session content; acknowledged and normalized patient’s thoughts, feelings, and concerns.  Rationalized patient thought process regarding concerns presented at session.  Discussed triggers associated with patient's  anxiety  and depression  Also discussed coping skills for patient to implement such as mindfulness , increasing activity , self care , and positive self talk       Allowed patient to freely discuss issues without interruption or judgment. Provided safe, confidential environment to facilitate the development of positive therapeutic relationship and encourage open,  honest communication. Assisted patient in identifying risk factors which would indicate the need for higher level of care including thoughts to harm self or others and/or self-harming behavior and encouraged patient to contact this office, call 911, or present to the nearest emergency room should any of these events occur. Discussed crisis intervention services and means to access. Patient adamantly and convincingly denies current suicidal or homicidal ideation or perceptual disturbance.    Assessment:     Patient appears to maintain relative stability as compared to their baseline.  However, patient continues to struggle with anxiety and depression which continues to cause impairment in important areas of functioning.  A result, they can be reasonably expected to continue to benefit from treatment and would likely be at increased risk for decompensation otherwise.      PHQ-Score Total:  PHQ-9 Total Score: 14     TRIP-7 Score Total:  Over the last two weeks, how often have you been bothered by the following problems?  Feeling nervous, anxious or on edge: Several days  Not being able to stop or control worrying: Several days  Worrying too much about different things: Several days  Trouble Relaxing: Several days  Being so restless that it is hard to sit still: Not at all  Becoming easily annoyed or irritable: More than half the days  Feeling afraid as if something awful might happen: Several days  TRIP 7 Total Score: 7  If you checked any problems, how difficult have these problems made it for you to do your work, take care of things at home, or get along with other people: Somewhat difficult      Mental Status Exam:   Hygiene:   good  Cooperation:  Cooperative  Eye Contact:  Good  Psychomotor Behavior:  Appropriate  Affect:  Appropriate  Mood: sad, depressed, anxious, and fluctates  Speech:  Normal  Thought Process:  Goal directed  Thought Content:  Normal  Suicidal:  None  Homicidal:  None  Hallucinations:   None  Delusion:  None  Memory:  Intact  Orientation:  Person, Place, Time, and Situation  Reliability:  good  Insight:  Good  Judgement:  Good  Impulse Control:  Good  Physical/Medical Issues:  Yes Chronic Pain       Patient's Support Network Includes:  children and father    Functional Status: Moderate impairment     Progress toward goal: Not at goal    Prognosis: Guarded with Ongoing Treatment      Plan:    Patient will continue in individual outpatient therapy with focus on improved functioning and coping skills, maintaining stability, and avoiding decompensation and the need for higher level of care.    Patient will adhere to medication regimen as prescribed and report any side effects. Patient will contact this office, call 911 or present to the nearest emergency room should suicidal or homicidal ideations occur. Provide Cognitive Behavioral Therapy and Solution Focused Therapy to improve functioning, maintain stability, and avoid decompensation and the need for higher level of care.         VISIT DIAGNOSIS:     ICD-10-CM ICD-9-CM   1. Generalized anxiety disorder  F41.1 300.02   2. Major depressive disorder, recurrent episode, moderate  F33.1 296.32        Return in about 4 weeks (around 6/26/2025).    This document has been electronically signed by Antonina Aguirre [facility]  June 1, 2025 13:52 EDT     Part of this note may be an electronic transcription/translation of spoken language to printed text using the Dragon Dictation System.

## 2025-06-02 ENCOUNTER — LAB (OUTPATIENT)
Facility: HOSPITAL | Age: 65
End: 2025-06-02
Payer: MEDICARE

## 2025-06-02 DIAGNOSIS — D69.6 THROMBOCYTOPENIA: ICD-10-CM

## 2025-06-02 DIAGNOSIS — D50.8 OTHER IRON DEFICIENCY ANEMIA: ICD-10-CM

## 2025-06-02 DIAGNOSIS — N18.32 STAGE 3B CHRONIC KIDNEY DISEASE: ICD-10-CM

## 2025-06-02 DIAGNOSIS — D64.9 NORMOCYTIC ANEMIA: ICD-10-CM

## 2025-06-02 DIAGNOSIS — D89.89 KAPPA LIGHT CHAIN DISEASE: ICD-10-CM

## 2025-06-02 LAB
ALBUMIN SERPL-MCNC: 4.4 G/DL (ref 3.5–5.2)
ALBUMIN/GLOB SERPL: 1.6 G/DL
ALP SERPL-CCNC: 78 U/L (ref 39–117)
ALT SERPL W P-5'-P-CCNC: 19 U/L (ref 1–41)
ANION GAP SERPL CALCULATED.3IONS-SCNC: 9.6 MMOL/L (ref 5–15)
AST SERPL-CCNC: 21 U/L (ref 1–40)
BASOPHILS # BLD AUTO: 0.03 10*3/MM3 (ref 0–0.2)
BASOPHILS NFR BLD AUTO: 0.6 % (ref 0–1.5)
BILIRUB SERPL-MCNC: 0.4 MG/DL (ref 0–1.2)
BUN SERPL-MCNC: 14 MG/DL (ref 8–23)
BUN/CREAT SERPL: 9.4 (ref 7–25)
CALCIUM SPEC-SCNC: 8.9 MG/DL (ref 8.6–10.5)
CHLORIDE SERPL-SCNC: 100 MMOL/L (ref 98–107)
CO2 SERPL-SCNC: 28.4 MMOL/L (ref 22–29)
CREAT SERPL-MCNC: 1.49 MG/DL (ref 0.76–1.27)
DEPRECATED RDW RBC AUTO: 46.9 FL (ref 37–54)
EGFRCR SERPLBLD CKD-EPI 2021: 51.8 ML/MIN/1.73
EOSINOPHIL # BLD AUTO: 0.14 10*3/MM3 (ref 0–0.4)
EOSINOPHIL NFR BLD AUTO: 2.9 % (ref 0.3–6.2)
ERYTHROCYTE [DISTWIDTH] IN BLOOD BY AUTOMATED COUNT: 13.1 % (ref 12.3–15.4)
GLOBULIN UR ELPH-MCNC: 2.7 GM/DL
GLUCOSE SERPL-MCNC: 89 MG/DL (ref 65–99)
HCT VFR BLD AUTO: 39.1 % (ref 37.5–51)
HGB BLD-MCNC: 12.5 G/DL (ref 13–17.7)
IMM GRANULOCYTES # BLD AUTO: 0.01 10*3/MM3 (ref 0–0.05)
IMM GRANULOCYTES NFR BLD AUTO: 0.2 % (ref 0–0.5)
LYMPHOCYTES # BLD AUTO: 2.3 10*3/MM3 (ref 0.7–3.1)
LYMPHOCYTES NFR BLD AUTO: 48.1 % (ref 19.6–45.3)
MCH RBC QN AUTO: 31.2 PG (ref 26.6–33)
MCHC RBC AUTO-ENTMCNC: 32 G/DL (ref 31.5–35.7)
MCV RBC AUTO: 97.5 FL (ref 79–97)
MONOCYTES # BLD AUTO: 0.52 10*3/MM3 (ref 0.1–0.9)
MONOCYTES NFR BLD AUTO: 10.9 % (ref 5–12)
NEUTROPHILS NFR BLD AUTO: 1.78 10*3/MM3 (ref 1.7–7)
NEUTROPHILS NFR BLD AUTO: 37.3 % (ref 42.7–76)
NRBC BLD AUTO-RTO: 0 /100 WBC (ref 0–0.2)
PLATELET # BLD AUTO: 154 10*3/MM3 (ref 140–450)
PMV BLD AUTO: 10.1 FL (ref 6–12)
POTASSIUM SERPL-SCNC: 4.4 MMOL/L (ref 3.5–5.2)
PROT SERPL-MCNC: 7.1 G/DL (ref 6–8.5)
RBC # BLD AUTO: 4.01 10*6/MM3 (ref 4.14–5.8)
SODIUM SERPL-SCNC: 138 MMOL/L (ref 136–145)
WBC NRBC COR # BLD AUTO: 4.78 10*3/MM3 (ref 3.4–10.8)

## 2025-06-02 PROCEDURE — 84425 ASSAY OF VITAMIN B-1: CPT

## 2025-06-02 PROCEDURE — 83521 IG LIGHT CHAINS FREE EACH: CPT

## 2025-06-02 PROCEDURE — 80053 COMPREHEN METABOLIC PANEL: CPT

## 2025-06-02 PROCEDURE — 36415 COLL VENOUS BLD VENIPUNCTURE: CPT

## 2025-06-02 PROCEDURE — 86334 IMMUNOFIX E-PHORESIS SERUM: CPT

## 2025-06-02 PROCEDURE — 85025 COMPLETE CBC W/AUTO DIFF WBC: CPT

## 2025-06-02 PROCEDURE — 82784 ASSAY IGA/IGD/IGG/IGM EACH: CPT

## 2025-06-02 PROCEDURE — 84165 PROTEIN E-PHORESIS SERUM: CPT

## 2025-06-03 ENCOUNTER — LAB (OUTPATIENT)
Facility: HOSPITAL | Age: 65
End: 2025-06-03
Payer: MEDICARE

## 2025-06-03 DIAGNOSIS — D89.89 KAPPA LIGHT CHAIN DISEASE: ICD-10-CM

## 2025-06-03 DIAGNOSIS — D50.8 OTHER IRON DEFICIENCY ANEMIA: ICD-10-CM

## 2025-06-03 DIAGNOSIS — D64.9 NORMOCYTIC ANEMIA: ICD-10-CM

## 2025-06-03 DIAGNOSIS — N18.32 STAGE 3B CHRONIC KIDNEY DISEASE: ICD-10-CM

## 2025-06-03 DIAGNOSIS — D69.6 THROMBOCYTOPENIA: ICD-10-CM

## 2025-06-03 PROCEDURE — 81050 URINALYSIS VOLUME MEASURE: CPT

## 2025-06-03 PROCEDURE — 82570 ASSAY OF URINE CREATININE: CPT

## 2025-06-03 PROCEDURE — 83825 ASSAY OF MERCURY: CPT

## 2025-06-03 PROCEDURE — 83521 IG LIGHT CHAINS FREE EACH: CPT

## 2025-06-03 PROCEDURE — 83655 ASSAY OF LEAD: CPT

## 2025-06-03 PROCEDURE — 82175 ASSAY OF ARSENIC: CPT

## 2025-06-04 LAB
ALBUMIN SERPL ELPH-MCNC: 3.9 G/DL (ref 2.9–4.4)
ALBUMIN/GLOB SERPL: 1.4 {RATIO} (ref 0.7–1.7)
ALPHA1 GLOB SERPL ELPH-MCNC: 0.2 G/DL (ref 0–0.4)
ALPHA2 GLOB SERPL ELPH-MCNC: 0.6 G/DL (ref 0.4–1)
B-GLOBULIN SERPL ELPH-MCNC: 1 G/DL (ref 0.7–1.3)
GAMMA GLOB SERPL ELPH-MCNC: 1.1 G/DL (ref 0.4–1.8)
GLOBULIN SER-MCNC: 3 G/DL (ref 2.2–3.9)
IGA SERPL-MCNC: 166 MG/DL (ref 61–437)
IGG SERPL-MCNC: 1094 MG/DL (ref 603–1613)
IGM SERPL-MCNC: 92 MG/DL (ref 20–172)
INTERPRETATION SERPL IEP-IMP: ABNORMAL
KAPPA LC FREE SER-MCNC: 36.4 MG/L (ref 3.3–19.4)
KAPPA LC FREE/LAMBDA FREE SER: 1.59 {RATIO} (ref 0.26–1.65)
LABORATORY COMMENT REPORT: ABNORMAL
LAMBDA LC FREE SERPL-MCNC: 22.9 MG/L (ref 5.7–26.3)
M PROTEIN SERPL ELPH-MCNC: ABNORMAL G/DL
PROT SERPL-MCNC: 6.9 G/DL (ref 6–8.5)

## 2025-06-05 LAB
KAPPA LC FREE UR-MCNC: 24.51 MG/L (ref 1.17–86.46)
KAPPA LC FREE/LAMBDA FREE UR: 7.98 (ref 1.83–14.26)
LAMBDA LC FREE UR-MCNC: 3.07 MG/L (ref 0.27–15.21)

## 2025-06-06 LAB — VIT B1 BLD-SCNC: 130.6 NMOL/L (ref 66.5–200)

## 2025-06-06 NOTE — PROGRESS NOTES
Chief Complaint/Reason for Referral:  Anemia (3 month follow up )    Paige Florez*  Paige Florez MD    Records Obtained:  Records of the patients history including those obtained from [facility] EMR were reviewed and summarized in detail.    Subjective    History of Present Illness    Stephon Castellano presents to [facility] HEMATOLOGY & ONCOLOGY for Normocytic Anemia    Patient is a 63 yo M with PMH of alcohol abuse, substance abuse [id number], CKD stage 3, MUNIRA, anemia,   atrial fibrillation in 2023 presenting for follow up for anemia. Follows with nephrology.  Reports daily fatigue that is significant. Reports lightheadedness. Denies any bleeding.  No fevers or chills or infections.  Recent colonoscopy and EGD with benign findings. Recent bone marrow performed. Tolerated well. Marrow results discussed with patient. They were normal. Patient remains fatigued.     History of Present Illness  The patient is a 64-year-old male who presents to the hematology department for a follow-up on his anemia.    He reports overall good health but experiences persistent fatigue, which he attributes to his medication regimen. He plans to discuss potential dosage adjustments with his primary care physician during their upcoming appointment. He has not experienced any night sweats or unexplained weight loss. His weight has been stable, with a previous unintentional weight loss of 37 pounds. He recalls a period of significant illness characterized by severe anemia and other symptoms, but he has since recovered. He does not consume sushi and maintains a diet consisting of hamburgers, pizza, peanut butter, jelly sandwiches, and toast. He resides in a house built in the early 1980s. His wife is in good health and did not fall ill when he was previously unwell.    He also reports chronic pain in his neck and left knee, along with neuropathy in his feet, which he describes as numbness rather than pain. He  experiences tingling sensations in his feet and suspects a circulatory issue due to his fingers turning white in cold weather. He has not sought neurological or vascular consultation for these symptoms. He reports balance issues, including unexplained falls and lower leg weakness after walking approximately 100 meters. These symptoms began in January 2024 and persisted for 6 months. He has undergone an EMG nerve conduction test but has not had a heavy metal screen.    6/9/25:  The patient is a 64-year-old male who presents to the hematology oncology department for follow-up on his iron deficiency anemia and low platelets.    He reports experiencing fatigue during the day, even though he maintains a good sleep pattern. He does not experience any chest pain or shortness of breath. He occasionally experiences heartburn and an unusual sensation in his heart, but these episodes are not associated with any pain. These symptoms occur approximately once or twice a week, without any discernible pattern. He suspects that his consumption of spicy foods may be a contributing factor.    Hematology History  2/13/24: Hgb 10.4  8/7/24: Hgb 9.8, MCV 99, plt 140, WBC 3.84, ferritin 483, iron sat 28, B12 362, folate 14, , hapto normal, copper 76, zinc 75, SPEP without M-spike, kappa/lambda FLC ratio 2 (wnl for his GFR)  8/13/24: EGD and c-scope: H pylori negative, benign findings.  10/24/24: Bone marrow biopsy: Normocellular marrow for age with maturing trilineage hematopoiesis.  Storage iron is present.  Limited marrow tissue for evaluation.  Flow cytometry with normal level of blasts.  No significant immunophenotypic abnormalities detected.  Normal male karyotype.  MDS FISH panel was negative.  Intelligen myeloid panel NGS with variant of unknown significance found in PML gene.   10/24/24: Hgb 11.7, iron sat 22%    Oncology/Hematology History    No history exists.     Review of Systems   HENT:  Positive for ear pain.     Musculoskeletal:  Positive for arthralgias and neck pain.   Neurological:         Numbness and tingling in bilat UE/LE   All other systems reviewed and are negative.    Current Outpatient Medications on File Prior to Visit   Medication Sig Dispense Refill    atorvastatin (LIPITOR) 10 MG tablet Take 1 tablet by mouth Every Night.      busPIRone (BUSPAR) 5 MG tablet Take 1 tablet by mouth 3 (Three) Times a Day. (Patient taking differently: Take 1 tablet by mouth Daily.) 90 tablet 2    carboxymethylcellulose (REFRESH PLUS) 0.5 % solution Administer 2 drops to both eyes 3 (Three) Times a Day As Needed for Dry Eyes. 30 mL 12    cetirizine (zyrTEC) 10 MG tablet Take 1 tablet by mouth Daily. 90 tablet 1    finasteride (PROSCAR) 5 MG tablet Take 1 tablet by mouth Daily for 360 days. 90 tablet 3    FLUoxetine (PROzac) 10 MG capsule Take 1 capsule by mouth Daily. 90 capsule 1    FLUoxetine (PROzac) 20 MG capsule Take 1 capsule by mouth Daily. Start prozac 7/17 90 capsule 1    fluticasone (FLONASE) 50 MCG/ACT nasal spray Administer 2 sprays into the nostril(s) as directed by provider Daily. 16 g 11    HYDROmorphone (DILAUDID) 1 MG/ML injection Infuse 0-2 mg/hr into a venous catheter.      lisinopril (PRINIVIL,ZESTRIL) 5 MG tablet Take 0.5 tablets by mouth Daily.      Lubricant Eye Drops PF 0.5 % solution       Lysine 1000 MG tablet Take  by mouth.      Magnesium 250 MG tablet Take  by mouth.      pain patient supplied pump by Intrathecal route Continuous. Instructions are in drop boxes      QUEtiapine (SEROquel) 25 MG tablet Take 1 tablet by mouth Every Night. 90 tablet 1    tamsulosin (FLOMAX) 0.4 MG capsule 24 hr capsule Take 2 capsules by mouth Daily for 360 days. 180 capsule 3    Testosterone Cypionate (DEPOTESTOTERONE CYPIONATE) 200 MG/ML injection Inject 1 mL into the appropriate muscle as directed by prescriber Every 14 (Fourteen) Days. (Patient taking differently: Inject 1 mL into the appropriate muscle as directed  by prescriber Every 10 (Ten) Days.)      traZODone (DESYREL) 50 MG tablet Take 1 tablet by mouth Every Night. 90 tablet 1    amoxicillin (AMOXIL) 500 MG capsule Take 1 capsule by mouth. (Patient not taking: Reported on 5/21/2025)      IRON, FERROUS GLUCONATE, PO Take 65 mg by mouth Daily. (Patient not taking: Reported on 6/9/2025)      Turmeric 500 MG capsule Take  by mouth. (Patient not taking: Reported on 6/9/2025)       No current facility-administered medications on file prior to visit.     No Known Allergies  Past Medical History:   Diagnosis Date    Alcohol abuse     During COVID Lockdown    Allergic rhinitis     Anemia 02/06/2024    My hand started just shaking but got worse until now i have no strength in my left hand    Anxiety 2006    Starting taking mood depression drugs    Arthritis 2005    Have Stenosis of C-5,6,7& had Anterior Diseconomy    Asthma About 6 months    Sore Throat and was sent to ENT    Atrial fibrillation 2023    Benign prostatic hyperplasia 2015    Dad had Prostate Cancer and just did Colonoscopy and they said i have a major large Prostate    Cervical neck pain with evidence of disc disease     Chronic pain disorder 2006    Neck pain    Clotting disorder 2024    Ulcer    Colon polyp 2020    Had a lot of polyps on the last 2 Colonoscopies    CTS (carpal tunnel syndrome) 2005    Depression     I have major problems with depression and am taking medication for it.    Difficulty walking 2033    Erectile dysfunction 2017    I have been on Testosterone replacements since then    GI (gastrointestinal bleed) 2024    Ulcer    Head injury 1977    Headache May 2023    Started abou 6 months or more    Hemorrhoid     HL (hearing loss) 2004    Have a major tinitis/ringing in my ears especially the left one    Hyperlipidemia 2017    My blood test have stated they were high or elevated    Hypertension 2017    Given Flomax said it will probably go down    Hypogonadism in male     Insomnia     Kidney  stone 2020    Went to doctor and got medication for them and still have issues with right side    Low back pain 2010    Stinosis of back & neck    Memory loss 2023    Movement disorder 2023    Sleep apnea 2007    Substance abuse [id number]    On Disulfiram for alcohol abuse-voluntary    Ulcer 2024    Upper scope    Visual impairment 2023    Just recently have been given eye drops    Withdrawal symptoms, alcohol 2022    During COVD Lockdown     Past Surgical History:   Procedure Laterality Date    BACK SURGERY  2010    Anterior Diseconomies    CERVICAL DISC SURGERY  2008    COLONOSCOPY N/A 08/18/2023    Procedure: COLONOSCOPY WITH POLYPECTOMY/SNARE;  Surgeon: Jose Alejandro Fox MD;  Location: [facility] ENDOSCOPY;  Service: General;  Laterality: N/A;  COLON POLYPS    COLONOSCOPY N/A 08/13/2024    Procedure: COLONOSCOPY WITH POLYPECTOMIES;  Surgeon: Jose Alejandro Fox MD;  Location: [facility] ENDOSCOPY;  Service: General;  Laterality: N/A;  COLON POLYPS    ENDOSCOPY N/A 08/13/2024    Procedure: ESOPHAGOGASTRODUODENOSCOPY WITH BIOPSIES;  Surgeon: Jose Alejandro Fox MD;  Location: [facility] ENDOSCOPY;  Service: General;  Laterality: N/A;  GASTRITIS. SUPERFICIAL GASTRIC ULCERS    ENDOSCOPY N/A 11/27/2024    Procedure: ESOPHAGOGASTRODUODENOSCOPY WITH BIOPSIES;  Surgeon: Tamara Molina MD;  Location: [facility] ENDOSCOPY;  Service: Gastroenterology;  Laterality: N/A;  GASTRITIS    HEEL SPUR SURGERY  2005    HEMORRHOIDECTOMY  1995    KNEE ACL RECONSTRUCTION  2004    OTHER SURGICAL HISTORY      METAL IMPLANT    PAIN PUMP INSERTION/REVISION  2010    SPINE SURGERY  2008    Antieror c5-7 discotomy    TONSILLECTOMY  1965    UPPER GASTROINTESTINAL ENDOSCOPY  2024    Have ulcer    VASECTOMY  2002     Social History     Socioeconomic History    Marital status:    Tobacco Use    Smoking status: Former     Current packs/day: 0.00     Average packs/day: 2.0 packs/day for 8.0 years (16.0 ttl pk-yrs)     Types: Cigarettes     Start  date: 1975     Quit date: 1983     Years since quittin.4     Passive exposure: Past    Smokeless tobacco: Former     Types: Snuff    Tobacco comments:     Also dipped for several years   Vaping Use    Vaping status: Never Used   Substance and Sexual Activity    Alcohol use: Not Currently     Alcohol/week: 14.0 standard drinks of alcohol     Types: 14 Shots of liquor per week     Comment: Stopped due to Disulfiram 250 mg    Drug use: Not Currently     Frequency: 7.0 times per week     Types: Marijuana    Sexual activity: Not Currently     Partners: Female     Birth control/protection: None, Vasectomy     Comment: Low testosterone     Family History   Problem Relation Age of Onset    Suicide Attempts Mother     Bipolar disorder Mother     Anxiety disorder Mother     Heart disease Mother     Osteoporosis Mother     Alcohol abuse Mother         Mother committed suicide    Depression Mother         She had major depression    Early death Mother         She committed suicide    Thyroid disease Mother         She had Thyroid issues and had surgery    Cancer Father         Prostate    Prostate cancer Father     Hearing loss Father         He has worn a hearing aids since his late 50’s    Depression Sister     Alcohol abuse Sister     Diabetes Sister         Diabetes    Arthritis Sister     Sleep apnea Sister     Obesity Sister     Thyroid disease Sister     Insomnia Sister     Narcolepsy Sister     Diabetes Brother     Stroke Other         AUNT/UNCLE GRANDPARENTS    Diabetes Other         GRANDPARENTS      Immunization History   Administered Date(s) Administered    COVID-19 (SHRAVAN) 03/10/2021    Covid-19 (Pfizer) Gray Cap Monovalent 2022    DTaP 2019    FluMist 2-49yrs (Nasal) 2005, 2005    Fluzone  >6mos 10/04/2011, 10/15/2015    Fluzone (or Fluarix & Flulaval for VFC) >6mos 2019, 2022, 03/15/2024    Fluzone High-Dose 65+YRS 2024    Fluzone Quad >6mos (Multi-dose)  10/01/2019    H1N1 Inj 12/16/2009    Hepatitis A 05/08/1995, 11/08/1999    Influenza Injectable Mdck Pf Quad 10/27/2020, 02/15/2023    Influenza Seasonal Injectable 10/01/2018    Influenza Whole 12/03/2003, 10/12/2011    Influenza, Unspecified 10/01/2018, 10/27/2020, 02/15/2023    MMR 04/25/2003    Measles 05/02/1984    Meningococcal Polysaccharide 04/19/2002    OPV 10/03/1986    PPD Test 11/13/2001, 04/25/2003    Plague 01/27/1986, 03/10/1986, 06/13/1989    Pneumococcal Conjugate 20-Valent (PCV20) 09/01/2022    Pneumococcal, Unspecified 09/01/2022    Rubella 05/02/1984    Shingrix 01/25/2022, 08/02/2022    Td (TDVAX) 11/08/1999    Tdap 09/28/2010, 03/29/2019    Typhoid Inactivated 11/13/2001    Typhoid, Unspecified 11/13/2001    Yellow Fever 05/13/1994    influenza Split 11/21/2001, 11/01/2002, 10/30/2007, 10/07/2009, 10/20/2009     Tobacco Use: Medium Risk (6/9/2025)    Patient History     Smoking Tobacco Use: Former     Smokeless Tobacco Use: Former     Passive Exposure: Past     Objective     Physical Exam  Vitals and nursing note reviewed.   Constitutional:       General: He is not in acute distress.     Appearance: Normal appearance. He is not ill-appearing.   HENT:      Head: Normocephalic.   Eyes:      Conjunctiva/sclera: Conjunctivae normal.   Cardiovascular:      Rate and Rhythm: Normal rate and regular rhythm.      Heart sounds: Normal heart sounds.   Pulmonary:      Effort: Pulmonary effort is normal.      Breath sounds: Normal breath sounds.   Abdominal:      Palpations: Abdomen is soft.   Lymphadenopathy:      Cervical: No cervical adenopathy.      Right cervical: No superficial cervical adenopathy.     Left cervical: No superficial cervical adenopathy.      Upper Body:      Right upper body: No axillary adenopathy.      Left upper body: No axillary adenopathy.   Skin:     Coloration: Skin is not jaundiced.   Neurological:      Mental Status: He is alert.   Psychiatric:         Mood and Affect: Mood  "normal.         Behavior: Behavior normal. Behavior is cooperative.         Thought Content: Thought content normal.         Judgment: Judgment normal.       Vitals:    06/09/25 1052   BP: 125/74   Pulse: 75   Resp: 16   Temp: 98.7 °F (37.1 °C)   TempSrc: Temporal   SpO2: 97%   Weight: 103 kg (226 lb)   Height: 180.3 cm (70.98\")   PainSc: 0-No pain         Wt Readings from Last 3 Encounters:   06/09/25 103 kg (226 lb)   05/21/25 102 kg (225 lb)   04/23/25 103 kg (227 lb 12.8 oz)      ECOG score: 0         ECOG: (0) Fully Active - Able to Carry On All Pre-disease Performance Without Restriction  Fall Risk Assessment was completed, and patient is at low risk for falls.  PHQ-9 Total Score:         The patient is  experiencing fatigue. Fatigue score: 9    PT/OT Functional Screening:   Speech Functional Screening:   Rehab to be ordered:     Result Review :  The following data was reviewed by: Tommy Doshi PA-C on 06/09/25:    RED BLOOD CELLs:  Lab Results   Component Value Date    RBC 4.01 (L) 06/02/2025    HGB 12.5 (L) 06/02/2025    HCT 39.1 06/02/2025    MCV 97.5 (H) 06/02/2025     06/02/2025      WHITE BLOOD CELLs:  Lab Results   Component Value Date    WBC 4.78 06/02/2025    NEUTROABS 1.78 06/02/2025    LYMPHSABS 2.30 06/02/2025    MONOABS 0.41 10/24/2024    EOSABS 0.14 06/02/2025    BASOSABS 0.03 06/02/2025     RBC EVALUATION (MICROCYTOSIS/NORMOCYTOSIS):  Lab Results   Component Value Date    RETIC 0.0344 08/07/2024    MCV 97.5 (H) 06/02/2025    IRON 72 03/06/2025    FERRITIN 314.90 03/06/2025    LABIRON 19 (L) 03/06/2025    TIBC 386 03/06/2025    TRANSFERRIN 259 03/06/2025     HEMOLYSIS EVALUATION:  Lab Results   Component Value Date    MCV 97.5 (H) 06/02/2025     08/07/2024    HAPTOGLOBIN 110 08/07/2024    RETIC 0.0344 08/07/2024     Sed Rate   Date Value Ref Range Status   08/07/2024 1 0 - 20 mm/hr Final     C-Reactive Protein   Date Value Ref Range Status   02/28/2024 <0.30 0.00 - 0.50 mg/dL Final "     MACROCYTOSIS EVALUATION:  Lab Results   Component Value Date    RETIC 0.0344 08/07/2024    MCV 97.5 (H) 06/02/2025    [id number] 695 08/07/2024    FOLATE 14.00 08/07/2024     RENAL FUNCTION TESTs:  Lab Results   Component Value Date    EGFR 51.8 (L) 06/02/2025    BUN 14.0 06/02/2025     LIVER FUNCTION TESTs:  Lab Results   Component Value Date    ALT 19 06/02/2025    AST 21 06/02/2025    BILITOT 0.4 06/02/2025    BILIDIR <10 09/20/2023    BILIDIR <10 09/20/2023    BILIDIR <10 09/20/2023    ALKPHOS 78 06/02/2025    PROTEINTOT 7.1 06/02/2025    PROTEINTOT 6.9 06/02/2025    ALBUMIN 4.4 06/02/2025    ALBUMIN 3.9 06/02/2025     GLUCOSE EVALUATION:  Lab Results   Component Value Date    GLUCOSE 89 06/02/2025    HGBA1C 5.40 07/26/2024     SERUM CHEMISTRIES:  Lab Results   Component Value Date     06/02/2025    K 4.4 06/02/2025     06/02/2025    CO2 28.4 06/02/2025    CALCIUM 8.9 06/02/2025     URINALYSIS:  Lab Results   Component Value Date    RBCUR Negative 01/02/2024    LEUKOCYTESUR Negative 10/24/2024    BILIRUBINUR Negative 10/24/2024    PHUR 6.0 10/24/2024    SPECGRAVUR 1.010 10/24/2024     THYROID FUNCTION TESTs:  TSH   Date Value Ref Range Status   07/26/2024 1.450 0.270 - 4.200 uIU/mL Final     Free T4   Date Value Ref Range Status   11/15/2023 0.90 (L) 0.93 - 1.70 ng/dL Final     T3, Free   Date Value Ref Range Status   09/20/2023 3.05 2.00 - 4.40 pg/mL Final     TUMOR MARKERS:  Lab Results   Component Value Date    PSA 0.6 11/14/2023     Free Lambda Light Chains   Date Value Ref Range Status   06/02/2025 22.9 5.7 - 26.3 mg/L Final     IgA   Date Value Ref Range Status   06/02/2025 166 61 - 437 mg/dL Final     Results  Laboratory Studies  Ferritin improved from 483 to 314. White blood cell count is 4.1. Hemoglobin improved from 8.8 to 12.1. Platelet count improved from 136 to 164. Iron is 72, iron saturation is 19, transferrin and TIBC are normal. B12 and folate levels are normal. Copper and zinc  levels are normal.      Labs   - White Blood Cells: 06/02/2025, 4.78   - Platelets: 154, 164   - Hemoglobin: 12.5   - Hematocrit: Normalized   - Creatinine: 1.49   - Sodium: Normal   - Potassium: Normal   - Chloride: Normal   - Calcium: Normal   - Protein: Normal   - Liver Enzymes: Completely normal   - Kidney Function: 51%    Diagnostic Testing   - [facility] Light Chains: 36       Assessment and Plan:  Diagnoses and all orders for this visit:    1. Normocytic anemia (Primary)  -     CBC & Differential; Future  -     Ferritin; Future  -     Iron Profile w/o Ferritin; Future  -     Comprehensive Metabolic Panel; Future    2. Thrombocytopenia  -     CBC & Differential; Future  -     Ferritin; Future  -     Iron Profile w/o Ferritin; Future  -     Comprehensive Metabolic Panel; Future    3. Other iron deficiency anemia    4. Stage 3b chronic kidney disease  -     CBC & Differential; Future  -     Ferritin; Future  -     Iron Profile w/o Ferritin; Future  -     Comprehensive Metabolic Panel; Future    5. Kappa light chain disease  Comments:  Recheck panel labs in 6 months  Orders:  -     CBC & Differential; Future  -     Ferritin; Future  -     Iron Profile w/o Ferritin; Future  -     Comprehensive Metabolic Panel; Future    6. CKD stage 3b, GFR 30-44 ml/min      Assessment & Plan  Anemia  - Ferritin levels normalized, CBC showed improvement  - White blood cell count within normal range  - Hemoglobin increased from 8.8 to 12.1 over 7 months  - Platelet count within normal range  - Satisfactory iron profile: iron level 72, slightly low iron saturation 19  - Transferrin and TIBC within acceptable limits  - Excellent B12 and folate levels  - Copper and zinc levels within normal range  - Advised against taking iron supplements  - Encouraged to discuss medication regimen with primary care physician, Dr. Florez  - Recheck labs in 3 months to monitor progress  - Heavy metal screen to rule out arsenic, lead, and mercury  "poisoning  - Complete blood work approximately one week prior to next appointment    Neuropathy  - Reports numbness and tingling in feet, described as feeling \"dead\"  - Similar symptoms in hands, turning white in cold temperatures (possible Raynaud's phenomenon)  - Advised to consult with neurologist or vascular specialist for further evaluation and management  - Heavy metal screen to rule out arsenic, lead, and mercury poisoning    Chronic Pain  - Reports chronic pain in neck and left knee  - Advised to continue managing pain with current regimen  - Consult with primary care physician for any necessary adjustments    Follow-up  - Patient to follow up in 3 months Dr. Solomon    PROCEDURE  The patient underwent an EGD and colonoscopy in the past.      Iron Deficiency Anemia:  - Hemoglobin levels improved to 12.5, close to normal range  - Hematocrit levels normalized  - Fatigue may be due to ongoing anemia  - CBC and iron studies to be conducted in 3 months    Low Platelets:  - Platelet count normalized to 154, 164  - Significant improvement from previous low levels  - Continued monitoring necessary  - Repeat CBC in 3 months    Kidney Disease:  - Kidney function improved, creatinine levels decreased from 2.2 to 1.49  - Kidney function increased from 31% to 51%  - Continued monitoring necessary  - CMP to be conducted in 3 months    Heartburn:  - Occasional heartburn and indigestion, possibly related to spicy food intake  - Advised to take Tums 2-3 times a day as needed for relief and additional calcium    Abnormal kappa light chains:  - Kappa light chains decreased from 48 to 36, ratio normalized  - Positive trend observed  - Continued monitoring necessary  - Immunofixation and protein electrophoresis tests to be conducted in 6 months    Anemia  Mild thrombocytopenia  CKD IIIB  EGD and colonoscopy on the 17th with benign findings, H. pylori negative.  Patient without any iron deficiency.  Ferritin actually elevated " above 400.  Myeloma labs negative.  Hemolysis ruled out.  B12 and folate normal. Hgb as of 10/24/24 actually much improved to 11.7 with normal iron sat. Bone marrow biopsy on 10/24/24 was normal with normal cellularity, maturing trilineage monoparesis, no increase in blasts, normal male karyotype, normal MDS FISH panel.  Intelligen NGS testing with VUS in PML gene.    Anemia could be from his chronic kidney disease in which case erythropoietin can be considered, however as hgb is now above 10.0 g/dL, which is threshold above which EPO is held. Plts borderline low, will need to monitor. Discussed that no full explanation can be provided for the drop in hgb but reassuringly it is much better.    Patient Follow Up: 4-6 months with MD - obtain labs one week prior to visit and labs in 3 months.    I spent 30 minutes caring for Stephon on this date of service. This time includes time spent by me in the following activities:preparing for the visit, reviewing tests, obtaining and/or reviewing a separately obtained history, performing a medically appropriate examination and/or evaluation , counseling and educating the patient/family/caregiver, ordering medications, tests, or procedures, documenting information in the medical record, and independently interpreting results and communicating that information with the patient/family/caregiver    Patient was given instructions and counseling regarding his condition or for health maintenance advice. Please see specific information pulled into the AVS if appropriate.

## 2025-06-09 ENCOUNTER — OFFICE VISIT (OUTPATIENT)
Dept: ONCOLOGY | Facility: HOSPITAL | Age: 65
End: 2025-06-09
Payer: MEDICARE

## 2025-06-09 VITALS
HEIGHT: 71 IN | TEMPERATURE: 98.7 F | HEART RATE: 75 BPM | WEIGHT: 226 LBS | OXYGEN SATURATION: 97 % | BODY MASS INDEX: 31.64 KG/M2 | SYSTOLIC BLOOD PRESSURE: 125 MMHG | DIASTOLIC BLOOD PRESSURE: 74 MMHG | RESPIRATION RATE: 16 BRPM

## 2025-06-09 DIAGNOSIS — N18.32 STAGE 3B CHRONIC KIDNEY DISEASE: ICD-10-CM

## 2025-06-09 DIAGNOSIS — N18.32 CKD STAGE 3B, GFR 30-44 ML/MIN: ICD-10-CM

## 2025-06-09 DIAGNOSIS — D50.8 OTHER IRON DEFICIENCY ANEMIA: ICD-10-CM

## 2025-06-09 DIAGNOSIS — D64.9 NORMOCYTIC ANEMIA: Primary | ICD-10-CM

## 2025-06-09 DIAGNOSIS — D69.6 THROMBOCYTOPENIA: ICD-10-CM

## 2025-06-09 DIAGNOSIS — D89.89 KAPPA LIGHT CHAIN DISEASE: ICD-10-CM

## 2025-06-09 PROCEDURE — 1159F MED LIST DOCD IN RCRD: CPT | Performed by: PHYSICIAN ASSISTANT

## 2025-06-09 PROCEDURE — 1160F RVW MEDS BY RX/DR IN RCRD: CPT | Performed by: PHYSICIAN ASSISTANT

## 2025-06-09 PROCEDURE — 99214 OFFICE O/P EST MOD 30 MIN: CPT | Performed by: PHYSICIAN ASSISTANT

## 2025-06-09 PROCEDURE — G0463 HOSPITAL OUTPT CLINIC VISIT: HCPCS | Performed by: PHYSICIAN ASSISTANT

## 2025-06-09 PROCEDURE — 1126F AMNT PAIN NOTED NONE PRSNT: CPT | Performed by: PHYSICIAN ASSISTANT

## 2025-06-12 LAB
ARSENIC UR-MCNC: NORMAL UG/L (ref 0–9)
CREAT UR-MCNC: 0.44 G/L (ref 0.3–3)
LEAD 24H UR-MCNC: NORMAL UG/L (ref 0–49)
MERCURY 24H UR-MCNC: NORMAL UG/L (ref 0–19)

## 2025-06-23 ENCOUNTER — OFFICE VISIT (OUTPATIENT)
Dept: PSYCHIATRY | Facility: CLINIC | Age: 65
End: 2025-06-23
Payer: MEDICARE

## 2025-06-23 VITALS
BODY MASS INDEX: 32.26 KG/M2 | HEIGHT: 71 IN | SYSTOLIC BLOOD PRESSURE: 119 MMHG | WEIGHT: 230.4 LBS | HEART RATE: 71 BPM | DIASTOLIC BLOOD PRESSURE: 67 MMHG

## 2025-06-23 DIAGNOSIS — F51.05 INSOMNIA DUE TO MENTAL CONDITION: ICD-10-CM

## 2025-06-23 DIAGNOSIS — E55.9 VITAMIN D DEFICIENCY, UNSPECIFIED: ICD-10-CM

## 2025-06-23 DIAGNOSIS — F33.1 MAJOR DEPRESSIVE DISORDER, RECURRENT EPISODE, MODERATE: ICD-10-CM

## 2025-06-23 DIAGNOSIS — F41.1 GENERALIZED ANXIETY DISORDER: Primary | ICD-10-CM

## 2025-06-23 PROCEDURE — 99214 OFFICE O/P EST MOD 30 MIN: CPT | Performed by: STUDENT IN AN ORGANIZED HEALTH CARE EDUCATION/TRAINING PROGRAM

## 2025-06-23 PROCEDURE — 90833 PSYTX W PT W E/M 30 MIN: CPT | Performed by: STUDENT IN AN ORGANIZED HEALTH CARE EDUCATION/TRAINING PROGRAM

## 2025-06-23 PROCEDURE — G2211 COMPLEX E/M VISIT ADD ON: HCPCS | Performed by: STUDENT IN AN ORGANIZED HEALTH CARE EDUCATION/TRAINING PROGRAM

## 2025-06-23 PROCEDURE — 1160F RVW MEDS BY RX/DR IN RCRD: CPT | Performed by: STUDENT IN AN ORGANIZED HEALTH CARE EDUCATION/TRAINING PROGRAM

## 2025-06-23 PROCEDURE — 1159F MED LIST DOCD IN RCRD: CPT | Performed by: STUDENT IN AN ORGANIZED HEALTH CARE EDUCATION/TRAINING PROGRAM

## 2025-06-23 RX ORDER — TESTOSTERONE UNDECANOATE 237 MG/1
CAPSULE, LIQUID FILLED ORAL
COMMUNITY
Start: 2025-06-10

## 2025-06-23 RX ORDER — METHYLPREDNISOLONE 4 MG/1
TABLET ORAL
COMMUNITY
Start: 2025-06-18 | End: 2025-06-23

## 2025-06-23 RX ORDER — AZELASTINE 1 MG/ML
SPRAY, METERED NASAL
COMMUNITY
Start: 2025-06-18

## 2025-06-23 NOTE — PATIENT INSTRUCTIONS
1.  Please return to clinic at your next scheduled visit.  Contact the clinic ([phone]) at least 24 hours prior in the event you need to cancel.  2.  Do no harm to yourself or others.    3.  Avoid alcohol and drugs.    4.  Take all medications as prescribed.  Please contact the clinic with any concerns. If you are in need of medication refills, please call the clinic at [phone].    5. Should you want to get in touch with your provider, Dr. Penelope Davis, please utilize [facility] or contact the office ([phone]), and staff will be able to page Dr. Davis directly.  6.  In the event you have personal crisis, contact the following crisis numbers: Suicide Prevention Hotline 1-185.555.3812; GERMAINE Helpline 7-732-089-XIQR; [facility] Emergency Room [phone]; text HELLO to [phone]; or 347.     [facility]: [phone]

## 2025-06-23 NOTE — PROGRESS NOTES
"Subjective   Stephon Castellano is a 64 y.o. male who presents today for initial evaluation     Referring Provider:  No referring provider defined for this encounter.  [facility] endocrinologist    Chief Complaint:  depression    History of Present Illness:     Chart Review By Dates:  06/23/2025: int med, onc, Byble x2, unknown x3; cmp cr 1.49; abnl cbc hgb 12.5.  04/23/2025: int med, Byble x1, onc  03/12/2025: int med, nephro, onc, Byble x1.  01/29/2025: EGD for CANDIDA, PT, int med, Byble x2, urology; benign bx's.  11/13/2024: hand surg, onc.  10/04/2024: gen surg, hand surg, neur, ortho, unknown, urology,   08/22/2024: admitted for colonoscopy 8/13, gene surg, int med, nephro, onc, reassuring copper, zinc, hepatitis panel.  7/25/24: Sleep: settings recs, retaining per bladder scan.   07/10/24: urology, sleep med, gen surg, bladder scan 280 mL. Mirtazapine led to brain fog, fatigue, poor focus in am.  6/14/2024: Neurology, internal medicine.  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    VISITS/APPOINTMENTS (BELOW):    \"Stephon\"  Stage 4 CKD (nephro 8/2024)  Normocytic anemia  Tried bupropion long ago (for depression 10 years ago)  Mom may have been bipolar, committed SA, \"she had everything\"  Very sensitive to low doses of meds  Low energy, motivation, chronically  It's been this way for some time.  Anemia, CKD, pain pump (dilaudid)  Mornings that are the worst    06/23/2025:   In person interview:  \"Not too bad, just tired all the time.\"  I stopped the trazodone, I was tired all day, still sleeping well  Quetiapine helping mood  Still gaining weight  Also having pain with my knee, I can't walk, so I'm gaining weight  Discussed wife again  MDD: mild depressed mood improved, stable  TRIP: worrying, on edge, irritable, mild -- improved  Panic attacks: stable  Energy: down chronically  Concentration: worse recently  Insomnia: initial, CPAP, 5-6 hours a night  Eating/Weight: 230, 227, 223, " "222, 218, 206, 196, 191, 188, 182 lbs (goal is 190)  Refills: y  Substances: def  Therapy: Byble, likes  Medication compliant: y  SE: n  No SI HI AVH.      04/23/2025:   In person interview:  \"Sleeping fine, but just tired all the time.\"  No longer on seroquel, confirmed  Therapy helps  Discussed weight gain. He is snacking at night.  Just not motivated the last month  I'm still sedated and dizzy -- I don't think stopping seroquel helped  Discussed wife, who recently got a puppy. \"Guess who has to take care of him.\"  R/O ADHD:   Elementary school:   Grades? Not good  Special classes or failures? Failed 2nd grade for reading  Got in trouble? denies  Referral for ADHD testing? denies  FHx: denies  Presently:  Problems with attention to detail  MDD: mild depressed mood    TRIP: worrying, on edge, irritable, mild    Panic attacks: stable  Energy: down chronically  Concentration: worse recently  Insomnia: initial, CPAP, 5-6 hours a night  Eating/Weight: 227, 223, 222, 218, 206, 196, 191, 188, 182 lbs (goal is 190)  Refills: y  Substances: def  Therapy: Byble, likes  Medication compliant: y  SE: n  No SI HI AVH.      03/12/2025:   In person interview:  \"Terrible.\"  Pt never stopped seroquel nightly.  Some confusion regarding meds  No major changes  Therapy continues to help  \"Everything else is pretty good\"  Situation still the same at the house  Doing more work to clean the house, which helps his mental health  MDD: mild depressed mood -- improving  TRIP: worrying, on edge, irritable, mild -- improving  Panic attacks: stable  Energy: down chronically  Concentration: stable  Insomnia: initial, CPAP, 5-6 hours a night  Eating/Weight: 223, 222, 218, 206, 196, 191, 188, 182 lbs (goal is 190)  Refills: y  Substances: def  Therapy: Byble, likes  Medication compliant: y  SE: n  No SI HI AVH.      01/29/2025:   In person interview:  \"Pretty good. Actually about the same.\"  Drowsy and dizzy in the mornings.  Anemia has gotten " "better  Still has low energy  Wife situation hasn't changed  hoarding  Less arguments  Therapy helping  MDD: mild depressed mood  TRIP: worrying, on edge, irritable, mild  Panic attacks: stable  Energy: down chronically  Concentration: stable  Insomnia: initial, CPAP, 5-6 hours a night  Eating/Weight: 222, 218, 206, 196, 191, 188, 182 lbs (goal is 190)  Refills: y  Substances: def  Therapy: Byble, likes  Medication compliant: y  SE: n  No SI HI AVH.      ...    04/30/2024: INITIAL VISIT Chart review:     Juan: Hydromorphone  Care Everywhere: a few non behavioral health notes, sees [facility]    Psychotropic medication chart review:  Present:  Trazodone 200 mg nightly  Cymbalta 60 mg a day    Previously:  Zoloft 50 mg a day    EKG: November 2023: 57, sinus, QTc 417  Procedures: Sleep study ordered for the future  Head imaging: November 2023 CT of the head shows no acute, MRI March 2023 shows no acute, empty sella configuration  Labs: March 2024: CMP shows creatinine 1.47, CK is elevated at 756, reassuring B12, hemoglobin is low at 11.5,  Initial Chart Review Notes: Seen by neurology in February for evaluation for seizures.  Patient notes that he is depressed and cannot sleep, depression began when he was in the [employer], his mother killed himself.  Depression began about 10 years ago.  Last year his problems became so severe that he quit his job.  He has not seen a psychiatrist for years.  Sleep medicine consult also ordered.      04/30/2024: In person.  Interview:  His/Her Story: \"[facility].\"  G14, P16  I've always had a little bit of depression.  But when I got injured, it got worse. \"I couldn't do things like I used to.\"  It's a combination of injuries, surgeries  Pain pump helps  Trazodone for 5 years  Cymbalta 5 years  Has never tried combinations, but has tried \"the gamut\" of them  Has lost 50 lbs in 3 mos, unsure why  Trouble focusing recently  Has chronic balance issues, no one knows " why (cards, two neurologists, worked him up) x 3-4 mos  Depression/Mood:  Depressed mood, anhedonia, poor energy, poor concentration, weight loss, insomnia.  Seasonal pattern: def  Severity: Moderate  Duration: all his life  Anxiety:  Uncontrolled worrying, muscle tension, fatigue, poor concentration, feeling on edge, irritability, insomnia.  Severity: Moderate  Duration: all of his life  Panic attacks: n  Psych ROS: Positive for depression, anxiety.  Negative for psychosis and rebecca.  ADHD: def  PTSD: no life threatening trauma  No SI HI AVH.  Medication compliant:      Access to Firearms: yes, locked away    PHQ-9 Depression Screening  PHQ-9 Total Score:      Little interest or pleasure in doing things?     Feeling down, depressed, or hopeless?     Trouble falling or staying asleep, or sleeping too much?     Feeling tired or having little energy?     Poor appetite or overeating?     Feeling bad about yourself - or that you are a failure or have let yourself or your family down?     Trouble concentrating on things, such as reading the newspaper or watching television?     Moving or speaking so slowly that other people could have noticed? Or the opposite - being so fidgety or restless that you have been moving around a lot more than usual?     Thoughts that you would be better off dead, or of hurting yourself in some way?     PHQ-9 Total Score       TRIP-7       Past Surgical History:  Past Surgical History:   Procedure Laterality Date    BACK SURGERY  2010    Anterior Diseconomies    CERVICAL DISC SURGERY  2008    COLONOSCOPY N/A 08/18/2023    Procedure: COLONOSCOPY WITH POLYPECTOMY/SNARE;  Surgeon: Jose Alejandro Fox MD;  Location: [facility] ENDOSCOPY;  Service: General;  Laterality: N/A;  COLON POLYPS    COLONOSCOPY N/A 08/13/2024    Procedure: COLONOSCOPY WITH POLYPECTOMIES;  Surgeon: Jose Alejandro Fox MD;  Location: [facility] ENDOSCOPY;  Service: General;  Laterality: N/A;  COLON POLYPS    ENDOSCOPY N/A 08/13/2024     Procedure: ESOPHAGOGASTRODUODENOSCOPY WITH BIOPSIES;  Surgeon: Jose Alejandro Fox MD;  Location: [facility] ENDOSCOPY;  Service: General;  Laterality: N/A;  GASTRITIS. SUPERFICIAL GASTRIC ULCERS    ENDOSCOPY N/A 11/27/2024    Procedure: ESOPHAGOGASTRODUODENOSCOPY WITH BIOPSIES;  Surgeon: Tamara Molina MD;  Location: [facility] ENDOSCOPY;  Service: Gastroenterology;  Laterality: N/A;  GASTRITIS    HEEL SPUR SURGERY  2005    HEMORRHOIDECTOMY  1995    KNEE ACL RECONSTRUCTION  2004    OTHER SURGICAL HISTORY      METAL IMPLANT    PAIN PUMP INSERTION/REVISION  2010    SPINE SURGERY  2008    Antieror c5-7 discotomy    TONSILLECTOMY  1965    UPPER GASTROINTESTINAL ENDOSCOPY  2024    Have ulcer    VASECTOMY  2002       Problem List:  Patient Active Problem List   Diagnosis    Chronic right-sided thoracic back pain    Kidney stone    Screening due    Allergic rhinitis    Arthritis    Cervical neck pain with evidence of disc disease    Depression    Head injury    Hemorrhoid    Hypogonadism in male    Right wrist pain    Weight loss    Abdominal pain    Seizure-like activity    Palpitations    Abnormal EKG    Bradycardia    Brain fog    Fatigue    Acute kidney injury    Tremor    Slow transit constipation    Weakness of both lower extremities    Left hand weakness    Drug induced myoclonus    Insomnia due to other mental disorder    Obstructive sleep apnea syndrome    Cervical radiculopathy    Iron deficiency anemia    Gastroesophageal reflux disease with esophagitis without hemorrhage    History of colonic polyps    Benign prostatic hyperplasia with urinary retention    BPH without obstruction/lower urinary tract symptoms    Benign prostatic hyperplasia with lower urinary tract symptoms    Acute gastric ulcer without hemorrhage or perforation    Dysfunction of both eustachian tubes       Allergy:   No Known Allergies     Discontinued Medications:  Medications Discontinued During This Encounter   Medication Reason     amoxicillin (AMOXIL) 500 MG capsule *Therapy completed    IRON, FERROUS GLUCONATE, PO *Therapy completed    methylPREDNISolone (MEDROL) 4 MG dose pack *Therapy completed    traZODone (DESYREL) 50 MG tablet Side effects                     Current Medications:   Current Outpatient Medications   Medication Sig Dispense Refill    atorvastatin (LIPITOR) 10 MG tablet Take 1 tablet by mouth Every Night.      azelastine (ASTELIN) 0.1 % nasal spray       busPIRone (BUSPAR) 5 MG tablet Take 1 tablet by mouth 3 (Three) Times a Day. (Patient taking differently: Take 1 tablet by mouth Daily.) 90 tablet 2    carboxymethylcellulose (REFRESH PLUS) 0.5 % solution Administer 2 drops to both eyes 3 (Three) Times a Day As Needed for Dry Eyes. 30 mL 12    cetirizine (zyrTEC) 10 MG tablet Take 1 tablet by mouth Daily. 90 tablet 1    finasteride (PROSCAR) 5 MG tablet Take 1 tablet by mouth Daily for 360 days. 90 tablet 3    FLUoxetine (PROzac) 10 MG capsule Take 1 capsule by mouth Daily. 90 capsule 1    FLUoxetine (PROzac) 20 MG capsule Take 1 capsule by mouth Daily. Start prozac 7/17 90 capsule 1    fluticasone (FLONASE) 50 MCG/ACT nasal spray Administer 2 sprays into the nostril(s) as directed by provider Daily. 16 g 11    HYDROmorphone (DILAUDID) 1 MG/ML injection Infuse 0-2 mg/hr into a venous catheter.      Jatenzo 237 MG capsule       lisinopril (PRINIVIL,ZESTRIL) 5 MG tablet Take 0.5 tablets by mouth Daily.      Lubricant Eye Drops PF 0.5 % solution       Lysine 1000 MG tablet Take  by mouth.      Magnesium 250 MG tablet Take  by mouth.      pain patient supplied pump by Intrathecal route Continuous. Instructions are in drop boxes      QUEtiapine (SEROquel) 25 MG tablet Take 1 tablet by mouth Every Night. 90 tablet 1    tamsulosin (FLOMAX) 0.4 MG capsule 24 hr capsule Take 2 capsules by mouth Daily for 360 days. 180 capsule 3    Testosterone Cypionate (DEPOTESTOTERONE CYPIONATE) 200 MG/ML injection Inject 1 mL into the appropriate  muscle as directed by prescriber Every 14 (Fourteen) Days. (Patient not taking: Reported on 6/23/2025)      Turmeric 500 MG capsule Take  by mouth. (Patient not taking: Reported on 5/21/2025)       No current facility-administered medications for this visit.       Past Medical History:  Past Medical History:   Diagnosis Date    Alcohol abuse     During COVID Lockdown    Allergic rhinitis     Anemia 02/06/2024    My hand started just shaking but got worse until now i have no strength in my left hand    Anxiety 2006    Starting taking mood depression drugs    Arthritis 2005    Have Stenosis of C-5,6,7& had Anterior Diseconomy    Asthma About 6 months    Sore Throat and was sent to ENT    Atrial fibrillation 2023    Benign prostatic hyperplasia 2015    Dad had Prostate Cancer and just did Colonoscopy and they said i have a major large Prostate    Cervical neck pain with evidence of disc disease     Chronic pain disorder 2006    Neck pain    Clotting disorder 2024    Ulcer    Colon polyp 2020    Had a lot of polyps on the last 2 Colonoscopies    CTS (carpal tunnel syndrome) 2005    Depression     I have major problems with depression and am taking medication for it.    Difficulty walking 2033    Erectile dysfunction 2017    I have been on Testosterone replacements since then    GI (gastrointestinal bleed) 2024    Ulcer    Head injury 1977    Headache May 2023    Started abou 6 months or more    Hemorrhoid     HL (hearing loss) 2004    Have a major tinitis/ringing in my ears especially the left one    Hyperlipidemia 2017    My blood test have stated they were high or elevated    Hypertension 2017    Given Flomax said it will probably go down    Hypogonadism in male     Insomnia     Kidney stone 2020    Went to doctor and got medication for them and still have issues with right side    Low back pain 2010    Stinosis of back & neck    Memory loss 2023    Movement disorder 2023    Sleep apnea 2007    Substance abuse  "[id number]    On Disulfiram for alcohol abuse-voluntary    Ulcer     Upper scope    Visual impairment     Just recently have been given eye drops    Withdrawal symptoms, alcohol     During COVD Lockdown       Past Psychiatric History:  Began Treatment: a year   Diagnoses: TRIP, insomnia, MDD  Psychiatrist: a year   Therapist: a year   Admission History:Denies  Medication Trials:    multiple    Self Harm: Denies  Suicide Attempts:Denies      Substance Abuse History:   Types: end of  stopped drinking using antabuse, former smoker 2ppd  Withdrawal Symptoms:Denies  Longest Period Sober: 6 mos, recently  AA: Not applicable     Social History:  Martial Status:  Employed: retired 22  Kids:Yes  House:Lives in a house   History: [employer], retired    Social History     Socioeconomic History    Marital status:    Tobacco Use    Smoking status: Former     Current packs/day: 0.00     Average packs/day: 2.0 packs/day for 8.0 years (16.0 ttl pk-yrs)     Types: Cigarettes     Start date: 1975     Quit date: 1983     Years since quittin.5     Passive exposure: Past    Smokeless tobacco: Former     Types: Snuff    Tobacco comments:     Also dipped for several years   Vaping Use    Vaping status: Never Used   Substance and Sexual Activity    Alcohol use: Not Currently     Alcohol/week: 14.0 standard drinks of alcohol     Types: 14 Shots of liquor per week     Comment: Stopped due to Disulfiram 250 mg    Drug use: Not Currently     Frequency: 7.0 times per week     Types: Marijuana    Sexual activity: Not Currently     Partners: Female     Birth control/protection: None, Vasectomy     Comment: Low testosterone       Family History:   Suicide Attempts:  Mom  Suicide Completions: mom  \"I think she had bipolar but I'm not sure\"      Family History   Problem Relation Age of Onset    Suicide Attempts Mother     Bipolar disorder Mother     Anxiety disorder Mother     Heart disease " "Mother     Osteoporosis Mother     Alcohol abuse Mother         Mother committed suicide    Depression Mother         She had major depression    Early death Mother         She committed suicide    Thyroid disease Mother         She had Thyroid issues and had surgery    Cancer Father         Prostate    Prostate cancer Father     Hearing loss Father         He has worn a hearing aids since his late 50’s    Depression Sister     Alcohol abuse Sister     Diabetes Sister         Diabetes    Arthritis Sister     Sleep apnea Sister     Obesity Sister     Thyroid disease Sister     Insomnia Sister     Narcolepsy Sister     Diabetes Brother     Stroke Other         AUNT/UNCLE GRANDPARENTS    Diabetes Other         GRANDPARENTS        Developmental History:       Childhood: saw mom and dad fight a lot, they , brother  when he was 5 yo.   High School:Completed  College: [employer]    Mental Status Exam  Appearance  : groomed, good eye contact, normal street clothes  Behavior  : pleasant and cooperative  Motor  : No abnormal  Speech  : talkative, normal rhythm, rate, volume, tone, not hyperverbal, not pressured, normal prosidy  Mood  : \"I'm good for the most part\"  Affect  : euthymic, mood congruent, good variability  Thought Content  : negative suicidal ideations, negative homicidal ideations, negative obsessions  Perceptions  : negative auditory hallucinations, negative visual hallucinations  Thought Process  : linear  Insight/Judgement  : Fair/fair  Cognition  : grossly intact  Attention   : intact      Review of Systems:  Review of Systems   Constitutional:  Positive for fatigue. Negative for diaphoresis.   HENT:  Negative for drooling.    Eyes:  Negative for visual disturbance.   Respiratory:  Negative for cough, chest tightness and shortness of breath.    Cardiovascular:  Negative for chest pain, palpitations and leg swelling.   Gastrointestinal:  Positive for constipation. Negative for abdominal pain, diarrhea, " "nausea and vomiting.   Endocrine: Positive for cold intolerance and heat intolerance.   Genitourinary:  Negative for difficulty urinating.   Musculoskeletal:  Negative for joint swelling.   Allergic/Immunologic: Negative for immunocompromised state.   Neurological:  Positive for dizziness, light-headedness and headaches. Negative for seizures, speech difficulty and numbness.   Psychiatric/Behavioral:  Positive for sleep disturbance. Negative for agitation.        Physical Exam:  Physical Exam    Vital Signs:   /67   Pulse 71   Ht 180.3 cm (71\")   Wt 105 kg (230 lb 6.4 oz)   BMI 32.13 kg/m²      Lab Results:   Lab on 06/03/2025   Component Date Value Ref Range Status    Free Kappa Lt Chains,Ur 06/03/2025 24.51  1.17 - 86.46 mg/L Final    Free Lambda Lt Chains,Ur 06/03/2025 3.07  0.27 - 15.21 mg/L Final    Kappa/Lambda Ratio,U 06/03/2025 7.98  1.83 - 14.26 Final    Creatinine, Urine 06/03/2025 0.44  0.30 - 3.00 g/L Final                                    Detection Limit = 0.10    Arsenic Ur 06/03/2025 None Detected  0 - 9 ug/L Final                                    Detection Limit = 10    Lead, Urine 06/03/2025 None Detected  0 - 49 ug/L Final                                    Detection Limit = 1    Mercury, Urine 06/03/2025 None Detected  0 - 19 ug/L Final                                    Detection Limit = 1   Lab on 06/02/2025   Component Date Value Ref Range Status    Glucose 06/02/2025 89  65 - 99 mg/dL Final    BUN 06/02/2025 14.0  8.0 - 23.0 mg/dL Final    Creatinine 06/02/2025 1.49 (H)  0.76 - 1.27 mg/dL Final    Sodium 06/02/2025 138  136 - 145 mmol/L Final    Potassium 06/02/2025 4.4  3.5 - 5.2 mmol/L Final    Chloride 06/02/2025 100  98 - 107 mmol/L Final    CO2 06/02/2025 28.4  22.0 - 29.0 mmol/L Final    Calcium 06/02/2025 8.9  8.6 - 10.5 mg/dL Final    Total Protein 06/02/2025 7.1  6.0 - 8.5 g/dL Final    Albumin 06/02/2025 4.4  3.5 - 5.2 g/dL Final    ALT (SGPT) 06/02/2025 19  1 - 41 U/L " Final    AST (SGOT) 06/02/2025 21  1 - 40 U/L Final    Alkaline Phosphatase 06/02/2025 78  39 - 117 U/L Final    Total Bilirubin 06/02/2025 0.4  0.0 - 1.2 mg/dL Final    Globulin 06/02/2025 2.7  gm/dL Final    A/G Ratio 06/02/2025 1.6  g/dL Final    BUN/Creatinine Ratio 06/02/2025 9.4  7.0 - 25.0 Final    Anion Gap 06/02/2025 9.6  5.0 - 15.0 mmol/L Final    eGFR 06/02/2025 51.8 (L)  >60.0 mL/min/1.73 Final    IgG 06/02/2025 1094  603 - 1613 mg/dL Final    IgA 06/02/2025 166  61 - 437 mg/dL Final    IgM 06/02/2025 92  20 - 172 mg/dL Final    Total Protein 06/02/2025 6.9  6.0 - 8.5 g/dL Final    Albumin 06/02/2025 3.9  2.9 - 4.4 g/dL Final    Alpha-1-Globulin 06/02/2025 0.2  0.0 - 0.4 g/dL Final    Alpha-2-Globulin 06/02/2025 0.6  0.4 - 1.0 g/dL Final    Beta Globulin 06/02/2025 1.0  0.7 - 1.3 g/dL Final    Gamma Globulin 06/02/2025 1.1  0.4 - 1.8 g/dL Final    M-Walter 06/02/2025 Not Observed  Not Observed g/dL Final    Globulin 06/02/2025 3.0  2.2 - 3.9 g/dL Final    A/G Ratio 06/02/2025 1.4  0.7 - 1.7 Final    Immunofixation Reflex, Serum 06/02/2025 Comment   Final    No monoclonality detected.    Please note 06/02/2025 Comment   Final    Protein electrophoresis scan will follow via computer, mail, or   delivery.    Free Light Chain, Kappa 06/02/2025 36.4 (H)  3.3 - 19.4 mg/L Final    Free Lambda Light Chains 06/02/2025 22.9  5.7 - 26.3 mg/L Final    Kappa/Lambda Ratio 06/02/2025 1.59  0.26 - 1.65 Final    Vitamin B1, Whole Blood 06/02/2025 130.6  66.5 - 200.0 nmol/L Final    WBC 06/02/2025 4.78  3.40 - 10.80 10*3/mm3 Final    RBC 06/02/2025 4.01 (L)  4.14 - 5.80 10*6/mm3 Final    Hemoglobin 06/02/2025 12.5 (L)  13.0 - 17.7 g/dL Final    Hematocrit 06/02/2025 39.1  37.5 - 51.0 % Final    MCV 06/02/2025 97.5 (H)  79.0 - 97.0 fL Final    MCH 06/02/2025 31.2  26.6 - 33.0 pg Final    MCHC 06/02/2025 32.0  31.5 - 35.7 g/dL Final    RDW 06/02/2025 13.1  12.3 - 15.4 % Final    RDW-SD 06/02/2025 46.9  37.0 - 54.0 fl  Final    MPV 06/02/2025 10.1  6.0 - 12.0 fL Final    Platelets 06/02/2025 154  140 - 450 10*3/mm3 Final    Neutrophil % 06/02/2025 37.3 (L)  42.7 - 76.0 % Final    Lymphocyte % 06/02/2025 48.1 (H)  19.6 - 45.3 % Final    Monocyte % 06/02/2025 10.9  5.0 - 12.0 % Final    Eosinophil % 06/02/2025 2.9  0.3 - 6.2 % Final    Basophil % 06/02/2025 0.6  0.0 - 1.5 % Final    Immature Grans % 06/02/2025 0.2  0.0 - 0.5 % Final    Neutrophils, Absolute 06/02/2025 1.78  1.70 - 7.00 10*3/mm3 Final    Lymphocytes, Absolute 06/02/2025 2.30  0.70 - 3.10 10*3/mm3 Final    Monocytes, Absolute 06/02/2025 0.52  0.10 - 0.90 10*3/mm3 Final    Eosinophils, Absolute 06/02/2025 0.14  0.00 - 0.40 10*3/mm3 Final    Basophils, Absolute 06/02/2025 0.03  0.00 - 0.20 10*3/mm3 Final    Immature Grans, Absolute 06/02/2025 0.01  0.00 - 0.05 10*3/mm3 Final    nRBC 06/02/2025 0.0  0.0 - 0.2 /100 WBC Final   Lab on 03/06/2025   Component Date Value Ref Range Status    Ferritin 03/06/2025 314.90  30.00 - 400.00 ng/mL Final    Iron 03/06/2025 72  59 - 158 mcg/dL Final    Iron Saturation (TSAT) 03/06/2025 19 (L)  20 - 50 % Final    Transferrin 03/06/2025 259  200 - 360 mg/dL Final    TIBC 03/06/2025 386  298 - 536 mcg/dL Final    WBC 03/06/2025 4.13  3.40 - 10.80 10*3/mm3 Final    RBC 03/06/2025 3.91 (L)  4.14 - 5.80 10*6/mm3 Final    Hemoglobin 03/06/2025 12.1 (L)  13.0 - 17.7 g/dL Final    Hematocrit 03/06/2025 37.9  37.5 - 51.0 % Final    MCV 03/06/2025 96.9  79.0 - 97.0 fL Final    MCH 03/06/2025 30.9  26.6 - 33.0 pg Final    MCHC 03/06/2025 31.9  31.5 - 35.7 g/dL Final    RDW 03/06/2025 12.8  12.3 - 15.4 % Final    RDW-SD 03/06/2025 45.5  37.0 - 54.0 fl Final    MPV 03/06/2025 9.9  6.0 - 12.0 fL Final    Platelets 03/06/2025 164  140 - 450 10*3/mm3 Final    Neutrophil % 03/06/2025 37.7 (L)  42.7 - 76.0 % Final    Lymphocyte % 03/06/2025 47.5 (H)  19.6 - 45.3 % Final    Monocyte % 03/06/2025 11.6  5.0 - 12.0 % Final    Eosinophil % 03/06/2025 2.7  0.3  - 6.2 % Final    Basophil % 03/06/2025 0.5  0.0 - 1.5 % Final    Immature Grans % 03/06/2025 0.0  0.0 - 0.5 % Final    Neutrophils, Absolute 03/06/2025 1.56 (L)  1.70 - 7.00 10*3/mm3 Final    Lymphocytes, Absolute 03/06/2025 1.96  0.70 - 3.10 10*3/mm3 Final    Monocytes, Absolute 03/06/2025 0.48  0.10 - 0.90 10*3/mm3 Final    Eosinophils, Absolute 03/06/2025 0.11  0.00 - 0.40 10*3/mm3 Final    Basophils, Absolute 03/06/2025 0.02  0.00 - 0.20 10*3/mm3 Final    Immature Grans, Absolute 03/06/2025 0.00  0.00 - 0.05 10*3/mm3 Final    nRBC 03/06/2025 0.0  0.0 - 0.2 /100 WBC Final   Lab on 01/28/2025   Component Date Value Ref Range Status    Gliadin Deamidated Peptide Ab, IgA 01/28/2025 3  0 - 19 units Final                       Negative                   0 - 19                     Weak Positive             20 - 30                     Moderate to Strong Positive   >30    Tissue Transglutaminase IgA 01/28/2025 <2  0 - 3 U/mL Final                                  Negative        0 -  3                                Weak Positive   4 - 10                                Positive           >10   Tissue Transglutaminase (tTG) has been identified   as the endomysial antigen.  Studies have demonstr-   ated that endomysial IgA antibodies have over 99%   specificity for gluten sensitive enteropathy.    IgA 01/28/2025 176  61 - 437 mg/dL Final   Office Visit on 01/21/2025   Component Date Value Ref Range Status    Urine Volume 01/21/2025 148   Final       EKG Results:  No orders to display       Imaging Results:  CT Chest Without Contrast Diagnostic    Result Date: 3/19/2024  Impression: CT scan of the chest without IV contrast demonstrating tree-in-bud airspace disease in the left lower lobe suggesting indolent infection. Follow-up CT scan of the chest in 3 months is recommended.    Electronically Signed By-SOILA TREVIÑO MD On:3/19/2024 3:23 PM          Assessment & Plan   Diagnoses and all orders for this visit:    1.  Generalized anxiety disorder (Primary)    2. Major depressive disorder, recurrent episode, moderate  -     Vitamin D 25 Hydroxy; Future    3. Insomnia due to mental condition    4. Vitamin D deficiency, unspecified  -     Vitamin D 25 Hydroxy; Future        Visit Diagnoses:    ICD-10-CM ICD-9-CM   1. Generalized anxiety disorder  F41.1 300.02   2. Major depressive disorder, recurrent episode, moderate  F33.1 296.32   3. Insomnia due to mental condition  F51.05 300.9     327.02   4. Vitamin D deficiency, unspecified  E55.9 268.9     06/23/2025: stopped trazodone (sedation during the day). Wife's decided she's not gonna clean the house. Vit D level. Wife may be interested in therapy. Son visiting with grandchild, not staying at the house because it is so dirty. Discussed strategies to manage, such as hiring a . Discussed going back to [facility].    Acknowledged and normalized patient's thoughts, feelings, and concerns. Allowed patient to freely discuss and process issues, such as:  Anxiety and depression regarding medical conditions.  Anxiety and depression regarding relationships.  ... using Rogerian psychotherapeutic techniques including unconditional positive regard, reflective listening, and demonstrating clear empathy, with the goal of ameliorating symptoms and maintaining, restoring, or improving self-esteem, adaptive skills, and ego or psychological functions (Danielle et al. 1991), the long-term goal of which is to develop a better, healthier perspective and help the patient bear their circumstances more easily.  Time (minutes) spent providing supportive psychotherapy: 17  (This time is exclusive to the therapy session and separate from the time spent on activities used to meet the criteria for the E/M service (history, exam, medical decision-making).)  Start: 11:05  Stop: 11:22  Functional status: mild impairment  Treatment plan: Medication management and supportive psychotherapy  Prognosis:  good  Progress: improved, chronic fatigue  2m    04/23/2025: Was better, less depressed on seroquel (Mom dx'd with bipolar). Restart.     03/12/2025: Major stressor is wife, discussed. Pt never stopped seroquel but is better owing to his own efforts keeping the house clean (and likely also psychotherapy). Stop seroquel and trial buspar up to 3x/day. Now only has one dog (they did have 3). Discussed strategies to improve communication with wife.    01/29/2025: mild TRIP, MDD, start buspar. Stop seroquel: dizzy plus weight gain. Much of this is situational -- there is only so much we can do about that. Possibly seasonal, start light box, which he has. Discussed hiring someone to clean, wife's resistance to changes.    11/13/2024: Insomnia, increase quetiapine. Discussed getting help with organizing the house.    10/04/2024: Improving, still MDD, TRIP, increase prozac further.    08/22/2024: Improving, increase prozac for possible MDD.    07/25/2024: Stop wellbutrin, in 2 wks, hold trazodone and start seroquel for irritability, mood stability. Poor energy may be due to anemia. Mood improved, but irritable as well. Mom has hx of what he thinks was bipolar. Avoiding lithium 2/2 CKD.     07/10/2024: Mirtazapine led to brain fog, fatigue, poor focus in am. Stop it. [facility] for marriage issues (individual therapy). Pt is not on duloxetine. Pt needs energizing meds. Start wellbutrin for a week, then add prozac.    06/14/2024: No change. Discussed reflective listening with his wife. Stop abilify; start mirtazapine.    4/30/24: R/O ADHD. Start abilify, Therapy? Wgt loss, consider mirtazapine, seroquel. 6w.    PLAN:  Safety: No acute safety concerns  Therapy: None  Risk Assessment: Risk of self-harm acutely is moderate.  Risk factors include anxiety disorder, mood disorder, fhx, access to guns, and recent psychosocial stressors (pandemic). Protective factors include no present SI, no history of suicide attempts or self-harm in the  past, minimal AODA, healthcare seeking, future orientation, willingness to engage in care.  Risk of self-harm chronically is also moderate, but could be further elevated in the event of treatment noncompliance and/or AODA.  Meds:  CONTINUE prozac 30 mg qam. Risks, benefits, alternatives discussed with patient including GI upset, nausea vomiting diarrhea, theoretical decrease of seizure threshold predisposing the patient to a slightly higher seizure risk, headaches, sexual dysfunction, serotonin syndrome, bleeding risk, increased suicidality in patients 24 years and younger, switching to rebecca/hypomania.  After discussion of these risks and benefits, the patient voiced understanding and agreed to proceed.  CONTINUE buspar 5 mg TID. Risks, benefits, alternatives discussed with patient including nausea, GI upset, mild sedation, falls risk.  Use care when operating vehicle, vessel, or machine. After discussion of these risks and benefits, the patient voiced understanding and agreed to proceed.  STOPPED trazodone 50 mg qhs. (Sedation 6/25)   CONTINUE seroquel 25 mg qhs. (Dizzy, sedated 1/25) Risks, benefits, alternatives discussed with patient including nausea and vomiting, GI upset, sedation, dizziness, falls, akathisia, hypotension, increased appetite, lowering of seizure threshold, theoretical risk of tardive dyskinesia, extrapyramidal symptoms, movement issues, restless legs syndrome. Use care when operating vehicle, vessel, or machine. After discussion of these risks and benefits, the patient voiced understanding and agreed to proceed.  S/P:  trazodone 50 mg qhs. 100 mg made him groggy the next day. 7/24.    wellbutrin  mg qam. Irritable 7/24  abilify 2 mg qhs. No change 6/24.   mirtazapine 7.5 mg qhs. Sedation 7/24  Seroquel didn't work in the past 7/24  Labs: none    Patient screened positive for depression based on a PHQ-9 score of 14 on 5/29/2025. Follow-up recommendations include: Prescribed  antidepressant medication treatment and Suicide Risk Assessment performed.           TREATMENT PLAN/GOALS: Continue supportive psychotherapy efforts and medications as indicated. Treatment and medication options discussed during today's visit. Patient acknowledged and verbally consented to continue with current treatment plan and was educated on the importance of compliance with treatment and follow-up appointments.    MEDICATION ISSUES:  JIM reviewed as expected.  Discussed medication options and treatment plan of prescribed medication as well as the risks, benefits, and side effects including potential falls, possible impaired driving and metabolic adversities among others. Patient is agreeable to call the office with any worsening of symptoms or onset of side effects. Patient is agreeable to call 911 or go to the nearest ER should he/she begin having SI/HI. No medication side effects or related complaints today.     MEDS ORDERED DURING VISIT:  No orders of the defined types were placed in this encounter.      Return in about 2 months (around 8/23/2025).         This document has been electronically signed by Penelope Davis MD  June 23, 2025 11:22 EDT    Dictated Utilizing Dragon Dictation: Part of this note may be an electronic transcription/translation of spoken language to printed text using the Dragon Dictation System.

## 2025-06-23 NOTE — TREATMENT PLAN
Anxiety:  6/10 progressing    Goals:  Patient will develop and implement behavioral and cognitive strategies to reduce anxiety and irrational fears, monthly, using Rogerian psychotherapy and CBT where appropriate.  Help patient explore past emotional issues in relation to present anxiety, monthly, until remission of symptoms, using Rogerian psychotherapy and CBT where appropriate.  Help patient develop an awareness of their cognitive and physical responses to anxiety, monthly, until remission of symptoms, using Rogerian psychotherapy and CBT where appropriate.          Depression:  3/10 progressing    Goals:  Patient will demonstrate the ability to initiate new constructive life skills outside of sessions on a consistent basis, monthly, using Rogerian psychotherapy and CBT where appropriate.  Assist patient in setting attainable activities of daily living goals, monthly, using Rogerian psychotherapy and CBT where appropriate.  Provide education about depression, monthly, using Rogerian psychotherapy and CBT where appropriate.  Assist patient in developing healthy coping strategies, monthly, using Rogerian psychotherapy and CBT where appropriate.    Rogerian psychotherapy and CBT will be used to help accomplish the above goals and manage depression and anxiety related to medical conditions, family conflict       I have discussed and reviewed this treatment plan with the patient.      Reviewed by Penelope Davis MD   06/23/2025

## 2025-06-30 ENCOUNTER — TRANSCRIBE ORDERS (OUTPATIENT)
Dept: DIABETES SERVICES | Facility: HOSPITAL | Age: 65
End: 2025-06-30
Payer: MEDICARE

## 2025-06-30 DIAGNOSIS — N18.4 CHRONIC KIDNEY DISEASE, STAGE IV (SEVERE): Primary | ICD-10-CM

## 2025-07-18 NOTE — PROGRESS NOTES
Chief Complaint: Urinary Retention    Subjective         History of Present Illness  Stephon Castellano is a 64 y.o. male presents to [facility] to be seen for follow-up.    Patient was previously seen by me with last visit on 1/21/2025 urinary retention, BPH. He was continued on tamsulosin and added finasteride.  He is here for follow-up.      History of Present Illness  The patient is a 64-year-old male here for follow-up of benign prostatic hyperplasia (BPH).    He reports an improvement in his condition, with only occasional nighttime urination with starting on finasteride.    He has not had his prostate-specific antigen (PSA) levels checked recently but plans to do so after this visit. He expresses concern about his PSA levels due to his father's history of prostate cancer at the age of 60.    Supplemental Information  He was tired and anemic, so his last doctor advised him to get his vitamin D level checked.    FAMILY HISTORY  His father had prostate cancer at the age of 60.      Previous 1/21/2025:  Last visit on 8/26/2024 TRUS and cystoscopy BPH.  At that visit he was told that he would have to wait to schedule for aquablation due to IV fluid shortage.  When he was called back to ask about scheduling for surgery he said he did not want to do surgery at that time.  I did advise that he needed to be seen since he was previously in retention when I saw him.     PVR 7/10/2024 280 mL     He reports he does have to urinate frequently.         Previous 8/26/2024:  ---------------Transrectal Ultrasound of the Prostate-----------     The procedure is done for the indication of BPH.   No enema prior to the procedure. Timeout was undertaken documenting the correct patient,site and procedure. The patient has also taken antibiotics prior to the procedure.      He was placed in the left lateral decubitis position.  A transrectal ultrasound probe was the lubricated, covered with a condom, and  placed into the rectum. The ultrasound machine at 7.5 Mhz was used to perform the ultrasound exam. The prostate was scanned in both transverse and longitudinal fashion. Multiple images were obtained. L        The prostate height is 2.6 mm.      The prostate width is 5.3 mm.      The prostate length is 4.0 mm.      The total prostate volume is 31 gms.      The appearance of the prostate is normal.         The patient tolerated the procedure well. There was minimal bleeding at the end of the procedure.         Procedures         Urinalysis was checked today and was negative for signs of infection        Cytoscopy Procedure:      Procedure: Flexible cytoscope was passed per urethra into the bladder without difficulty after proper consent. The bladder was inspected in a systematic meridian fashion.         3 cm prostate, no median lobe     Moderate trabeculation.  No pathology        There were no tumors, lesions, stones, or other abnormalities noted within the bladder. Of note, there was no increased vascularity as well. Both ureteral orifices were identified and were normal in appearance. The flexible cytoscope was removed. The patient tolerated the procedure well.            This document has been electronically signed by Charles Phan MD  August 24, 2024 05:44 EDT        History of Present Illness  Stephon Castellano is a 63 y.o. male            BPH        Flomax 0.8 nightly and finasteride  Nocturia x 4  Frequency every 30 minutes, urgency, no incontinence, weak stream     1/24 urinary retention -start finasteride     7/24 Q-Clark 8.9,    7/24 IPSS 27     Still gets erections, moderately worried about this.     Nephrolithiasis x 2.  No  surgeries  Father with prostate cancer at 60     8/24 UA - 0-2 RBCs, no bacteria, 1.9, GFR 36     PVR     4/24   080  1/24    250     11/23 CT abdomen/pelvis without - 1 mm nonobstructing stone right kidney.  Negative otherwise  11/23 renal ultrasound - small left renal cyst.   No hydronephrosis     History of bradycardia  No anticoagulation  Non-smoker           PSA     11/23 0.6    Objective     Past Medical History:   Diagnosis Date    Alcohol abuse     During COVID Lockdown    Allergic rhinitis     Anemia 02/06/2024    My hand started just shaking but got worse until now i have no strength in my left hand    Anxiety 2006    Starting taking mood depression drugs    Arthritis 2005    Have Stenosis of C-5,6,7& had Anterior Diseconomy    Asthma About 6 months    Sore Throat and was sent to ENT    Atrial fibrillation 2023    Benign prostatic hyperplasia 2015    Dad had Prostate Cancer and just did Colonoscopy and they said i have a major large Prostate    Cervical neck pain with evidence of disc disease     Chronic pain disorder 2006    Neck pain    Clotting disorder 2024    Ulcer    Colon polyp 2020    Had a lot of polyps on the last 2 Colonoscopies    CTS (carpal tunnel syndrome) 2005    Depression     I have major problems with depression and am taking medication for it.    Difficulty walking 2033    Erectile dysfunction 2017    I have been on Testosterone replacements since then    GI (gastrointestinal bleed) 2024    Ulcer    Head injury 1977    Headache May 2023    Started abou 6 months or more    Hemorrhoid     HL (hearing loss) 2004    Have a major tinitis/ringing in my ears especially the left one    Hyperlipidemia 2017    My blood test have stated they were high or elevated    Hypertension 2017    Given Flomax said it will probably go down    Hypogonadism in male     Insomnia     Kidney stone 2020    Went to doctor and got medication for them and still have issues with right side    Low back pain 2010    Stinosis of back & neck    Memory loss 2023    Movement disorder 2023    Sleep apnea 2007    Substance abuse [id number]    On Disulfiram for alcohol abuse-voluntary    Ulcer 2024    Upper scope    Visual impairment 2023    Just recently have been given eye drops    Withdrawal  symptoms, alcohol 2022    During COVD Lockdown       Past Surgical History:   Procedure Laterality Date    BACK SURGERY  2010    Anterior Diseconomies    CERVICAL DISC SURGERY  2008    COLONOSCOPY N/A 08/18/2023    Procedure: COLONOSCOPY WITH POLYPECTOMY/SNARE;  Surgeon: Jose Alejandro Fox MD;  Location: [facility] ENDOSCOPY;  Service: General;  Laterality: N/A;  COLON POLYPS    COLONOSCOPY N/A 08/13/2024    Procedure: COLONOSCOPY WITH POLYPECTOMIES;  Surgeon: Jose Alejandro Fox MD;  Location: [facility] ENDOSCOPY;  Service: General;  Laterality: N/A;  COLON POLYPS    ENDOSCOPY N/A 08/13/2024    Procedure: ESOPHAGOGASTRODUODENOSCOPY WITH BIOPSIES;  Surgeon: Jose Alejandro Fox MD;  Location: [facility] ENDOSCOPY;  Service: General;  Laterality: N/A;  GASTRITIS. SUPERFICIAL GASTRIC ULCERS    ENDOSCOPY N/A 11/27/2024    Procedure: ESOPHAGOGASTRODUODENOSCOPY WITH BIOPSIES;  Surgeon: Tamara Molina MD;  Location: [facility] ENDOSCOPY;  Service: Gastroenterology;  Laterality: N/A;  GASTRITIS    HEEL SPUR SURGERY  2005    HEMORRHOIDECTOMY  1995    KNEE ACL RECONSTRUCTION  2004    OTHER SURGICAL HISTORY      METAL IMPLANT    PAIN PUMP INSERTION/REVISION  2010    SPINE SURGERY  2008    Antieror c5-7 discotomy    TONSILLECTOMY  1965    UPPER GASTROINTESTINAL ENDOSCOPY  2024    Have ulcer    VASECTOMY  2002         Current Outpatient Medications:     atorvastatin (LIPITOR) 10 MG tablet, Take 1 tablet by mouth Every Night., Disp: , Rfl:     azelastine (ASTELIN) 0.1 % nasal spray, , Disp: , Rfl:     busPIRone (BUSPAR) 5 MG tablet, Take 1 tablet by mouth 3 (Three) Times a Day. (Patient taking differently: Take 1 tablet by mouth Daily.), Disp: 90 tablet, Rfl: 2    carboxymethylcellulose (REFRESH PLUS) 0.5 % solution, Administer 2 drops to both eyes 3 (Three) Times a Day As Needed for Dry Eyes., Disp: 30 mL, Rfl: 12    cetirizine (zyrTEC) 10 MG tablet, Take 1 tablet by mouth Daily., Disp: 90 tablet, Rfl: 1    finasteride (PROSCAR) 5 MG  tablet, Take 1 tablet by mouth Daily for 360 days., Disp: 90 tablet, Rfl: 3    FLUoxetine (PROzac) 10 MG capsule, Take 1 capsule by mouth Daily., Disp: 90 capsule, Rfl: 1    FLUoxetine (PROzac) 20 MG capsule, Take 1 capsule by mouth Daily. Start prozac 7/17, Disp: 90 capsule, Rfl: 1    fluticasone (FLONASE) 50 MCG/ACT nasal spray, Administer 2 sprays into the nostril(s) as directed by provider Daily., Disp: 16 g, Rfl: 11    HYDROmorphone (DILAUDID) 1 MG/ML injection, Infuse 0-2 mg/hr into a venous catheter., Disp: , Rfl:     Jatenzo 237 MG capsule, , Disp: , Rfl:     lisinopril (PRINIVIL,ZESTRIL) 5 MG tablet, Take 0.5 tablets by mouth Daily., Disp: , Rfl:     Lubricant Eye Drops PF 0.5 % solution, , Disp: , Rfl:     Lysine 1000 MG tablet, Take  by mouth., Disp: , Rfl:     Magnesium 250 MG tablet, Take  by mouth., Disp: , Rfl:     pain patient supplied pump, by Intrathecal route Continuous. Instructions are in drop boxes, Disp: , Rfl:     QUEtiapine (SEROquel) 25 MG tablet, Take 1 tablet by mouth Every Night., Disp: 90 tablet, Rfl: 1    tamsulosin (FLOMAX) 0.4 MG capsule 24 hr capsule, Take 2 capsules by mouth Daily for 360 days., Disp: 180 capsule, Rfl: 3    No Known Allergies     Family History   Problem Relation Age of Onset    Suicide Attempts Mother     Bipolar disorder Mother     Anxiety disorder Mother     Heart disease Mother     Osteoporosis Mother     Alcohol abuse Mother         Mother committed suicide    Depression Mother         She had major depression    Early death Mother         She committed suicide    Thyroid disease Mother         She had Thyroid issues and had surgery    Cancer Father         Prostate    Prostate cancer Father     Hearing loss Father         He has worn a hearing aids since his late 50’s    Depression Sister     Alcohol abuse Sister     Diabetes Sister         Diabetes    Arthritis Sister     Sleep apnea Sister     Obesity Sister     Thyroid disease Sister     Insomnia Sister   "   Narcolepsy Sister     Diabetes Brother     Stroke Other         AUNT/UNCLE GRANDPARENTS    Diabetes Other         GRANDPARENTS        Social History     Socioeconomic History    Marital status:    Tobacco Use    Smoking status: Former     Current packs/day: 0.00     Average packs/day: 2.0 packs/day for 8.0 years (16.0 ttl pk-yrs)     Types: Cigarettes     Start date: 1975     Quit date: 1983     Years since quittin.5     Passive exposure: Past    Smokeless tobacco: Former     Types: Snuff    Tobacco comments:     Also dipped for several years   Vaping Use    Vaping status: Never Used   Substance and Sexual Activity    Alcohol use: Not Currently     Alcohol/week: 14.0 standard drinks of alcohol     Types: 14 Shots of liquor per week     Comment: Stopped due to Disulfiram 250 mg    Drug use: Not Currently     Frequency: 7.0 times per week     Types: Marijuana    Sexual activity: Not Currently     Partners: Female     Birth control/protection: None, Vasectomy     Comment: Low testosterone       Vital Signs:   Resp 14   Ht 180.3 cm (71\")   Wt 105 kg (231 lb 7.7 oz)   BMI 32.29 kg/m²      Physical Exam  Vitals reviewed.   Constitutional:       Appearance: Normal appearance.   Neurological:      General: No focal deficit present.      Mental Status: He is alert and oriented to person, place, and time.   Psychiatric:         Mood and Affect: Mood normal.         Behavior: Behavior normal.          Result Review :   The following data was reviewed by: NARENDRA Villafana on 2025:  Results for orders placed or performed in visit on 25   Bladder Scan    Collection Time: 25 12:51 PM   Result Value Ref Range    Urine Volume 86       Bladder Scan interpretation 2025    Estimation of residual urine via BVI 3000 Verathon Bladder Scan  MA/nurse performing: Becca CANNON MA  Residual Urine: 86 ml  Indication: Urinary retention    Benign prostatic hyperplasia with urinary " retention   Position: Supine  Examination: Incremental scanning of the suprapubic area using 2.0 MHz transducer using copious amounts of acoustic gel.   Findings: An anechoic area was demonstrated which represented the bladder, with measurement of residual urine as noted. I inspected this myself. In that the residual urine was stable or insignificant, refer to plan for treatment and plan necessary at this time.         Results  Diagnostic Testing   - Bladder volume measurement: Reduced from 148 to 86      Procedures        Assessment and Plan    Diagnoses and all orders for this visit:    1. Urinary retention (Primary)  -     Bladder Scan  -     PSA DIAGNOSTIC; Future    2. Benign prostatic hyperplasia with urinary retention  -     PSA DIAGNOSTIC; Future        Assessment & Plan  1. Benign Prostatic Hyperplasia (BPH).  Bladder volume has decreased from 148 mL to 86 mL, indicating a positive response to the current treatment regimen. The patient reports getting up once at night, which is an improvement. Currently on finasteride and tamsulosin. A lab order for PSA testing has been placed. Due to finasteride use, the PSA result should be doubled for accurate interpretation. The patient expressed concern about prostate cancer due to family history. The results will be communicated upon receipt.    Follow-up  Plan to follow up in 6 months or sooner if necessary.      Follow Up   Return in about 6 months (around 1/22/2026).  Patient was given instructions and counseling regarding his condition or for health maintenance advice. Please see specific information pulled into the AVS if appropriate.         This document has been electronically signed by NARENDRA Villafana  July 22, 2025 13:03 EDT     Patient or patient representative verbalized consent for the use of Ambient Listening during the visit with  NARENDRA Villafana for chart documentation. 7/22/2025  13:04 EDT

## 2025-07-22 ENCOUNTER — OFFICE VISIT (OUTPATIENT)
Dept: UROLOGY | Age: 65
End: 2025-07-22
Payer: MEDICARE

## 2025-07-22 ENCOUNTER — LAB (OUTPATIENT)
Facility: HOSPITAL | Age: 65
End: 2025-07-22
Payer: MEDICARE

## 2025-07-22 VITALS — WEIGHT: 231.48 LBS | RESPIRATION RATE: 14 BRPM | BODY MASS INDEX: 32.41 KG/M2 | HEIGHT: 71 IN

## 2025-07-22 DIAGNOSIS — R33.8 BENIGN PROSTATIC HYPERPLASIA WITH URINARY RETENTION: ICD-10-CM

## 2025-07-22 DIAGNOSIS — E55.9 VITAMIN D DEFICIENCY, UNSPECIFIED: ICD-10-CM

## 2025-07-22 DIAGNOSIS — N40.1 BENIGN PROSTATIC HYPERPLASIA WITH URINARY RETENTION: ICD-10-CM

## 2025-07-22 DIAGNOSIS — R33.9 URINARY RETENTION: ICD-10-CM

## 2025-07-22 DIAGNOSIS — F33.1 MAJOR DEPRESSIVE DISORDER, RECURRENT EPISODE, MODERATE: ICD-10-CM

## 2025-07-22 DIAGNOSIS — R33.9 URINARY RETENTION: Primary | ICD-10-CM

## 2025-07-22 LAB
25(OH)D3 SERPL-MCNC: 49.2 NG/ML (ref 30–100)
PSA SERPL-MCNC: 0.75 NG/ML (ref 0–4)
URINE VOLUME: 86

## 2025-07-22 PROCEDURE — 36415 COLL VENOUS BLD VENIPUNCTURE: CPT

## 2025-07-22 PROCEDURE — 84153 ASSAY OF PSA TOTAL: CPT

## 2025-07-22 PROCEDURE — 82306 VITAMIN D 25 HYDROXY: CPT

## 2025-08-01 ENCOUNTER — TRANSCRIBE ORDERS (OUTPATIENT)
Dept: ADMINISTRATIVE | Facility: HOSPITAL | Age: 65
End: 2025-08-01
Payer: MEDICARE

## 2025-08-01 ENCOUNTER — LAB (OUTPATIENT)
Facility: HOSPITAL | Age: 65
End: 2025-08-01
Payer: MEDICARE

## 2025-08-01 DIAGNOSIS — N18.4 CKD (CHRONIC KIDNEY DISEASE), STAGE IV: ICD-10-CM

## 2025-08-01 DIAGNOSIS — E55.9 VITAMIN D DEFICIENCY, UNSPECIFIED: ICD-10-CM

## 2025-08-01 DIAGNOSIS — N40.0 ENLARGED PROSTATE: ICD-10-CM

## 2025-08-01 DIAGNOSIS — M50.30 DEGENERATIVE CERVICAL DISC: ICD-10-CM

## 2025-08-01 DIAGNOSIS — N25.81 SECONDARY HYPERPARATHYROIDISM OF RENAL ORIGIN: ICD-10-CM

## 2025-08-01 DIAGNOSIS — D63.1 EPO-RESISTANT ANEMIA: ICD-10-CM

## 2025-08-01 DIAGNOSIS — N18.4 CKD (CHRONIC KIDNEY DISEASE), STAGE IV: Primary | ICD-10-CM

## 2025-08-01 LAB
25(OH)D3 SERPL-MCNC: 50.4 NG/ML (ref 30–100)
ALBUMIN SERPL-MCNC: 4.4 G/DL (ref 3.5–5.2)
ALBUMIN UR-MCNC: <1.2 MG/DL
ANION GAP SERPL CALCULATED.3IONS-SCNC: 11 MMOL/L (ref 5–15)
BACTERIA UR QL AUTO: NORMAL /HPF
BASOPHILS # BLD AUTO: 0.02 10*3/MM3 (ref 0–0.2)
BASOPHILS NFR BLD AUTO: 0.4 % (ref 0–1.5)
BILIRUB UR QL STRIP: NEGATIVE
BUN SERPL-MCNC: 13 MG/DL (ref 8–23)
BUN/CREAT SERPL: 7.1 (ref 7–25)
CALCIUM SPEC-SCNC: 9.2 MG/DL (ref 8.6–10.5)
CHLORIDE SERPL-SCNC: 102 MMOL/L (ref 98–107)
CLARITY UR: CLEAR
CO2 SERPL-SCNC: 26 MMOL/L (ref 22–29)
COLOR UR: YELLOW
CREAT SERPL-MCNC: 1.83 MG/DL (ref 0.76–1.27)
CREAT UR-MCNC: 101.9 MG/DL
DEPRECATED RDW RBC AUTO: 42.5 FL (ref 37–54)
EGFRCR SERPLBLD CKD-EPI 2021: 40.4 ML/MIN/1.73
EOSINOPHIL # BLD AUTO: 0.12 10*3/MM3 (ref 0–0.4)
EOSINOPHIL NFR BLD AUTO: 2.6 % (ref 0.3–6.2)
EOSINOPHIL SPEC QL MICRO: 0 % EOS/100 CELLS (ref 0–0)
ERYTHROCYTE [DISTWIDTH] IN BLOOD BY AUTOMATED COUNT: 12.2 % (ref 12.3–15.4)
GLUCOSE SERPL-MCNC: 89 MG/DL (ref 65–99)
GLUCOSE UR STRIP-MCNC: NEGATIVE MG/DL
HCT VFR BLD AUTO: 39.8 % (ref 37.5–51)
HGB BLD-MCNC: 13.4 G/DL (ref 13–17.7)
HGB UR QL STRIP.AUTO: NEGATIVE
HYALINE CASTS UR QL AUTO: NORMAL /LPF
IMM GRANULOCYTES # BLD AUTO: 0 10*3/MM3 (ref 0–0.05)
IMM GRANULOCYTES NFR BLD AUTO: 0 % (ref 0–0.5)
KETONES UR QL STRIP: NEGATIVE
LEUKOCYTE ESTERASE UR QL STRIP.AUTO: ABNORMAL
LYMPHOCYTES # BLD AUTO: 1.42 10*3/MM3 (ref 0.7–3.1)
LYMPHOCYTES NFR BLD AUTO: 31.1 % (ref 19.6–45.3)
MCH RBC QN AUTO: 31.9 PG (ref 26.6–33)
MCHC RBC AUTO-ENTMCNC: 33.7 G/DL (ref 31.5–35.7)
MCV RBC AUTO: 94.8 FL (ref 79–97)
MICROALBUMIN/CREAT UR: NORMAL MG/G{CREAT}
MONOCYTES # BLD AUTO: 0.51 10*3/MM3 (ref 0.1–0.9)
MONOCYTES NFR BLD AUTO: 11.2 % (ref 5–12)
NEUTROPHILS NFR BLD AUTO: 2.49 10*3/MM3 (ref 1.7–7)
NEUTROPHILS NFR BLD AUTO: 54.7 % (ref 42.7–76)
NITRITE UR QL STRIP: NEGATIVE
NRBC BLD AUTO-RTO: 0 /100 WBC (ref 0–0.2)
PH UR STRIP.AUTO: 7 [PH] (ref 5–8)
PHOSPHATE SERPL-MCNC: 2.6 MG/DL (ref 2.5–4.5)
PLATELET # BLD AUTO: 156 10*3/MM3 (ref 140–450)
PMV BLD AUTO: 10.4 FL (ref 6–12)
POTASSIUM SERPL-SCNC: 4.5 MMOL/L (ref 3.5–5.2)
PROT UR QL STRIP: NEGATIVE
PTH-INTACT SERPL-MCNC: 44.3 PG/ML (ref 15–65)
RBC # BLD AUTO: 4.2 10*6/MM3 (ref 4.14–5.8)
RBC # UR STRIP: NORMAL /HPF
REF LAB TEST METHOD: NORMAL
SODIUM SERPL-SCNC: 139 MMOL/L (ref 136–145)
SP GR UR STRIP: 1.01 (ref 1–1.03)
SQUAMOUS #/AREA URNS HPF: NORMAL /HPF
UROBILINOGEN UR QL STRIP: ABNORMAL
WBC # UR STRIP: NORMAL /HPF
WBC NRBC COR # BLD AUTO: 4.56 10*3/MM3 (ref 3.4–10.8)

## 2025-08-01 PROCEDURE — 80069 RENAL FUNCTION PANEL: CPT

## 2025-08-01 PROCEDURE — 82570 ASSAY OF URINE CREATININE: CPT

## 2025-08-01 PROCEDURE — 83970 ASSAY OF PARATHORMONE: CPT

## 2025-08-01 PROCEDURE — 84156 ASSAY OF PROTEIN URINE: CPT

## 2025-08-01 PROCEDURE — 87205 SMEAR GRAM STAIN: CPT

## 2025-08-01 PROCEDURE — 82043 UR ALBUMIN QUANTITATIVE: CPT

## 2025-08-01 PROCEDURE — 84166 PROTEIN E-PHORESIS/URINE/CSF: CPT

## 2025-08-01 PROCEDURE — 86335 IMMUNFIX E-PHORSIS/URINE/CSF: CPT

## 2025-08-01 PROCEDURE — 81001 URINALYSIS AUTO W/SCOPE: CPT

## 2025-08-01 PROCEDURE — 36415 COLL VENOUS BLD VENIPUNCTURE: CPT

## 2025-08-01 PROCEDURE — 82306 VITAMIN D 25 HYDROXY: CPT

## 2025-08-01 PROCEDURE — 85025 COMPLETE CBC W/AUTO DIFF WBC: CPT

## 2025-08-05 LAB
ALBUMIN 24H MFR UR ELPH: 30.6 %
ALPHA1 GLOB 24H MFR UR ELPH: 3.2 %
ALPHA2 GLOB 24H MFR UR ELPH: 11 %
B-GLOBULIN MFR UR ELPH: 29.4 %
GAMMA GLOB 24H MFR UR ELPH: 25.8 %
INTERPRETATION UR IFE-IMP: NORMAL
M PROTEIN 24H MFR UR ELPH: NORMAL %
PROT UR-MCNC: 7.9 MG/DL
SERVICE CMNT-IMP: NORMAL

## 2025-08-18 ENCOUNTER — OFFICE VISIT (OUTPATIENT)
Dept: PSYCHIATRY | Facility: CLINIC | Age: 65
End: 2025-08-18
Payer: MEDICARE

## 2025-08-18 VITALS
DIASTOLIC BLOOD PRESSURE: 65 MMHG | OXYGEN SATURATION: 94 % | HEART RATE: 60 BPM | SYSTOLIC BLOOD PRESSURE: 130 MMHG | BODY MASS INDEX: 31.69 KG/M2 | WEIGHT: 226.4 LBS | HEIGHT: 71 IN

## 2025-08-18 DIAGNOSIS — F33.1 MAJOR DEPRESSIVE DISORDER, RECURRENT EPISODE, MODERATE: ICD-10-CM

## 2025-08-18 DIAGNOSIS — F51.05 INSOMNIA DUE TO MENTAL CONDITION: ICD-10-CM

## 2025-08-18 DIAGNOSIS — F41.1 GENERALIZED ANXIETY DISORDER: Primary | ICD-10-CM

## 2025-08-18 DIAGNOSIS — E55.9 VITAMIN D DEFICIENCY, UNSPECIFIED: ICD-10-CM

## 2025-08-18 PROCEDURE — 90833 PSYTX W PT W E/M 30 MIN: CPT | Performed by: STUDENT IN AN ORGANIZED HEALTH CARE EDUCATION/TRAINING PROGRAM

## 2025-08-18 PROCEDURE — 99214 OFFICE O/P EST MOD 30 MIN: CPT | Performed by: STUDENT IN AN ORGANIZED HEALTH CARE EDUCATION/TRAINING PROGRAM

## 2025-08-18 PROCEDURE — 1160F RVW MEDS BY RX/DR IN RCRD: CPT | Performed by: STUDENT IN AN ORGANIZED HEALTH CARE EDUCATION/TRAINING PROGRAM

## 2025-08-18 PROCEDURE — 1159F MED LIST DOCD IN RCRD: CPT | Performed by: STUDENT IN AN ORGANIZED HEALTH CARE EDUCATION/TRAINING PROGRAM

## 2025-08-18 PROCEDURE — G2211 COMPLEX E/M VISIT ADD ON: HCPCS | Performed by: STUDENT IN AN ORGANIZED HEALTH CARE EDUCATION/TRAINING PROGRAM

## 2025-08-18 RX ORDER — FLUOXETINE 10 MG/1
10 CAPSULE ORAL DAILY
Qty: 90 CAPSULE | Refills: 1 | Status: SHIPPED | OUTPATIENT
Start: 2025-08-18

## 2025-08-18 RX ORDER — QUETIAPINE FUMARATE 25 MG/1
25 TABLET, FILM COATED ORAL NIGHTLY
Qty: 90 TABLET | Refills: 1 | Status: SHIPPED | OUTPATIENT
Start: 2025-08-18

## (undated) DEVICE — SNAR POLYP CAPTIFLEX XS/OVL 11X2.4MM 240CM 1P/U

## (undated) DEVICE — SOL IRRG H2O PL/BG 1000ML STRL

## (undated) DEVICE — SOLIDIFIER LIQLOC PLS 1500CC BT

## (undated) DEVICE — BLCK/BITE BLOX WO/DENTL/RIM W/STRAP 54F

## (undated) DEVICE — Device: Brand: DEFENDO AIR/WATER/SUCTION AND BIOPSY VALVE

## (undated) DEVICE — LINER SURG CANSTR SXN S/RIGD 1500CC

## (undated) DEVICE — THE SINGLE USE ETRAP – POLYP TRAP IS USED FOR SUCTION RETRIEVAL OF ENDOSCOPICALLY REMOVED POLYPS.: Brand: ETRAP

## (undated) DEVICE — CONN JET HYDRA H20 AUXILIARY DISP

## (undated) DEVICE — SOL IRR NACL 0.9PCT BT 1000ML

## (undated) DEVICE — SINGLE-USE BIOPSY FORCEPS: Brand: RADIAL JAW 4

## (undated) DEVICE — Device